# Patient Record
Sex: FEMALE | Race: WHITE | NOT HISPANIC OR LATINO | Employment: OTHER | URBAN - METROPOLITAN AREA
[De-identification: names, ages, dates, MRNs, and addresses within clinical notes are randomized per-mention and may not be internally consistent; named-entity substitution may affect disease eponyms.]

---

## 2017-01-11 ENCOUNTER — OFFICE VISIT (OUTPATIENT)
Dept: UROLOGY | Facility: CLINIC | Age: 42
End: 2017-01-11
Payer: MEDICARE

## 2017-01-11 DIAGNOSIS — N20.0 CALCULUS OF KIDNEY: Primary | ICD-10-CM

## 2017-01-11 DIAGNOSIS — Q05.5 SPINA BIFIDA OF CERVICAL REGION, UNSPECIFIED HYDROCEPHALUS PRESENCE: ICD-10-CM

## 2017-01-11 DIAGNOSIS — T19.1XXA FOREIGN BODY IN BLADDER AND URETHRA, INITIAL ENCOUNTER: ICD-10-CM

## 2017-01-11 DIAGNOSIS — T19.0XXA FOREIGN BODY IN BLADDER AND URETHRA, INITIAL ENCOUNTER: ICD-10-CM

## 2017-01-11 DIAGNOSIS — G91.9 HYDROCEPHALUS: ICD-10-CM

## 2017-01-11 DIAGNOSIS — G83.4 CAUDA EQUINA SYNDROME WITH NEUROGENIC BLADDER: ICD-10-CM

## 2017-01-11 PROCEDURE — 99212 OFFICE O/P EST SF 10 MIN: CPT | Mod: PBBFAC,PO | Performed by: UROLOGY

## 2017-01-11 PROCEDURE — 99999 PR PBB SHADOW E&M-EST. PATIENT-LVL II: CPT | Mod: PBBFAC,,, | Performed by: UROLOGY

## 2017-01-11 PROCEDURE — 99214 OFFICE O/P EST MOD 30 MIN: CPT | Mod: S$PBB,,, | Performed by: UROLOGY

## 2017-01-11 NOTE — PROGRESS NOTES
OFFICE NOTE    CHIEF COMPLAINT:  Neurogenic bladder, indwelling suprapubic catheter,   hydrocephalus, paraplegia, spina bifida, renal calculus.    HISTORY OF PRESENT ILLNESS:  This 41-year-old female returns for routine   recheck.  She has a history of neurogenic bladder as a result of her spina   bifida and hydrocephalus and bladder emptying is being managed with suprapubic tube   that is changed on a regular basis every three to four weeks by her mother and   she states overall she has been doing quite well, but the last time she did   notice some gross hematuria, which  subsequently resolved and the urine is   clear on today's visit.  The patient had been experiencing some intermittent   episodes of left-sided pain, which has experienced in the past.  A KUB was   obtained on 12/28/2016, which revealed a 15 mm calculus over the right kidney.    It was discussed with the patient and the family that we probably will need to   repeat a CT scan using kidney stone protocol, as the last one we obtained was in   2014, for better definition of the renal findings on the stone.    PHYSICAL EXAMINATION:  She has no CVA tenderness.  No abdominal tenderness.  The   suprapubic tube is in good place draining clear urine.    FINAL IMPRESSION:  Indwelling suprapubic tube in good position, neurogenic   bladder with resultant spina bifida, hydrocephalus, and right renal calculus.    RECOMMENDATION:  We will obtain CT scan using kidney stone protocol without   contrast and in the meantime, the mother will continue to change the suprapubic   tube on a regular basis as she has been doing and we will contact them with the   CT scan report.      DENY  dd: 01/11/2017 10:36:27 (CST)  td: 01/11/2017 22:47:17 (CST)  Doc ID   #[id number]  Job ID #[id number]    CC:

## 2017-01-11 NOTE — MR AVS SNAPSHOT
[facility]  [address]  [place] [address]  Phone: [phone]                  Briana Uriarte   2017 10:00 AM   Office Visit    Description:  Female : 1975   Provider:  Rosemarie Smith MD   Department:  [facility]           Reason for Visit     Follow-up           Diagnoses this Visit        Comments    Calculus of kidney    -  Primary     Cauda equina syndrome with neurogenic bladder         Foreign body in bladder and urethra, initial encounter         Spina bifida of cervical region, unspecified hydrocephalus presence         Hydrocephalus                To Do List           Future Appointments        Provider Department Dept Phone    2017 9:15 AM [facility] CT2 LIMIT 500 LBS [facility] [phone]    2017 1:40 PM Anaid Osei MD [facility] [phone]      Goals (5 Years of Data)     None      [facility]     [facility] -  Assistance  Registered nurses in the [facility] provide clinical advisement, health education, appointment booking, and other advisory services.  Call for this free service at [phone].             Medications           Message regarding Medications     Verify the changes and/or additions to your medication regime listed below are the same as discussed with your clinician today.  If any of these changes or additions are incorrect, please notify your healthcare provider.             Verify that the below list of medications is an accurate representation of the medications you are currently taking.  If none reported, the list may be blank. If incorrect, please contact your healthcare provider. Carry this list with you in case of emergency.           Current Medications     albuterol (PROAIR HFA) 90 mcg/actuation inhaler Inhale 2 puffs into the lungs every 6 (six) hours as needed for Wheezing.    ascorbic acid (VITAMIN C) 500 MG tablet  Take 500 mg by mouth once daily.    blood sugar diagnostic (FREESTYLE LITE STRIPS) Strp Use one test strip daily to test blood sugars    blood sugar diagnostic (FREESTYLE LITE STRIPS) Strp TEST BLOOD SUGAR ONCE DAILY    fexofenadine (ALLEGRA) 180 MG tablet Take 180 mg by mouth once daily.    fluocinolone acetonide oil (DERMOTIC OIL) 0.01 % Drop 5 drops in right ear bid x 7-10 days until resolution, use intermittenlty for flares.    fluticasone (FLONASE) 50 mcg/actuation nasal spray     furosemide (LASIX) 20 MG tablet Take 1 tablet (20 mg total) by mouth 2 (two) times daily. Sunday, Wednesday, Friday    hydrocodone-acetaminophen 7.5-325mg (NORCO) 7.5-325 mg per tablet Take 1 tablet by mouth every 6 (six) hours as needed.    lisinopril 10 MG tablet TAKE 1 TABLET BY MOUTH ONCE DAILY    metoprolol succinate (TOPROL-XL) 50 MG 24 hr tablet Take 1 tablet (50 mg total) by mouth once daily.    multivitamin with minerals tablet Take 1 tablet by mouth once daily.    nystatin (MYCOSTATIN) cream Apply topically 2 (two) times daily.    silver sulfADIAZINE 1% (SILVADENE) 1 % cream Apply topically once daily.    triamcinolone acetonide 0.1% (KENALOG) 0.1 % cream Apply topically 2 (two) times daily.    zinc oxide 40 % Oint Apply topically.           Clinical Reference Information           Allergies as of 1/11/2017     Cefepime    Latex      Immunizations Administered on Date of Encounter - 1/11/2017     None      Orders Placed During Today's Visit     Future Labs/Procedures Expected by Expires    CT Abdomen Pelvis  Without Contrast  1/11/2017 1/11/2018

## 2017-01-12 ENCOUNTER — HOSPITAL ENCOUNTER (OUTPATIENT)
Dept: RADIOLOGY | Facility: HOSPITAL | Age: 42
Discharge: HOME | End: 2017-01-12
Attending: UROLOGY
Payer: MEDICARE

## 2017-01-12 DIAGNOSIS — N20.0 CALCULUS OF KIDNEY: ICD-10-CM

## 2017-01-12 PROCEDURE — 74176 CT ABD & PELVIS W/O CONTRAST: CPT | Mod: 26,,, | Performed by: RADIOLOGY

## 2017-01-12 PROCEDURE — 74176 CT ABD & PELVIS W/O CONTRAST: CPT | Mod: TC

## 2017-01-16 ENCOUNTER — TELEPHONE (OUTPATIENT)
Dept: UROLOGY | Facility: CLINIC | Age: 42
End: 2017-01-16

## 2017-01-16 DIAGNOSIS — N20.0 CALCULUS OF KIDNEY: Primary | ICD-10-CM

## 2017-01-26 ENCOUNTER — HOSPITAL ENCOUNTER (OUTPATIENT)
Dept: RADIOLOGY | Facility: HOSPITAL | Age: 42
Discharge: HOME | End: 2017-01-26
Attending: UROLOGY
Payer: MEDICARE

## 2017-01-26 DIAGNOSIS — N20.0 CALCULUS OF KIDNEY: ICD-10-CM

## 2017-01-26 PROCEDURE — 78708 K FLOW/FUNCT IMAGE W/DRUG: CPT | Mod: TC

## 2017-01-26 PROCEDURE — 63600175 PHARM REV CODE 636 W HCPCS: Performed by: RADIOLOGY

## 2017-01-26 PROCEDURE — A9562 TC99M MERTIATIDE: HCPCS

## 2017-01-26 PROCEDURE — 78708 K FLOW/FUNCT IMAGE W/DRUG: CPT | Mod: 26,,, | Performed by: RADIOLOGY

## 2017-01-26 RX ORDER — FUROSEMIDE 10 MG/ML
INJECTION INTRAMUSCULAR; INTRAVENOUS
Status: DISPENSED
Start: 2017-01-26 | End: 2017-01-26

## 2017-01-26 RX ORDER — FUROSEMIDE 10 MG/ML
20 INJECTION INTRAMUSCULAR; INTRAVENOUS ONCE
Status: COMPLETED | OUTPATIENT
Start: 2017-01-26 | End: 2017-01-26

## 2017-01-26 RX ADMIN — FUROSEMIDE 20 MG: 10 INJECTION, SOLUTION INTRAMUSCULAR; INTRAVENOUS at 11:01

## 2017-01-30 ENCOUNTER — PATIENT MESSAGE (OUTPATIENT)
Dept: UROLOGY | Facility: CLINIC | Age: 42
End: 2017-01-30

## 2017-01-30 ENCOUNTER — PATIENT MESSAGE (OUTPATIENT)
Dept: FAMILY MEDICINE | Facility: CLINIC | Age: 42
End: 2017-01-30

## 2017-01-30 ENCOUNTER — TELEPHONE (OUTPATIENT)
Dept: UROLOGY | Facility: CLINIC | Age: 42
End: 2017-01-30

## 2017-01-30 DIAGNOSIS — Q76.0 SPINA BIFIDA OCCULTA: ICD-10-CM

## 2017-01-30 DIAGNOSIS — Q05.9 SPINA BIFIDA MANIFESTA: ICD-10-CM

## 2017-01-30 RX ORDER — HYDROCODONE BITARTRATE AND ACETAMINOPHEN 7.5; 325 MG/1; MG/1
1 TABLET ORAL EVERY 6 HOURS PRN
Qty: 120 TABLET | Refills: 0 | Status: SHIPPED | OUTPATIENT
Start: 2017-01-30 | End: 2017-03-10 | Stop reason: SDUPTHER

## 2017-01-30 NOTE — TELEPHONE ENCOUNTER
----- Message from Randy Pulliam sent at 1/30/2017 11:02 AM CST -----  Contact: pt's dad Blaze  Pt's  Dad returning call, call placed to pod no answer  Call Back#[phone] or   Thanks

## 2017-01-30 NOTE — TELEPHONE ENCOUNTER
----- Message from Rosemarie Smith MD sent at 1/29/2017  6:37 PM CST -----  Renal scan - nonfunctioning left kidney as noted on prior studies                        Normal functioning right kidney    Rec - recheck 3-4 months

## 2017-02-01 ENCOUNTER — PATIENT MESSAGE (OUTPATIENT)
Dept: FAMILY MEDICINE | Facility: CLINIC | Age: 42
End: 2017-02-01

## 2017-02-13 ENCOUNTER — OFFICE VISIT (OUTPATIENT)
Dept: PODIATRY | Facility: CLINIC | Age: 42
End: 2017-02-13
Payer: MEDICARE

## 2017-02-13 ENCOUNTER — HOSPITAL ENCOUNTER (OUTPATIENT)
Dept: RADIOLOGY | Facility: CLINIC | Age: 42
Discharge: HOME | End: 2017-02-13
Attending: PODIATRIST
Payer: MEDICARE

## 2017-02-13 ENCOUNTER — DOCUMENTATION ONLY (OUTPATIENT)
Dept: FAMILY MEDICINE | Facility: CLINIC | Age: 42
End: 2017-02-13

## 2017-02-13 VITALS — WEIGHT: 265 LBS | HEIGHT: 61 IN | BODY MASS INDEX: 50.03 KG/M2

## 2017-02-13 DIAGNOSIS — G82.20 PARAPLEGIA: ICD-10-CM

## 2017-02-13 DIAGNOSIS — I87.2 PERIPHERAL VENOUS INSUFFICIENCY: ICD-10-CM

## 2017-02-13 DIAGNOSIS — E11.42 CONTROLLED TYPE 2 DIABETES MELLITUS WITH DIABETIC POLYNEUROPATHY, UNSPECIFIED LONG TERM INSULIN USE STATUS: ICD-10-CM

## 2017-02-13 DIAGNOSIS — R23.8 SKIN BULLA: Primary | ICD-10-CM

## 2017-02-13 DIAGNOSIS — R23.8 SKIN BULLA: ICD-10-CM

## 2017-02-13 PROCEDURE — 73630 X-RAY EXAM OF FOOT: CPT | Mod: TC,PO,RT

## 2017-02-13 PROCEDURE — 10140 I&D HMTMA SEROMA/FLUID COLLJ: CPT | Mod: S$PBB,,, | Performed by: PODIATRIST

## 2017-02-13 PROCEDURE — 99999 PR PBB SHADOW E&M-EST. PATIENT-LVL III: CPT | Mod: PBBFAC,,, | Performed by: PODIATRIST

## 2017-02-13 PROCEDURE — 99213 OFFICE O/P EST LOW 20 MIN: CPT | Mod: 25,S$PBB,, | Performed by: PODIATRIST

## 2017-02-13 PROCEDURE — 73630 X-RAY EXAM OF FOOT: CPT | Mod: 26,RT,S$GLB, | Performed by: RADIOLOGY

## 2017-02-13 NOTE — MR AVS SNAPSHOT
[facility]  [address]  [place] [address]  Phone: [phone]                  Briana Uriarte   2017 10:00 AM   Office Visit    Description:  Female : 1975   Provider:  Panchito Blue DPM   Department:  [facility]           Reason for Visit     Foot Problem           Diagnoses this Visit        Comments    Skin bulla    -  Primary     Controlled type 2 diabetes mellitus with diabetic polyneuropathy, unspecified long term insulin use status         Paraplegia         Peripheral venous insufficiency                To Do List           Future Appointments        Provider Department Dept Phone    2017 10:20 AM Anaid Osei MD [facility] [phone]    2017 10:45 AM [facility] XR1 [facility] [phone]    2017 10:15 AM Panchito Blue DPM [facility] [phone]    2017 1:40 PM Anaid Osei MD [facility] [phone]    2017 10:00 AM Rosemarie Smith MD [facility] [phone]      Goals (5 Years of Data)     None      Follow-Up and Disposition     Return in about 1 week (around 2017) for ulcer right foot.      [facility]     [facility] On Call Nurse Care Line -  Assistance  Registered nurses in the [facility] On Call Center provide clinical advisement, health education, appointment booking, and other advisory services.  Call for this free service at [phone].             Medications           Message regarding Medications     Verify the changes and/or additions to your medication regime listed below are the same as discussed with your clinician today.  If any of these changes or additions are incorrect, please notify your healthcare provider.             Verify that the below list of medications is an accurate representation of the medications you are currently taking.  If none reported, the list may be blank. If incorrect, please contact your healthcare provider.  "Carry this list with you in case of emergency.           Current Medications     albuterol (PROAIR HFA) 90 mcg/actuation inhaler Inhale 2 puffs into the lungs every 6 (six) hours as needed for Wheezing.    ascorbic acid (VITAMIN C) 500 MG tablet Take 500 mg by mouth once daily.    blood sugar diagnostic (FREESTYLE LITE STRIPS) Strp Use one test strip daily to test blood sugars    blood sugar diagnostic (FREESTYLE LITE STRIPS) Strp TEST BLOOD SUGAR ONCE DAILY    fexofenadine (ALLEGRA) 180 MG tablet Take 180 mg by mouth once daily.    fluocinolone acetonide oil (DERMOTIC OIL) 0.01 % Drop 5 drops in right ear bid x 7-10 days until resolution, use intermittenlty for flares.    fluticasone (FLONASE) 50 mcg/actuation nasal spray     furosemide (LASIX) 20 MG tablet Take 1 tablet (20 mg total) by mouth 2 (two) times daily. Sunday, Wednesday, Friday    hydrocodone-acetaminophen 7.5-325mg (NORCO) 7.5-325 mg per tablet Take 1 tablet by mouth every 6 (six) hours as needed.    lisinopril 10 MG tablet TAKE 1 TABLET BY MOUTH ONCE DAILY    metoprolol succinate (TOPROL-XL) 50 MG 24 hr tablet Take 1 tablet (50 mg total) by mouth once daily.    multivitamin with minerals tablet Take 1 tablet by mouth once daily.    nystatin (MYCOSTATIN) cream Apply topically 2 (two) times daily.    silver sulfADIAZINE 1% (SILVADENE) 1 % cream Apply topically once daily.    triamcinolone acetonide 0.1% (KENALOG) 0.1 % cream Apply topically 2 (two) times daily.    zinc oxide 40 % Oint Apply topically.           Clinical Reference Information           Your Vitals Were     Height Weight BMI          5' 1" (1.549 m) 120.2 kg (265 lb) 50.07 kg/m2        Allergies as of 2/13/2017     Cefepime    Latex      Immunizations Administered on Date of Encounter - 2/13/2017     None      Orders Placed During Today's Visit     Future Labs/Procedures Expected by Expires    X-Ray Foot Complete Right  2/13/2017 2/13/2018      Language Assistance Services     ATTENTION: " Language assistance services are available, free of charge. Please call [phone].      ATENCIÓN: Si habla joan, tiene a park disposición servicios gratuitos de asistencia lingüística. Llame al [phone].     CHÚ Ý: N?u b?n nói Ti?ng Vi?t, có các d?ch v? h? tr? ngôn ng? mi?n phí dành cho b?n. G?i s? [phone].         [facility] complies with applicable Federal civil rights laws and does not discriminate on the basis of race, color, national origin, age, disability, or sex.

## 2017-02-13 NOTE — PROGRESS NOTES
Pre-Visit Chart Review  For Appointment Scheduled on 2-13-17    Health Maintenance Due   Topic Date Due    TETANUS VACCINE  09/24/1993    Pap Smear  09/24/1996    Mammogram  09/24/2015    Eye Exam  02/16/2017

## 2017-02-13 NOTE — PROGRESS NOTES
Subjective:      Patient ID: Briana Uriarte is a 41 y.o. female.    Chief Complaint: Foot Problem (right foot blister)    Clear blister right foot at proximal medial arch indicated navicular tuberosity with index finger.  Gradual onset, worsening over past 2 days, aggravated by increased weight bearing, shoe gear, pressure.  No previous medical treatment.  OTC pain med not helping.  Denies trauma, signs infection.      Review of Systems   Constitution: Negative for chills, diaphoresis, fever, malaise/fatigue and night sweats.   Cardiovascular: Negative for claudication, cyanosis, leg swelling and syncope.   Skin: Positive for suspicious lesions. Negative for color change, dry skin, rash and unusual hair distribution.   Musculoskeletal: Negative for falls, joint pain, joint swelling, muscle cramps, muscle weakness and stiffness.   Gastrointestinal: Negative for constipation, diarrhea, nausea and vomiting.   Neurological: Positive for focal weakness and numbness. Negative for brief paralysis, disturbances in coordination, paresthesias, sensory change and tremors.           Objective:      Physical Exam   Constitutional: She appears well-developed and well-nourished. She is cooperative. No distress.   Cardiovascular:   Pulses:       Dorsalis pedis pulses are 1+ on the right side, and 1+ on the left side.        Posterior tibial pulses are 1+ on the right side, and 1+ on the left side.   All toes warm, pink    2+ pitting edema both legs consistent with stasis.     Musculoskeletal:   parapelegia both legs.   Lymphadenopathy:   Negative lymphadenopathy bilateral popliteal fossa and tarsal tunnel.     Neurological: A sensory deficit is present.   Diminished/loss of protective sensation all toes bilateral to 10 gram monofilament.     Skin:   Wound:medial right arch  Size:    Pre:[id number] - clear tense bulla  Post: [id number]    Base:  Granular, pink with moderate serous/serosanaguinous drainage only.  No pus,  tracking, fluctuance, malodor, or cardinal signs infection.    Borders:   flat, pink, blanchable skin edges without undermining.                 Assessment:       Encounter Diagnoses   Name Primary?    Skin bulla Yes    Controlled type 2 diabetes mellitus with diabetic polyneuropathy, unspecified long term insulin use status     Paraplegia     Peripheral venous insufficiency          Plan:       Briana was seen today for foot problem.    Diagnoses and all orders for this visit:    Skin bulla  -     X-Ray Foot Complete Right; Future    Controlled type 2 diabetes mellitus with diabetic polyneuropathy, unspecified long term insulin use status    Paraplegia    Peripheral venous insufficiency      I counseled the patient on her conditions, their implications and medical management.    This procedure has been fully reviewed with the patient and verbal informed consent mother and patient -has been obtained.    Time out performed prior to anesthetizing the surgical area and all patient identifiers, procedures, and site markings are in agreement.      Dict:    [id number]    Swab wound with betadine once daily and cover with wound foam, kerlix, ACE with light compression RLE changing every 1-2 days or on strike through.      X-ray right foot.        Return in about 1 week (around 2/20/2017) for ulcer right foot.

## 2017-02-13 NOTE — PROCEDURES
DATE OF PROCEDURE:  02/13/2017    PREOPERATIVE DIAGNOSIS:  Bulla, right foot.    POSTOPERATIVE DIAGNOSIS:  Bulla right foot.    PROCEDURE:  Drainage, bulla right foot.    SURGEON:  Panchito Blue DPM    ASSISTANT:  No surgical assist.    ESTIMATED BLOOD LOSS:  Minimal, being less than 1 mL.    HEMOSTASIS:  Anatomic dissection, direct pressure manually.    ANESTHESIA:  The patient's own  peripheral neuropathy from spina bifida.    PROCEDURE IN DETAIL:  The skin of the right foot was prepped and draped in the   office, creating small sterile field.  A sterile #15 blade was then used to   puncture and circumscribe the bulla, which began as a tense clear bulla and   resulted after debridement in a 57 x 43 x 1 mm ulceration.  All the nonviable   wound elements were removed and passed from the operative site as well as the   clear fluid.  The wound was then swabbed with Betadine and covered with a   sterile dressing of wound foam and Kerlix to secure and Ace bandage with light   compression from toes to ankle were then applied over the dressing.  Her family   will change the dressing in the same way approximately every 1 to 2 days or upon   strike through.  The patient will continue regular diet.  She is discharged   today under the care of her family and will follow in this office in 1 week for   reevaluation and monitoring or sooner p.r.n.    /abiel [id number] laterality      MARYELLEN/  dd: 02/13/2017 10:16:58 (CST)  td: 02/13/2017 10:41:12 (CST)  Doc ID   #[id number]  Job ID #[id number]    CC:

## 2017-02-16 ENCOUNTER — PATIENT MESSAGE (OUTPATIENT)
Dept: PODIATRY | Facility: CLINIC | Age: 42
End: 2017-02-16

## 2017-02-20 ENCOUNTER — OFFICE VISIT (OUTPATIENT)
Dept: PODIATRY | Facility: CLINIC | Age: 42
End: 2017-02-20
Payer: MEDICARE

## 2017-02-20 VITALS — BODY MASS INDEX: 50.03 KG/M2 | HEIGHT: 61 IN | WEIGHT: 265 LBS

## 2017-02-20 DIAGNOSIS — L97.511 FOOT ULCER, RIGHT, LIMITED TO BREAKDOWN OF SKIN: Primary | ICD-10-CM

## 2017-02-20 DIAGNOSIS — G82.20 PARAPLEGIA: ICD-10-CM

## 2017-02-20 DIAGNOSIS — E11.42 CONTROLLED TYPE 2 DIABETES MELLITUS WITH DIABETIC POLYNEUROPATHY, UNSPECIFIED LONG TERM INSULIN USE STATUS: ICD-10-CM

## 2017-02-20 PROCEDURE — 99213 OFFICE O/P EST LOW 20 MIN: CPT | Mod: PBBFAC,PO | Performed by: PODIATRIST

## 2017-02-20 PROCEDURE — 99024 POSTOP FOLLOW-UP VISIT: CPT | Mod: S$PBB,,, | Performed by: PODIATRIST

## 2017-02-20 PROCEDURE — 99999 PR PBB SHADOW E&M-EST. PATIENT-LVL III: CPT | Mod: PBBFAC,,, | Performed by: PODIATRIST

## 2017-02-20 NOTE — MR AVS SNAPSHOT
[facility]  [address] [place] [address]  Phone: [phone]                  Briana Uriarte   2017 10:15 AM   Office Visit    Description:  Female : 1975   Provider:  Panchito Blue DPM   Department:  [facility]           Reason for Visit     Foot Ulcer           Diagnoses this Visit        Comments    Foot ulcer, right, limited to breakdown of skin    -  Primary     Controlled type 2 diabetes mellitus with diabetic polyneuropathy, unspecified long term insulin use status         Paraplegia                To Do List           Future Appointments        Provider Department Dept Phone    3/1/2017 1:00 PM Panchito Blue DPM [facility] [phone]    2017 1:40 PM Anaid Osei MD [facility] [phone]    2017 10:00 AM Rosemarie Smith MD [facility] [phone]      Goals (5 Years of Data)     None      Follow-Up and Disposition     Return in about 1 week (around 2017) for wound care.      [facility]     [facility] -  Assistance  Registered nurses in the [facility] provide clinical advisement, health education, appointment booking, and other advisory services.  Call for this free service at [phone].             Medications           Message regarding Medications     Verify the changes and/or additions to your medication regime listed below are the same as discussed with your clinician today.  If any of these changes or additions are incorrect, please notify your healthcare provider.             Verify that the below list of medications is an accurate representation of the medications you are currently taking.  If none reported, the list may be blank. If incorrect, please contact your healthcare provider. Carry this list with you in case of emergency.           Current Medications     albuterol (PROAIR HFA) 90 mcg/actuation inhaler Inhale 2 puffs into the lungs  "every 6 (six) hours as needed for Wheezing.    ascorbic acid (VITAMIN C) 500 MG tablet Take 500 mg by mouth once daily.    blood sugar diagnostic (FREESTYLE LITE STRIPS) Strp Use one test strip daily to test blood sugars    blood sugar diagnostic (FREESTYLE LITE STRIPS) Strp TEST BLOOD SUGAR ONCE DAILY    fexofenadine (ALLEGRA) 180 MG tablet Take 180 mg by mouth once daily.    fluocinolone acetonide oil (DERMOTIC OIL) 0.01 % Drop 5 drops in right ear bid x 7-10 days until resolution, use intermittenlty for flares.    fluticasone (FLONASE) 50 mcg/actuation nasal spray     furosemide (LASIX) 20 MG tablet Take 1 tablet (20 mg total) by mouth 2 (two) times daily. Sunday, Wednesday, Friday    hydrocodone-acetaminophen 7.5-325mg (NORCO) 7.5-325 mg per tablet Take 1 tablet by mouth every 6 (six) hours as needed.    lisinopril 10 MG tablet TAKE 1 TABLET BY MOUTH ONCE DAILY    metoprolol succinate (TOPROL-XL) 50 MG 24 hr tablet Take 1 tablet (50 mg total) by mouth once daily.    multivitamin with minerals tablet Take 1 tablet by mouth once daily.    nystatin (MYCOSTATIN) cream Apply topically 2 (two) times daily.    silver sulfADIAZINE 1% (SILVADENE) 1 % cream Apply topically once daily.    triamcinolone acetonide 0.1% (KENALOG) 0.1 % cream Apply topically 2 (two) times daily.    zinc oxide 40 % Oint Apply topically.           Clinical Reference Information           Your Vitals Were     Height Weight BMI          5' 1" (1.549 m) 120.2 kg (264 lb 15.9 oz) 50.07 kg/m2        Allergies as of 2/20/2017     Cefepime    Latex      Immunizations Administered on Date of Encounter - 2/20/2017     None      Language Assistance Services     ATTENTION: Language assistance services are available, free of charge. Please call [phone].      ATENCIÓN: Si machola joan, tiene a park disposición servicios gratuitos de asistencia lingüística. Llame al [phone].     CHÚ Ý: N?u b?n nói Ti?ng Vi?t, có các d?ch v? h? tr? ngôn ng? mi?n " phí dành cho b?n. G?i s? [phone].         [facility] complies with applicable Federal civil rights laws and does not discriminate on the basis of race, color, national origin, age, disability, or sex.

## 2017-02-20 NOTE — PROGRESS NOTES
"Subjective:      Patient ID: Briana Uriarte is a 41 y.o. female.    Chief Complaint: Foot Ulcer (right foot)    Wound right medial arch from bulla last week.  Dressing with telfa, kerlix.  "draining a lot".  xrays negative focal osteolysis.  Review of Systems   Constitution: Negative for chills, fever, malaise/fatigue and night sweats.   Cardiovascular: Positive for leg swelling. Negative for claudication and cyanosis.   Skin: Positive for poor wound healing. Negative for color change, itching, rash and suspicious lesions.   Musculoskeletal: Negative for falls, gout, joint pain and myalgias.   Neurological: Positive for numbness and sensory change.           Objective:      Physical Exam   Constitutional: She appears well-developed and well-nourished. She is cooperative. No distress.   Cardiovascular:   Pulses:       Dorsalis pedis pulses are 1+ on the right side, and 1+ on the left side.        Posterior tibial pulses are 1+ on the right side, and 1+ on the left side.   All toes warm, pink    2+ pitting edema both legs consistent with stasis.     Musculoskeletal:   parapelegia both legs.   Lymphadenopathy:   Negative lymphadenopathy bilateral popliteal fossa and tarsal tunnel.     Neurological: A sensory deficit is present.   Diminished/loss of protective sensation all toes bilateral to 10 gram monofilament.     Skin:   Wound:medial right arch  Size:    Pre: [id number]    Base:  Granular, pink with moderate serous/serosanaguinous drainage only.  No pus, tracking, fluctuance, malodor, or cardinal signs infection.    Borders:  Atrophic flat, pink, blanchable skin edges without undermining.                 Assessment:       Encounter Diagnoses   Name Primary?    Foot ulcer, right, limited to breakdown of skin Yes    Controlled type 2 diabetes mellitus with diabetic polyneuropathy, unspecified long term insulin use status     Paraplegia          Plan:       Briana was seen today for foot ulcer.    Diagnoses " and all orders for this visit:    Foot ulcer, right, limited to breakdown of skin    Controlled type 2 diabetes mellitus with diabetic polyneuropathy, unspecified long term insulin use status    Paraplegia      I counseled the patient on her conditions, their implications and medical management.        Cover wound base with betadine once daily.    Apply aquacel Ag to wound base.    Cover with wound foam, kerlix to secure.    tubigrip over toes to ankle 12 hours daily.              Return in about 1 week (around 2/27/2017) for wound care.

## 2017-03-01 ENCOUNTER — OFFICE VISIT (OUTPATIENT)
Dept: PODIATRY | Facility: CLINIC | Age: 42
End: 2017-03-01
Payer: MEDICARE

## 2017-03-01 VITALS — HEIGHT: 61 IN

## 2017-03-01 DIAGNOSIS — G82.20 PARAPLEGIA: ICD-10-CM

## 2017-03-01 DIAGNOSIS — E11.42 CONTROLLED TYPE 2 DIABETES MELLITUS WITH DIABETIC POLYNEUROPATHY, UNSPECIFIED LONG TERM INSULIN USE STATUS: ICD-10-CM

## 2017-03-01 DIAGNOSIS — L97.511 FOOT ULCER, RIGHT, LIMITED TO BREAKDOWN OF SKIN: Primary | ICD-10-CM

## 2017-03-01 PROCEDURE — 99212 OFFICE O/P EST SF 10 MIN: CPT | Mod: S$PBB,,, | Performed by: PODIATRIST

## 2017-03-01 PROCEDURE — 99212 OFFICE O/P EST SF 10 MIN: CPT | Mod: PBBFAC,PO | Performed by: PODIATRIST

## 2017-03-01 PROCEDURE — 99999 PR PBB SHADOW E&M-EST. PATIENT-LVL II: CPT | Mod: PBBFAC,,, | Performed by: PODIATRIST

## 2017-03-01 NOTE — MR AVS SNAPSHOT
[facility]  [address] [place] [address]  Phone: [phone]                  Briana Uriarte   3/1/2017 1:00 PM   Office Visit    Description:  Female : 1975   Provider:  Panchito Blue DPM   Department:  [facility]           Reason for Visit     Foot Ulcer           Diagnoses this Visit        Comments    Foot ulcer, right, limited to breakdown of skin    -  Primary     Controlled type 2 diabetes mellitus with diabetic polyneuropathy, unspecified long term insulin use status         Paraplegia                To Do List           Future Appointments        Provider Department Dept Phone    3/9/2017 10:45 AM Panchito Blue DPM [facility] [phone]    2017 1:40 PM Anaid Osei MD [facility] [phone]    2017 10:00 AM [facility] Smith [facility] [phone]      Goals (5 Years of Data)     None      Follow-Up and Disposition     Return in about 1 week (around 3/8/2017) for wound care.      [facility]     [facility] -  Assistance  Registered nurses in the [facility] provide clinical advisement, health education, appointment booking, and other advisory services.  Call for this free service at [phone].             Medications           Message regarding Medications     Verify the changes and/or additions to your medication regime listed below are the same as discussed with your clinician today.  If any of these changes or additions are incorrect, please notify your healthcare provider.             Verify that the below list of medications is an accurate representation of the medications you are currently taking.  If none reported, the list may be blank. If incorrect, please contact your healthcare provider. Carry this list with you in case of emergency.           Current Medications     albuterol (PROAIR HFA) 90 mcg/actuation inhaler Inhale 2 puffs into the lungs  "every 6 (six) hours as needed for Wheezing.    ascorbic acid (VITAMIN C) 500 MG tablet Take 500 mg by mouth once daily.    blood sugar diagnostic (FREESTYLE LITE STRIPS) Strp Use one test strip daily to test blood sugars    blood sugar diagnostic (FREESTYLE LITE STRIPS) Strp TEST BLOOD SUGAR ONCE DAILY    fexofenadine (ALLEGRA) 180 MG tablet Take 180 mg by mouth once daily.    fluocinolone acetonide oil (DERMOTIC OIL) 0.01 % Drop 5 drops in right ear bid x 7-10 days until resolution, use intermittenlty for flares.    fluticasone (FLONASE) 50 mcg/actuation nasal spray     furosemide (LASIX) 20 MG tablet Take 1 tablet (20 mg total) by mouth 2 (two) times daily. Sunday, Wednesday, Friday    hydrocodone-acetaminophen 7.5-325mg (NORCO) 7.5-325 mg per tablet Take 1 tablet by mouth every 6 (six) hours as needed.    lisinopril 10 MG tablet TAKE 1 TABLET BY MOUTH ONCE DAILY    metoprolol succinate (TOPROL-XL) 50 MG 24 hr tablet Take 1 tablet (50 mg total) by mouth once daily.    multivitamin with minerals tablet Take 1 tablet by mouth once daily.    nystatin (MYCOSTATIN) cream Apply topically 2 (two) times daily.    silver sulfADIAZINE 1% (SILVADENE) 1 % cream Apply topically once daily.    triamcinolone acetonide 0.1% (KENALOG) 0.1 % cream Apply topically 2 (two) times daily.    zinc oxide 40 % Oint Apply topically.           Clinical Reference Information           Your Vitals Were     Height                   5' 1" (1.549 m)           Allergies as of 3/1/2017     Cefepime    Latex      Immunizations Administered on Date of Encounter - 3/1/2017     None      Language Assistance Services     ATTENTION: Language assistance services are available, free of charge. Please call [phone].      ATENCIÓN: Si oskar franco, tiene a park disposición servicios gratuitos de asistencia lingüística. Llame al [phone].     CHÚ Ý: N?u b?n nói Ti?ng Vi?t, có các d?ch v? h? tr? ngôn ng? mi?n phí dành cho b?n. G?i s? " [phone].         [facility] complies with applicable Federal civil rights laws and does not discriminate on the basis of race, color, national origin, age, disability, or sex.

## 2017-03-01 NOTE — PROGRESS NOTES
Subjective:      Patient ID: Briana Uriarte is a 41 y.o. female.    Chief Complaint: Foot Ulcer (right)    Wound right medial arch from bulla.  Dressing with aquacel, kerlix changes every other day.  xrays negative focal osteolysis.  Review of Systems   Constitution: Negative for chills, fever, malaise/fatigue and night sweats.   Cardiovascular: Positive for leg swelling. Negative for claudication and cyanosis.   Skin: Positive for poor wound healing. Negative for color change, itching, rash and suspicious lesions.   Musculoskeletal: Negative for falls, gout, joint pain and myalgias.   Neurological: Positive for numbness and sensory change.           Objective:      Physical Exam   Constitutional: She appears well-developed and well-nourished. She is cooperative. No distress.   Cardiovascular:   Pulses:       Dorsalis pedis pulses are 1+ on the right side, and 1+ on the left side.        Posterior tibial pulses are 1+ on the right side, and 1+ on the left side.   All toes warm, pink    2+ pitting edema both legs consistent with stasis.     Musculoskeletal:   parapelegia both legs.   Lymphadenopathy:   Negative lymphadenopathy bilateral popliteal fossa and tarsal tunnel.     Neurological: A sensory deficit is present.   Diminished/loss of protective sensation all toes bilateral to 10 gram monofilament.     Skin:   Wound:medial right arch  Size:    Pre: [id number]    Base:  Granular, pink with moderate serous/serosanaguinous drainage only.  No pus, tracking, fluctuance, malodor, or cardinal signs infection.    Borders:  Atrophic flat, pink, blanchable skin edges without undermining.                 Assessment:       Encounter Diagnoses   Name Primary?    Foot ulcer, right, limited to breakdown of skin Yes    Controlled type 2 diabetes mellitus with diabetic polyneuropathy, unspecified long term insulin use status     Paraplegia          Plan:       Briana was seen today for foot ulcer.    Diagnoses and all  orders for this visit:    Foot ulcer, right, limited to breakdown of skin    Controlled type 2 diabetes mellitus with diabetic polyneuropathy, unspecified long term insulin use status    Paraplegia      I counseled the patient on her conditions, their implications and medical management.    Stop aquacel.    Cover with triple antibiotic ointment, wound foam, kerlix to secure.    tubigrip over toes to ankle 12 hours daily.              Return in about 1 week (around 3/8/2017) for wound care.

## 2017-03-09 ENCOUNTER — OFFICE VISIT (OUTPATIENT)
Dept: PODIATRY | Facility: CLINIC | Age: 42
End: 2017-03-09
Payer: MEDICARE

## 2017-03-09 VITALS — BODY MASS INDEX: 49.95 KG/M2 | WEIGHT: 264.56 LBS | HEIGHT: 61 IN

## 2017-03-09 DIAGNOSIS — G82.20 PARAPLEGIA: ICD-10-CM

## 2017-03-09 DIAGNOSIS — R60.9 EDEMA, UNSPECIFIED TYPE: ICD-10-CM

## 2017-03-09 DIAGNOSIS — Z87.898 HISTORY OF ULCERATION: Primary | ICD-10-CM

## 2017-03-09 DIAGNOSIS — E11.42 CONTROLLED TYPE 2 DIABETES MELLITUS WITH DIABETIC POLYNEUROPATHY, UNSPECIFIED LONG TERM INSULIN USE STATUS: ICD-10-CM

## 2017-03-09 PROCEDURE — 99999 PR PBB SHADOW E&M-EST. PATIENT-LVL III: CPT | Mod: PBBFAC,,, | Performed by: PODIATRIST

## 2017-03-09 PROCEDURE — 99212 OFFICE O/P EST SF 10 MIN: CPT | Mod: S$PBB,,, | Performed by: PODIATRIST

## 2017-03-09 PROCEDURE — 99213 OFFICE O/P EST LOW 20 MIN: CPT | Mod: PBBFAC,PO | Performed by: PODIATRIST

## 2017-03-09 NOTE — MR AVS SNAPSHOT
[facility]  [address]  [place] [address]  Phone: [phone]                  Briana Uriarte   3/9/2017 10:45 AM   Office Visit    Description:  Female : 1975   Provider:  Panchito Blue DPM   Department:  [facility]           Reason for Visit     Foot Ulcer           Diagnoses this Visit        Comments    History of ulceration    -  Primary     Controlled type 2 diabetes mellitus with diabetic polyneuropathy, unspecified long term insulin use status         Paraplegia         Edema, unspecified type                To Do List           Future Appointments        Provider Department Dept Phone    3/23/2017 10:45 AM Panchito Blue DPM [facility] [phone]    2017 1:40 PM Anaid Osei MD [facility] [phone]    2017 10:00 AM Rosemarie Smith MD [facility] [phone]      Goals (5 Years of Data)     None      Follow-Up and Disposition     Return in about 2 weeks (around 3/23/2017) for wound right heel - closed.      [facility]     [facility] -  Assistance  Registered nurses in the [facility] provide clinical advisement, health education, appointment booking, and other advisory services.  Call for this free service at [phone].             Medications           Message regarding Medications     Verify the changes and/or additions to your medication regime listed below are the same as discussed with your clinician today.  If any of these changes or additions are incorrect, please notify your healthcare provider.             Verify that the below list of medications is an accurate representation of the medications you are currently taking.  If none reported, the list may be blank. If incorrect, please contact your healthcare provider. Carry this list with you in case of emergency.           Current Medications     albuterol (PROAIR HFA) 90 mcg/actuation inhaler Inhale 2  "puffs into the lungs every 6 (six) hours as needed for Wheezing.    ascorbic acid (VITAMIN C) 500 MG tablet Take 500 mg by mouth once daily.    blood sugar diagnostic (FREESTYLE LITE STRIPS) Strp Use one test strip daily to test blood sugars    blood sugar diagnostic (FREESTYLE LITE STRIPS) Strp TEST BLOOD SUGAR ONCE DAILY    fexofenadine (ALLEGRA) 180 MG tablet Take 180 mg by mouth once daily.    fluocinolone acetonide oil (DERMOTIC OIL) 0.01 % Drop 5 drops in right ear bid x 7-10 days until resolution, use intermittenlty for flares.    fluticasone (FLONASE) 50 mcg/actuation nasal spray     furosemide (LASIX) 20 MG tablet Take 1 tablet (20 mg total) by mouth 2 (two) times daily. Sunday, Wednesday, Friday    hydrocodone-acetaminophen 7.5-325mg (NORCO) 7.5-325 mg per tablet Take 1 tablet by mouth every 6 (six) hours as needed.    lisinopril 10 MG tablet TAKE 1 TABLET BY MOUTH ONCE DAILY    metoprolol succinate (TOPROL-XL) 50 MG 24 hr tablet Take 1 tablet (50 mg total) by mouth once daily.    multivitamin with minerals tablet Take 1 tablet by mouth once daily.    nystatin (MYCOSTATIN) cream Apply topically 2 (two) times daily.    silver sulfADIAZINE 1% (SILVADENE) 1 % cream Apply topically once daily.    triamcinolone acetonide 0.1% (KENALOG) 0.1 % cream Apply topically 2 (two) times daily.    zinc oxide 40 % Oint Apply topically.           Clinical Reference Information           Your Vitals Were     Height Weight BMI          5' 1" (1.549 m) 120 kg (264 lb 8.8 oz) 49.99 kg/m2        Allergies as of 3/9/2017     Cefepime    Latex      Immunizations Administered on Date of Encounter - 3/9/2017     None      Language Assistance Services     ATTENTION: Language assistance services are available, free of charge. Please call [phone].      ATENCIÓN: Si machola joan, tiene a park disposición servicios gratuitos de asistencia lingüística. Llame al [phone].     CHÚ Ý: N?u b?n nói Ti?ng Vi?t, có các d?ch v? h? tr? " cassandra doherty? mi?n phí dành cho b?n. G?i s? [phone].         [facility] complies with applicable Federal civil rights laws and does not discriminate on the basis of race, color, national origin, age, disability, or sex.

## 2017-03-09 NOTE — PROGRESS NOTES
Subjective:      Patient ID: Briana Uriarte is a 41 y.o. female.    Chief Complaint: Foot Ulcer (right foot)    Wound right medial arch from bulla.  Dressing with aquacel, kerlix changes every other day.  xrays negative focal osteolysis.    Review of Systems   Constitution: Negative for chills, fever, malaise/fatigue and night sweats.   Cardiovascular: Positive for leg swelling. Negative for claudication and cyanosis.   Skin: Positive for poor wound healing. Negative for color change, itching, rash and suspicious lesions.   Musculoskeletal: Negative for falls, gout, joint pain and myalgias.   Neurological: Positive for numbness and sensory change.           Objective:      Physical Exam   Constitutional: She appears well-developed and well-nourished. She is cooperative. No distress.   Cardiovascular:   Pulses:       Dorsalis pedis pulses are 1+ on the right side, and 1+ on the left side.        Posterior tibial pulses are 1+ on the right side, and 1+ on the left side.   All toes warm, pink    2+ pitting edema both legs consistent with stasis.     Musculoskeletal:   parapelegia both legs.   Lymphadenopathy:   Negative lymphadenopathy bilateral popliteal fossa and tarsal tunnel.     Neurological: A sensory deficit is present.   Diminished/loss of protective sensation all toes bilateral to 10 gram monofilament.     Skin:   Wound medial right arch/heel closed today, epithelialized  without ulceration, drainage, pus, tracking, fluctuance, malodor, or cardinal signs infection.                 Assessment:       Encounter Diagnoses   Name Primary?    History of ulceration Yes    Controlled type 2 diabetes mellitus with diabetic polyneuropathy, unspecified long term insulin use status     Paraplegia     Edema, unspecified type          Plan:       Briana was seen today for foot ulcer.    Diagnoses and all orders for this visit:    History of ulceration    Controlled type 2 diabetes mellitus with diabetic  polyneuropathy, unspecified long term insulin use status    Paraplegia    Edema, unspecified type      I counseled the patient on her conditions, their implications and medical management.    Protect right arch/heel daily with dry dresing.    tubigrip over toes to ankle 12 hours daily.    Defer to PCP for long term recommendation for serial compression LE bilateral.      monitor both feet/legs multiple times daily for signs of occurrence/recurrence ulceration/infection.    Return in about 2 weeks (around 3/23/2017) for wound right heel - closed.

## 2017-03-10 ENCOUNTER — PATIENT MESSAGE (OUTPATIENT)
Dept: FAMILY MEDICINE | Facility: CLINIC | Age: 42
End: 2017-03-10

## 2017-03-10 DIAGNOSIS — Q76.0 SPINA BIFIDA OCCULTA: ICD-10-CM

## 2017-03-10 DIAGNOSIS — Q05.9 SPINA BIFIDA MANIFESTA: ICD-10-CM

## 2017-03-10 RX ORDER — HYDROCODONE BITARTRATE AND ACETAMINOPHEN 7.5; 325 MG/1; MG/1
1 TABLET ORAL EVERY 6 HOURS PRN
Qty: 120 TABLET | Refills: 0 | Status: SHIPPED | OUTPATIENT
Start: 2017-03-10 | End: 2017-04-12 | Stop reason: SDUPTHER

## 2017-03-14 ENCOUNTER — PATIENT MESSAGE (OUTPATIENT)
Dept: FAMILY MEDICINE | Facility: CLINIC | Age: 42
End: 2017-03-14

## 2017-03-23 ENCOUNTER — OFFICE VISIT (OUTPATIENT)
Dept: PODIATRY | Facility: CLINIC | Age: 42
End: 2017-03-23
Payer: MEDICARE

## 2017-03-23 VITALS — HEIGHT: 61 IN | WEIGHT: 264.56 LBS | BODY MASS INDEX: 49.95 KG/M2

## 2017-03-23 DIAGNOSIS — Z87.898 HISTORY OF ULCERATION: Primary | ICD-10-CM

## 2017-03-23 DIAGNOSIS — E11.42 CONTROLLED TYPE 2 DIABETES MELLITUS WITH DIABETIC POLYNEUROPATHY, UNSPECIFIED LONG TERM INSULIN USE STATUS: ICD-10-CM

## 2017-03-23 DIAGNOSIS — G82.20 PARAPLEGIA: ICD-10-CM

## 2017-03-23 PROCEDURE — 99212 OFFICE O/P EST SF 10 MIN: CPT | Mod: S$PBB,,, | Performed by: PODIATRIST

## 2017-03-23 PROCEDURE — 99213 OFFICE O/P EST LOW 20 MIN: CPT | Mod: PBBFAC,PO | Performed by: PODIATRIST

## 2017-03-23 PROCEDURE — 99999 PR PBB SHADOW E&M-EST. PATIENT-LVL III: CPT | Mod: PBBFAC,,, | Performed by: PODIATRIST

## 2017-03-23 NOTE — PROGRESS NOTES
Subjective:      Patient ID: Briana Uriarte is a 41 y.o. female.    Chief Complaint: Wound Check (Rt foot)    Wound right medial arch from bulla.  Dressing with aquacel, kerlix changes every other day.  xrays negative focal osteolysis.    Review of Systems   Constitution: Negative for chills, fever, malaise/fatigue and night sweats.   Cardiovascular: Positive for leg swelling. Negative for claudication and cyanosis.   Skin: Positive for poor wound healing. Negative for color change, itching, rash and suspicious lesions.   Musculoskeletal: Negative for falls, gout, joint pain and myalgias.   Neurological: Positive for numbness and sensory change.           Objective:      Physical Exam   Constitutional: She appears well-developed and well-nourished. She is cooperative. No distress.   Cardiovascular:   Pulses:       Dorsalis pedis pulses are 1+ on the right side, and 1+ on the left side.        Posterior tibial pulses are 1+ on the right side, and 1+ on the left side.   All toes warm, pink    2+ pitting edema both legs consistent with stasis.     Musculoskeletal:   parapelegia both legs.   Lymphadenopathy:   Negative lymphadenopathy bilateral popliteal fossa and tarsal tunnel.     Neurological: A sensory deficit is present.   Diminished/loss of protective sensation all toes bilateral to 10 gram monofilament.     Skin:   Wound medial right arch/heel remains closed today, epithelialized  without ulceration, drainage, pus, tracking, fluctuance, malodor, or cardinal signs infection.                 Assessment:       Encounter Diagnoses   Name Primary?    History of ulceration Yes    Controlled type 2 diabetes mellitus with diabetic polyneuropathy, unspecified long term insulin use status     Paraplegia          Plan:       Briana was seen today for wound check.    Diagnoses and all orders for this visit:    History of ulceration    Controlled type 2 diabetes mellitus with diabetic polyneuropathy, unspecified  long term insulin use status    Paraplegia      I counseled the patient on her conditions, their implications and medical management.    Protect right arch/heel daily with dry dresing.    tubigrip over toes to ankle 12 hours daily.    Defer to PCP for long term recommendation for serial compression LE bilateral.      monitor both feet/legs multiple times daily for signs of occurrence/recurrence ulceration/infection.    Return in about 1 year (around 3/23/2018) for AR exam.

## 2017-03-23 NOTE — MR AVS SNAPSHOT
[facility]  [address] [place] [address]  Phone: [phone]                  Briana Uriarte   3/23/2017 10:45 AM   Office Visit    Description:  Female : 1975   Provider:  Panchito Blue DPM   Department:  [facility]           Reason for Visit     Wound Check           Diagnoses this Visit        Comments    History of ulceration    -  Primary     Controlled type 2 diabetes mellitus with diabetic polyneuropathy, unspecified long term insulin use status         Paraplegia                To Do List           Future Appointments        Provider Department Dept Phone    3/23/2017 10:45 AM Panchito Blue DPM [facility] [phone]    2017 1:40 PM Anaid Osei MD [facility] [phone]    2017 10:00 AM Rosemarie Smith MD [facility] [phone]      Goals (5 Years of Data)     None      Follow-Up and Disposition     Return in about 1 year (around 3/23/2018) for AR exam.      [facility]     [facility] On Call Nurse [facility] Line -  Assistance  Registered nurses in the [facility] Call Center provide clinical advisement, health education, appointment booking, and other advisory services.  Call for this free service at [phone].             Medications           Message regarding Medications     Verify the changes and/or additions to your medication regime listed below are the same as discussed with your clinician today.  If any of these changes or additions are incorrect, please notify your healthcare provider.             Verify that the below list of medications is an accurate representation of the medications you are currently taking.  If none reported, the list may be blank. If incorrect, please contact your healthcare provider. Carry this list with you in case of emergency.           Current Medications     albuterol (PROAIR HFA) 90 mcg/actuation inhaler Inhale 2 puffs into the lungs every 6 (six) hours as  "needed for Wheezing.    ascorbic acid (VITAMIN C) 500 MG tablet Take 500 mg by mouth once daily.    blood sugar diagnostic (FREESTYLE LITE STRIPS) Strp Use one test strip daily to test blood sugars    blood sugar diagnostic (FREESTYLE LITE STRIPS) Strp TEST BLOOD SUGAR ONCE DAILY    fexofenadine (ALLEGRA) 180 MG tablet Take 180 mg by mouth once daily.    fluocinolone acetonide oil (DERMOTIC OIL) 0.01 % Drop 5 drops in right ear bid x 7-10 days until resolution, use intermittenlty for flares.    fluticasone (FLONASE) 50 mcg/actuation nasal spray     furosemide (LASIX) 20 MG tablet Take 1 tablet (20 mg total) by mouth 2 (two) times daily. Sunday, Wednesday, Friday    hydrocodone-acetaminophen 7.5-325mg (NORCO) 7.5-325 mg per tablet Take 1 tablet by mouth every 6 (six) hours as needed.    lisinopril 10 MG tablet TAKE 1 TABLET BY MOUTH ONCE DAILY    metoprolol succinate (TOPROL-XL) 50 MG 24 hr tablet Take 1 tablet (50 mg total) by mouth once daily.    multivitamin with minerals tablet Take 1 tablet by mouth once daily.    nystatin (MYCOSTATIN) cream Apply topically 2 (two) times daily.    silver sulfADIAZINE 1% (SILVADENE) 1 % cream Apply topically once daily.    triamcinolone acetonide 0.1% (KENALOG) 0.1 % cream Apply topically 2 (two) times daily.    zinc oxide 40 % Oint Apply topically.           Clinical Reference Information           Your Vitals Were     Height Weight BMI          5' 1" (1.549 m) 120 kg (264 lb 8.8 oz) 49.99 kg/m2        Allergies as of 3/23/2017     Cefepime    Latex      Immunizations Administered on Date of Encounter - 3/23/2017     None      Language Assistance Services     ATTENTION: Language assistance services are available, free of charge. Please call [phone].      ATENCIÓN: Si oskar franco, tiene a park disposición servicios gratuitos de asistencia lingüística. Llame al [phone].     CHÚ Ý: N?u b?n nói Ti?ng Vi?t, có các d?ch v? h? tr? ngôn ng? mi?n phí dành cho b?n. G?i s? " [phone].         [facility] complies with applicable Federal civil rights laws and does not discriminate on the basis of race, color, national origin, age, disability, or sex.

## 2017-04-10 ENCOUNTER — DOCUMENTATION ONLY (OUTPATIENT)
Dept: FAMILY MEDICINE | Facility: CLINIC | Age: 42
End: 2017-04-10

## 2017-04-10 NOTE — PROGRESS NOTES
Pre-Visit Chart Review  For Appointment Scheduled on 4/12/17    Health Maintenance Due   Topic Date Due    TETANUS VACCINE  09/24/1993    Pap Smear  09/24/1996    Mammogram  09/24/2015    Eye Exam  02/16/2017

## 2017-04-12 ENCOUNTER — OFFICE VISIT (OUTPATIENT)
Dept: FAMILY MEDICINE | Facility: CLINIC | Age: 42
End: 2017-04-12
Payer: MEDICARE

## 2017-04-12 VITALS
SYSTOLIC BLOOD PRESSURE: 137 MMHG | DIASTOLIC BLOOD PRESSURE: 94 MMHG | HEIGHT: 61 IN | TEMPERATURE: 98 F | HEART RATE: 89 BPM

## 2017-04-12 DIAGNOSIS — M25.851 RIGHT HIP IMPINGEMENT SYNDROME: ICD-10-CM

## 2017-04-12 DIAGNOSIS — I10 ESSENTIAL HYPERTENSION: ICD-10-CM

## 2017-04-12 DIAGNOSIS — E66.01 MORBID OBESITY WITH BMI OF 50.0-59.9, ADULT: ICD-10-CM

## 2017-04-12 DIAGNOSIS — D50.9 IRON DEFICIENCY ANEMIA, UNSPECIFIED IRON DEFICIENCY ANEMIA TYPE: ICD-10-CM

## 2017-04-12 DIAGNOSIS — I89.0 CHRONIC ACQUIRED LYMPHEDEMA: ICD-10-CM

## 2017-04-12 DIAGNOSIS — Q05.9 SPINA BIFIDA MANIFESTA: ICD-10-CM

## 2017-04-12 DIAGNOSIS — Q76.0 SPINA BIFIDA OCCULTA: ICD-10-CM

## 2017-04-12 DIAGNOSIS — E11.42 CONTROLLED TYPE 2 DIABETES MELLITUS WITH DIABETIC POLYNEUROPATHY, UNSPECIFIED LONG TERM INSULIN USE STATUS: Primary | ICD-10-CM

## 2017-04-12 DIAGNOSIS — Z23 NEED FOR DIPHTHERIA-TETANUS-PERTUSSIS (TDAP) VACCINE: ICD-10-CM

## 2017-04-12 DIAGNOSIS — S90.415A ABRASION OF TOE OF LEFT FOOT, INITIAL ENCOUNTER: ICD-10-CM

## 2017-04-12 PROCEDURE — 99213 OFFICE O/P EST LOW 20 MIN: CPT | Mod: PBBFAC,PO | Performed by: FAMILY MEDICINE

## 2017-04-12 PROCEDURE — 90715 TDAP VACCINE 7 YRS/> IM: CPT | Mod: PBBFAC,PO | Performed by: FAMILY MEDICINE

## 2017-04-12 PROCEDURE — 90471 IMMUNIZATION ADMIN: CPT | Mod: PBBFAC,PO | Performed by: FAMILY MEDICINE

## 2017-04-12 PROCEDURE — 99999 PR PBB SHADOW E&M-EST. PATIENT-LVL III: CPT | Mod: PBBFAC,,, | Performed by: FAMILY MEDICINE

## 2017-04-12 PROCEDURE — 99214 OFFICE O/P EST MOD 30 MIN: CPT | Mod: S$PBB,,, | Performed by: FAMILY MEDICINE

## 2017-04-12 RX ORDER — HYDROCODONE BITARTRATE AND ACETAMINOPHEN 7.5; 325 MG/1; MG/1
1 TABLET ORAL EVERY 6 HOURS PRN
Qty: 120 TABLET | Refills: 0 | Status: SHIPPED | OUTPATIENT
Start: 2017-06-08 | End: 2017-07-28 | Stop reason: SDUPTHER

## 2017-04-12 RX ORDER — HYDROCODONE BITARTRATE AND ACETAMINOPHEN 7.5; 325 MG/1; MG/1
1 TABLET ORAL EVERY 6 HOURS PRN
Qty: 120 TABLET | Refills: 0 | Status: SHIPPED | OUTPATIENT
Start: 2017-04-12 | End: 2017-05-31 | Stop reason: ALTCHOICE

## 2017-04-12 RX ORDER — HYDROCODONE BITARTRATE AND ACETAMINOPHEN 7.5; 325 MG/1; MG/1
1 TABLET ORAL EVERY 6 HOURS PRN
Qty: 120 TABLET | Refills: 0 | Status: SHIPPED | OUTPATIENT
Start: 2017-05-10 | End: 2017-05-31 | Stop reason: ALTCHOICE

## 2017-04-12 RX ORDER — HYDROCHLOROTHIAZIDE 25 MG/1
25 TABLET ORAL DAILY
Qty: 30 TABLET | Refills: 11 | Status: SHIPPED | OUTPATIENT
Start: 2017-04-12 | End: 2018-04-08 | Stop reason: SDUPTHER

## 2017-04-12 NOTE — PROGRESS NOTES
2 patient identifiers used (name & ). Administered tdap vaccine IM. Patient tolerated well, no bleeding at insertion site noted. Pain scale 0/10. Aseptic technique maintained.

## 2017-04-12 NOTE — MR AVS SNAPSHOT
[facility]  [address]  [place] [address]  Phone: [phone]  Fax: [phone]                  Briana Uriarte   2017 10:40 AM   Office Visit    Description:  Female : 1975   Provider:  Anaid Osei MD   Department:  [place] - Family [facility]           Reason for Visit     Follow-up           Diagnoses this Visit        Comments    Controlled type 2 diabetes mellitus with diabetic polyneuropathy, unspecified long term insulin use status    -  Primary     Iron deficiency anemia, unspecified iron deficiency anemia type         Right hip impingement syndrome         Chronic acquired lymphedema         Essential hypertension         Spina bifida manifesta         Spina bifida occulta         Need for diphtheria-tetanus-pertussis (Tdap) vaccine         Abrasion of toe of left foot, initial encounter                To Do List           Future Appointments        Provider Department Dept Phone    2017 10:00 AM Paulina Lindsay DO [place] [phone]    2017 10:00 AM [facility] [phone]    2017 11:00 AM Anaid Osei MD [facility] [phone]      Goals (5 Years of Data)     None      Follow-Up and Disposition     Return in about 3 months (around 2017).       These Medications        Disp Refills Start End    hydrochlorothiazide (HYDRODIURIL) 25 MG tablet 30 tablet 11 2017    Take 1 tablet (25 mg total) by mouth once daily. - Oral    [facility] [address] [address] AT [facility] Ph #: [phone]       hydrocodone-acetaminophen 7.5-325mg (NORCO) 7.5-325 mg per tablet 120 tablet 0 2017     Take 1 tablet by mouth every 6 (six) hours as needed. - Oral    Pharmacy: [facility] [address] [facility] [address] [facility] AT [facility] Ph #: [phone]        hydrocodone-acetaminophen 7.5-325mg (NORCO) 7.5-325 mg per tablet 120 tablet 0 5/10/2017     Take 1 tablet by mouth every 6 (six) hours as needed for Pain. - Oral    Pharmacy: [facility] [address] [facility], [place] - [address] AT [facility] Ph #: [phone]       hydrocodone-acetaminophen 7.5-325mg (NORCO) 7.5-325 mg per tablet 120 tablet 0 6/8/2017     Take 1 tablet by mouth every 6 (six) hours as needed for Pain. - Oral    Pharmacy: [facility] [id number] - [place], [place] - [address] AT [facility] Ph #: [phone]         [facility]     [facility] On [facility] Nurse Care Line - 24/7 Assistance  Unless otherwise directed by your provider, please contact [facility], our nurse care line that is available for 24/7 assistance.     Registered nurses in the [facility] provide: appointment scheduling, clinical advisement, health education, and other advisory services.  Call: [phone] (toll free)               Medications           Message regarding Medications     Verify the changes and/or additions to your medication regime listed below are the same as discussed with your clinician today.  If any of these changes or additions are incorrect, please notify your healthcare provider.        START taking these NEW medications        Refills    hydrochlorothiazide (HYDRODIURIL) 25 MG tablet 11    Sig: Take 1 tablet (25 mg total) by mouth once daily.    Class: Normal    Route: Oral    hydrocodone-acetaminophen 7.5-325mg (NORCO) 7.5-325 mg per tablet 0    Starting on: 5/10/2017    Sig: Take 1 tablet by mouth every 6 (six) hours as needed for Pain.    Class: Normal    Route: Oral    hydrocodone-acetaminophen 7.5-325mg (NORCO) 7.5-325 mg per tablet 0    Starting on: 6/8/2017    Sig: Take 1 tablet by mouth every 6 (six) hours as needed for Pain.    Class: Normal    Route: Oral      STOP taking these medications     furosemide (LASIX) 20 MG tablet Take 1  tablet (20 mg total) by mouth 2 (two) times daily. Sunday, Wednesday, Friday           Verify that the below list of medications is an accurate representation of the medications you are currently taking.  If none reported, the list may be blank. If incorrect, please contact your healthcare provider. Carry this list with you in case of emergency.           Current Medications     albuterol (PROAIR HFA) 90 mcg/actuation inhaler Inhale 2 puffs into the lungs every 6 (six) hours as needed for Wheezing.    ascorbic acid (VITAMIN C) 500 MG tablet Take 500 mg by mouth once daily.    blood sugar diagnostic (FREESTYLE LITE STRIPS) Strp Use one test strip daily to test blood sugars    blood sugar diagnostic (FREESTYLE LITE STRIPS) Strp TEST BLOOD SUGAR ONCE DAILY    fexofenadine (ALLEGRA) 180 MG tablet Take 180 mg by mouth once daily.    fluocinolone acetonide oil (DERMOTIC OIL) 0.01 % Drop 5 drops in right ear bid x 7-10 days until resolution, use intermittenlty for flares.    fluticasone (FLONASE) 50 mcg/actuation nasal spray     hydrocodone-acetaminophen 7.5-325mg (NORCO) 7.5-325 mg per tablet Take 1 tablet by mouth every 6 (six) hours as needed.    lisinopril 10 MG tablet TAKE 1 TABLET BY MOUTH ONCE DAILY    metoprolol succinate (TOPROL-XL) 50 MG 24 hr tablet Take 1 tablet (50 mg total) by mouth once daily.    multivitamin with minerals tablet Take 1 tablet by mouth once daily.    nystatin (MYCOSTATIN) cream Apply topically 2 (two) times daily.    silver sulfADIAZINE 1% (SILVADENE) 1 % cream Apply topically once daily.    triamcinolone acetonide 0.1% (KENALOG) 0.1 % cream Apply topically 2 (two) times daily.    zinc oxide 40 % Oint Apply topically.    hydrochlorothiazide (HYDRODIURIL) 25 MG tablet Take 1 tablet (25 mg total) by mouth once daily.    hydrocodone-acetaminophen 7.5-325mg (NORCO) 7.5-325 mg per tablet Starting on May 10, 2017. Take 1 tablet by mouth every 6 (six) hours as needed for Pain.     "hydrocodone-acetaminophen 7.5-325mg (NORCO) 7.5-325 mg per tablet Starting on Jun 08, 2017. Take 1 tablet by mouth every 6 (six) hours as needed for Pain.           Clinical Reference Information           Your Vitals Were     BP Pulse Temp Height Last Period       137/94 (BP Location: Left arm, Patient Position: Sitting, BP Method: Automatic) 89 97.7 °F (36.5 °C) (Oral) 5' 1" (1.549 m) 04/03/2017       Blood Pressure          Most Recent Value    BP  (!)  137/94      Allergies as of 4/12/2017     Cefepime    Latex      Immunizations Administered on Date of Encounter - 4/12/2017     Name Date Dose VIS Date Route    TDAP  Incomplete 0.5 mL 2/24/2015 Intramuscular      Orders Placed During Today's Visit      Normal Orders This Visit    Ambulatory referral to Physical Medicine Rehab     COMPRESSION STOCKINGS     Tdap Vaccine     Future Labs/Procedures Expected by Expires    CBC auto differential  4/12/2017 6/11/2018    Comprehensive metabolic panel  4/12/2017 6/11/2018    Ferritin  4/12/2017 6/11/2018    Hemoglobin A1c  4/12/2017 6/11/2018    Iron and TIBC  4/12/2017 6/11/2018      Language Assistance Services     ATTENTION: Language assistance services are available, free of charge. Please call [phone].      ATENCIÓN: Si machola joan, tiene a park disposición servicios gratuitos de asistencia lingüística. Llame al [phone].     CHÚ Ý: N?u b?n nói Ti?ng Vi?t, có các d?ch v? h? tr? ngôn ng? mi?n phí dành cho b?n. G?i s? [phone].         [place] [facility] complies with applicable Federal civil rights laws and does not discriminate on the basis of race, color, national origin, age, disability, or sex.        "

## 2017-04-22 NOTE — PROGRESS NOTES
Subjective:       Patient ID: Briana Uriarte is a 41 y.o. female.    Chief Complaint: Follow-up (3 month )    Patient Active Problem List   Diagnosis    Non-healing ulcer of buttock    Spina bifida manifesta    Peripheral venous insufficiency    Morbid obesity with BMI of 50.0-59.9, adult    Paraplegia    Pickwickian syndrome    Diabetes mellitus type II, controlled    Asthma    Obstructive hydrocephalus    Encephalopathy    Intracranial hypertension    Chiari malformation type II    Microcytic anemia    Spina bifida of lumbar region    Suprapubic catheter    Opioid dependence with perceptual disturbance    Physical debility    Umbilical hernia    Skin bulla    Skin ulcer of right foot, limited to breakdown of skin   BS average 135 in am.  Last eye exam [facility] 2/16    Chronic back pain- stable on qid norco    Needs urostomy supplies and compression stockings for chronic le edema.  Lasix not making much difference.  BP not controlled    HPI  Review of Systems   Constitutional: Negative for fatigue and unexpected weight change.   Respiratory: Negative for chest tightness and shortness of breath.    Cardiovascular: Positive for leg swelling. Negative for chest pain and palpitations.   Gastrointestinal: Negative for abdominal pain.   Musculoskeletal: Positive for back pain. Negative for arthralgias.   Neurological: Negative for dizziness, syncope, light-headedness and headaches.       Objective:      Physical Exam   Constitutional: She is oriented to person, place, and time. She appears well-developed and well-nourished.   Cardiovascular: Normal rate, regular rhythm and normal heart sounds.    Pulmonary/Chest: Effort normal and breath sounds normal.   Abdominal: Soft. Bowel sounds are normal. A hernia (soft reducible ventral hernia in lower mid abdomen from previous ostomy) is present.   Musculoskeletal: She exhibits no edema.   Neurological: She is alert and oriented to person,  place, and time.   Skin: Skin is warm and dry. There is erythema (abrasion to tip of left big toe).   Psychiatric: She has a normal mood and affect.   Nursing note and vitals reviewed.      Assessment:       1. Controlled type 2 diabetes mellitus with diabetic polyneuropathy, unspecified long term insulin use status    2. Iron deficiency anemia, unspecified iron deficiency anemia type    3. Right hip impingement syndrome    4. Chronic acquired lymphedema    5. Essential hypertension    6. Spina bifida manifesta    7. Spina bifida occulta    8. Need for diphtheria-tetanus-pertussis (Tdap) vaccine    9. Abrasion of toe of left foot, initial encounter    10. Morbid obesity with BMI of 50.0-59.9, adult        Plan:       1. Controlled type 2 diabetes mellitus with diabetic polyneuropathy, unspecified long term insulin use status  Controlled on current medications.  Continue current medications.    - Comprehensive metabolic panel; Future  - Hemoglobin A1c; Future    2. Iron deficiency anemia, unspecified iron deficiency anemia type  Stable condition.  Continue current medications.  Will adjust based on lab findings or if condition changes.    - CBC auto differential; Future  - Ferritin; Future  - Iron and TIBC; Future    3. Right hip impingement syndrome  Refer for eval and treat  - Ambulatory referral to Physical Medicine Rehab    4. Chronic acquired lymphedema  I counseled the patient on avoiding salt, elevating legs and wearing compression stockings as much as possible to diminish swelling.      - COMPRESSION STOCKINGS    5. Essential hypertension  D/c lasix and add:  - hydrochlorothiazide (HYDRODIURIL) 25 MG tablet; Take 1 tablet (25 mg total) by mouth once daily.  Dispense: 30 tablet; Refill: 11    6. Spina bifida manifesta  Cont pain mgmt and care.  Refill  - hydrocodone-acetaminophen 7.5-325mg (NORCO) 7.5-325 mg per tablet; Take 1 tablet by mouth every 6 (six) hours as needed.  Dispense: 120 tablet; Refill: 0  -  "hydrocodone-acetaminophen 7.5-325mg (NORCO) 7.5-325 mg per tablet; Take 1 tablet by mouth every 6 (six) hours as needed for Pain.  Dispense: 120 tablet; Refill: 0  - hydrocodone-acetaminophen 7.5-325mg (NORCO) 7.5-325 mg per tablet; Take 1 tablet by mouth every 6 (six) hours as needed for Pain.  Dispense: 120 tablet; Refill: 0    7. Spina bifida occulta  Refill  - hydrocodone-acetaminophen 7.5-325mg (NORCO) 7.5-325 mg per tablet; Take 1 tablet by mouth every 6 (six) hours as needed.  Dispense: 120 tablet; Refill: 0  - hydrocodone-acetaminophen 7.5-325mg (NORCO) 7.5-325 mg per tablet; Take 1 tablet by mouth every 6 (six) hours as needed for Pain.  Dispense: 120 tablet; Refill: 0  - hydrocodone-acetaminophen 7.5-325mg (NORCO) 7.5-325 mg per tablet; Take 1 tablet by mouth every 6 (six) hours as needed for Pain.  Dispense: 120 tablet; Refill: 0    8. Need for diphtheria-tetanus-pertussis (Tdap) vaccine  Immunize today.  Counseled patient on risks, benefits and side effects.  Patient elected to proceed with vaccination.      9. Abrasion of toe of left foot, initial encounter  Cleanse wound twice daily.  Apply antibiotic ointment and sterile bandage twice daily and as needed.  Monitor for increased pain, pus, redness and swelling.  If occurs then return to clinic or go to the emergency room if clinic is closed for a recheck.        10. Morbid obesity with BMI of 50.0-59.9, adult  Counseled patient on his ideal body weight, health consequences of being obese and current recommendations including weekly exercise and a heart healthy diet.  Current BMI is:Estimated body mass index is 49.99 kg/(m^2) as calculated from the following:    Height as of 3/23/17: 5' 1" (1.549 m).    Weight as of 3/23/17: 120 kg (264 lb 8.8 oz)..  Patient is aware that ideal BMI < 25 or Weight in (lb) to have BMI = 25: 132.    [facility] goal documentation:  Patient readiness: acceptance and barriers:readiness  During the course of the visit the patient " was educated and counseled about the following: Diabetes:  Discussed general issues about diabetes pathophysiology and management.  Obesity:   General weight loss/lifestyle modification strategies discussed (elicit support from others; identify saboteurs; non-food rewards, etc).  Goals: Diabetes: Maintain Hemoglobin A1C below 7 and Obesity: Reduce calorie intake and BMI  Goal/Outcomes met:type 2 DM and obesity  The following self management tools provided:none  Patient Instructions (the written plan) was given to the patient/family: Yes  Time spent with patient: 20 minutes    Patient with be reevaluated in 3 months or sooner prn    Greater than 50% of this visit was spent counseling as described in above documentation:Yes

## 2017-05-08 ENCOUNTER — INITIAL CONSULT (OUTPATIENT)
Dept: PHYSICAL MEDICINE AND REHAB | Facility: CLINIC | Age: 42
End: 2017-05-08
Payer: MEDICARE

## 2017-05-08 VITALS — BODY MASS INDEX: 50.03 KG/M2 | HEIGHT: 61 IN | WEIGHT: 265 LBS

## 2017-05-08 DIAGNOSIS — M25.511 CHRONIC RIGHT SHOULDER PAIN: Primary | ICD-10-CM

## 2017-05-08 DIAGNOSIS — G89.29 CHRONIC RIGHT SHOULDER PAIN: Primary | ICD-10-CM

## 2017-05-08 PROCEDURE — 20611 DRAIN/INJ JOINT/BURSA W/US: CPT | Mod: PBBFAC,PN | Performed by: PHYSICAL MEDICINE & REHABILITATION

## 2017-05-08 PROCEDURE — 20611 DRAIN/INJ JOINT/BURSA W/US: CPT | Mod: S$PBB,,, | Performed by: PHYSICAL MEDICINE & REHABILITATION

## 2017-05-08 PROCEDURE — 96372 THER/PROPH/DIAG INJ SC/IM: CPT | Mod: PBBFAC,PN,59

## 2017-05-08 PROCEDURE — 99213 OFFICE O/P EST LOW 20 MIN: CPT | Mod: 25,PBBFAC,PN | Performed by: PHYSICAL MEDICINE & REHABILITATION

## 2017-05-08 PROCEDURE — 99214 OFFICE O/P EST MOD 30 MIN: CPT | Mod: 25,S$PBB,, | Performed by: PHYSICAL MEDICINE & REHABILITATION

## 2017-05-08 PROCEDURE — 99999 PR PBB SHADOW E&M-EST. PATIENT-LVL III: CPT | Mod: 25,PBBFAC,, | Performed by: PHYSICAL MEDICINE & REHABILITATION

## 2017-05-08 RX ORDER — METHYLPREDNISOLONE ACETATE 40 MG/ML
40 INJECTION, SUSPENSION INTRA-ARTICULAR; INTRALESIONAL; INTRAMUSCULAR; SOFT TISSUE
Status: COMPLETED | OUTPATIENT
Start: 2017-05-08 | End: 2017-05-08

## 2017-05-08 RX ORDER — LIDOCAINE HYDROCHLORIDE 10 MG/ML
1 INJECTION INFILTRATION; PERINEURAL
Status: COMPLETED | OUTPATIENT
Start: 2017-05-08 | End: 2017-05-08

## 2017-05-08 RX ADMIN — LIDOCAINE HYDROCHLORIDE 1 ML: 10 INJECTION INFILTRATION; PERINEURAL at 01:05

## 2017-05-08 RX ADMIN — METHYLPREDNISOLONE ACETATE 40 MG: 40 INJECTION, SUSPENSION INTRA-ARTICULAR; INTRALESIONAL; INTRAMUSCULAR; SOFT TISSUE at 01:05

## 2017-05-08 NOTE — PROGRESS NOTES
HPI:  Patient is a 41 y.o. year old female w. Spina bifida, flaccid lower extremity paralysis and cognitive delay. Pt. Is having right shoulder pain.    Imaging  Shoulder xray  Neg    Labs  Egfr, cr, lfts gluc nl  hgbaic nl      Past Medical History:   Diagnosis Date    Asthma     Back pain     Blood transfusion     Chronic kidney disease     kidney stones    Diabetes mellitus     Diabetes mellitus, type 2     Esotropia     GERD (gastroesophageal reflux disease)     Hydrocephalus     Hypertension     Non-healing ulcer of buttock     Nystagmus, congenital     Paralysis     Spinal bifida, closed     Wheezing      Past Surgical History:   Procedure Laterality Date    BRAIN SURGERY       shunt revision    CHOLECYSTECTOMY      CYSTOSCOPY W/ LASER LITHOTRIPSY      SPINE SURGERY       Family History   Problem Relation Age of Onset    Cancer Mother     Glaucoma Father     Heart disease Maternal Grandmother     Cancer Maternal Grandmother     Glaucoma Paternal Grandfather     Psoriasis Maternal Uncle     Melanoma Neg Hx     Lupus Neg Hx     Eczema Neg Hx      Social History     Social History    Marital status: Single     Spouse name: N/A    Number of children: N/A    Years of education: N/A     Social History Main Topics    Smoking status: Never Smoker    Smokeless tobacco: Never Used    Alcohol use No    Drug use: No    Sexual activity: Not Currently     Other Topics Concern    Not on file     Social History Narrative    No narrative on file       Review of patient's allergies indicates:   Allergen Reactions    Cefepime      HIVES    Latex Swelling       Current Outpatient Prescriptions:     albuterol (PROAIR HFA) 90 mcg/actuation inhaler, Inhale 2 puffs into the lungs every 6 (six) hours as needed for Wheezing., Disp: 8.5 g, Rfl: 11    ascorbic acid (VITAMIN C) 500 MG tablet, Take 500 mg by mouth once daily., Disp: , Rfl:     blood sugar diagnostic (FREESTYLE LITE STRIPS) Strp,  Use one test strip daily to test blood sugars, Disp: 100 strip, Rfl: 2    blood sugar diagnostic (FREESTYLE LITE STRIPS) Strp, TEST BLOOD SUGAR ONCE DAILY, Disp: 100 strip, Rfl: 3    fexofenadine (ALLEGRA) 180 MG tablet, Take 180 mg by mouth once daily., Disp: , Rfl:     fluocinolone acetonide oil (DERMOTIC OIL) 0.01 % Drop, 5 drops in right ear bid x 7-10 days until resolution, use intermittenlty for flares., Disp: 20 mL, Rfl: 2    fluticasone (FLONASE) 50 mcg/actuation nasal spray, , Disp: , Rfl:     hydrochlorothiazide (HYDRODIURIL) 25 MG tablet, Take 1 tablet (25 mg total) by mouth once daily., Disp: 30 tablet, Rfl: 11    hydrocodone-acetaminophen 7.5-325mg (NORCO) 7.5-325 mg per tablet, Take 1 tablet by mouth every 6 (six) hours as needed., Disp: 120 tablet, Rfl: 0    [START ON 5/10/2017] hydrocodone-acetaminophen 7.5-325mg (NORCO) 7.5-325 mg per tablet, Take 1 tablet by mouth every 6 (six) hours as needed for Pain., Disp: 120 tablet, Rfl: 0    [START ON 6/8/2017] hydrocodone-acetaminophen 7.5-325mg (NORCO) 7.5-325 mg per tablet, Take 1 tablet by mouth every 6 (six) hours as needed for Pain., Disp: 120 tablet, Rfl: 0    lisinopril 10 MG tablet, TAKE 1 TABLET BY MOUTH ONCE DAILY, Disp: 90 tablet, Rfl: 3    metoprolol succinate (TOPROL-XL) 50 MG 24 hr tablet, Take 1 tablet (50 mg total) by mouth once daily., Disp: 90 tablet, Rfl: 3    multivitamin with minerals tablet, Take 1 tablet by mouth once daily., Disp: , Rfl:     nystatin (MYCOSTATIN) cream, Apply topically 2 (two) times daily., Disp: 60 g, Rfl: prn    silver sulfADIAZINE 1% (SILVADENE) 1 % cream, Apply topically once daily., Disp: , Rfl:     triamcinolone acetonide 0.1% (KENALOG) 0.1 % cream, Apply topically 2 (two) times daily., Disp: 80 g, Rfl: prn    zinc oxide 40 % Oint, Apply topically., Disp: , Rfl:   No current facility-administered medications for this visit.     Facility-Administered Medications Ordered in Other Visits:      "sodium chloride 0.9% flush 10 mL, 10 mL, Intravenous, PRN, Bozena Acosta MD    sodium chloride 0.9% flush 10 mL, 10 mL, Intravenous, PRN, Bozena Acosta MD    sodium chloride 0.9% flush 10 mL, 10 mL, Intravenous, PRN, Bozena Acosta MD    sodium chloride 0.9% flush 10 mL, 10 mL, Intravenous, PRNBozena MD          Review of Systems  No nausea, vomiting, fevers, Chills , contipation, diarrhea or sweats    Physical Exam:      Vitals:   reviwed  +rosen, +neers RIGHT SHOULDER  +impingement 90 dec  Dec rom in all directions   follows commands answers most questions appropriately,affect wnl  Manual muscle test LE 0/5, rue 4/ 5 except shoulder abductors fwd flexors sensation to light touch grossly intact  gait not tested  No C/C/E      Assessment:  Right shoulder impingement  DM2 controlled  Spina bifida w. Cognitive deficit  Plan:  Cortisone to RIGHT shoulder bursa  May consider mri of shoulder if this does not help    Procedure note: Risk and benefit of US guided RIGHT subacromial bursa  injection given to pt. 25 g 1.5" utilized. Injection performed after sterile prep w. Betadine , verbal consent explained, no complications. Depomedrol 40mg 1ml and lidocaine 1ml were used, US guidance was used since it has been shown to have greater efficiency w. Accurate needle placement  Ultrasound interpretation was performed prior to the procedure to identify the target and any adjacent neurovascular structures.  Subsequently, interpretation was performed during real- time needle guidance confirming placement. Post- intervention interpretation was also performed confirming appropriate injectate flow and hemostasis.  Images indicating needle placement have been saved in [employer].          "

## 2017-05-08 NOTE — LETTER
May 8, 2017      Anaid Osei MD  [address]  [facility] [address]           [facility]  [address] [place] [address]  Phone: [phone]  Fax: [phone]          Patient: Briana Uriarte   MR Number: [id number]   YOB: 1975   Date of Visit: 5/8/2017       Dear Dr. Anaid Osei:    Thank you for referring Briana Uriarte to me for evaluation. Attached you will find relevant portions of my assessment and plan of care.    If you have questions, please do not hesitate to call me. I look forward to following Briana Uriarte along with you.    Sincerely,    Paulina Lindsay, DO [place]  CC:  No Recipients    If you would like to receive this communication electronically, please contact [email or web address] or [phone] to request more information on [employer] access.    For providers and/or their staff who would like to refer a patient to [facility], please contact us through our one-stop-shop provider referral line, [facility], at [phone].    If you feel you have received this communication in error or would no longer like to receive these types of communications, please e-mail [email or web address]

## 2017-05-31 ENCOUNTER — OFFICE VISIT (OUTPATIENT)
Dept: UROLOGY | Facility: CLINIC | Age: 42
End: 2017-05-31
Payer: MEDICARE

## 2017-05-31 ENCOUNTER — HOSPITAL ENCOUNTER (OUTPATIENT)
Dept: RADIOLOGY | Facility: CLINIC | Age: 42
Discharge: HOME | End: 2017-05-31
Attending: UROLOGY
Payer: MEDICARE

## 2017-05-31 VITALS — HEART RATE: 86 BPM | TEMPERATURE: 98 F | DIASTOLIC BLOOD PRESSURE: 81 MMHG | SYSTOLIC BLOOD PRESSURE: 137 MMHG

## 2017-05-31 DIAGNOSIS — N20.0 RENAL CALCULUS, RIGHT: Primary | ICD-10-CM

## 2017-05-31 DIAGNOSIS — N20.0 RENAL CALCULUS, RIGHT: ICD-10-CM

## 2017-05-31 DIAGNOSIS — G83.4 CAUDA EQUINA SYNDROME WITH NEUROGENIC BLADDER: ICD-10-CM

## 2017-05-31 DIAGNOSIS — G82.20 PARAPLEGIA: ICD-10-CM

## 2017-05-31 DIAGNOSIS — G91.9 HYDROCEPHALUS: ICD-10-CM

## 2017-05-31 DIAGNOSIS — N28.9 NONFUNCTIONING KIDNEY: ICD-10-CM

## 2017-05-31 DIAGNOSIS — Q05.5 SPINA BIFIDA OF CERVICAL REGION, UNSPECIFIED HYDROCEPHALUS PRESENCE: ICD-10-CM

## 2017-05-31 DIAGNOSIS — Q05.9 SPINA BIFIDA, UNSPECIFIED HYDROCEPHALUS PRESENCE, UNSPECIFIED SPINAL REGION: ICD-10-CM

## 2017-05-31 PROCEDURE — 74000 XR ABDOMEN AP 1 VIEW: CPT | Mod: TC,PO

## 2017-05-31 PROCEDURE — 99214 OFFICE O/P EST MOD 30 MIN: CPT | Mod: S$PBB,,, | Performed by: UROLOGY

## 2017-05-31 PROCEDURE — 99999 PR PBB SHADOW E&M-EST. PATIENT-LVL III: CPT | Mod: PBBFAC,,, | Performed by: UROLOGY

## 2017-05-31 PROCEDURE — 74000 XR ABDOMEN AP 1 VIEW: CPT | Mod: 26,,, | Performed by: RADIOLOGY

## 2017-05-31 NOTE — PROGRESS NOTES
OFFICE NOTE    CHIEF COMPLAINT:  Neurogenic bladder, indwelling suprapubic catheter,   hydrocephalus, paraplegia, spina bifida, right renal calculus, nonfunctioning   left kidney.    HISTORY OF PRESENT ILLNESS:  This 41-year-old female returns for a routine   recheck.  She has a history of neurogenic bladder as a result of her spina   bifida and hydrocephalus.  Her bladder  emptying is currently being managed with a suprapubic tube that is changed every three to four weeks by her   mother with which she has been doing quite well.  No problems since her last   visit of 01/11/2017.  The patient also has a history of urinary calculi and she   is known to have a staghorn-type calculus in the lower pole of the right kidney   that has remained stable and a renal nuclear scan revealed a nonfunctioning   atrophic left kidney, but there is normal function to the right kidney.  CT scan   from 01/12/2017 did reveal as mentioned above, the staghorn-type calculus in   the lower pole of the right kidney, but no evidence of any obstruction.  The   calculus measured approximately 2.8 cm in greatest diameter.    Medical history, social history, surgical history, family history and  review of   symptoms history are essentially unchanged from the last visit of 01/11/2017.    PHYSICAL EXAMINATION:  ABDOMEN:  Protuberant with an indwelling suprapubic catheter and there is also   significant bulging at the umbilicus consistent with umbilical hernia that she   has had for long-term.  There was some mild right flank tenderness.    FINAL IMPRESSION:  Neurogenic bladder, spina bifida, hydrocephalus, indwelling   suprapubic catheter, right renal calculus, nonfunctioning left kidney.    RECOMMENDATIONS:  We will obtain a KUB and a CMP.  The possibility of   intervention for the calculus was again discussed with the family.  They did   mention they want to manage it as conservatively as possible, since apparently   they had problems in the past  when she underwent surgical intervention for the   stone and she has been doing quite well recently.  They said they understood and   agreed and we will contact them with the findings of the KUB and the CMP.      MD/ALFONSO  dd: 05/31/2017 10:19:10 (CDT)  td: 06/01/2017 08:46:37 (CDT)  Doc ID   #[id number]  Job ID #[id number]    CC:

## 2017-06-02 ENCOUNTER — TELEPHONE (OUTPATIENT)
Dept: UROLOGY | Facility: CLINIC | Age: 42
End: 2017-06-02

## 2017-06-02 DIAGNOSIS — N20.0 RENAL CALCULUS: Primary | ICD-10-CM

## 2017-06-03 ENCOUNTER — PATIENT MESSAGE (OUTPATIENT)
Dept: UROLOGY | Facility: CLINIC | Age: 42
End: 2017-06-03

## 2017-06-08 ENCOUNTER — HOSPITAL ENCOUNTER (OUTPATIENT)
Dept: RADIOLOGY | Facility: HOSPITAL | Age: 42
Discharge: HOME | End: 2017-06-08
Attending: UROLOGY
Payer: MEDICARE

## 2017-06-08 DIAGNOSIS — N20.0 RENAL CALCULUS: ICD-10-CM

## 2017-06-08 PROCEDURE — 74176 CT ABD & PELVIS W/O CONTRAST: CPT | Mod: TC

## 2017-06-08 PROCEDURE — 74176 CT ABD & PELVIS W/O CONTRAST: CPT | Mod: 26,,, | Performed by: RADIOLOGY

## 2017-06-20 ENCOUNTER — PATIENT MESSAGE (OUTPATIENT)
Dept: FAMILY MEDICINE | Facility: CLINIC | Age: 42
End: 2017-06-20

## 2017-06-20 DIAGNOSIS — Q05.9 SPINA BIFIDA MANIFESTA: ICD-10-CM

## 2017-06-20 DIAGNOSIS — Q76.0 SPINA BIFIDA OCCULTA: ICD-10-CM

## 2017-06-21 ENCOUNTER — TELEPHONE (OUTPATIENT)
Dept: FAMILY MEDICINE | Facility: CLINIC | Age: 42
End: 2017-06-21

## 2017-06-21 ENCOUNTER — PATIENT MESSAGE (OUTPATIENT)
Dept: FAMILY MEDICINE | Facility: CLINIC | Age: 42
End: 2017-06-21

## 2017-07-27 ENCOUNTER — DOCUMENTATION ONLY (OUTPATIENT)
Dept: FAMILY MEDICINE | Facility: CLINIC | Age: 42
End: 2017-07-27

## 2017-07-27 NOTE — PROGRESS NOTES
Pre-Visit Chart Review  For Appointment Scheduled on 7-28-17    Health Maintenance Due   Topic Date Due    Pap Smear with HPV Cotest  09/24/1996    Mammogram  09/24/2015

## 2017-07-28 ENCOUNTER — OFFICE VISIT (OUTPATIENT)
Dept: FAMILY MEDICINE | Facility: CLINIC | Age: 42
End: 2017-07-28
Payer: MEDICARE

## 2017-07-28 VITALS
HEART RATE: 90 BPM | DIASTOLIC BLOOD PRESSURE: 82 MMHG | TEMPERATURE: 99 F | WEIGHT: 238.13 LBS | SYSTOLIC BLOOD PRESSURE: 140 MMHG | BODY MASS INDEX: 44.96 KG/M2 | HEIGHT: 61 IN

## 2017-07-28 DIAGNOSIS — E66.01 MORBID OBESITY WITH BMI OF 50.0-59.9, ADULT: ICD-10-CM

## 2017-07-28 DIAGNOSIS — B37.2 CANDIDAL INTERTRIGO: Primary | ICD-10-CM

## 2017-07-28 DIAGNOSIS — E11.42 CONTROLLED TYPE 2 DIABETES MELLITUS WITH DIABETIC POLYNEUROPATHY, UNSPECIFIED LONG TERM INSULIN USE STATUS: ICD-10-CM

## 2017-07-28 DIAGNOSIS — Q05.9 SPINA BIFIDA MANIFESTA: ICD-10-CM

## 2017-07-28 DIAGNOSIS — F11.288: Chronic | ICD-10-CM

## 2017-07-28 DIAGNOSIS — K59.00 CONSTIPATION, UNSPECIFIED CONSTIPATION TYPE: ICD-10-CM

## 2017-07-28 DIAGNOSIS — Q76.0 SPINA BIFIDA OCCULTA: ICD-10-CM

## 2017-07-28 PROCEDURE — 4010F ACE/ARB THERAPY RXD/TAKEN: CPT | Mod: ,,, | Performed by: FAMILY MEDICINE

## 2017-07-28 PROCEDURE — 99213 OFFICE O/P EST LOW 20 MIN: CPT | Mod: PBBFAC,PO | Performed by: FAMILY MEDICINE

## 2017-07-28 PROCEDURE — 99214 OFFICE O/P EST MOD 30 MIN: CPT | Mod: S$PBB,,, | Performed by: FAMILY MEDICINE

## 2017-07-28 PROCEDURE — 3044F HG A1C LEVEL LT 7.0%: CPT | Mod: ,,, | Performed by: FAMILY MEDICINE

## 2017-07-28 PROCEDURE — 99999 PR PBB SHADOW E&M-EST. PATIENT-LVL III: CPT | Mod: PBBFAC,,, | Performed by: FAMILY MEDICINE

## 2017-07-28 RX ORDER — NYSTATIN 100000 U/G
CREAM TOPICAL 2 TIMES DAILY
Qty: 60 G | Status: SHIPPED | OUTPATIENT
Start: 2017-07-28 | End: 2018-06-25

## 2017-07-28 RX ORDER — HYDROCODONE BITARTRATE AND ACETAMINOPHEN 7.5; 325 MG/1; MG/1
1 TABLET ORAL EVERY 6 HOURS PRN
Qty: 120 TABLET | Refills: 0 | Status: SHIPPED | OUTPATIENT
Start: 2017-07-28 | End: 2017-09-01 | Stop reason: SDUPTHER

## 2017-07-28 RX ORDER — NYSTATIN 100000 [USP'U]/G
POWDER TOPICAL 2 TIMES DAILY
Qty: 60 G | Status: SHIPPED | OUTPATIENT
Start: 2017-07-28 | End: 2018-06-25

## 2017-07-28 NOTE — PROGRESS NOTES
Subjective:       Patient ID: Briana Uriarte is a 41 y.o. female.    Chief Complaint: Follow-up    Patient Active Problem List   Diagnosis    Non-healing ulcer of buttock    Spina bifida manifesta    Peripheral venous insufficiency    Morbid obesity with BMI of 50.0-59.9, adult    Paraplegia    Pickwickian syndrome    Diabetes mellitus type II, controlled    Asthma    Obstructive hydrocephalus    Encephalopathy    Intracranial hypertension    Chiari malformation type II    Microcytic anemia    Spina bifida of lumbar region    Suprapubic catheter    Opioid dependence with perceptual disturbance    Physical debility    Umbilical hernia    Skin bulla    Skin ulcer of right foot, limited to breakdown of skin   C/o itchy red rash under breasts    C/o constipation and watery BMs since on iron, norco qid and sedentary with spina bifida    HPI  Review of Systems   Constitutional: Negative for fatigue, fever and unexpected weight change.   Respiratory: Negative for chest tightness and shortness of breath.    Cardiovascular: Negative for chest pain, palpitations and leg swelling.   Gastrointestinal: Positive for constipation and diarrhea. Negative for abdominal pain, blood in stool, nausea and vomiting.   Musculoskeletal: Negative for arthralgias.   Neurological: Negative for dizziness, syncope, light-headedness and headaches.       Objective:      Physical Exam   Constitutional: She is oriented to person, place, and time. She appears well-developed and well-nourished.   Cardiovascular: Normal rate, regular rhythm and normal heart sounds.    Pulmonary/Chest: Effort normal and breath sounds normal.   Musculoskeletal: She exhibits no edema.   Neurological: She is alert and oriented to person, place, and time.   Skin: Skin is warm and dry.        Psychiatric: She has a normal mood and affect.   Nursing note and vitals reviewed.      Assessment:       1. Candidal intertrigo    2. Spina bifida  "manifesta    3. Spina bifida occulta    4. Controlled type 2 diabetes mellitus with diabetic polyneuropathy, unspecified long term insulin use status    5. Morbid obesity with BMI of 50.0-59.9, adult    6. Opioid dependence with perceptual disturbance    7. Constipation, unspecified constipation type        Plan:       1. Spina bifida manifesta  Chronic -cont current care and  - hydrocodone-acetaminophen 7.5-325mg (NORCO) 7.5-325 mg per tablet; Take 1 tablet by mouth every 6 (six) hours as needed for Pain.  Dispense: 120 tablet; Refill: 0    2. Spina bifida occulta  Cont current care and  - hydrocodone-acetaminophen 7.5-325mg (NORCO) 7.5-325 mg per tablet; Take 1 tablet by mouth every 6 (six) hours as needed for Pain.  Dispense: 120 tablet; Refill: 0    3. Candidal intertrigo  Apply cream prn rash then use powder for prevention  - nystatin (MYCOSTATIN) cream; Apply topically 2 (two) times daily.  Dispense: 60 g; Refill: prn  - nystatin (MYCOSTATIN) powder; Apply topically 2 (two) times daily.  Dispense: 60 g; Refill: prn    4. Controlled type 2 diabetes mellitus with diabetic polyneuropathy, unspecified long term insulin use status  Controlled on current medications.  Continue current medications.      5. Morbid obesity with BMI of 50.0-59.9, adult  Counseled patient on his ideal body weight, health consequences of being obese and current recommendations including weekly exercise and a heart healthy diet.  Current BMI is:Estimated body mass index is 44.99 kg/m² as calculated from the following:    Height as of this encounter: 5' 1" (1.549 m).    Weight as of this encounter: 108 kg (238 lb 1.6 oz)..  Patient is aware that ideal BMI < 25 or Weight in (lb) to have BMI = 25: 132.        6. Opioid dependence with perceptual disturbance  Counseled patient on habit forming potential of opiates, risk of sedation, respiratory suppression or arrest especially if used in conjunction with benzodiazepines or alcohol.  Counseled " patient to avoid driving while on the medication, rules of the pain contract and need for routine 3 month follow ups.  Patient agrees to all aspects of the plan of care and wishes to proceed with therapy.  The plan is always to use alternatives less habit forming, to utilize interventions and therapies that reduce pain as an adjunctive treatment, and limit and wean the dose of narcotics as soon as able and appropriate.        7. Constipation, unspecified constipation type  Patient was counseled that these lifestyle changes can help prevent constipation:  · Diet. Eat a high-fiber diet, with fresh fruit and vegetables, and reduce dairy intake, meats, and processed foods.  An over the counter fiber supplement is recommended such as Fiberchoice chewables or Metamucil.  · Fluids. It's important to get enough fluids each day. Drink plenty of water when you eat more fiber. If you are on diet that limits the amount of fluid you can have, talk about this with your health care provider.  · Regular exercise. Check with your health care provider first.  I also advised use of over the counter stool softeners like docusate as needed for persistent constipation.  OTC probiotics may also be of benefit like Align to help regulation.  If acutely constipated the patient was advised to use otc fleets enema(s) and/or magnesium citrate to relieve symptoms.    Monitor for worsening symptoms and return to clinic or go to the ER if these occur: fever > 100.4, severe abdominal pain, intractable vomiting, bleeding from the rectum or black tarry stools, dehydration, lethargy or other worsening symptoms.    [facility] goal documentation:  Patient readiness: acceptance and barriers:readiness  During the course of the visit the patient was educated and counseled about the following: Hypertension:   Dietary sodium restriction.  Regular aerobic exercise.  Obesity:   General weight loss/lifestyle modification strategies discussed (elicit support from  others; identify saboteurs; non-food rewards, etc).  Goals: Hypertension: Reduce Blood Pressure and Obesity: Reduce calorie intake and BMI  Goal/Outcomes met:Hypertension and obesity  The following self management tools provided:none  Patient Instructions (the written plan) was given to the patient/family: Yes  Time spent with patient: 20 minutes    Patient with be reevaluated in 3 months or sooner prn    Greater than 50% of this visit was spent counseling as described in above documentation:Yes

## 2017-08-17 DIAGNOSIS — Z12.31 OTHER SCREENING MAMMOGRAM: ICD-10-CM

## 2017-08-31 ENCOUNTER — PATIENT MESSAGE (OUTPATIENT)
Dept: FAMILY MEDICINE | Facility: CLINIC | Age: 42
End: 2017-08-31

## 2017-08-31 ENCOUNTER — TELEPHONE (OUTPATIENT)
Dept: FAMILY MEDICINE | Facility: CLINIC | Age: 42
End: 2017-08-31

## 2017-09-01 ENCOUNTER — PATIENT MESSAGE (OUTPATIENT)
Dept: FAMILY MEDICINE | Facility: CLINIC | Age: 42
End: 2017-09-01

## 2017-09-01 DIAGNOSIS — Q05.9 SPINA BIFIDA MANIFESTA: ICD-10-CM

## 2017-09-01 DIAGNOSIS — Q76.0 SPINA BIFIDA OCCULTA: ICD-10-CM

## 2017-09-01 RX ORDER — HYDROCODONE BITARTRATE AND ACETAMINOPHEN 7.5; 325 MG/1; MG/1
1 TABLET ORAL EVERY 6 HOURS PRN
Qty: 120 TABLET | Refills: 0 | Status: SHIPPED | OUTPATIENT
Start: 2017-09-01 | End: 2017-10-02 | Stop reason: SDUPTHER

## 2017-09-19 ENCOUNTER — OFFICE VISIT (OUTPATIENT)
Dept: UROLOGY | Facility: CLINIC | Age: 42
End: 2017-09-19
Payer: MEDICARE

## 2017-09-19 VITALS
TEMPERATURE: 98 F | HEART RATE: 82 BPM | DIASTOLIC BLOOD PRESSURE: 78 MMHG | SYSTOLIC BLOOD PRESSURE: 130 MMHG | RESPIRATION RATE: 18 BRPM | WEIGHT: 238.13 LBS | HEIGHT: 61 IN | BODY MASS INDEX: 44.96 KG/M2

## 2017-09-19 DIAGNOSIS — Z43.5 ENCOUNTER FOR CARE OR REPLACEMENT OF SUPRAPUBIC TUBE: ICD-10-CM

## 2017-09-19 DIAGNOSIS — G83.4 CAUDA EQUINA SYNDROME WITH NEUROGENIC BLADDER: ICD-10-CM

## 2017-09-19 DIAGNOSIS — N28.9 NONFUNCTIONING KIDNEY: ICD-10-CM

## 2017-09-19 DIAGNOSIS — Q05.5 SPINA BIFIDA OF CERVICAL REGION, UNSPECIFIED HYDROCEPHALUS PRESENCE: ICD-10-CM

## 2017-09-19 DIAGNOSIS — N20.0 KIDNEY STONE: Primary | ICD-10-CM

## 2017-09-19 DIAGNOSIS — Q05.9 SPINA BIFIDA, UNSPECIFIED HYDROCEPHALUS PRESENCE, UNSPECIFIED SPINAL REGION: ICD-10-CM

## 2017-09-19 DIAGNOSIS — G91.9 HYDROCEPHALUS: ICD-10-CM

## 2017-09-19 DIAGNOSIS — G82.20 PARAPLEGIA: ICD-10-CM

## 2017-09-19 PROCEDURE — 3078F DIAST BP <80 MM HG: CPT | Mod: ,,, | Performed by: UROLOGY

## 2017-09-19 PROCEDURE — 99213 OFFICE O/P EST LOW 20 MIN: CPT | Mod: S$PBB,,, | Performed by: UROLOGY

## 2017-09-19 PROCEDURE — 3075F SYST BP GE 130 - 139MM HG: CPT | Mod: ,,, | Performed by: UROLOGY

## 2017-09-19 PROCEDURE — 99213 OFFICE O/P EST LOW 20 MIN: CPT | Mod: PBBFAC,PN | Performed by: UROLOGY

## 2017-09-19 PROCEDURE — 99999 PR PBB SHADOW E&M-EST. PATIENT-LVL III: CPT | Mod: PBBFAC,,, | Performed by: UROLOGY

## 2017-09-19 NOTE — PROGRESS NOTES
OFFICE NOTE    CHIEF COMPLAINT:  Neurogenic bladder, indwelling suprapubic catheter,   hydrocephalus, paraplegia, spina bifida, right renal calculus and nonfunctioning   left kidney.    HISTORY OF PRESENT ILLNESS:  This 41-year-old female returns for routine   recheck.  She has a history of neurogenic bladder as a result of her spina   bifida and hydrocephalus, that is currently being managed with suprapubic tube   that is changed every three to four weeks by her mother and they are returning now   for routine recheck.  Overall, they have been doing quite well since the last   visit and no new complaints.  The catheter was most recently changed on   09/14/2017.  The patient also has a history of staghorn calculus in the lower   pole of the right kidney and she also has a nonfunctioning left kidney as noted   on the recent renal nuclear scan, but has normal function to the right kidney.    She will be due followup imaging studies.    No other change in her general health since the last visit.    PHYSICAL EXAMINATION:  ABDOMEN:  Soft.  Again, there is a protuberant bulging mass at the side of the   umbilicus, that has been present on prior visits and a suprapubic tube in good   place.    FINAL IMPRESSION AND RECOMMENDATIONS:  Recheck in three months for renal   ultrasound and KUB.      DENY  dd: 09/19/2017 18:52:45 (CDT)  td: 09/20/2017 08:43:31 (CDT)  Doc ID   #[id number]  Job ID #[id number]    CC:

## 2017-09-29 ENCOUNTER — LAB VISIT (OUTPATIENT)
Dept: LAB | Facility: HOSPITAL | Age: 42
End: 2017-09-29
Attending: PHYSICIAN ASSISTANT
Payer: MEDICARE

## 2017-09-29 ENCOUNTER — OFFICE VISIT (OUTPATIENT)
Dept: FAMILY MEDICINE | Facility: CLINIC | Age: 42
End: 2017-09-29
Payer: MEDICARE

## 2017-09-29 VITALS
HEART RATE: 91 BPM | HEIGHT: 61 IN | SYSTOLIC BLOOD PRESSURE: 143 MMHG | TEMPERATURE: 98 F | RESPIRATION RATE: 14 BRPM | DIASTOLIC BLOOD PRESSURE: 84 MMHG

## 2017-09-29 DIAGNOSIS — R73.9 HYPERGLYCEMIA: ICD-10-CM

## 2017-09-29 DIAGNOSIS — R19.7 DIARRHEA, UNSPECIFIED TYPE: ICD-10-CM

## 2017-09-29 DIAGNOSIS — L30.4 INTERTRIGINOUS DERMATITIS ASSOCIATED WITH MOISTURE: Primary | ICD-10-CM

## 2017-09-29 LAB
ALBUMIN SERPL BCP-MCNC: 3.3 G/DL
ALP SERPL-CCNC: 82 U/L
ALT SERPL W/O P-5'-P-CCNC: 26 U/L
ANION GAP SERPL CALC-SCNC: 13 MMOL/L
AST SERPL-CCNC: 40 U/L
BASOPHILS # BLD AUTO: 0.02 K/UL
BASOPHILS NFR BLD: 0.3 %
BILIRUB SERPL-MCNC: 0.2 MG/DL
BUN SERPL-MCNC: 16 MG/DL
CALCIUM SERPL-MCNC: 9.4 MG/DL
CHLORIDE SERPL-SCNC: 96 MMOL/L
CO2 SERPL-SCNC: 21 MMOL/L
CREAT SERPL-MCNC: 0.8 MG/DL
DIFFERENTIAL METHOD: ABNORMAL
EOSINOPHIL # BLD AUTO: 0 K/UL
EOSINOPHIL NFR BLD: 0.5 %
ERYTHROCYTE [DISTWIDTH] IN BLOOD BY AUTOMATED COUNT: 15.1 %
EST. GFR  (AFRICAN AMERICAN): >60 ML/MIN/1.73 M^2
EST. GFR  (NON AFRICAN AMERICAN): >60 ML/MIN/1.73 M^2
ESTIMATED AVG GLUCOSE: 114 MG/DL
GLUCOSE SERPL-MCNC: 101 MG/DL
HBA1C MFR BLD HPLC: 5.6 %
HCT VFR BLD AUTO: 34.7 %
HGB BLD-MCNC: 11.5 G/DL
LYMPHOCYTES # BLD AUTO: 0.9 K/UL
LYMPHOCYTES NFR BLD: 12 %
MCH RBC QN AUTO: 26.6 PG
MCHC RBC AUTO-ENTMCNC: 33.1 G/DL
MCV RBC AUTO: 80 FL
MONOCYTES # BLD AUTO: 0.5 K/UL
MONOCYTES NFR BLD: 6.8 %
NEUTROPHILS # BLD AUTO: 6.2 K/UL
NEUTROPHILS NFR BLD: 80.1 %
PLATELET # BLD AUTO: 328 K/UL
PMV BLD AUTO: 9.7 FL
POTASSIUM SERPL-SCNC: 4.3 MMOL/L
PROT SERPL-MCNC: 8.7 G/DL
RBC # BLD AUTO: 4.32 M/UL
SODIUM SERPL-SCNC: 130 MMOL/L
WBC # BLD AUTO: 7.69 K/UL

## 2017-09-29 PROCEDURE — 85025 COMPLETE CBC W/AUTO DIFF WBC: CPT

## 2017-09-29 PROCEDURE — 3077F SYST BP >= 140 MM HG: CPT | Mod: ,,, | Performed by: PHYSICIAN ASSISTANT

## 2017-09-29 PROCEDURE — 83036 HEMOGLOBIN GLYCOSYLATED A1C: CPT

## 2017-09-29 PROCEDURE — 99214 OFFICE O/P EST MOD 30 MIN: CPT | Mod: PBBFAC,PO | Performed by: PHYSICIAN ASSISTANT

## 2017-09-29 PROCEDURE — 3079F DIAST BP 80-89 MM HG: CPT | Mod: ,,, | Performed by: PHYSICIAN ASSISTANT

## 2017-09-29 PROCEDURE — 99999 PR PBB SHADOW E&M-EST. PATIENT-LVL IV: CPT | Mod: PBBFAC,,, | Performed by: PHYSICIAN ASSISTANT

## 2017-09-29 PROCEDURE — 36415 COLL VENOUS BLD VENIPUNCTURE: CPT | Mod: PO

## 2017-09-29 PROCEDURE — 99214 OFFICE O/P EST MOD 30 MIN: CPT | Mod: S$PBB,,, | Performed by: PHYSICIAN ASSISTANT

## 2017-09-29 PROCEDURE — 80053 COMPREHEN METABOLIC PANEL: CPT

## 2017-09-29 RX ORDER — FLUCONAZOLE 150 MG/1
TABLET ORAL
Qty: 4 TABLET | Refills: 0 | Status: SHIPPED | OUTPATIENT
Start: 2017-09-29 | End: 2017-11-02

## 2017-09-29 RX ORDER — CHOLESTYRAMINE 4 G/9G
4 POWDER, FOR SUSPENSION ORAL DAILY
Qty: 90 PACKET | Refills: 0 | Status: SHIPPED | OUTPATIENT
Start: 2017-09-29 | End: 2017-11-02

## 2017-09-29 NOTE — PATIENT INSTRUCTIONS
Diabetes (General Information)  Diabetes is a long-term health problem. It means your body does not make enough insulin. Or it may mean that your body cannot use the insulin it makes. Insulin is a hormone in your body. It lets blood sugar (glucose) reach the cells in your body. All of your cells need glucose for fuel.  When you have diabetes, the glucose in your blood builds up because it cannot get into the cells. This buildup is called high blood sugar (hyperglycemia).  Your blood sugar level depends on several things. It depends on what kind of food you eat and how much of it you eat. It also depends on how much exercise you get, and how much insulin you have in your body. Eating too much of the wrong kinds of food or not taking diabetes medicine on time can cause high blood sugar. Infections can cause high blood sugar even if you are taking medicines correctly.  These things can also cause low blood sugar:  · Missing meals  · Not eating enough food  · Taking too much diabetes medicine  Diabetes can cause serious problems over time if you do not get treated. These problems include heart disease, stroke, kidney failure, and blindness. They also include nerve pain or loss of feeling in your legs and feet, and gangrene of the feet. By keeping your blood sugar under control you can prevent or delay these problems.  Normal blood sugar levels are 80 to 100 before a meal and less than 180 in the 1 to 2 hours after a meal.  Home care  Follow these guidelines when caring for yourself at home:  · Follow the diet your healthcare provider gives you. Take insulin or other diabetes medicine exactly as told to.  · Watch your blood sugar as you are told to. Keep a log of your results. This will help your provider change your medicines to keep your blood sugar under control.  · Try to reach your ideal weight. You may be able to cut back on or not have to take diabetes medicine if you eat the right foods and get exercise.  · Do  not smoke. Smoking worsens the effects of diabetes on your circulation. You are much more likely to have a heart attack if you have diabetes and you smoke.  · Take good care of your feet. If you have lost feeling in your feet, you may not see an injury or infection. Check your feet and between your toes at least once a week.  · Wear a medical alert bracelet or necklace, or carry a card in your wallet that says you have diabetes. This will help healthcare providers give you the right care if you get very ill and cannot tell them that you have diabetes.  Sick day plan  If you get a cold, the flu, or a bacterial or viral infection, take these steps:  · Look at your diabetes sick plan and call your healthcare provider as you were told to. You may need to call your provider right away if:  ¨ Your blood sugar is above 240 while taking your diabetes medicine  ¨ Your urine ketone levels are above normal or high  ¨ You have been vomiting more than 6 hours  ¨ You have trouble breathing or your breath ha s a fruity smell  ¨ You have a high fever  ¨ You have a fever for several days and you are not getting better  ¨ You get light-headed and are sleepier than usual  · Keep taking your diabetes pills (oral medicine) even if you have been vomiting and are feeling sick. Call your provider right away because you may need insulin to lower your blood sugar until you recover from your illness.  · Keep taking your insulin even if you have been vomiting and are feeling sick. Call your provider right away to ask if you need to change your insulin dose. This will depend on your blood sugar results.  · Check your blood sugar every 2 to 4 hours, or at least 4 times a day.  · Check your ketones often. If you are vomiting and having diarrhea, watch them more often.  · Do not skip meals. Try to eat small meals on a regular schedule. Do this even if you do not feel like eating.  · Drink water or other liquids that do not have caffeine or  calories. This will keep you from getting dehydrated. If you are nauseated or vomiting, takes small sips every 5 minutes. To prevent dehydration try to drink a cup (8 ounces) of fluids every hour while you are awake.  General care  Always bring a source of fast-acting sugar with you in case you have symptoms of low blood sugar (below 70). At the first sign of low blood sugar, eat or drink 15 to 20 grams of fast-acting sugar to raise your blood sugar. Examples are:  · 3 to 4 glucose tablets. You can buy these at most [employer].  · 4 ounces (1/2 cup) of regular (not diet) soft drinks  · 4 ounces (1/2 cup) of any fruit juice  · 8 ounces (1 cup) of milk  · 5 to 6 pieces of hard candy  · 1 tablespoon of honey  Check your blood sugar 15 minutes after treating yourself. If it is still below 70, take 15 to 20 more grams of fast-acting sugar. Test again in 15 minutes. If it returns to normal (70 or above), eat a snack or meal to keep your blood sugar in a safe range. If it stays low, call your doctor or go to an emergency room.  Follow-up care  Follow-up with your healthcare provider, or as advised. For more information about diabetes, visit the American Diabetes Association website at www.diabetes.org or call 807-023-8055.  When to seek medical advice  Call your healthcare provider right away if you have any of these symptoms of high blood sugar:  · Frequent urination  · Dizziness  · Drowsiness  · Thirst  · Headache  · Nausea or vomiting  · Abdominal pain  · Eyesight changes  · Fast breathing  · Confusion or loss of consciousness  Also call your provider right away if you have any of these signs of low blood sugar:  · Fatigue  · Headache  · Shakes  · Excess sweating  · Hunger  · Feeling anxious or restless  · Eyesight changes  · Drowsiness  · Weakness  · Confusion or loss of consciousness  Call 911  Call for emergency help right away if any of these occur:  · Chest pain or shortness of breath  · Dizziness or  fainting  · Weakness of an arm or leg or one side of the face  · Trouble speaking or seeing   Date Last Reviewed: 6/1/2016  © 9055-9145 [employer]. [address] [place] [address], [place] [address]. All rights reserved. This information is not intended as a substitute for professional medical care. Always follow your healthcare professional's instructions.            Walking for Fitness  Fitness walking has something for everyone, even people who are already fit. Walking is one of the safest ways to condition your body aerobically. It can boost energy, help you lose weight, and reduce stress.    Physical benefits  · Walking strengthens your heart and lungs, and tones your muscles.  · When walking, your feet land with less impact than in other sports. This reduces chances of muscle, bone, and joint injury.  · Regular walking improves your cholesterol levels and lowers your risk of heart disease. And it helps you control your blood sugar if you have diabetes.  · Walking is a weight-bearing activity, which helps maintain bone density. This can help prevent osteoporosis.  Personal rewards  · Taking walks can help you relax and manage stress. And fitness walking may make you feel better about yourself.  · Walking can help you sleep better at night and make you less likely to be depressed.  · Regular walking may help maintain your memory as you get older.  · Walking is a great way to spend extra time with friends and family members. Be sure to invite your dog along!  Q&A about fitness walking  Q: Will walking keep me fit?  A: Yes. Regular walking at the right pace gives you all the benefits of other aerobic activities, such as jogging and swimming.  Q: Will walking help me lose weight and keep it off?  A: Yes. Per mile, walking can burn as many calories as jogging. Your health care provider can help work walking into your weight-loss plan.  Q: Is walking safe for my health?  A: Yes. Walking is safe if you have high  blood pressure, diabetes, heart disease, or other conditions. Talk to your healthcare provider before you start.  Date Last Reviewed: 4/1/2017 © 2000-2017 [employer]. [address] [place] [address], [place] [address]. All rights reserved. This information is not intended as a substitute for professional medical care. Always follow your healthcare professional's instructions.            Weight Management: Getting Started  Healthy bodies come in all shapes and sizes. Not all bodies are made to be thin. For some people, a healthy weight is higher than the average weight listed on weight charts. Your healthcare provider can help you decide on a healthy weight for you.    Reasons to lose weight  Losing weight can help with some health problems, such as high blood pressure, heart disease, diabetes, sleep apnea, and arthritis. You may also feel more energy.  Set your long-term goal  Your goal doesn't even have to be a specific weight. You may decide on a fitness goal (such as being able to walk 10 miles a week), or a health goal (such as lowering your blood pressure). Choose a goal that is measurable and reasonable, so you know when you've reached it. A goal of reaching a BMI of less than 25 is not always reasonable (or possible).   Make an action plan  Habits dont change overnight. Setting your goals too high can leave you feeling discouraged if you cant reach them. Be realistic. Choose one or two small changes you can make now. Set an action plan for how you are going to make these changes. When you can stick to this plan, keep making a few more small changes. Taking small steps will help you stay on the path to success.  Track your progress  Write down your goals. Then, keep a daily record of your progress. Write down what you eat and how active you are. This record lets you look back on how much youve done. It may also help when youre feeling frustrated. Reward yourself for success. Even if you dont reach  every goal, give yourself credit for what you do get done.  Get support  Encouragement from others can help make losing weight easier. Ask your family members and friends for support. They may even want to join you. Also look to your healthcare provider, registered dietitian, and  for help. Your local hospital can give you more information about nutrition, exercise, and weight loss.  Date Last Reviewed: 1/31/2016  © 9104-0136 [employer]. [address] [place] [address], [place] [address]. All rights reserved. This information is not intended as a substitute for professional medical care. Always follow your healthcare professional's instructions.

## 2017-09-29 NOTE — PROGRESS NOTES
Subjective:       Patient ID: Briana Uriarte is a 42 y.o. female.    Chief Complaint: Rash on bottom of abdomen area and Diarrhea    Diarrhea    This is a new problem. The current episode started more than 1 month ago (x 2-3 months). The problem occurs less than 2 times per day. The problem has been gradually worsening. The stool consistency is described as mucous. The patient states that diarrhea does not awaken her from sleep. Pertinent negatives include no abdominal pain, arthralgias, coughing, headaches, myalgias, vomiting or weight loss. Nothing aggravates the symptoms. She has tried nothing for the symptoms.   Rash   This is a new problem. The current episode started 1 to 4 weeks ago. The problem has been gradually worsening since onset. The affected locations include the groin, genitalia, right buttock and left buttock. The rash is characterized by dryness and redness. She was exposed to nothing. Associated symptoms include diarrhea. Pertinent negatives include no cough, rhinorrhea, shortness of breath or vomiting. Treatments tried: calazinc, nystatin. The treatment provided no relief.     Review of Systems   Constitutional: Negative for activity change, appetite change and weight loss.   HENT: Negative for postnasal drip, rhinorrhea and sinus pressure.    Eyes: Negative for visual disturbance.   Respiratory: Negative for cough and shortness of breath.    Cardiovascular: Negative for chest pain.   Gastrointestinal: Positive for diarrhea. Negative for abdominal distention, abdominal pain and vomiting.   Genitourinary: Negative for difficulty urinating and dysuria.   Musculoskeletal: Negative for arthralgias and myalgias.   Skin: Positive for color change and rash.   Neurological: Negative for headaches.   Hematological: Negative for adenopathy.   Psychiatric/Behavioral: The patient is not nervous/anxious.        Objective:      Physical Exam   Constitutional: She is oriented to person, place, and  time.   Patient in wheelchair   HENT:   Mouth/Throat: Oropharynx is clear and moist. No oropharyngeal exudate.   Cardiovascular: Normal rate and regular rhythm.    Pulmonary/Chest: Effort normal and breath sounds normal.   Abdominal: Soft. Bowel sounds are normal. She exhibits distension. A hernia is present.       Musculoskeletal: She exhibits no edema.   Paraplegia; patient in wheelchair   Lymphadenopathy:     She has no cervical adenopathy.   Neurological: She is alert and oriented to person, place, and time. She displays abnormal reflex. She exhibits abnormal muscle tone. Coordination abnormal.   Paraplegia    Skin: Rash noted. There is erythema.   Macular erythematous rash present around suprapubic catheter.  Patient's mother shows picture of rash on bottom and in groin- this is also erythematous and macular.    Psychiatric: Her behavior is normal.       Assessment:       1. Intertriginous dermatitis associated with moisture    2. Diarrhea, unspecified type    3. Hyperglycemia        Plan:       Briana was seen today for rash on bottom of abdomen area and diarrhea.    Diagnoses and all orders for this visit:    Intertriginous dermatitis associated with moisture  -     fluconazole (DIFLUCAN) 150 MG Tab; Take one tablet by mouth once weekly.    Diarrhea, unspecified type  -     Stool culture; Future  -     Giardia / Cryptosporidum, EIA; Future  -     H. pylori antigen, stool; Future  -     Occult blood x 1, stool; Future  -     Rotavirus antigen, stool; Future  -     Stool Exam-Ova,Cysts,Parasites; Future  -     WBC, Stool; Future  -     Clostridium difficile EIA; Future  -     cholestyramine (QUESTRAN) 4 gram packet; Take 1 packet (4 g total) by mouth once daily.  -     CBC auto differential; Future  -     Comprehensive metabolic panel; Future    Hyperglycemia  -     Hemoglobin A1c; Future    Diflucan once weekly- pulse dosing  Topical diflucan/ triamcinolone (patient has this at home)  Attempt to keep area clean  and dry  Questran to help diarrhea  Stool studies to be returned ASAP.     Follow up in 4 weeks as scheduled with Dr. Osei. Follow up with me sooner if needed.

## 2017-09-30 ENCOUNTER — LAB VISIT (OUTPATIENT)
Dept: LAB | Facility: HOSPITAL | Age: 42
End: 2017-09-30
Attending: FAMILY MEDICINE
Payer: MEDICARE

## 2017-09-30 DIAGNOSIS — R19.7 DIARRHEA, UNSPECIFIED TYPE: ICD-10-CM

## 2017-09-30 LAB — WBC #/AREA STL HPF: NORMAL /[HPF]

## 2017-09-30 PROCEDURE — 87425 ROTAVIRUS AG IA: CPT

## 2017-09-30 PROCEDURE — 87209 SMEAR COMPLEX STAIN: CPT

## 2017-09-30 PROCEDURE — 87427 SHIGA-LIKE TOXIN AG IA: CPT

## 2017-09-30 PROCEDURE — 87046 STOOL CULTR AEROBIC BACT EA: CPT | Mod: 59

## 2017-09-30 PROCEDURE — 87329 GIARDIA AG IA: CPT

## 2017-09-30 PROCEDURE — 82272 OCCULT BLD FECES 1-3 TESTS: CPT

## 2017-09-30 PROCEDURE — 87449 NOS EACH ORGANISM AG IA: CPT

## 2017-09-30 PROCEDURE — 87338 HPYLORI STOOL AG IA: CPT

## 2017-09-30 PROCEDURE — 87045 FECES CULTURE AEROBIC BACT: CPT

## 2017-09-30 PROCEDURE — 89055 LEUKOCYTE ASSESSMENT FECAL: CPT

## 2017-10-01 LAB
C DIFF GDH STL QL: NEGATIVE
C DIFF TOX A+B STL QL IA: NEGATIVE
CRYPTOSP AG STL QL IA: NEGATIVE
E COLI SXT1 STL QL IA: NEGATIVE
E COLI SXT2 STL QL IA: NEGATIVE
G LAMBLIA AG STL QL IA: NEGATIVE
OB PNL STL: NEGATIVE
RV AG STL QL IA.RAPID: NEGATIVE

## 2017-10-02 DIAGNOSIS — R19.7 DIARRHEA, UNSPECIFIED TYPE: ICD-10-CM

## 2017-10-02 DIAGNOSIS — Q76.0 SPINA BIFIDA OCCULTA: ICD-10-CM

## 2017-10-02 DIAGNOSIS — Q05.9 SPINA BIFIDA MANIFESTA: ICD-10-CM

## 2017-10-02 DIAGNOSIS — E87.1 HYPONATREMIA: Primary | ICD-10-CM

## 2017-10-02 LAB
BACTERIA STL CULT: NORMAL
O+P STL TRI STN: NORMAL

## 2017-10-02 RX ORDER — HYDROCODONE BITARTRATE AND ACETAMINOPHEN 7.5; 325 MG/1; MG/1
1 TABLET ORAL EVERY 6 HOURS PRN
Qty: 120 TABLET | Refills: 0 | Status: SHIPPED | OUTPATIENT
Start: 2017-10-02 | End: 2017-11-02 | Stop reason: SDUPTHER

## 2017-10-04 LAB — H PYLORI AG STL QL: NOT DETECTED

## 2017-10-10 ENCOUNTER — PATIENT MESSAGE (OUTPATIENT)
Dept: FAMILY MEDICINE | Facility: CLINIC | Age: 42
End: 2017-10-10

## 2017-10-15 ENCOUNTER — PATIENT MESSAGE (OUTPATIENT)
Dept: FAMILY MEDICINE | Facility: CLINIC | Age: 42
End: 2017-10-15

## 2017-10-16 NOTE — TELEPHONE ENCOUNTER
I will place order for CT scan. Patient also needs an appt with Dr. Sea COREY. THanks!  Patient also needs repeat BMP that was ordered on 10/02/17.

## 2017-10-17 ENCOUNTER — HOSPITAL ENCOUNTER (OUTPATIENT)
Dept: RADIOLOGY | Facility: HOSPITAL | Age: 42
Discharge: HOME | End: 2017-10-17
Attending: PHYSICIAN ASSISTANT
Payer: MEDICARE

## 2017-10-17 DIAGNOSIS — R19.7 DIARRHEA, UNSPECIFIED TYPE: ICD-10-CM

## 2017-10-17 PROCEDURE — 25500020 PHARM REV CODE 255

## 2017-10-17 PROCEDURE — 74176 CT ABD & PELVIS W/O CONTRAST: CPT | Mod: 26,,, | Performed by: RADIOLOGY

## 2017-10-17 PROCEDURE — 74176 CT ABD & PELVIS W/O CONTRAST: CPT | Mod: TC

## 2017-10-17 RX ADMIN — IOHEXOL 30 ML: 350 INJECTION, SOLUTION INTRAVENOUS at 12:10

## 2017-10-19 ENCOUNTER — TELEPHONE (OUTPATIENT)
Dept: FAMILY MEDICINE | Facility: CLINIC | Age: 42
End: 2017-10-19

## 2017-10-19 NOTE — TELEPHONE ENCOUNTER
----- Message from LYNDSEY Fowler sent at 10/19/2017  2:57 PM CDT -----  No changes are seen on the CT scan of the abdomen.  No acute cause seen for symptoms.

## 2017-10-19 NOTE — TELEPHONE ENCOUNTER
----- Message from Annamaria Rondon PA-C sent at 10/18/2017 12:06 PM CDT -----  Blood work shows that sodium level has improved though it remains mildly low. Continue to limit water intake to 1.5 liters daily. Please keep scheduled follow up with Dr. Osei as she may want to do further evaluation of this.

## 2017-10-23 RX ORDER — LISINOPRIL 10 MG/1
TABLET ORAL
Qty: 90 TABLET | Refills: 0 | Status: SHIPPED | OUTPATIENT
Start: 2017-10-23 | End: 2017-11-02 | Stop reason: SDUPTHER

## 2017-11-01 ENCOUNTER — DOCUMENTATION ONLY (OUTPATIENT)
Dept: FAMILY MEDICINE | Facility: CLINIC | Age: 42
End: 2017-11-01

## 2017-11-01 NOTE — PROGRESS NOTES
Pre-Visit Chart Review  For Appointment Scheduled on 11-2-17    Health Maintenance Due   Topic Date Due    Pap Smear with HPV Cotest  09/24/1996    Mammogram  09/24/2015    Eye Exam  02/16/2017    Influenza Vaccine  08/01/2017    Foot Exam  09/01/2017

## 2017-11-02 ENCOUNTER — OFFICE VISIT (OUTPATIENT)
Dept: FAMILY MEDICINE | Facility: CLINIC | Age: 42
End: 2017-11-02
Payer: MEDICARE

## 2017-11-02 VITALS
OXYGEN SATURATION: 96 % | HEART RATE: 106 BPM | SYSTOLIC BLOOD PRESSURE: 130 MMHG | BODY MASS INDEX: 55.32 KG/M2 | WEIGHT: 293 LBS | HEIGHT: 61 IN | DIASTOLIC BLOOD PRESSURE: 80 MMHG | TEMPERATURE: 99 F

## 2017-11-02 DIAGNOSIS — J45.909 UNCOMPLICATED ASTHMA, UNSPECIFIED ASTHMA SEVERITY, UNSPECIFIED WHETHER PERSISTENT: Primary | ICD-10-CM

## 2017-11-02 DIAGNOSIS — Q76.0 SPINA BIFIDA OCCULTA: ICD-10-CM

## 2017-11-02 DIAGNOSIS — Q05.9 SPINA BIFIDA MANIFESTA: ICD-10-CM

## 2017-11-02 DIAGNOSIS — R06.02 SOB (SHORTNESS OF BREATH): ICD-10-CM

## 2017-11-02 DIAGNOSIS — Z23 FLU VACCINE NEED: ICD-10-CM

## 2017-11-02 DIAGNOSIS — F11.20 CONTINUOUS OPIOID DEPENDENCE: Chronic | ICD-10-CM

## 2017-11-02 DIAGNOSIS — E66.01 MORBID OBESITY WITH BMI OF 50.0-59.9, ADULT: ICD-10-CM

## 2017-11-02 DIAGNOSIS — G82.20 PARAPLEGIA: ICD-10-CM

## 2017-11-02 DIAGNOSIS — E11.42 CONTROLLED TYPE 2 DIABETES MELLITUS WITH DIABETIC POLYNEUROPATHY, UNSPECIFIED LONG TERM INSULIN USE STATUS: ICD-10-CM

## 2017-11-02 PROCEDURE — 90688 IIV4 VACCINE SPLT 0.5 ML IM: CPT | Mod: PBBFAC,PO

## 2017-11-02 PROCEDURE — 99999 PR PBB SHADOW E&M-EST. PATIENT-LVL IV: CPT | Mod: PBBFAC,,, | Performed by: FAMILY MEDICINE

## 2017-11-02 PROCEDURE — 99214 OFFICE O/P EST MOD 30 MIN: CPT | Mod: PBBFAC,PO | Performed by: FAMILY MEDICINE

## 2017-11-02 PROCEDURE — 99214 OFFICE O/P EST MOD 30 MIN: CPT | Mod: S$PBB,,, | Performed by: FAMILY MEDICINE

## 2017-11-02 RX ORDER — HYDROCODONE BITARTRATE AND ACETAMINOPHEN 7.5; 325 MG/1; MG/1
1 TABLET ORAL EVERY 6 HOURS PRN
Qty: 120 TABLET | Refills: 0 | Status: SHIPPED | OUTPATIENT
Start: 2017-11-02 | End: 2017-12-04 | Stop reason: SDUPTHER

## 2017-11-02 RX ORDER — ALBUTEROL SULFATE 0.83 MG/ML
2.5 SOLUTION RESPIRATORY (INHALATION) EVERY 6 HOURS PRN
Qty: 100 EACH | Status: SHIPPED | OUTPATIENT
Start: 2017-11-02 | End: 2018-05-02 | Stop reason: SDUPTHER

## 2017-11-02 RX ORDER — METOPROLOL SUCCINATE 50 MG/1
50 TABLET, EXTENDED RELEASE ORAL DAILY
Qty: 90 TABLET | Refills: 3 | Status: SHIPPED | OUTPATIENT
Start: 2017-11-02 | End: 2018-11-09 | Stop reason: SDUPTHER

## 2017-11-02 RX ORDER — LISINOPRIL 10 MG/1
10 TABLET ORAL DAILY
Qty: 90 TABLET | Refills: 3 | Status: SHIPPED | OUTPATIENT
Start: 2017-11-02 | End: 2018-05-15 | Stop reason: SDUPTHER

## 2017-11-02 NOTE — PROGRESS NOTES
Subjective:       Patient ID: Briana Uriarte is a 42 y.o. female.    Chief Complaint: Follow-up    Patient Active Problem List   Diagnosis    Non-healing ulcer of buttock    Spina bifida manifesta    Peripheral venous insufficiency    Morbid obesity with BMI of 50.0-59.9, adult    Paraplegia    Pickwickian syndrome    Diabetes mellitus type II, controlled    Asthma    Obstructive hydrocephalus    Encephalopathy    Intracranial hypertension    Chiari malformation type II    Microcytic anemia    Spina bifida of lumbar region    Suprapubic catheter    Continuous opioid dependence-uds and contract 12/14/16    Physical debility    Umbilical hernia    Skin bulla    Skin ulcer of right foot, limited to breakdown of skin   Continuous use of opioids follow-up:  Current medication and dosing:  norco 7.5 qid  Supply:  30 day supply  Side effects: none  Pain controlled: yes  Medication compliance: yes  DPS checked today: today -no evidence of diversion  UDS: 12/14/16  pain contract signed: 12/14/16  Opioid risk tool done:done    C/o labored breathing, wheezing unrelieved with albuterol HFA.  Leans now toward right.  Patient admits to being more uncomfortable and more sob when upright.  Has h/o pickwickian but mother and patient are unaware how that diagnosis was made. It was diagnosed during a hospitalization for urosepsis 2013.   Not on cpap.  Has old nebulizer machine but no meds.  Never has been seen by pulm or had pfts  HPI  Review of Systems   Constitutional: Negative for fever.   Respiratory: Positive for shortness of breath and wheezing. Negative for cough.    Gastrointestinal: Negative for diarrhea and nausea.   Skin: Positive for rash (recurrent intertrigo under breast) and wound (small recurrent sacral decubitus above gluteal crease).       Objective:      Physical Exam   Constitutional: She appears well-developed and well-nourished.   Morbidly obese female with atrophied lower extremities  in motorized scooter   Cardiovascular: Normal rate and regular rhythm.    Pulmonary/Chest: Accessory muscle usage present. She is in respiratory distress. She has decreased breath sounds. She has wheezes.   Skin: Skin is warm and dry.   Psychiatric: She has a normal mood and affect.       Assessment:       1. Uncomplicated asthma, unspecified asthma severity, unspecified whether persistent    2. Spina bifida manifesta    3. Spina bifida occulta    4. SOB (shortness of breath)    5. Flu vaccine need    6. Morbid obesity with BMI of 50.0-59.9, adult    7. Controlled type 2 diabetes mellitus with diabetic polyneuropathy, unspecified long term insulin use status    8. Paraplegia    9. Continuous opioid dependence-uds and contract 12/14/16        Plan:       1. Uncomplicated asthma, unspecified asthma severity, unspecified whether persistent  USe prn:  - albuterol (PROVENTIL) 2.5 mg /3 mL (0.083 %) nebulizer solution; Take 3 mLs (2.5 mg total) by nebulization every 6 (six) hours as needed for Wheezing. Rescue  Dispense: 100 each; Refill: prn  - Ambulatory referral to Pulmonology  - NEBULIZER KIT (SUPPLIES) FOR HOME USE    2. Spina bifida manifesta  Chronic sequelae.  Counseled patient on habit forming potential of opiates, risk of sedation, respiratory suppression or arrest especially if used in conjunction with benzodiazepines or alcohol.  Counseled patient to avoid driving while on the medication, rules of the pain contract and need for routine 3 month follow ups.  Patient agrees to all aspects of the plan of care and wishes to proceed with therapy.  The plan is always to use alternatives less habit forming, to utilize interventions and therapies that reduce pain as an adjunctive treatment, and limit and wean the dose of narcotics as soon as able and appropriate.    - hydrocodone-acetaminophen 7.5-325mg (NORCO) 7.5-325 mg per tablet; Take 1 tablet by mouth every 6 (six) hours as needed for Pain.  Dispense: 120 tablet;  "Refill: 0  - Ambulatory referral to Pulmonology    3. Spina bifida occulta  See above    - hydrocodone-acetaminophen 7.5-325mg (NORCO) 7.5-325 mg per tablet; Take 1 tablet by mouth every 6 (six) hours as needed for Pain.  Dispense: 120 tablet; Refill: 0  - Ambulatory referral to Pulmonology    4. SOB (shortness of breath)  Suspect mainly restrictive lung disease, atelectasis from body habitus, spinal curvature and asthma.  May need pfts.  Consider sleep study to determine if patient may benefit from CPAP/biPAP  - Ambulatory referral to Pulmonology    5. Flu vaccine need  Immunize today.  Counseled patient on risks, benefits and side effects.  Patient elected to proceed with vaccination.    - Influenza - Quadrivalent (3 years & older)    6. Morbid obesity with BMI of 50.0-59.9, adult  Counseled patient on his ideal body weight, health consequences of being obese and current recommendations including weekly exercise and a heart healthy diet.  Current BMI is:Estimated body mass index is 58.9 kg/m² as calculated from the following:    Height as of this encounter: 5' 1" (1.549 m).    Weight as of this encounter: 141.4 kg (311 lb 11.7 oz)..  Patient is aware that ideal BMI < 25 or Weight in (lb) to have BMI = 25: 132.        7. Controlled type 2 diabetes mellitus with diabetic polyneuropathy, unspecified long term insulin use status  Controlled on current medications.  Continue current medications.      8. Paraplegia  Chronic.  Cont decubitus care and precutions    9. Continuous opioid dependence-uds and contract 12/14/16  See above    [facility] goal documentation:  Patient readiness: acceptance and barriers:readiness  During the course of the visit the patient was educated and counseled about the following: Diabetes:  Discussed general issues about diabetes pathophysiology and management.  Obesity:   General weight loss/lifestyle modification strategies discussed (elicit support from others; identify saboteurs; non-food " rewards, etc).  Goals: Diabetes: Maintain Hemoglobin A1C below 7 and Obesity: Reduce calorie intake and BMI  Goal/Outcomes met:type 2 DM  The following self management tools provided:none  Patient Instructions (the written plan) was given to the patient/family: Yes  Time spent with patient: 20 minutes    Patient with be reevaluated in 3 months or sooner prn    Greater than 50% of this visit was spent counseling as described in above documentation:Yes

## 2017-11-02 NOTE — PROGRESS NOTES
2 patient identifiers used ( name &  ).  Administered Flu vaccine IM left deltoid.  Patient tolerated well, no bleeding at insertion site noted.  Pain scale 0/10.  Aseptic technique maintained.  Immunization information given to patient. Advised patient to remain in clinic for 15 minutes to monitor for reaction.  No AR noted.

## 2017-11-02 NOTE — PATIENT INSTRUCTIONS
Controlling High Blood Pressure  High blood pressure (hypertension) is often called the silent killer. This is because many people who have it dont know it. High blood pressure is defined as 140/90 mm Hg or higher. Know your blood pressure and remember to check it regularly. Doing so can save your life. Here are some things you can do to help control your blood pressure.    Choose heart-healthy foods  · Select low-salt, low-fat foods. Limit sodium intake to 2,400 mg per day or the amount suggested by your healthcare provider.  · Limit canned, dried, cured, packaged, and fast foods. These can contain a lot of salt.  · Eat 8 to 10 servings of fruits and vegetables every day.  · Choose lean meats, fish, or chicken.  · Eat whole-grain pasta, brown rice, and beans.  · Eat 2 to 3 servings of low-fat or fat-free dairy products.  · Ask your doctor about the DASH eating plan. This plan helps reduce blood pressure.  · When you go to a restaurant, ask that your meal be prepared with no added salt.  Maintain a healthy weight  · Ask your healthcare provider how many calories to eat a day. Then stick to that number.  · Ask your healthcare provider what weight range is healthiest for you. If you are overweight, a weight loss of only 3% to 5% of your body weight can help lower blood pressure. Generally, a good weight loss goal is to lose 10% of your body weight in a year.  · Limit snacks and sweets.  · Get regular exercise.  Get up and get active  · Choose activities you enjoy. Find ones you can do with friends or family. This includes bicycling, dancing, walking, and jogging.  · Park farther away from building entrances.  · Use stairs instead of the elevator.  · When you can, walk or bike instead of driving.  · [place] leaves, garden, or do household repairs.  · Be active at a moderate to vigorous level of physical activity for at least 40 minutes for a minimum of 3 to 4 days a week.   Manage stress  · Make time to relax and enjoy  life. Find time to laugh.  · Communicate your concerns with your loved ones and your healthcare provider.  · Visit with family and friends, and keep up with hobbies.  Limit alcohol and quit smoking  · Men should have no more than 2 drinks per day.  · Women should have no more than 1 drink per day.  · Talk with your healthcare provider about quitting smoking. Smoking significantly increases your risk for heart disease and stroke. Ask your healthcare provider about community smoking cessation programs and other options.  Medicines  If lifestyle changes arent enough, your healthcare provider may prescribe high blood pressure medicine. Take all medicines as prescribed. If you have any questions about your medicines, ask your healthcare provider before stopping or changing them.   Date Last Reviewed: 4/27/2016  © 7538-0422 [employer]. [address] [place] [address], [place] [address]. All rights reserved. This information is not intended as a substitute for professional medical care. Always follow your healthcare professional's instructions.

## 2017-11-03 ENCOUNTER — TELEPHONE (OUTPATIENT)
Dept: FAMILY MEDICINE | Facility: CLINIC | Age: 42
End: 2017-11-03

## 2017-11-03 NOTE — TELEPHONE ENCOUNTER
----- Message from Michelle Gray sent at 11/2/2017  3:41 PM CDT -----  Contact: father, Alex  Patient's father Alex Chapito calling regarding the nebulizer. Please call father at [phone] or [phone]. Thanks!

## 2017-11-13 ENCOUNTER — TELEPHONE (OUTPATIENT)
Dept: PHYSICAL MEDICINE AND REHAB | Facility: CLINIC | Age: 42
End: 2017-11-13

## 2017-11-13 DIAGNOSIS — G82.20 PARAPLEGIA: Primary | ICD-10-CM

## 2017-12-04 ENCOUNTER — PATIENT MESSAGE (OUTPATIENT)
Dept: FAMILY MEDICINE | Facility: CLINIC | Age: 42
End: 2017-12-04

## 2017-12-04 DIAGNOSIS — Q05.9 SPINA BIFIDA MANIFESTA: ICD-10-CM

## 2017-12-04 DIAGNOSIS — Q76.0 SPINA BIFIDA OCCULTA: ICD-10-CM

## 2017-12-05 ENCOUNTER — HOSPITAL ENCOUNTER (OUTPATIENT)
Dept: RADIOLOGY | Facility: HOSPITAL | Age: 42
Discharge: HOME | End: 2017-12-05
Attending: UROLOGY
Payer: MEDICARE

## 2017-12-05 DIAGNOSIS — N20.0 KIDNEY STONE: ICD-10-CM

## 2017-12-05 PROCEDURE — 76770 US EXAM ABDO BACK WALL COMP: CPT | Mod: TC

## 2017-12-05 PROCEDURE — 76770 US EXAM ABDO BACK WALL COMP: CPT | Mod: 26,,, | Performed by: RADIOLOGY

## 2017-12-05 PROCEDURE — 74000 XR ABDOMEN AP 1 VIEW: CPT | Mod: TC

## 2017-12-05 PROCEDURE — 74000 XR ABDOMEN AP 1 VIEW: CPT | Mod: 26,,, | Performed by: RADIOLOGY

## 2017-12-05 RX ORDER — HYDROCODONE BITARTRATE AND ACETAMINOPHEN 7.5; 325 MG/1; MG/1
1 TABLET ORAL EVERY 6 HOURS PRN
Qty: 120 TABLET | Refills: 0 | Status: SHIPPED | OUTPATIENT
Start: 2017-12-05 | End: 2017-12-20 | Stop reason: SDUPTHER

## 2017-12-20 DIAGNOSIS — Q05.9 SPINA BIFIDA MANIFESTA: ICD-10-CM

## 2017-12-20 DIAGNOSIS — Q76.0 SPINA BIFIDA OCCULTA: ICD-10-CM

## 2017-12-20 RX ORDER — HYDROCODONE BITARTRATE AND ACETAMINOPHEN 7.5; 325 MG/1; MG/1
1 TABLET ORAL EVERY 6 HOURS PRN
Qty: 120 TABLET | Refills: 0 | Status: SHIPPED | OUTPATIENT
Start: 2017-12-20 | End: 2018-01-29 | Stop reason: SDUPTHER

## 2017-12-21 ENCOUNTER — OFFICE VISIT (OUTPATIENT)
Dept: UROLOGY | Facility: CLINIC | Age: 42
End: 2017-12-21
Payer: MEDICARE

## 2017-12-21 VITALS — BODY MASS INDEX: 55.32 KG/M2 | HEIGHT: 61 IN | RESPIRATION RATE: 18 BRPM | WEIGHT: 293 LBS | TEMPERATURE: 98 F

## 2017-12-21 DIAGNOSIS — Z43.5 ENCOUNTER FOR CARE OR REPLACEMENT OF SUPRAPUBIC TUBE: ICD-10-CM

## 2017-12-21 DIAGNOSIS — G91.9 HYDROCEPHALUS: ICD-10-CM

## 2017-12-21 DIAGNOSIS — G83.4 CAUDA EQUINA SYNDROME WITH NEUROGENIC BLADDER: ICD-10-CM

## 2017-12-21 DIAGNOSIS — N20.0 KIDNEY STONE: ICD-10-CM

## 2017-12-21 DIAGNOSIS — Q05.9 SPINA BIFIDA, UNSPECIFIED HYDROCEPHALUS PRESENCE, UNSPECIFIED SPINAL REGION: ICD-10-CM

## 2017-12-21 DIAGNOSIS — Q05.5 SPINA BIFIDA OF CERVICAL REGION, UNSPECIFIED HYDROCEPHALUS PRESENCE: Primary | ICD-10-CM

## 2017-12-21 PROCEDURE — 99999 PR PBB SHADOW E&M-EST. PATIENT-LVL III: CPT | Mod: PBBFAC,,, | Performed by: UROLOGY

## 2017-12-21 PROCEDURE — 99213 OFFICE O/P EST LOW 20 MIN: CPT | Mod: PBBFAC,PN | Performed by: UROLOGY

## 2017-12-21 PROCEDURE — 99214 OFFICE O/P EST MOD 30 MIN: CPT | Mod: S$PBB,,, | Performed by: UROLOGY

## 2017-12-21 NOTE — PROGRESS NOTES
OFFICE NOTE    CHIEF COMPLAINT:   Paraplegia, hydrocephalus, spina bifida, neurogenic bladder,   indwelling suprapubic catheter, right renal calculi, and nonfunctioning left   kidney.    HISTORY OF PRESENT ILLNESS:  This 42-year-old female returns for routine   recheck.  She has a history of neurogenic bladder as a result of her spina   bifida and hydrocephalus that is being managed with a suprapubic tube that is   changed every three to four weeks by her mother who is accompanying her today.    The mother states she has had no problems and otherwise has been doing quite   well.  Mother did mention that the patient did recently develop a generalized   body rash that was treated by her primary care physician and has since resolved.    The patient also has a history of renal calculi and has staghorn-type calculus in   the lower pole of the right kidney and a recent KUB on 12/05/2017 revealed   essentially no change in the calculus. On renal ultrasound and CT scan it was also   essentially unchanged revealing nonobstructive right renal calculus and also   left renal cyst in the atrophic nonfunctioning left kidney.    PHYSICAL EXAMINATION:  GENERAL:  The patient appears to be in no acute distress.  Alert, awake,   cooperative.  ABDOMEN:  Again, reveals a protuberant bulging abdomen with a bulging mass in   the umbilicus and the suprapubic tube in good place draining clear urine.    FINAL IMPRESSION:  Neurogenic bladder, hydrocephalus, paraplegia, indwelling   suprapubic catheter, spina bifida, right renal calculus, nonfunctioning left   kidney.    RECOMMENDATION:  The mother will continue to change the suprapubic tube every   three to four weeks.  Otherwise, routine recheck with us in three to four   months.      DENY  dd: 12/21/2017 10:55:16 (CST)  td: 12/22/2017 00:29:52 (CST)  Doc ID   #[id number]  Job ID #[id number]    CC:

## 2018-01-22 ENCOUNTER — TELEPHONE (OUTPATIENT)
Dept: PULMONOLOGY | Facility: CLINIC | Age: 43
End: 2018-01-22

## 2018-01-29 DIAGNOSIS — Q05.9 SPINA BIFIDA MANIFESTA: ICD-10-CM

## 2018-01-29 DIAGNOSIS — Q76.0 SPINA BIFIDA OCCULTA: ICD-10-CM

## 2018-01-30 RX ORDER — HYDROCODONE BITARTRATE AND ACETAMINOPHEN 7.5; 325 MG/1; MG/1
1 TABLET ORAL EVERY 6 HOURS PRN
Qty: 120 TABLET | Refills: 0 | Status: SHIPPED | OUTPATIENT
Start: 2018-01-30 | End: 2018-02-28 | Stop reason: SDUPTHER

## 2018-02-01 ENCOUNTER — PATIENT MESSAGE (OUTPATIENT)
Dept: FAMILY MEDICINE | Facility: CLINIC | Age: 43
End: 2018-02-01

## 2018-02-01 DIAGNOSIS — E11.9 CONTROLLED TYPE 2 DIABETES MELLITUS WITHOUT COMPLICATION, WITHOUT LONG-TERM CURRENT USE OF INSULIN: ICD-10-CM

## 2018-02-02 ENCOUNTER — OFFICE VISIT (OUTPATIENT)
Dept: FAMILY MEDICINE | Facility: CLINIC | Age: 43
End: 2018-02-02
Payer: MEDICARE

## 2018-02-02 VITALS
HEIGHT: 61 IN | TEMPERATURE: 98 F | HEART RATE: 91 BPM | SYSTOLIC BLOOD PRESSURE: 142 MMHG | DIASTOLIC BLOOD PRESSURE: 82 MMHG

## 2018-02-02 DIAGNOSIS — E11.42 CONTROLLED TYPE 2 DIABETES MELLITUS WITH DIABETIC POLYNEUROPATHY, UNSPECIFIED LONG TERM INSULIN USE STATUS: ICD-10-CM

## 2018-02-02 DIAGNOSIS — F11.20 CONTINUOUS OPIOID DEPENDENCE: Chronic | ICD-10-CM

## 2018-02-02 DIAGNOSIS — Z93.59 SUPRAPUBIC CATHETER: Primary | ICD-10-CM

## 2018-02-02 PROCEDURE — 99213 OFFICE O/P EST LOW 20 MIN: CPT | Mod: PBBFAC,PO | Performed by: FAMILY MEDICINE

## 2018-02-02 PROCEDURE — 99999 PR PBB SHADOW E&M-EST. PATIENT-LVL III: CPT | Mod: PBBFAC,,, | Performed by: FAMILY MEDICINE

## 2018-02-02 PROCEDURE — 99214 OFFICE O/P EST MOD 30 MIN: CPT | Mod: S$PBB,,, | Performed by: FAMILY MEDICINE

## 2018-02-02 NOTE — LETTER
"2018    Briana Ila Chapito  [address] [place] [address]             [facility]  [address] [place]  [place] [address]  Phone: [phone]  Fax: [phone]   2018    Re: Briana Thorpe Lorinkaterina        : 1975        MRN: [id number]    PAIN ORIENTATION AGREEMENT    My doctor and I have decided that as part of my treatment for chronic pain, I will receive prescriptions for controlled substances.  As a patient, I will agree to the following terms in order for my provider to effectively treat my pain and also comply with the rules set forth by [facility] Board of Medical Examiners and Drug Enforcement Agency.  1. I understand that in order for me to receive the best possible care, my pain management doctor needs a copy of any previous medical records, including MRI, office notes, lab results, etc.  2. I will provide a full list of my medications, current dose, and how often I take my medication.  3. A single physician shall be responsible for prescribing my pain medication.  4. I understand that my physician may require an office visit every 3 months for certain controlled substances.  5. It is my responsibility to keep my appointments.  If I miss my schedule appointment, I will be rescheduled to the first available time slot.  I understand that pain medications may not be refilled until seen.  6. If my doctor agrees to refill my pain medication by telephone, I will call the office at least 5 business days in advance to request a refill prescription.  7. Refill prescriptions will not be written in the evenings, weekends, or on holidays.  8. Each prescription is expected to last at least one month.  Refills will not be given early if I "run out early", "lose a prescription", "spill" or "misplaced" my medication.  9. It may be necessary for prescriptions to be picked up in person and proof of identification may be required.  10. I will have my pain medication " "filled at only one pharmacy.                 [facility] [place] - [address] [facility]  [address]  [place] [address]  Phone: [phone] Fax: [phone]         11. A baseline drug screen may be completed on my first visit and or randomly at other routine clinic visits.      I have read and understand the above information.  I will, to the best of my ability, adhere to these policies and commitments.  I further understand that noncompliance with these policies may result in my being discharged as the patient.      _____________________                     _____Briana Uriarte______  Patient signature/date         Patient printed  name                                                                                  _____________________                    ____________________  Physician signature/printed name/date         Witness/date    Anaid Osei MD 2/2/2018                BEHAVIOR AGREEMENT FOR THE USE OF CONTROLLED DRUGS  The following has been explained to me:  1. It is possible that I may become physically dependent, psychologically dependent, tolerant and/or addicted to controlled substance medications.   2. Physical dependence occurs if withdrawal symptoms are experienced when the drug is suddenly discontinued.  3. Tolerance is the need for higher doses of the drug to achieve the same amount of pain control.  4. Addition is a psychological and behavioral syndrome that is recognized when the patient abuses the drug to obtain mental numbness or euphoria (get high), or shows drug craving behavior or manipulative attitude toward the physician in order to obtain the drug.  5. Withdrawal symptoms may occur if pain medication is stopped abruptly.   These symptoms include yawning, sweating, watery eyes, runny nose, anxiety, tremors, achy muscles, hot and cold flashes, "gooseflesh ", abdominal cramps or diarrhea.  6. I will not cut or chew " long-acting pain medication.  7. If severe sedation (sleepiness) or any other medical emergency relating to my pain medication occurs, I will contact my doctor's office or seek ER attention immediately.  8. I will not combine these drugs with alcohol or recreational drugs (this includes marijuana).     9. I must inform my doctor if I am taking any other sedating drugs such as Valium, Ativan, seizure medication or psychiatric drugs.    10. I will inform my physician of any current or prior history of drug abuse or prescription medication misuse.  11. These medications may be harmful to an unborn child.  I have been advised to use 2 forms of birth control (at least one barrier, such as condoms) while using these medications.    12. If I test positive for drugs that my doctor has not prescribed, and/or if I refuses a random drug test, my physician has the right to stop my controlled substance, end  his/her relationship with me, and I may be terminated from the clinic.   13. If at any time I become violent or abusive, verbally or physically, my actions will be considered cause to terminate care from the clinic and discontinuation of pain medications.          I understand and agree that if I fail to abide by the above agreements, or if I show signs suspicious of narcotic over use or abuse, my pain management physician may discontinue treatment, and narcotic prescriptions will be discontinued.            _____________________                     _____Briana Uriarte______  Patient signature/date          Patient printed  name                                                                                _____________________                    ____________________  Physician signature/printed name/date           Witness/date    Anaid Osei MD 2/2/2018          no intervention at this time

## 2018-02-13 NOTE — PROGRESS NOTES
Subjective:       Patient ID: Briana Uriarte is a 42 y.o. female.    Chief Complaint: Follow-up    Patient Active Problem List   Diagnosis    Non-healing ulcer of buttock    Spina bifida manifesta    Peripheral venous insufficiency    Morbid obesity with BMI of 50.0-59.9, adult    Paraplegia    Pickwickian syndrome    Diabetes mellitus type II, controlled    Asthma    Obstructive hydrocephalus    Encephalopathy    Intracranial hypertension    Chiari malformation type II    Microcytic anemia    Spina bifida of lumbar region    Suprapubic catheter    Continuous opioid dependence-uds and contract 12/14/16    Physical debility    Umbilical hernia    Skin bulla    Skin ulcer of right foot, limited to breakdown of skin     HPI  Review of Systems   Constitutional: Negative for fatigue and unexpected weight change.   Respiratory: Negative for chest tightness and shortness of breath.    Cardiovascular: Negative for chest pain, palpitations and leg swelling.   Gastrointestinal: Negative for abdominal pain.   Musculoskeletal: Negative for arthralgias.   Neurological: Negative for dizziness, syncope, light-headedness and headaches.       Objective:      Physical Exam   Constitutional: She is oriented to person, place, and time. She appears well-developed and well-nourished.   Cardiovascular: Normal rate, regular rhythm and normal heart sounds.    Pulmonary/Chest: Effort normal and breath sounds normal.   Musculoskeletal: She exhibits no edema.   Neurological: She is alert and oriented to person, place, and time.   Skin: Skin is warm and dry.   Psychiatric: She has a normal mood and affect.   Nursing note and vitals reviewed.      Assessment:       1. Suprapubic catheter    2. Controlled type 2 diabetes mellitus with diabetic polyneuropathy, unspecified long term insulin use status    3. Continuous opioid dependence-uds and contract 12/14/16        Plan:       1. Suprapubic catheter  Cont home care  and urology monitoring  - catheter 20 Fr Misc; Latex free, 15 balloon suprapubic catheter  Dispense: 6 each; Refill: 0    2. Controlled type 2 diabetes mellitus with diabetic polyneuropathy, unspecified long term insulin use status  Stable condition.  Continue current medications.  Will adjust based on lab findings or if condition changes.  Will scheduled eye xam  - Comprehensive metabolic panel; Future  - CBC auto differential; Future  - Hemoglobin A1c; Future  - Lipid panel; Future    3. Continuous opioid dependence-uds and contract 12/14/16  Controlled on current medications.  Continue current medications.  Counseled patient on habit forming potential of opiates, risk of sedation, respiratory suppression or arrest especially if used in conjunction with benzodiazepines or alcohol.  Counseled patient to avoid driving while on the medication, rules of the pain contract and need for routine 3 month follow ups.  Patient agrees to all aspects of the plan of care and wishes to proceed with therapy.  The plan is always to use alternatives less habit forming, to utilize interventions and therapies that reduce pain as an adjunctive treatment, and limit and wean the dose of narcotics as soon as able and appropriate.      Reeval 3 months or sooner pnr

## 2018-02-28 DIAGNOSIS — Q76.0 SPINA BIFIDA OCCULTA: ICD-10-CM

## 2018-02-28 DIAGNOSIS — Q05.9 SPINA BIFIDA MANIFESTA: ICD-10-CM

## 2018-03-01 RX ORDER — HYDROCODONE BITARTRATE AND ACETAMINOPHEN 7.5; 325 MG/1; MG/1
1 TABLET ORAL EVERY 6 HOURS PRN
Qty: 120 TABLET | Refills: 0 | Status: SHIPPED | OUTPATIENT
Start: 2018-03-01 | End: 2018-04-03 | Stop reason: SDUPTHER

## 2018-03-09 ENCOUNTER — PATIENT MESSAGE (OUTPATIENT)
Dept: FAMILY MEDICINE | Facility: CLINIC | Age: 43
End: 2018-03-09

## 2018-04-03 DIAGNOSIS — Q76.0 SPINA BIFIDA OCCULTA: ICD-10-CM

## 2018-04-03 DIAGNOSIS — Q05.9 SPINA BIFIDA MANIFESTA: ICD-10-CM

## 2018-04-03 RX ORDER — HYDROCODONE BITARTRATE AND ACETAMINOPHEN 7.5; 325 MG/1; MG/1
1 TABLET ORAL EVERY 6 HOURS PRN
Qty: 120 TABLET | Refills: 0 | Status: SHIPPED | OUTPATIENT
Start: 2018-04-03 | End: 2018-04-30 | Stop reason: SDUPTHER

## 2018-04-08 DIAGNOSIS — I10 ESSENTIAL HYPERTENSION: ICD-10-CM

## 2018-04-09 ENCOUNTER — TELEPHONE (OUTPATIENT)
Dept: PHYSICAL MEDICINE AND REHAB | Facility: CLINIC | Age: 43
End: 2018-04-09

## 2018-04-09 RX ORDER — HYDROCHLOROTHIAZIDE 25 MG/1
TABLET ORAL
Qty: 30 TABLET | Refills: 11 | Status: SHIPPED | OUTPATIENT
Start: 2018-04-09 | End: 2018-06-25

## 2018-04-19 ENCOUNTER — OFFICE VISIT (OUTPATIENT)
Dept: UROLOGY | Facility: CLINIC | Age: 43
End: 2018-04-19
Payer: MEDICARE

## 2018-04-19 VITALS
DIASTOLIC BLOOD PRESSURE: 87 MMHG | TEMPERATURE: 99 F | SYSTOLIC BLOOD PRESSURE: 162 MMHG | RESPIRATION RATE: 18 BRPM | WEIGHT: 293 LBS | HEIGHT: 61 IN | HEART RATE: 93 BPM | BODY MASS INDEX: 55.32 KG/M2

## 2018-04-19 DIAGNOSIS — G91.9 HYDROCEPHALUS: ICD-10-CM

## 2018-04-19 DIAGNOSIS — Q05.9 SPINA BIFIDA, UNSPECIFIED HYDROCEPHALUS PRESENCE, UNSPECIFIED SPINAL REGION: ICD-10-CM

## 2018-04-19 DIAGNOSIS — N20.0 CALCULUS OF KIDNEY: Primary | ICD-10-CM

## 2018-04-19 DIAGNOSIS — Q05.5 SPINA BIFIDA OF CERVICAL REGION, UNSPECIFIED HYDROCEPHALUS PRESENCE: ICD-10-CM

## 2018-04-19 DIAGNOSIS — N28.9 NONFUNCTIONING KIDNEY: ICD-10-CM

## 2018-04-19 DIAGNOSIS — Z43.5 ENCOUNTER FOR CARE OR REPLACEMENT OF SUPRAPUBIC TUBE: ICD-10-CM

## 2018-04-19 DIAGNOSIS — G83.4 CAUDA EQUINA SYNDROME WITH NEUROGENIC BLADDER: ICD-10-CM

## 2018-04-19 PROCEDURE — 99214 OFFICE O/P EST MOD 30 MIN: CPT | Mod: PBBFAC,PN | Performed by: UROLOGY

## 2018-04-19 PROCEDURE — 99999 PR PBB SHADOW E&M-EST. PATIENT-LVL IV: CPT | Mod: PBBFAC,,, | Performed by: UROLOGY

## 2018-04-19 PROCEDURE — 99214 OFFICE O/P EST MOD 30 MIN: CPT | Mod: S$PBB,,, | Performed by: UROLOGY

## 2018-04-19 RX ORDER — IPRATROPIUM BROMIDE 42 UG/1
1 SPRAY, METERED NASAL DAILY PRN
COMMUNITY
Start: 2018-04-13 | End: 2021-08-24

## 2018-04-19 NOTE — PROGRESS NOTES
OFFICE NOTE    CHIEF COMPLAINT:  Hydrocephalus, paraplegia, spina bifida, neurogenic bladder,   indwelling suprapubic catheter, right renal calculus, and nonfunctioning left   kidney.     HISTORY OF PRESENT ILLNESS:  This 42-year-old female returns for routine   recheck.  She has a history of neurogenic bladder as a result of her a spina   bifida and hydrocephalus that is being managed with a suprapubic tube that is   changed every three to four weeks by her mother and in fact it was most recently   changed by her this morning.  The mother is accompanying her.  Mother states   there have been no problems with the catheter and otherwise, she has been doing   fine and has had no further problems with any infections.  The patient is also   known to have a right renal calculus that appears to be consistent with what   appears to be a staghorn-type calculus in the lower pole of the right kidney   that has remained stable.  She does have a nonfunctioning atrophic left kidney.    MEDICAL HISTORY UPDATE:  She has been experiencing some swelling and weakness in   the lower extremities and some breathing difficulty for which she will be   seeing Dr. Corrigan, the pulmonologist.    PHYSICAL EXAMINATION:  ABDOMEN:  Protuberant; but soft, benign.  Suprapubic tube in good place.  Again,   there again is a bulging mass in the umbilicus, essentially unchanged from   prior examinations.    FINAL IMPRESSION:  Hydrocephalus, neurogenic bladder, paraplegia, indwelling   suprapubic catheter, spina bifida, right renal calculus, nonfunctioning left   kidney.    RECOMMENDATION:  KUB and renal ultrasound for upper tract evaluation.    Otherwise, the mother will continue with changing of the suprapubic tube on a   regular basis and every three to four weeks and I will contact them with the   results of the KUB and renal ultrasound.      DENY  dd: 04/19/2018 17:05:31 (CDT)  td: 04/20/2018 02:04:41 (CDT)  Doc ID   #[id number]  Job ID #[id number]    CC:

## 2018-04-24 ENCOUNTER — OFFICE VISIT (OUTPATIENT)
Dept: PHYSICAL MEDICINE AND REHAB | Facility: CLINIC | Age: 43
End: 2018-04-24
Payer: MEDICARE

## 2018-04-24 VITALS
HEIGHT: 61 IN | HEART RATE: 96 BPM | WEIGHT: 293 LBS | SYSTOLIC BLOOD PRESSURE: 130 MMHG | BODY MASS INDEX: 55.32 KG/M2 | DIASTOLIC BLOOD PRESSURE: 88 MMHG

## 2018-04-24 DIAGNOSIS — M25.511 RIGHT SHOULDER PAIN, UNSPECIFIED CHRONICITY: Primary | ICD-10-CM

## 2018-04-24 PROCEDURE — 99999 PR PBB SHADOW E&M-EST. PATIENT-LVL III: CPT | Mod: PBBFAC,,, | Performed by: PHYSICAL MEDICINE & REHABILITATION

## 2018-04-24 PROCEDURE — 99213 OFFICE O/P EST LOW 20 MIN: CPT | Mod: S$PBB,,, | Performed by: PHYSICAL MEDICINE & REHABILITATION

## 2018-04-24 PROCEDURE — 99213 OFFICE O/P EST LOW 20 MIN: CPT | Mod: PBBFAC,PN | Performed by: PHYSICAL MEDICINE & REHABILITATION

## 2018-04-24 NOTE — PROGRESS NOTES
HPI:  Patient is a 42 y.o. year old female w. Spina bifida and cognitive impairment. She is here for her yearly follow up. Last eval I had done a subacromial cortisone injection which only helped short term. She is having difficulty w. Overhead activities of her right arm.  In the interim she was having difficulty w. Static sitting balance and required some wheelchair adjustments to address this. Mom has no complaints w. Wheelchair currently except battery only lasts 3 days not 5 days.However, tech has come to her house and was told battery was working properly.    Labs    Cr o.8 lft nl  egft nl    Past Medical History:   Diagnosis Date    Asthma     Back pain     Blood transfusion     Chronic kidney disease     kidney stones    Diabetes mellitus     Diabetes mellitus, type 2     Esotropia     GERD (gastroesophageal reflux disease)     Hydrocephalus     Hypertension     Non-healing ulcer of buttock     Nystagmus, congenital     Paralysis     Spinal bifida, closed     Wheezing      Past Surgical History:   Procedure Laterality Date    BRAIN SURGERY       shunt revision    CHOLECYSTECTOMY      CYSTOSCOPY W/ LASER LITHOTRIPSY      SPINE SURGERY       Family History   Problem Relation Age of Onset    Cancer Mother     Glaucoma Father     Heart disease Maternal Grandmother     Cancer Maternal Grandmother     Glaucoma Paternal Grandfather     Psoriasis Maternal Uncle     Melanoma Neg Hx     Lupus Neg Hx     Eczema Neg Hx      Social History     Social History    Marital status: Single     Spouse name: N/A    Number of children: N/A    Years of education: N/A     Social History Main Topics    Smoking status: Never Smoker    Smokeless tobacco: Never Used    Alcohol use No    Drug use: No    Sexual activity: Not Currently     Other Topics Concern    Not on file     Social History Narrative    No narrative on file       Review of patient's allergies indicates:   Allergen Reactions     Cefepime      HIVES    Latex Swelling       Current Outpatient Prescriptions:     albuterol (PROAIR HFA) 90 mcg/actuation inhaler, Inhale 2 puffs into the lungs every 6 (six) hours as needed for Wheezing., Disp: 8.5 g, Rfl: 11    albuterol (PROVENTIL) 2.5 mg /3 mL (0.083 %) nebulizer solution, Take 3 mLs (2.5 mg total) by nebulization every 6 (six) hours as needed for Wheezing. Rescue, Disp: 100 each, Rfl: prn    ascorbic acid (VITAMIN C) 500 MG tablet, Take 500 mg by mouth once daily., Disp: , Rfl:     blood sugar diagnostic (FREESTYLE LITE STRIPS) Strp, TEST BLOOD SUGAR ONCE DAILY, Disp: 100 strip, Rfl: 3    catheter 20 Fr Misc, Latex free, 15 balloon suprapubic catheter, Disp: 6 each, Rfl: 0    fexofenadine (ALLEGRA) 180 MG tablet, Take 180 mg by mouth once daily., Disp: , Rfl:     FIBER CHOICE ORAL, Take by mouth., Disp: , Rfl:     fluocinolone acetonide oil (DERMOTIC OIL) 0.01 % Drop, 5 drops in right ear bid x 7-10 days until resolution, use intermittenlty for flares., Disp: 20 mL, Rfl: 2    fluticasone (FLONASE) 50 mcg/actuation nasal spray, , Disp: , Rfl:     hydroCHLOROthiazide (HYDRODIURIL) 25 MG tablet, TAKE 1 TABLET(25 MG) BY MOUTH EVERY DAY, Disp: 30 tablet, Rfl: 11    hydrocodone-acetaminophen 7.5-325mg (NORCO) 7.5-325 mg per tablet, Take 1 tablet by mouth every 6 (six) hours as needed for Pain., Disp: 120 tablet, Rfl: 0    ipratropium (ATROVENT) 42 mcg (0.06 %) nasal spray, , Disp: , Rfl:     lisinopril 10 MG tablet, Take 1 tablet (10 mg total) by mouth once daily., Disp: 90 tablet, Rfl: 3    metoprolol succinate (TOPROL-XL) 50 MG 24 hr tablet, Take 1 tablet (50 mg total) by mouth once daily., Disp: 90 tablet, Rfl: 3    multivitamin with minerals tablet, Take 1 tablet by mouth once daily., Disp: , Rfl:     nystatin (MYCOSTATIN) cream, Apply topically 2 (two) times daily., Disp: 60 g, Rfl: prn    nystatin (MYCOSTATIN) powder, Apply topically 2 (two) times daily., Disp: 60 g, Rfl:  prn  No current facility-administered medications for this visit.     Facility-Administered Medications Ordered in Other Visits:     sodium chloride 0.9% flush 10 mL, 10 mL, Intravenous, PRN, Bozena Acosta MD    sodium chloride 0.9% flush 10 mL, 10 mL, Intravenous, PRN, Bozena Acosta MD    sodium chloride 0.9% flush 10 mL, 10 mL, Intravenous, PRN, Bozena Acosta MD    sodium chloride 0.9% flush 10 mL, 10 mL, Intravenous, PRNBozena MD    Review of Systems  No nausea, vomiting, fevers, Chills , contipation, diarrhea or sweats      Physical Exam:      Vitals:    04/24/18 0820   BP: 130/88   Pulse: 96   alert and oriented follows commands answers some questions affect wnl  Manual muscle test unchanged sensation to light touch grossly intact  Large body habitus  Wheezing  Wheelchair fits appropriately  Gait not tested wheelchair bound  Dec rom right shoulder all directions  +impingement 90deg in abduction and fwd flexion  +hawkin's +neers    Assessment:  Spinal bifida w. Cognitive delay and paraplegia  Morbid obesity possibly contributing to restrictive lung dz  rtc tendinopathy right shoulder    Plan:  She has difficulty w. Needles d/t anxiety. Will try cmpd cream medication which her mom will apply after icing shoulder to help w. Her pain  She has an appt. Coming up w. Pulmonology regarding her breathing  All repairs to wheelchair are now up to date

## 2018-04-25 ENCOUNTER — HOSPITAL ENCOUNTER (OUTPATIENT)
Dept: RADIOLOGY | Facility: HOSPITAL | Age: 43
Discharge: HOME | End: 2018-04-25
Attending: UROLOGY
Payer: MEDICARE

## 2018-04-25 DIAGNOSIS — N20.0 CALCULUS OF KIDNEY: ICD-10-CM

## 2018-04-25 PROCEDURE — 74018 RADEX ABDOMEN 1 VIEW: CPT | Mod: TC,FY

## 2018-04-25 PROCEDURE — 76770 US EXAM ABDO BACK WALL COMP: CPT | Mod: TC

## 2018-04-25 PROCEDURE — 74018 RADEX ABDOMEN 1 VIEW: CPT | Mod: 26,,, | Performed by: RADIOLOGY

## 2018-04-25 PROCEDURE — 76770 US EXAM ABDO BACK WALL COMP: CPT | Mod: 26,,, | Performed by: RADIOLOGY

## 2018-04-30 DIAGNOSIS — Q05.9 SPINA BIFIDA MANIFESTA: ICD-10-CM

## 2018-04-30 DIAGNOSIS — Q76.0 SPINA BIFIDA OCCULTA: ICD-10-CM

## 2018-04-30 RX ORDER — HYDROCODONE BITARTRATE AND ACETAMINOPHEN 7.5; 325 MG/1; MG/1
1 TABLET ORAL EVERY 6 HOURS PRN
Qty: 120 TABLET | Refills: 0 | Status: SHIPPED | OUTPATIENT
Start: 2018-04-30 | End: 2018-05-29 | Stop reason: SDUPTHER

## 2018-05-02 ENCOUNTER — LAB VISIT (OUTPATIENT)
Dept: LAB | Facility: HOSPITAL | Age: 43
End: 2018-05-02
Attending: FAMILY MEDICINE
Payer: MEDICARE

## 2018-05-02 ENCOUNTER — OFFICE VISIT (OUTPATIENT)
Dept: PULMONOLOGY | Facility: CLINIC | Age: 43
End: 2018-05-02
Payer: MEDICARE

## 2018-05-02 VITALS
HEART RATE: 101 BPM | BODY MASS INDEX: 55.32 KG/M2 | HEIGHT: 61 IN | OXYGEN SATURATION: 98 % | DIASTOLIC BLOOD PRESSURE: 82 MMHG | SYSTOLIC BLOOD PRESSURE: 178 MMHG | WEIGHT: 293 LBS

## 2018-05-02 DIAGNOSIS — J45.40 MODERATE PERSISTENT ASTHMA WITHOUT COMPLICATION: Primary | ICD-10-CM

## 2018-05-02 DIAGNOSIS — R06.00 DYSPNEA, UNSPECIFIED TYPE: ICD-10-CM

## 2018-05-02 DIAGNOSIS — E11.42: ICD-10-CM

## 2018-05-02 DIAGNOSIS — J44.9 CHRONIC OBSTRUCTIVE PULMONARY DISEASE, UNSPECIFIED COPD TYPE: ICD-10-CM

## 2018-05-02 DIAGNOSIS — J39.8 TRACHEOBRONCHOMALACIA: ICD-10-CM

## 2018-05-02 DIAGNOSIS — E66.01 MORBID OBESITY WITH BMI OF 50.0-59.9, ADULT: ICD-10-CM

## 2018-05-02 DIAGNOSIS — R60.0 LOCALIZED EDEMA: ICD-10-CM

## 2018-05-02 LAB
ALBUMIN SERPL BCP-MCNC: 3.3 G/DL
ALP SERPL-CCNC: 66 U/L
ALT SERPL W/O P-5'-P-CCNC: 10 U/L
ANION GAP SERPL CALC-SCNC: 11 MMOL/L
AST SERPL-CCNC: 21 U/L
BASOPHILS # BLD AUTO: 0.05 K/UL
BASOPHILS NFR BLD: 0.7 %
BILIRUB SERPL-MCNC: 0.3 MG/DL
BUN SERPL-MCNC: 20 MG/DL
CALCIUM SERPL-MCNC: 9.8 MG/DL
CHLORIDE SERPL-SCNC: 105 MMOL/L
CHOLEST SERPL-MCNC: 202 MG/DL
CHOLEST/HDLC SERPL: 3.6 {RATIO}
CO2 SERPL-SCNC: 22 MMOL/L
CREAT SERPL-MCNC: 0.7 MG/DL
DIFFERENTIAL METHOD: ABNORMAL
EOSINOPHIL # BLD AUTO: 0.3 K/UL
EOSINOPHIL NFR BLD: 4.5 %
ERYTHROCYTE [DISTWIDTH] IN BLOOD BY AUTOMATED COUNT: 17 %
EST. GFR  (AFRICAN AMERICAN): >60 ML/MIN/1.73 M^2
EST. GFR  (NON AFRICAN AMERICAN): >60 ML/MIN/1.73 M^2
ESTIMATED AVG GLUCOSE: 103 MG/DL
GLUCOSE SERPL-MCNC: 83 MG/DL
HBA1C MFR BLD HPLC: 5.2 %
HCT VFR BLD AUTO: 35.6 %
HDLC SERPL-MCNC: 56 MG/DL
HDLC SERPL: 27.7 %
HGB BLD-MCNC: 10.9 G/DL
IMM GRANULOCYTES # BLD AUTO: 0.03 K/UL
IMM GRANULOCYTES NFR BLD AUTO: 0.4 %
LDLC SERPL CALC-MCNC: 123 MG/DL
LYMPHOCYTES # BLD AUTO: 1.4 K/UL
LYMPHOCYTES NFR BLD: 20.2 %
MCH RBC QN AUTO: 25.3 PG
MCHC RBC AUTO-ENTMCNC: 30.6 G/DL
MCV RBC AUTO: 83 FL
MONOCYTES # BLD AUTO: 0.5 K/UL
MONOCYTES NFR BLD: 6.8 %
NEUTROPHILS # BLD AUTO: 4.7 K/UL
NEUTROPHILS NFR BLD: 67.4 %
NONHDLC SERPL-MCNC: 146 MG/DL
NRBC BLD-RTO: 0 /100 WBC
PLATELET # BLD AUTO: 310 K/UL
PMV BLD AUTO: 10.7 FL
POTASSIUM SERPL-SCNC: 5.2 MMOL/L
PROT SERPL-MCNC: 8.4 G/DL
RBC # BLD AUTO: 4.31 M/UL
SODIUM SERPL-SCNC: 138 MMOL/L
TRIGL SERPL-MCNC: 115 MG/DL
WBC # BLD AUTO: 6.93 K/UL

## 2018-05-02 PROCEDURE — 80061 LIPID PANEL: CPT

## 2018-05-02 PROCEDURE — 99999 PR PBB SHADOW E&M-EST. PATIENT-LVL IV: CPT | Mod: PBBFAC,,, | Performed by: INTERNAL MEDICINE

## 2018-05-02 PROCEDURE — 99205 OFFICE O/P NEW HI 60 MIN: CPT | Mod: S$PBB,,, | Performed by: INTERNAL MEDICINE

## 2018-05-02 PROCEDURE — 36415 COLL VENOUS BLD VENIPUNCTURE: CPT | Mod: PO

## 2018-05-02 PROCEDURE — 85025 COMPLETE CBC W/AUTO DIFF WBC: CPT

## 2018-05-02 PROCEDURE — 99214 OFFICE O/P EST MOD 30 MIN: CPT | Mod: PBBFAC,PO | Performed by: INTERNAL MEDICINE

## 2018-05-02 PROCEDURE — 83036 HEMOGLOBIN GLYCOSYLATED A1C: CPT

## 2018-05-02 PROCEDURE — 80053 COMPREHEN METABOLIC PANEL: CPT

## 2018-05-02 RX ORDER — ALBUTEROL SULFATE 90 UG/1
AEROSOL, METERED RESPIRATORY (INHALATION)
Qty: 1 INHALER | Refills: 11 | Status: SHIPPED | OUTPATIENT
Start: 2018-05-02 | End: 2019-05-09 | Stop reason: SDUPTHER

## 2018-05-02 RX ORDER — ALBUTEROL SULFATE 90 UG/1
AEROSOL, METERED RESPIRATORY (INHALATION)
Qty: 1 INHALER | Refills: 11 | Status: SHIPPED | OUTPATIENT
Start: 2018-05-02 | End: 2018-06-25 | Stop reason: SDUPTHER

## 2018-05-02 RX ORDER — ALBUTEROL SULFATE 0.83 MG/ML
2.5 SOLUTION RESPIRATORY (INHALATION) EVERY 6 HOURS PRN
Qty: 120 EACH | Refills: 11 | Status: SHIPPED | OUTPATIENT
Start: 2018-05-02 | End: 2018-06-25

## 2018-05-02 NOTE — PROGRESS NOTES
"5/2/2018    Briana MoranIladarwin Jackmancheco  New Patient Consult    Chief Complaint   Patient presents with    Asthma    Shortness of Breath    Wheezing       HPI: no fh asthma and wheelchair bound, no h/o pe.  Has swollen legs - no dvt.  No renal dz, chr suprapubic catheter, h/o loss  renal infection and cyst evolved and stones and only right works now.      Pt has occ wheezes.      Snores somewhat- more puffs.  Use nebulizer helps a little. No pft.    No pft nor sleep study.                  The chief compliant  problem is new to me",   PFSH:  Past Medical History:   Diagnosis Date    Asthma     Back pain     Blood transfusion     Chronic kidney disease     kidney stones    Diabetes mellitus     Diabetes mellitus, type 2     Esotropia     GERD (gastroesophageal reflux disease)     Hydrocephalus     Hypertension     Non-healing ulcer of buttock     Nystagmus, congenital     Paralysis     Spinal bifida, closed     Wheezing          Past Surgical History:   Procedure Laterality Date    BRAIN SURGERY       shunt revision    CHOLECYSTECTOMY      CYSTOSCOPY W/ LASER LITHOTRIPSY      SPINE SURGERY       Social History   Substance Use Topics    Smoking status: Never Smoker    Smokeless tobacco: Never Used    Alcohol use No     Family History   Problem Relation Age of Onset    Cancer Mother     Glaucoma Father     Heart disease Maternal Grandmother     Cancer Maternal Grandmother     Glaucoma Paternal Grandfather     Psoriasis Maternal Uncle     Melanoma Neg Hx     Lupus Neg Hx     Eczema Neg Hx      Review of patient's allergies indicates:   Allergen Reactions    Cefepime      HIVES    Latex Swelling       Performance Status:The patient's activity level is bedbound.      Review of Systems:  a review of eleven systems covering constitutional, Eye, HEENT, Psych, Respiratory, Cardiac, GI, , Musculoskeletal, Endocrine, Dermatologic was negative except for pertinent findings as listed ABOVE and " "below:   pertinent positive as above, rest is good       Exam:Comprehensive exam done. BP (!) 178/82 (BP Location: Right arm, Patient Position: Sitting)   Pulse 101   Ht 5' 1" (1.549 m)   Wt 136.1 kg (300 lb)   SpO2 98%   BMI 56.68 kg/m²   Exam included Vitals as listed, and patient's appearance and affect and alertness and mood, oral exam for yeast and hygiene and pharynx lesions and Mallapatti (M) score, neck with inspection for jvd and masses and thyroid abnormalities and lymph nodes (supraclavicular and infraclavicular nodes and axillary also examined and noted if abn), chest exam included symmetry and effort and fremitus and percussion and auscultation, cardiac exam included rhythm and gallops and murmur and rubs and jvd and edema, abdominal exam for mass and hepatosplenomegaly and tenderness and hernias and bowel sounds, Musculoskeletal exam with muscle tone and posture and mobility/gait and  strength, and skin for rashes and cyanosis and pallor and turgor, extremity for clubbing.  Findings were normal except for pertinent findings listed below:  MOrbid obese, M1, right >> left leg edema,  shunt apparent in neck, chest is symmetric, no distress, normal percussion, normal fremitus and good normal breath sounds  RRR, no hs megaly nor mass nor clubbing,     Radiographs (ct chest and cxr) reviewed: view by direct vision  2015 ct chest tb malacia and retained secretions lll    Labs reviewed      PFT will be done and results to be reviewed       Plan:  Clinical impression is apparently straight forward and impression with management as below.    Briana was seen today for asthma, shortness of breath and wheezing.    Diagnoses and all orders for this visit:    Moderate persistent asthma without complication  -     Spirometry with/without bronchodilator; Future  -     X-Ray Chest PA And Lateral; Future  -     PULM - Arterial Blood Gases--in addition to PFT only; Future  -     fluticasone-umeclidin-vilanter " (TRELEGY ELLIPTA) 100-62.5-25 mcg DsDv; Inhale 1 puff into the lungs once daily.  -     albuterol (PROVENTIL) 2.5 mg /3 mL (0.083 %) nebulizer solution; Take 3 mLs (2.5 mg total) by nebulization every 6 (six) hours as needed for Wheezing or Shortness of Breath. Rescue  -     albuterol 90 mcg/actuation inhaler; 2 puffs every 4 hours as needed for cough, wheeze, or shortness of breath    Tracheobronchomalacia  -     Spirometry with/without bronchodilator; Future  -     X-Ray Chest PA And Lateral; Future  -     PULM - Arterial Blood Gases--in addition to PFT only; Future  -     fluticasone-umeclidin-vilanter (TRELEGY ELLIPTA) 100-62.5-25 mcg DsDv; Inhale 1 puff into the lungs once daily.  -     albuterol (PROVENTIL) 2.5 mg /3 mL (0.083 %) nebulizer solution; Take 3 mLs (2.5 mg total) by nebulization every 6 (six) hours as needed for Wheezing or Shortness of Breath. Rescue  -     albuterol 90 mcg/actuation inhaler; 2 puffs every 4 hours as needed for cough, wheeze, or shortness of breath    Morbid obesity with BMI of 50.0-59.9, adult  -     PULSE OXIMETRY OVERNIGHT    Localized edema  -     US Lower Extremity Veins Bilateral; Future  -     PULSE OXIMETRY OVERNIGHT    Dyspnea, unspecified type  -     US Lower Extremity Veins Bilateral; Future  -     PULSE OXIMETRY OVERNIGHT    Chronic obstructive pulmonary disease, unspecified COPD type  -     PULSE OXIMETRY OVERNIGHT        Follow-up in about 6 months (around 11/2/2018), or if symptoms worsen or fail to improve.    Discussed with patient above for education the following:      Patient Instructions   Obesity makes lungs very small- immobility adds to smallness.     Ct chest 2015 with tracheobronchomalacia - cannot effectively clear secretions - no secretions usually no problem.  Try to use medication to minimize secretion retention.    Consider copd and asthma.    Edema of legs increases chance of clot- check legs-- right worse than left chronically  Check chest xray and  breathing test and blood gas- not urgent..    Use trelegy as trial - 24/7 bronchial medication = once daily - continue if helps    Check 02 level sleeping     Use albuterol inhaler or nebulizer up to every 4 hrs as needed - use regular to help clearance.    Weight loss may help, but hard to accomplish.

## 2018-05-02 NOTE — PATIENT INSTRUCTIONS
Obesity makes lungs very small- immobility adds to smallness.     Ct chest 2015 with tracheobronchomalacia - cannot effectively clear secretions - no secretions usually no problem.  Try to use medication to minimize secretion retention.    Consider copd and asthma.    Edema of legs increases chance of clot- check legs-- right worse than left chronically  Check chest xray and breathing test and blood gas- not urgent..    Use trelegy as trial - 24/7 bronchial medication = once daily - continue if helps    Check 02 level sleeping     Use albuterol inhaler or nebulizer up to every 4 hrs as needed - use regular to help clearance.  Spacer may help.    Weight loss may help, but hard to accomplish.

## 2018-05-02 NOTE — LETTER
May 2, 2018      Anaid Osei MD  [address]  [place] [address]           [facility] [facility]  [address]  [place] [address]  Phone: [phone]  Fax: [phone]          Patient: Briana Uriarte   MR Number: [id number]   YOB: 1975   Date of Visit: 5/2/2018       Dear Dr. Anaid Osei:    Thank you for referring Briana Uriarte to me for evaluation. Attached you will find relevant portions of my assessment and plan of care.    If you have questions, please do not hesitate to call me. I look forward to following Briana Uriarte along with you.    Sincerely,    Ramirez Corrigan MD    Enclosure  CC:  No Recipients    If you would like to receive this communication electronically, please contact [email or web address] or [phone] to request more information on [facility] access.    For providers and/or their staff who would like to refer a patient to [facility], please contact us through our one-stop-shop provider referral line, [facility] [place], operated by [facility], at [phone].    If you feel you have received this communication in error or would no longer like to receive these types of communications, please e-mail [email or web address]

## 2018-05-03 DIAGNOSIS — E11.9 CONTROLLED TYPE 2 DIABETES MELLITUS WITHOUT COMPLICATION, WITHOUT LONG-TERM CURRENT USE OF INSULIN: ICD-10-CM

## 2018-05-03 NOTE — TELEPHONE ENCOUNTER
Last visit 2-12-18, last labs 5-2-18. Patient also is requesting a new meter. Need a rx for generic meter.

## 2018-05-08 ENCOUNTER — HOSPITAL ENCOUNTER (OUTPATIENT)
Dept: RADIOLOGY | Facility: HOSPITAL | Age: 43
Discharge: HOME | End: 2018-05-08
Attending: INTERNAL MEDICINE
Payer: MEDICARE

## 2018-05-08 ENCOUNTER — HOSPITAL ENCOUNTER (OUTPATIENT)
Dept: RESPIRATORY THERAPY | Facility: HOSPITAL | Age: 43
Discharge: HOME | End: 2018-05-08
Attending: INTERNAL MEDICINE
Payer: MEDICARE

## 2018-05-08 DIAGNOSIS — R06.00 DYSPNEA, UNSPECIFIED TYPE: ICD-10-CM

## 2018-05-08 DIAGNOSIS — J39.8 TRACHEOBRONCHOMALACIA: ICD-10-CM

## 2018-05-08 DIAGNOSIS — J45.40 MODERATE PERSISTENT ASTHMA WITHOUT COMPLICATION: ICD-10-CM

## 2018-05-08 DIAGNOSIS — R60.0 LOCALIZED EDEMA: ICD-10-CM

## 2018-05-08 LAB
ALLENS TEST: ABNORMAL
DELSYS: ABNORMAL
FIO2: 21
HCO3 UR-SCNC: 26.4 MMOL/L (ref 24–28)
MODE: ABNORMAL
PCO2 BLDA: 41.9 MMHG (ref 35–45)
PH SMN: 7.41 [PH] (ref 7.35–7.45)
PO2 BLDA: 80 MMHG (ref 80–100)
POC BE: 2 MMOL/L
POC SATURATED O2: 96 % (ref 95–100)
POC TCO2: 28 MMOL/L (ref 23–27)
SAMPLE: ABNORMAL
SITE: ABNORMAL

## 2018-05-08 PROCEDURE — 71046 X-RAY EXAM CHEST 2 VIEWS: CPT | Mod: TC,FY

## 2018-05-08 PROCEDURE — 94060 EVALUATION OF WHEEZING: CPT | Mod: 26,,, | Performed by: INTERNAL MEDICINE

## 2018-05-08 PROCEDURE — 93970 EXTREMITY STUDY: CPT | Mod: TC

## 2018-05-08 PROCEDURE — 94060 EVALUATION OF WHEEZING: CPT

## 2018-05-08 PROCEDURE — 93970 EXTREMITY STUDY: CPT | Mod: 26,,, | Performed by: RADIOLOGY

## 2018-05-08 PROCEDURE — 71046 X-RAY EXAM CHEST 2 VIEWS: CPT | Mod: 26,,, | Performed by: RADIOLOGY

## 2018-05-08 PROCEDURE — 82803 BLOOD GASES ANY COMBINATION: CPT

## 2018-05-08 PROCEDURE — 94727 GAS DIL/WSHOT DETER LNG VOL: CPT | Mod: 26,,, | Performed by: INTERNAL MEDICINE

## 2018-05-08 PROCEDURE — 36600 WITHDRAWAL OF ARTERIAL BLOOD: CPT

## 2018-05-08 PROCEDURE — 94727 GAS DIL/WSHOT DETER LNG VOL: CPT

## 2018-05-09 NOTE — PROCEDURES
Pulmonary Functions, including spirometry and bronchodilator response and lung volumes  , study was done May  8, 2018.  Spirometry shows loss of vital capacity and fev1 with no obstruction and no bronchodilator response.   FEV1 is 45% or 1.24 liters.  Lung volumes show  loss of TLC with restriction 51%.     Pulmonary functions show restriction. Clinical correlation recommended.     Ramirez Corrigan M.D.

## 2018-05-10 ENCOUNTER — TELEPHONE (OUTPATIENT)
Dept: PULMONOLOGY | Facility: CLINIC | Age: 43
End: 2018-05-10

## 2018-05-10 NOTE — TELEPHONE ENCOUNTER
Per Dr. Corrigan patient notified.    ----- Message from Ramirez Corrigan MD sent at 5/8/2018  5:09 PM CDT -----  Notify good blood gases good.

## 2018-05-10 NOTE — TELEPHONE ENCOUNTER
Per Dr. Corrigan patient notified.    ----- Message from Ramirez Corrigan MD sent at 5/8/2018  5:11 PM CDT -----  Notify no dvt

## 2018-05-10 NOTE — TELEPHONE ENCOUNTER
Per Dr. Corrigan patient notified.    ----- Message from Ramirez Corrigan MD sent at 5/9/2018  3:18 PM CDT -----  Notify lung capacity 51%

## 2018-05-10 NOTE — TELEPHONE ENCOUNTER
Per Dr Corrigan patient notified.    ----- Message from Ramirez Corrigan MD sent at 5/8/2018  5:11 PM CDT -----  Notify small lungs seen as expected

## 2018-05-14 ENCOUNTER — DOCUMENTATION ONLY (OUTPATIENT)
Dept: FAMILY MEDICINE | Facility: CLINIC | Age: 43
End: 2018-05-14

## 2018-05-14 NOTE — PROGRESS NOTES
Pre-Visit Chart Review  For Appointment Scheduled on 5-15-18    Health Maintenance Due   Topic Date Due    Eye Exam  02/16/2017

## 2018-05-15 ENCOUNTER — PATIENT MESSAGE (OUTPATIENT)
Dept: PHYSICAL MEDICINE AND REHAB | Facility: CLINIC | Age: 43
End: 2018-05-15

## 2018-05-15 ENCOUNTER — OFFICE VISIT (OUTPATIENT)
Dept: FAMILY MEDICINE | Facility: CLINIC | Age: 43
End: 2018-05-15
Payer: MEDICARE

## 2018-05-15 VITALS
HEIGHT: 61 IN | DIASTOLIC BLOOD PRESSURE: 80 MMHG | TEMPERATURE: 99 F | SYSTOLIC BLOOD PRESSURE: 150 MMHG | HEART RATE: 100 BPM

## 2018-05-15 DIAGNOSIS — G82.20 PARAPLEGIA: ICD-10-CM

## 2018-05-15 DIAGNOSIS — Q05.9 SPINA BIFIDA MANIFESTA: ICD-10-CM

## 2018-05-15 DIAGNOSIS — E66.01 MORBID OBESITY WITH BMI OF 50.0-59.9, ADULT: ICD-10-CM

## 2018-05-15 DIAGNOSIS — E11.42 CONTROLLED TYPE 2 DIABETES MELLITUS WITH DIABETIC POLYNEUROPATHY, UNSPECIFIED WHETHER LONG TERM INSULIN USE: ICD-10-CM

## 2018-05-15 DIAGNOSIS — F11.20 CONTINUOUS OPIOID DEPENDENCE: Chronic | ICD-10-CM

## 2018-05-15 DIAGNOSIS — G91.1 OBSTRUCTIVE HYDROCEPHALUS: ICD-10-CM

## 2018-05-15 DIAGNOSIS — J45.40 MODERATE PERSISTENT ASTHMA WITHOUT COMPLICATION: ICD-10-CM

## 2018-05-15 DIAGNOSIS — R53.81 PHYSICAL DEBILITY: ICD-10-CM

## 2018-05-15 DIAGNOSIS — I10 ESSENTIAL HYPERTENSION: Primary | ICD-10-CM

## 2018-05-15 PROCEDURE — 99214 OFFICE O/P EST MOD 30 MIN: CPT | Mod: S$PBB,,, | Performed by: FAMILY MEDICINE

## 2018-05-15 PROCEDURE — 99213 OFFICE O/P EST LOW 20 MIN: CPT | Mod: PBBFAC,PO | Performed by: FAMILY MEDICINE

## 2018-05-15 PROCEDURE — 99999 PR PBB SHADOW E&M-EST. PATIENT-LVL III: CPT | Mod: PBBFAC,,, | Performed by: FAMILY MEDICINE

## 2018-05-15 RX ORDER — LISINOPRIL 20 MG/1
20 TABLET ORAL DAILY
Qty: 90 TABLET | Refills: 3 | Status: SHIPPED | OUTPATIENT
Start: 2018-05-15 | End: 2018-07-16 | Stop reason: DRUGHIGH

## 2018-05-17 PROBLEM — L97.511 SKIN ULCER OF RIGHT FOOT, LIMITED TO BREAKDOWN OF SKIN: Status: RESOLVED | Noted: 2017-02-20 | Resolved: 2018-05-17

## 2018-05-17 NOTE — PROGRESS NOTES
Subjective:       Patient ID: Briana Uriarte is a 42 y.o. female.    Chief Complaint: Follow-up    HPI  Review of Systems   Constitutional: Negative for fatigue and unexpected weight change.   Respiratory: Negative for chest tightness and shortness of breath.    Cardiovascular: Negative for chest pain, palpitations and leg swelling.   Gastrointestinal: Negative for abdominal pain.   Musculoskeletal: Negative for arthralgias.   Neurological: Negative for dizziness, syncope, light-headedness and headaches.       Patient Active Problem List   Diagnosis    Spina bifida manifesta    Peripheral venous insufficiency    Morbid obesity with BMI of 50.0-59.9, adult    Paraplegia    Pickwickian syndrome    Diabetes mellitus type II, controlled    Asthma    Obstructive hydrocephalus    Encephalopathy    Intracranial hypertension    Chiari malformation type II    Microcytic anemia    Spina bifida of lumbar region    Suprapubic catheter    Continuous opioid dependence-uds and contract 2/2/18    Physical debility    Umbilical hernia    Skin bulla    Tracheobronchomalacia     Patient is here for a chronic conditions follow up.    Hospital Outpatient Visit on 05/08/2018   Component Date Value Ref Range Status    POC PH 05/08/2018 7.407  7.35 - 7.45 Final    POC PCO2 05/08/2018 41.9  35 - 45 mmHg Final    POC PO2 05/08/2018 80  80 - 100 mmHg Final    POC HCO3 05/08/2018 26.4  24 - 28 mmol/L Final    POC BE 05/08/2018 2  -2 to 2 mmol/L Final    POC SATURATED O2 05/08/2018 96  95 - 100 % Final    POC TCO2 05/08/2018 28* 23 - 27 mmol/L Final    Sample 05/08/2018 ARTERIAL   Final    Site 05/08/2018 LR   Final    Allens Test 05/08/2018 Pass   Final    DelSys 05/08/2018 Room Air   Final    Mode 05/08/2018 SPONT   Final    FiO2 05/08/2018 21   Final   Lab Visit on 05/02/2018   Component Date Value Ref Range Status    Sodium 05/02/2018 138  136 - 145 mmol/L Final    Potassium 05/02/2018 5.2* 3.5 -  5.1 mmol/L Final    Chloride 05/02/2018 105  95 - 110 mmol/L Final    CO2 05/02/2018 22* 23 - 29 mmol/L Final    Glucose 05/02/2018 83  70 - 110 mg/dL Final    BUN, Bld 05/02/2018 20  6 - 20 mg/dL Final    Creatinine 05/02/2018 0.7  0.5 - 1.4 mg/dL Final    Calcium 05/02/2018 9.8  8.7 - 10.5 mg/dL Final    Total Protein 05/02/2018 8.4  6.0 - 8.4 g/dL Final    Albumin 05/02/2018 3.3* 3.5 - 5.2 g/dL Final    Total Bilirubin 05/02/2018 0.3  0.1 - 1.0 mg/dL Final    Alkaline Phosphatase 05/02/2018 66  55 - 135 U/L Final    AST 05/02/2018 21  10 - 40 U/L Final    ALT 05/02/2018 10  10 - 44 U/L Final    Anion Gap 05/02/2018 11  8 - 16 mmol/L Final    eGFR if African American 05/02/2018 >60.0  >60 mL/min/1.73 m^2 Final    eGFR if non African American 05/02/2018 >60.0  >60 mL/min/1.73 m^2 Final    WBC 05/02/2018 6.93  3.90 - 12.70 K/uL Final    RBC 05/02/2018 4.31  4.00 - 5.40 M/uL Final    Hemoglobin 05/02/2018 10.9* 12.0 - 16.0 g/dL Final    Hematocrit 05/02/2018 35.6* 37.0 - 48.5 % Final    MCV 05/02/2018 83  82 - 98 fL Final    MCH 05/02/2018 25.3* 27.0 - 31.0 pg Final    MCHC 05/02/2018 30.6* 32.0 - 36.0 g/dL Final    RDW 05/02/2018 17.0* 11.5 - 14.5 % Final    Platelets 05/02/2018 310  150 - 350 K/uL Final    MPV 05/02/2018 10.7  9.2 - 12.9 fL Final    Immature Granulocytes 05/02/2018 0.4  0.0 - 0.5 % Final    Gran # (ANC) 05/02/2018 4.7  1.8 - 7.7 K/uL Final    Immature Grans (Abs) 05/02/2018 0.03  0.00 - 0.04 K/uL Final    Lymph # 05/02/2018 1.4  1.0 - 4.8 K/uL Final    Mono # 05/02/2018 0.5  0.3 - 1.0 K/uL Final    Eos # 05/02/2018 0.3  0.0 - 0.5 K/uL Final    Baso # 05/02/2018 0.05  0.00 - 0.20 K/uL Final    nRBC 05/02/2018 0  0 /100 WBC Final    Gran% 05/02/2018 67.4  38.0 - 73.0 % Final    Lymph% 05/02/2018 20.2  18.0 - 48.0 % Final    Mono% 05/02/2018 6.8  4.0 - 15.0 % Final    Eosinophil% 05/02/2018 4.5  0.0 - 8.0 % Final    Basophil% 05/02/2018 0.7  0.0 - 1.9 % Final     Differential Method 05/02/2018 Automated   Final    Hemoglobin A1C 05/02/2018 5.2  4.0 - 5.6 % Final    Estimated Avg Glucose 05/02/2018 103  68 - 131 mg/dL Final    Cholesterol 05/02/2018 202* 120 - 199 mg/dL Final    Triglycerides 05/02/2018 115  30 - 150 mg/dL Final    HDL 05/02/2018 56  40 - 75 mg/dL Final    LDL Cholesterol 05/02/2018 123.0  63.0 - 159.0 mg/dL Final    HDL/Chol Ratio 05/02/2018 27.7  20.0 - 50.0 % Final    Total Cholesterol/HDL Ratio 05/02/2018 3.6  2.0 - 5.0 Final    Non-HDL Cholesterol 05/02/2018 146  mg/dL Final   }  Objective:      Physical Exam   Constitutional: She is oriented to person, place, and time. She appears well-developed and well-nourished.   Cardiovascular: Normal rate, regular rhythm and normal heart sounds.    Pulmonary/Chest: Effort normal and breath sounds normal.   Musculoskeletal: She exhibits edema.   Neurological: She is alert and oriented to person, place, and time.   Skin: Skin is warm and dry.   Psychiatric: She has a normal mood and affect.   Nursing note and vitals reviewed.      Assessment:       1. Essential hypertension    2. Spina bifida manifesta    3. Paraplegia    4. Obstructive hydrocephalus    5. Continuous opioid dependence-uds and contract 2/2/18    6. Moderate persistent asthma without complication    7. Morbid obesity with BMI of 50.0-59.9, adult    8. Controlled type 2 diabetes mellitus with diabetic polyneuropathy, unspecified whether long term insulin use    9. Physical debility        Plan:     1. Essential hypertension  Uncontrolled.  Increase  - lisinopril (PRINIVIL,ZESTRIL) 20 MG tablet; Take 1 tablet (20 mg total) by mouth once daily.  Dispense: 90 tablet; Refill: 3    2. Spina bifida manifesta  Chronic and stable sequela    3. Paraplegia  Cont use of power chair.  Family trying to get power controller mount for the back of the chair so they can assist with steering her.  Dr. Lidnsay is working on application    4. Obstructive  "hydrocephalus  Cont monitoring    5. Continuous opioid dependence-uds and contract 2/2/18  Controlled on current medications.  Continue current medications.  Contract and UDS 2/18    6. Moderate persistent asthma without complication  Controlled on current medications.  Continue current medications.  Improved with trelegy.  Cont pulm consult    7. Morbid obesity with BMI of 50.0-59.9, adult  Counseled patient on his ideal body weight, health consequences of being obese and current recommendations including weekly exercise and a heart healthy diet.  Current BMI is:Estimated body mass index is 56.68 kg/m² as calculated from the following:    Height as of 5/2/18: 5' 1" (1.549 m).    Weight as of 5/2/18: 136.1 kg (300 lb)..  Patient is aware that ideal BMI < 25 or Weight in (lb) to have BMI = 25: 132.        8. Controlled type 2 diabetes mellitus with diabetic polyneuropathy, unspecified whether long term insulin use  Controlled on current medications.  Continue current medications.      9. Physical debility  Cont current supports      Reeval 6 months with me and 3 months PA Maria R  "

## 2018-05-29 DIAGNOSIS — Q76.0 SPINA BIFIDA OCCULTA: ICD-10-CM

## 2018-05-29 DIAGNOSIS — Q05.9 SPINA BIFIDA MANIFESTA: ICD-10-CM

## 2018-05-29 RX ORDER — HYDROCODONE BITARTRATE AND ACETAMINOPHEN 7.5; 325 MG/1; MG/1
1 TABLET ORAL EVERY 6 HOURS PRN
Qty: 120 TABLET | Refills: 0 | Status: ON HOLD | OUTPATIENT
Start: 2018-05-29 | End: 2018-07-03 | Stop reason: HOSPADM

## 2018-06-25 ENCOUNTER — HOSPITAL ENCOUNTER (INPATIENT)
Facility: HOSPITAL | Age: 43
LOS: 3 days | Discharge: HOME | DRG: 389 | End: 2018-06-28
Attending: EMERGENCY MEDICINE | Admitting: HOSPITALIST
Payer: MEDICARE

## 2018-06-25 DIAGNOSIS — K56.609 SBO (SMALL BOWEL OBSTRUCTION): Primary | ICD-10-CM

## 2018-06-25 DIAGNOSIS — R00.0 TACHYCARDIA: ICD-10-CM

## 2018-06-25 PROBLEM — E87.1 HYPONATREMIA: Status: ACTIVE | Noted: 2018-06-25

## 2018-06-25 LAB
ALBUMIN SERPL BCP-MCNC: 3.6 G/DL
ALBUMIN SERPL BCP-MCNC: 3.6 G/DL
ALP SERPL-CCNC: 61 U/L
ALP SERPL-CCNC: 61 U/L
ALT SERPL W/O P-5'-P-CCNC: 8 U/L
ALT SERPL W/O P-5'-P-CCNC: 8 U/L
ANION GAP SERPL CALC-SCNC: 12 MMOL/L
ANION GAP SERPL CALC-SCNC: 12 MMOL/L
AST SERPL-CCNC: 18 U/L
AST SERPL-CCNC: 18 U/L
BACTERIA #/AREA URNS HPF: ABNORMAL /HPF
BASOPHILS # BLD AUTO: 0 K/UL
BASOPHILS # BLD AUTO: 0 K/UL
BASOPHILS NFR BLD: 0 %
BASOPHILS NFR BLD: 0 %
BILIRUB SERPL-MCNC: 0.2 MG/DL
BILIRUB SERPL-MCNC: 0.2 MG/DL
BILIRUB UR QL STRIP: NEGATIVE
BUN SERPL-MCNC: 19 MG/DL
BUN SERPL-MCNC: 19 MG/DL
CALCIUM SERPL-MCNC: 9.7 MG/DL
CALCIUM SERPL-MCNC: 9.7 MG/DL
CHLORIDE SERPL-SCNC: 94 MMOL/L
CHLORIDE SERPL-SCNC: 94 MMOL/L
CLARITY UR: CLEAR
CO2 SERPL-SCNC: 23 MMOL/L
CO2 SERPL-SCNC: 23 MMOL/L
COLOR UR: YELLOW
CREAT SERPL-MCNC: 1.1 MG/DL
CREAT SERPL-MCNC: 1.1 MG/DL
DIFFERENTIAL METHOD: ABNORMAL
DIFFERENTIAL METHOD: ABNORMAL
EOSINOPHIL # BLD AUTO: 0 K/UL
EOSINOPHIL # BLD AUTO: 0 K/UL
EOSINOPHIL NFR BLD: 0 %
EOSINOPHIL NFR BLD: 0 %
ERYTHROCYTE [DISTWIDTH] IN BLOOD BY AUTOMATED COUNT: 17.5 %
ERYTHROCYTE [DISTWIDTH] IN BLOOD BY AUTOMATED COUNT: 17.5 %
EST. GFR  (AFRICAN AMERICAN): >60 ML/MIN/1.73 M^2
EST. GFR  (AFRICAN AMERICAN): >60 ML/MIN/1.73 M^2
EST. GFR  (NON AFRICAN AMERICAN): >60 ML/MIN/1.73 M^2
EST. GFR  (NON AFRICAN AMERICAN): >60 ML/MIN/1.73 M^2
GLUCOSE SERPL-MCNC: 127 MG/DL
GLUCOSE SERPL-MCNC: 127 MG/DL
GLUCOSE UR QL STRIP: NEGATIVE
HCT VFR BLD AUTO: 34.8 %
HCT VFR BLD AUTO: 34.8 %
HGB BLD-MCNC: 11.4 G/DL
HGB BLD-MCNC: 11.4 G/DL
HGB UR QL STRIP: ABNORMAL
KETONES UR QL STRIP: NEGATIVE
LACTATE SERPL-SCNC: 0.9 MMOL/L
LACTATE SERPL-SCNC: 1 MMOL/L
LACTATE SERPL-SCNC: 2.4 MMOL/L
LACTATE SERPL-SCNC: 2.4 MMOL/L
LEUKOCYTE ESTERASE UR QL STRIP: ABNORMAL
LIPASE SERPL-CCNC: 20 U/L
LYMPHOCYTES # BLD AUTO: 0.8 K/UL
LYMPHOCYTES # BLD AUTO: 0.8 K/UL
LYMPHOCYTES NFR BLD: 6.2 %
LYMPHOCYTES NFR BLD: 6.2 %
MCH RBC QN AUTO: 24.9 PG
MCH RBC QN AUTO: 24.9 PG
MCHC RBC AUTO-ENTMCNC: 32.6 G/DL
MCHC RBC AUTO-ENTMCNC: 32.6 G/DL
MCV RBC AUTO: 77 FL
MCV RBC AUTO: 77 FL
MICROSCOPIC COMMENT: ABNORMAL
MONOCYTES # BLD AUTO: 0.5 K/UL
MONOCYTES # BLD AUTO: 0.5 K/UL
MONOCYTES NFR BLD: 3.6 %
MONOCYTES NFR BLD: 3.6 %
NEUTROPHILS # BLD AUTO: 11.4 K/UL
NEUTROPHILS # BLD AUTO: 11.4 K/UL
NEUTROPHILS NFR BLD: 90.2 %
NEUTROPHILS NFR BLD: 90.2 %
NITRITE UR QL STRIP: NEGATIVE
PH UR STRIP: 6 [PH] (ref 5–8)
PLATELET # BLD AUTO: 340 K/UL
PLATELET # BLD AUTO: 340 K/UL
PMV BLD AUTO: 8.2 FL
PMV BLD AUTO: 8.2 FL
POTASSIUM SERPL-SCNC: 4.6 MMOL/L
POTASSIUM SERPL-SCNC: 4.6 MMOL/L
PROT SERPL-MCNC: 8.7 G/DL
PROT SERPL-MCNC: 8.7 G/DL
PROT UR QL STRIP: NEGATIVE
RBC # BLD AUTO: 4.55 M/UL
RBC # BLD AUTO: 4.55 M/UL
RBC #/AREA URNS HPF: 1 /HPF (ref 0–4)
SODIUM SERPL-SCNC: 129 MMOL/L
SODIUM SERPL-SCNC: 129 MMOL/L
SP GR UR STRIP: 1.02 (ref 1–1.03)
SQUAMOUS #/AREA URNS HPF: 0 /HPF
URN SPEC COLLECT METH UR: ABNORMAL
UROBILINOGEN UR STRIP-ACNC: 1 EU/DL
WBC # BLD AUTO: 12.6 K/UL
WBC # BLD AUTO: 12.6 K/UL
WBC #/AREA URNS HPF: 7 /HPF (ref 0–5)

## 2018-06-25 PROCEDURE — 25000003 PHARM REV CODE 250: Performed by: EMERGENCY MEDICINE

## 2018-06-25 PROCEDURE — 99285 EMERGENCY DEPT VISIT HI MDM: CPT | Mod: 25

## 2018-06-25 PROCEDURE — 36415 COLL VENOUS BLD VENIPUNCTURE: CPT

## 2018-06-25 PROCEDURE — 83690 ASSAY OF LIPASE: CPT

## 2018-06-25 PROCEDURE — 63600175 PHARM REV CODE 636 W HCPCS: Performed by: HOSPITALIST

## 2018-06-25 PROCEDURE — 96361 HYDRATE IV INFUSION ADD-ON: CPT

## 2018-06-25 PROCEDURE — 93010 ELECTROCARDIOGRAM REPORT: CPT | Mod: ,,, | Performed by: INTERNAL MEDICINE

## 2018-06-25 PROCEDURE — 87186 SC STD MICRODIL/AGAR DIL: CPT

## 2018-06-25 PROCEDURE — 81000 URINALYSIS NONAUTO W/SCOPE: CPT

## 2018-06-25 PROCEDURE — 99900035 HC TECH TIME PER 15 MIN (STAT)

## 2018-06-25 PROCEDURE — 27000221 HC OXYGEN, UP TO 24 HOURS

## 2018-06-25 PROCEDURE — 96374 THER/PROPH/DIAG INJ IV PUSH: CPT

## 2018-06-25 PROCEDURE — 87086 URINE CULTURE/COLONY COUNT: CPT

## 2018-06-25 PROCEDURE — 87088 URINE BACTERIA CULTURE: CPT

## 2018-06-25 PROCEDURE — 83605 ASSAY OF LACTIC ACID: CPT | Mod: 91

## 2018-06-25 PROCEDURE — 87077 CULTURE AEROBIC IDENTIFY: CPT

## 2018-06-25 PROCEDURE — 63600175 PHARM REV CODE 636 W HCPCS: Performed by: EMERGENCY MEDICINE

## 2018-06-25 PROCEDURE — 93005 ELECTROCARDIOGRAM TRACING: CPT

## 2018-06-25 PROCEDURE — 12000002 HC ACUTE/MED SURGE SEMI-PRIVATE ROOM

## 2018-06-25 PROCEDURE — 87040 BLOOD CULTURE FOR BACTERIA: CPT | Mod: 59

## 2018-06-25 PROCEDURE — 85025 COMPLETE CBC W/AUTO DIFF WBC: CPT

## 2018-06-25 PROCEDURE — 99222 1ST HOSP IP/OBS MODERATE 55: CPT | Mod: ,,, | Performed by: SURGERY

## 2018-06-25 PROCEDURE — 94761 N-INVAS EAR/PLS OXIMETRY MLT: CPT

## 2018-06-25 PROCEDURE — 80053 COMPREHEN METABOLIC PANEL: CPT

## 2018-06-25 PROCEDURE — 25000003 PHARM REV CODE 250: Performed by: NURSE PRACTITIONER

## 2018-06-25 RX ORDER — ALBUTEROL SULFATE 2.5 MG/.5ML
2.5 SOLUTION RESPIRATORY (INHALATION) EVERY 6 HOURS PRN
Status: DISCONTINUED | OUTPATIENT
Start: 2018-06-25 | End: 2018-06-28 | Stop reason: HOSPADM

## 2018-06-25 RX ORDER — ALBUTEROL SULFATE 90 UG/1
2 AEROSOL, METERED RESPIRATORY (INHALATION) EVERY 6 HOURS PRN
Status: DISCONTINUED | OUTPATIENT
Start: 2018-06-25 | End: 2018-06-25 | Stop reason: CLARIF

## 2018-06-25 RX ORDER — MORPHINE SULFATE 2 MG/ML
2 INJECTION, SOLUTION INTRAMUSCULAR; INTRAVENOUS EVERY 4 HOURS PRN
Status: DISCONTINUED | OUTPATIENT
Start: 2018-06-25 | End: 2018-06-28

## 2018-06-25 RX ORDER — HYDROCHLOROTHIAZIDE 25 MG/1
25 TABLET ORAL DAILY
Status: ON HOLD | COMMUNITY
End: 2018-06-28 | Stop reason: HOSPADM

## 2018-06-25 RX ORDER — MORPHINE SULFATE 4 MG/ML
8 INJECTION, SOLUTION INTRAMUSCULAR; INTRAVENOUS EVERY 4 HOURS PRN
Status: DISCONTINUED | OUTPATIENT
Start: 2018-06-25 | End: 2018-06-25

## 2018-06-25 RX ORDER — LIDOCAINE HYDROCHLORIDE 20 MG/ML
JELLY TOPICAL
Status: COMPLETED | OUTPATIENT
Start: 2018-06-25 | End: 2018-06-25

## 2018-06-25 RX ORDER — METOPROLOL SUCCINATE 50 MG/1
50 TABLET, EXTENDED RELEASE ORAL DAILY
Status: DISCONTINUED | OUTPATIENT
Start: 2018-06-25 | End: 2018-06-25

## 2018-06-25 RX ORDER — ONDANSETRON 4 MG/1
4 TABLET, ORALLY DISINTEGRATING ORAL EVERY 8 HOURS PRN
Status: DISCONTINUED | OUTPATIENT
Start: 2018-06-25 | End: 2018-06-25

## 2018-06-25 RX ORDER — ENOXAPARIN SODIUM 100 MG/ML
40 INJECTION SUBCUTANEOUS EVERY 24 HOURS
Status: DISCONTINUED | OUTPATIENT
Start: 2018-06-25 | End: 2018-06-26

## 2018-06-25 RX ORDER — SODIUM CHLORIDE, SODIUM LACTATE, POTASSIUM CHLORIDE, CALCIUM CHLORIDE 600; 310; 30; 20 MG/100ML; MG/100ML; MG/100ML; MG/100ML
INJECTION, SOLUTION INTRAVENOUS CONTINUOUS
Status: DISCONTINUED | OUTPATIENT
Start: 2018-06-25 | End: 2018-06-28 | Stop reason: HOSPADM

## 2018-06-25 RX ORDER — ONDANSETRON 2 MG/ML
4 INJECTION INTRAMUSCULAR; INTRAVENOUS EVERY 6 HOURS PRN
Status: DISCONTINUED | OUTPATIENT
Start: 2018-06-25 | End: 2018-06-28 | Stop reason: HOSPADM

## 2018-06-25 RX ORDER — CIPROFLOXACIN 500 MG/1
500 TABLET ORAL 2 TIMES DAILY
Status: DISCONTINUED | OUTPATIENT
Start: 2018-06-25 | End: 2018-06-25

## 2018-06-25 RX ORDER — MORPHINE SULFATE 4 MG/ML
6 INJECTION, SOLUTION INTRAMUSCULAR; INTRAVENOUS
Status: COMPLETED | OUTPATIENT
Start: 2018-06-25 | End: 2018-06-25

## 2018-06-25 RX ADMIN — SODIUM CHLORIDE, SODIUM LACTATE, POTASSIUM CHLORIDE, AND CALCIUM CHLORIDE: .6; .31; .03; .02 INJECTION, SOLUTION INTRAVENOUS at 04:06

## 2018-06-25 RX ADMIN — SODIUM CHLORIDE 1000 ML: 0.9 INJECTION, SOLUTION INTRAVENOUS at 11:06

## 2018-06-25 RX ADMIN — LIDOCAINE HYDROCHLORIDE 10 ML: 20 JELLY TOPICAL at 01:06

## 2018-06-25 RX ADMIN — CIPROFLOXACIN HYDROCHLORIDE 500 MG: 500 TABLET, FILM COATED ORAL at 09:06

## 2018-06-25 RX ADMIN — ENOXAPARIN SODIUM 40 MG: 100 INJECTION SUBCUTANEOUS at 04:06

## 2018-06-25 RX ADMIN — MORPHINE SULFATE 3 MG: 4 INJECTION INTRAVENOUS at 02:06

## 2018-06-25 RX ADMIN — Medication 2 MG: at 07:06

## 2018-06-25 NOTE — ASSESSMENT & PLAN NOTE
1. Hernia is chronic and reducible.  2. Expect hernia will need to be repaired electively or on this admission if partial sbo doesn't resolve. Not clear that hernia is the cause.  3. NG in place.

## 2018-06-25 NOTE — ED PROVIDER NOTES
"Encounter Date: 6/25/2018       History     Chief Complaint   Patient presents with    Abdominal Pain     seen here on Sat PM for same - CT of abd @ that time - "not getting better'     Patient is a 42 y.o. female with a PMHx of spinal bifida, hydrocephalus, HTN, CKD, umbilical hernia,  shunt, and DM who presents to the ED 06/25/2018 with a chief complaint of abdominal pain. Pt was seen in ED 2 days ago for the same complaint. Records and CT reviewed. Pt was found to have UTI and early abdominal cellulitis and was treated with bactrim and cipro which she has been taking. Caretaker states pt has had "low grade fevers" and has been sleepy. Pt has been eating but has not had BM since ED visit. She does take narcotics for chronic pain daily. The patient cannot pinpoint pain. She has not had any vomiting. She has lower extremity paraplegia. She has chronic gluteal erythema since mostly bed bound that is unchanged. Her umbilical hernia with mild erythema surrounding is unchanged from 2 days ago according to caregiver. Caregiver states she has been breathing harder.  She denies that she has had any cough.   Denies history of pancreatitis. Pt has single functioning kidney according to caregiver.                Review of patient's allergies indicates:   Allergen Reactions    Cefepime      HIVES    Latex Swelling     Past Medical History:   Diagnosis Date    Asthma     Back pain     Blood transfusion     Chronic kidney disease     kidney stones    Diabetes mellitus     Diabetes mellitus, type 2     Esotropia     GERD (gastroesophageal reflux disease)     Hydrocephalus     Hypertension     Non-healing ulcer of buttock     Nystagmus, congenital     Paralysis     Spinal bifida, closed     Wheezing      Past Surgical History:   Procedure Laterality Date    BRAIN SURGERY       shunt revision    CHOLECYSTECTOMY      CYSTOSCOPY W/ LASER LITHOTRIPSY      SPINE SURGERY       Family History   Problem Relation " "Age of Onset    Cancer Mother     Glaucoma Father     Heart disease Maternal Grandmother     Cancer Maternal Grandmother     Glaucoma Paternal Grandfather     Psoriasis Maternal Uncle     Melanoma Neg Hx     Lupus Neg Hx     Eczema Neg Hx      Social History   Substance Use Topics    Smoking status: Never Smoker    Smokeless tobacco: Never Used    Alcohol use No     Review of Systems   Constitutional: Positive for chills, fatigue and fever ("low grade). Negative for activity change and appetite change.   HENT: Negative for sore throat.    Respiratory: Positive for shortness of breath ("breathing harder"). Negative for cough.    Cardiovascular: Negative for chest pain.   Gastrointestinal: Positive for abdominal distention, abdominal pain and constipation. Negative for anal bleeding, blood in stool, diarrhea, nausea and vomiting.        Hx of cholecystectomy     Genitourinary: Negative for decreased urine volume, difficulty urinating, dysuria, flank pain and pelvic pain.        Chronic suprapubic solis     Musculoskeletal: Positive for back pain ("chronic low  back bi").   Skin: Negative for rash.   Neurological: Positive for weakness (chronic bi lower extremities).       Physical Exam     Initial Vitals [06/25/18 0930]   BP Pulse Resp Temp SpO2   (!) 152/91 104 18 98.9 °F (37.2 °C) (!) 93 %      MAP       --         Physical Exam    Nursing note and vitals reviewed.  Constitutional: Vital signs are normal. She appears well-developed and well-nourished.   HENT:   Head: Normocephalic and atraumatic.   Eyes: Pupils are equal, round, and reactive to light.   Neck: Neck supple.   Cardiovascular: Normal rate, regular rhythm, normal heart sounds and intact distal pulses. Exam reveals no gallop and no friction rub.    No murmur heard.  Pulmonary/Chest: She has no wheezes. She has no rhonchi. She has no rales.   Abdominal: Soft. Normal appearance and bowel sounds are normal. She exhibits distension and mass (large " right partially reducable periumbilical hernia). There is no hepatosplenomegaly. There is no tenderness. There is no rigidity, no rebound, no guarding, no CVA tenderness, no tenderness at McBurney's point and negative Ivan's sign. No hernia.   Suprapubic catheter with cloudy yellow urine.    Genitourinary: Rectal exam shows anal tone abnormal. Rectal exam shows no external hemorrhoid, no internal hemorrhoid, no fissure, no mass and no tenderness.   Genitourinary Comments: 2 small firm pieces of stool removed; do not think impaction. No beto blood or mass. No rectal tone. Erythematous perianal rash which is unchanged for years according to caregiver.     Neurological: She is alert and oriented to person, place, and time. She has normal strength.   Skin: Skin is warm, dry and intact. Capillary refill takes less than 2 seconds. There is erythema.   Psychiatric: Her speech is normal.         ED Course   Procedures  Labs Reviewed - No data to display       Imaging Results    None          Medical Decision Making:   Differential Diagnosis:   Incarcerated hernia  UTI  Sepsis  Pyelonephritis  pancreatitis  SBO  Constipation          APC / Resident Notes:   Patient is a 42 y.o. female who presents to the ED 06/25/2018 who underwent emergent evaluation for abdominal pain.  Patient was seen here 2 days ago for similar complaint.  Records reviewed.  Family states her symptoms are unchanged.  Patient has been on Bactrim and Cipro for abdominal wall cellulitis and UTI.  Urinalysis consistent with resolving UTI.  Urine sent for culture.  Patient does have chronic indwelling Mcdonald catheter.  Patient is very obese.  She has a history of spina bifida.  She has a chronic large verna umbilical hernia that is only partially reducible; there is a small amount of surrounding erythema which is unchanged for 2 days according to caregiver.  She has no pinpoint abdominal tenderness. She has a history of cholecystectomy which rules out  cholecystitis.  There is no McBurney's point tenderness.  Do not think appendicitis.  Lipase is normal. Do not think pancreatitis.  2 small firm pieces of stool removed; do not think impaction. No beto blood or mass. No rectal tone.  Patient has only single functioning kidney so no contrast was used. Ct of abdomen and pelvis reviewed.  Patient was found to have small-bowel obstruction.  Case discussed with General surgery.  An NG tube is placed.  Patient remains NPO. Lactic acid of 2.4 noted. Likely dehydration. Do not think ischemic bowel or sepsis. Labs noted. No leukocytosis. Pt is afebrile in ED. Pt given IVF rehydration in ED. Will repeat lactic acid in 4 hours.  CXR without acute findings. Do not think pneumonia. Pt does not appear to be septic or in septic shock.  Case discussed with hospital medicine team who is accepting of this admission for further evaluation and treatment and surgical consultation.   Pt admitted to hospital medicine team in stable condition.               Attending Attestation:           Physician Attestation for Scribe:  Physician Attestation Statement for Scribe #1: I, Kortney Zuniga, reviewed documentation, as scribed by in my presence, and it is both accurate and complete.     Comments: I, JAIME Mckenna, personally performed the services described in this documentation. All medical record entries made by the scribe were at my direction and in my presence.  I have reviewed the chart and agree that the record reflects my personal performance and is accurate and complete. JAIME Mckenna.  3:03 PM 06/25/2018             ED Course as of Jun 25 1437   Mon Jun 25, 2018   1025 EKG:  Sinus tachycardia, rate of 101, normal intervals and axis.  There are no acute ST or T wave changes suggestive of acute ischemia or infarction.    [MR]   1222 D/w surgery labs and CT findings with repeat presentation concerning for SBO associated with hernia developing.  Dr. Mello will see the patient,  admit to medicine.  [MR]      ED Course User Index  [MR] Jay Jay Belcher MD     Clinical Impression:   The primary encounter diagnosis was SBO (small bowel obstruction). A diagnosis of Tachycardia was also pertinent to this visit.      Disposition:   Disposition: Admitted  Condition: Stable                        Kortney Zuniga NP  06/25/18 7471

## 2018-06-25 NOTE — HPI
43 yo female with umbilical hernia presents with constipation. Pt denies abdominal pain. She has a history of Spina Bifida. Hernia was reportedly repaired at time of lap marva in the past. Denies N/V. No fever or chills. Hernia has been present for years. CT showed hernia and some dilated small bowel loops. She has chronic constipation.

## 2018-06-25 NOTE — SUBJECTIVE & OBJECTIVE
Current Facility-Administered Medications on File Prior to Encounter   Medication    sodium chloride 0.9% flush 10 mL    sodium chloride 0.9% flush 10 mL    sodium chloride 0.9% flush 10 mL    sodium chloride 0.9% flush 10 mL     Current Outpatient Prescriptions on File Prior to Encounter   Medication Sig    albuterol 90 mcg/actuation inhaler 2 puffs every 4 hours as needed for cough, wheeze, or shortness of breath    ascorbic acid (VITAMIN C) 500 MG tablet Take 500 mg by mouth once daily.    ciprofloxacin HCl (CIPRO) 500 MG tablet Take 1 tablet (500 mg total) by mouth 2 (two) times daily. for 10 days    fluticasone-umeclidin-vilanter (TRELEGY ELLIPTA) 100-62.5-25 mcg DsDv Inhale 1 puff into the lungs once daily.    HYDROcodone-acetaminophen (NORCO) 7.5-325 mg per tablet Take 1 tablet by mouth every 6 (six) hours as needed for Pain.    lisinopril (PRINIVIL,ZESTRIL) 20 MG tablet Take 1 tablet (20 mg total) by mouth once daily.    metoprolol succinate (TOPROL-XL) 50 MG 24 hr tablet Take 1 tablet (50 mg total) by mouth once daily.    multivitamin with minerals tablet Take 1 tablet by mouth once daily.    sulfamethoxazole-trimethoprim 800-160mg (BACTRIM DS) 800-160 mg Tab Take 1 tablet by mouth 2 (two) times daily. for 7 days    fluocinolone acetonide oil (DERMOTIC OIL) 0.01 % Drop 5 drops in right ear bid x 7-10 days until resolution, use intermittenlty for flares.    ipratropium (ATROVENT) 42 mcg (0.06 %) nasal spray 1 spray by Nasal route daily as needed for Rhinitis.     [DISCONTINUED] albuterol (PROAIR HFA) 90 mcg/actuation inhaler Inhale 2 puffs into the lungs every 6 (six) hours as needed for Wheezing.    [DISCONTINUED] albuterol (PROVENTIL) 2.5 mg /3 mL (0.083 %) nebulizer solution Take 3 mLs (2.5 mg total) by nebulization every 6 (six) hours as needed for Wheezing or Shortness of Breath. Rescue    [DISCONTINUED] albuterol 90 mcg/actuation inhaler 2 puffs every 4 hours as needed for cough,  wheeze, or shortness of breath    [DISCONTINUED] blood sugar diagnostic (FREESTYLE LITE STRIPS) Strp TEST BLOOD SUGAR ONCE DAILY    [DISCONTINUED] catheter 20 Fr Misc Latex free, 15 balloon suprapubic catheter    [DISCONTINUED] fexofenadine (ALLEGRA) 180 MG tablet Take 180 mg by mouth once daily.    [DISCONTINUED] hydroCHLOROthiazide (HYDRODIURIL) 25 MG tablet TAKE 1 TABLET(25 MG) BY MOUTH EVERY DAY (Patient taking differently: No sig reported)    [DISCONTINUED] nystatin (MYCOSTATIN) cream Apply topically 2 (two) times daily.    [DISCONTINUED] nystatin (MYCOSTATIN) powder Apply topically 2 (two) times daily.       Review of patient's allergies indicates:   Allergen Reactions    Cefepime      HIVES    Latex Swelling       Past Medical History:   Diagnosis Date    Asthma     Back pain     Blood transfusion     Chronic kidney disease     kidney stones    Diabetes mellitus     Diabetes mellitus, type 2     Esotropia     GERD (gastroesophageal reflux disease)     Hydrocephalus     Hypertension     Non-healing ulcer of buttock     Nystagmus, congenital     Paralysis     Spinal bifida, closed     Wheezing      Past Surgical History:   Procedure Laterality Date    BRAIN SURGERY       shunt revision    CHOLECYSTECTOMY      CYSTOSCOPY W/ LASER LITHOTRIPSY      SPINE SURGERY       Family History     Problem Relation (Age of Onset)    Cancer Mother, Maternal Grandmother    Glaucoma Father, Paternal Grandfather    Heart disease Maternal Grandmother    Psoriasis Maternal Uncle        Social History Main Topics    Smoking status: Never Smoker    Smokeless tobacco: Never Used    Alcohol use No    Drug use: No    Sexual activity: Not Currently     Review of Systems   Constitutional: Negative for chills, fever and unexpected weight change.   HENT: Negative.    Eyes: Negative for visual disturbance.   Respiratory: Negative for cough, shortness of breath and wheezing.    Cardiovascular: Negative for  chest pain and palpitations.   Gastrointestinal: Positive for abdominal distention. Negative for abdominal pain, blood in stool, diarrhea, nausea and vomiting.   Genitourinary: Negative for dysuria, frequency and hematuria.   Musculoskeletal: Negative for arthralgias, joint swelling and neck pain.   Skin: Negative for rash and wound.   Neurological: Negative for dizziness, syncope, weakness and headaches.   Hematological: Does not bruise/bleed easily.   Psychiatric/Behavioral: Negative for agitation, behavioral problems and dysphoric mood.     Objective:     Vital Signs (Most Recent):  Temp: 97.4 °F (36.3 °C) (06/25/18 1515)  Pulse: 98 (06/25/18 1515)  Resp: 16 (06/25/18 1515)  BP: 113/73 (06/25/18 1515)  SpO2: 100 % (06/25/18 1515) Vital Signs (24h Range):  Temp:  [97.4 °F (36.3 °C)-98.9 °F (37.2 °C)] 97.4 °F (36.3 °C)  Pulse:  [] 98  Resp:  [16-18] 16  SpO2:  [93 %-100 %] 100 %  BP: (107-160)/(64-91) 113/73     Weight: (!) 142 kg (313 lb)  Body mass index is 59.14 kg/m².    Physical Exam   Constitutional: She is oriented to person, place, and time.   HENT:   Head: Normocephalic and atraumatic.   Eyes: Conjunctivae and EOM are normal. Pupils are equal, round, and reactive to light. No scleral icterus.   Neck: Normal range of motion.   Cardiovascular: Normal rate and regular rhythm.    No murmur heard.  Pulmonary/Chest: Effort normal and breath sounds normal. She has no wheezes. She has no rales.   Abdominal: Soft. Bowel sounds are normal. She exhibits distension. She exhibits no mass. There is no tenderness. A hernia (Reducible umbilical hernia. No significant tenderness on manipulation.) is present.   Musculoskeletal: Normal range of motion. She exhibits no edema.   Neurological: She is alert and oriented to person, place, and time. No cranial nerve deficit.   Skin: Skin is warm and dry. No rash noted. No erythema.   Psychiatric: She has a normal mood and affect. Her behavior is normal.       Significant  Labs:  CBC:   Recent Labs  Lab 06/25/18  1027   WBC 12.60  12.60   RBC 4.55  4.55   HGB 11.4*  11.4*   HCT 34.8*  34.8*     340   MCV 77*  77*   MCH 24.9*  24.9*   MCHC 32.6  32.6     CMP:   Recent Labs  Lab 06/25/18  1027   *  127*   CALCIUM 9.7  9.7   ALBUMIN 3.6  3.6   PROT 8.7*  8.7*   *  129*   K 4.6  4.6   CO2 23  23   CL 94*  94*   BUN 19  19   CREATININE 1.1  1.1   ALKPHOS 61  61   ALT 8*  8*   AST 18  18   BILITOT 0.2  0.2       Significant Diagnostics:  CT report and images reviewed.

## 2018-06-25 NOTE — PLAN OF CARE
"Problem: Patient Care Overview  Goal: Plan of Care Review  Outcome: Ongoing (interventions implemented as appropriate)  02 saturation 100% on nc at 3lpm..  Patient's mom states patient 02 saturation drops in low  90"s without 02 .  o2 decreased to 2lpm.  PRN tx not required at this time.      "

## 2018-06-25 NOTE — PLAN OF CARE
Cm completed the assessment with parents.  Pt arrived from home, she has Spina Bifida.  Patents take care of 2 Spina Bifida daughters.  PCP is Dr. Osei.  Insurance verified as Medicare and Medicaid. Pt is a diabetic and has a glucometer.  Parents are very knowledgeable with pt's care.   Pt has Very Pleasant  Parents.  Disposition:  Pt will discharge to home with parents.       06/25/18 7672   Discharge Assessment   Assessment Type Discharge Planning Assessment   Confirmed/corrected address and phone number on facesheet? Yes   Assessment information obtained from? Caregiver  (parents)   Prior to hospitilization cognitive status: Alert/Oriented   Prior to hospitalization functional status: Wheelchair Bound;Completely Dependent   Current cognitive status: Alert/Oriented   Current Functional Status: Completely Dependent;Wheelchair Bound   Facility Arrived From: home   Lives With parent(s)   Able to Return to Prior Arrangements yes   Is patient able to care for self after discharge? Yes   Who are your caregiver(s) and their phone number(s)? Parents Manuela and Blaze Melorenzacheco  [phone]   Patient's perception of discharge disposition home or selfcare   Readmission Within The Last 30 Days no previous admission in last 30 days   Patient currently being followed by outpatient case management? No   Patient currently receives any other outside agency services? No   Equipment Currently Used at Home lift device;wheelchair;nebulizer  (Adjustable Bed with side rails, Holter lift, per mobile w/c and pauline w/c )   Do you have any problems affording any of your prescribed medications? No   Is the patient taking medications as prescribed? yes  ([facility])   Does the patient have transportation home? Yes   Transportation Available family or friend will provide   Dialysis Name and Scheduled days na   Does the patient receive services at the [facility]? No   Discharge Plan A Home with family   Patient/Family In Agreement  With Plan yes   Does the patient have transportation to healthcare appointments? Yes

## 2018-06-25 NOTE — ED NOTES
Upon transfer to room 322, patient is AAOx4, no cardiac or respiratory complications. No needs or questions from patient or family at this time.

## 2018-06-25 NOTE — ED NOTES
NP at bedside, pt co abd pain intermittent, pt comes in motorized chair, alicia lift used to transfer pt to bed, pt awake alert in no acute distress, family at bedside

## 2018-06-25 NOTE — CONSULTS
[facility]  General Surgery  Consult Note    Patient Name: Briana Uriarte  MRN: [id number]  Code Status: Full Code  Admission Date: 6/25/2018  Hospital Length of Stay: 0 days  Attending Physician: Raffy Santos MD  Primary Care Provider: Anaid Osei MD    Patient information was obtained from patient and ER records.     Inpatient consult to General surgery  Consult performed by: MICKY JARAMILLO  Consult ordered by: XOCHILT NEELY        Subjective:     Principal Problem: <principal problem not specified>    History of Present Illness: 41 yo female with umbilical hernia presents with constipation. Pt denies abdominal pain. She has a history of Spina Bifida. Hernia was reportedly repaired at time of lap marva in the past. Denies N/V. No fever or chills. Hernia has been present for years. CT showed hernia and some dilated small bowel loops. She has chronic constipation.    Current Facility-Administered Medications on File Prior to Encounter   Medication    sodium chloride 0.9% flush 10 mL    sodium chloride 0.9% flush 10 mL    sodium chloride 0.9% flush 10 mL    sodium chloride 0.9% flush 10 mL     Current Outpatient Prescriptions on File Prior to Encounter   Medication Sig    albuterol 90 mcg/actuation inhaler 2 puffs every 4 hours as needed for cough, wheeze, or shortness of breath    ascorbic acid (VITAMIN C) 500 MG tablet Take 500 mg by mouth once daily.    ciprofloxacin HCl (CIPRO) 500 MG tablet Take 1 tablet (500 mg total) by mouth 2 (two) times daily. for 10 days    fluticasone-umeclidin-vilanter (TRELEGY ELLIPTA) 100-62.5-25 mcg DsDv Inhale 1 puff into the lungs once daily.    HYDROcodone-acetaminophen (NORCO) 7.5-325 mg per tablet Take 1 tablet by mouth every 6 (six) hours as needed for Pain.    lisinopril (PRINIVIL,ZESTRIL) 20 MG tablet Take 1 tablet (20 mg total) by mouth once daily.    metoprolol succinate (TOPROL-XL) 50 MG 24 hr tablet Take 1 tablet  (50 mg total) by mouth once daily.    multivitamin with minerals tablet Take 1 tablet by mouth once daily.    sulfamethoxazole-trimethoprim 800-160mg (BACTRIM DS) 800-160 mg Tab Take 1 tablet by mouth 2 (two) times daily. for 7 days    fluocinolone acetonide oil (DERMOTIC OIL) 0.01 % Drop 5 drops in right ear bid x 7-10 days until resolution, use intermittenlty for flares.    ipratropium (ATROVENT) 42 mcg (0.06 %) nasal spray 1 spray by Nasal route daily as needed for Rhinitis.     [DISCONTINUED] albuterol (PROAIR HFA) 90 mcg/actuation inhaler Inhale 2 puffs into the lungs every 6 (six) hours as needed for Wheezing.    [DISCONTINUED] albuterol (PROVENTIL) 2.5 mg /3 mL (0.083 %) nebulizer solution Take 3 mLs (2.5 mg total) by nebulization every 6 (six) hours as needed for Wheezing or Shortness of Breath. Rescue    [DISCONTINUED] albuterol 90 mcg/actuation inhaler 2 puffs every 4 hours as needed for cough, wheeze, or shortness of breath    [DISCONTINUED] blood sugar diagnostic (FREESTYLE LITE STRIPS) Strp TEST BLOOD SUGAR ONCE DAILY    [DISCONTINUED] catheter 20 Fr Misc Latex free, 15 balloon suprapubic catheter    [DISCONTINUED] fexofenadine (ALLEGRA) 180 MG tablet Take 180 mg by mouth once daily.    [DISCONTINUED] hydroCHLOROthiazide (HYDRODIURIL) 25 MG tablet TAKE 1 TABLET(25 MG) BY MOUTH EVERY DAY (Patient taking differently: No sig reported)    [DISCONTINUED] nystatin (MYCOSTATIN) cream Apply topically 2 (two) times daily.    [DISCONTINUED] nystatin (MYCOSTATIN) powder Apply topically 2 (two) times daily.       Review of patient's allergies indicates:   Allergen Reactions    Cefepime      HIVES    Latex Swelling       Past Medical History:   Diagnosis Date    Asthma     Back pain     Blood transfusion     Chronic kidney disease     kidney stones    Diabetes mellitus     Diabetes mellitus, type 2     Esotropia     GERD (gastroesophageal reflux disease)     Hydrocephalus     Hypertension      Non-healing ulcer of buttock     Nystagmus, congenital     Paralysis     Spinal bifida, closed     Wheezing      Past Surgical History:   Procedure Laterality Date    BRAIN SURGERY       shunt revision    CHOLECYSTECTOMY      CYSTOSCOPY W/ LASER LITHOTRIPSY      SPINE SURGERY       Family History     Problem Relation (Age of Onset)    Cancer Mother, Maternal Grandmother    Glaucoma Father, Paternal Grandfather    Heart disease Maternal Grandmother    Psoriasis Maternal Uncle        Social History Main Topics    Smoking status: Never Smoker    Smokeless tobacco: Never Used    Alcohol use No    Drug use: No    Sexual activity: Not Currently     Review of Systems   Constitutional: Negative for chills, fever and unexpected weight change.   HENT: Negative.    Eyes: Negative for visual disturbance.   Respiratory: Negative for cough, shortness of breath and wheezing.    Cardiovascular: Negative for chest pain and palpitations.   Gastrointestinal: Positive for abdominal distention. Negative for abdominal pain, blood in stool, diarrhea, nausea and vomiting.   Genitourinary: Negative for dysuria, frequency and hematuria.   Musculoskeletal: Negative for arthralgias, joint swelling and neck pain.   Skin: Negative for rash and wound.   Neurological: Negative for dizziness, syncope, weakness and headaches.   Hematological: Does not bruise/bleed easily.   Psychiatric/Behavioral: Negative for agitation, behavioral problems and dysphoric mood.     Objective:     Vital Signs (Most Recent):  Temp: 97.4 °F (36.3 °C) (06/25/18 1515)  Pulse: 98 (06/25/18 1515)  Resp: 16 (06/25/18 1515)  BP: 113/73 (06/25/18 1515)  SpO2: 100 % (06/25/18 1515) Vital Signs (24h Range):  Temp:  [97.4 °F (36.3 °C)-98.9 °F (37.2 °C)] 97.4 °F (36.3 °C)  Pulse:  [] 98  Resp:  [16-18] 16  SpO2:  [93 %-100 %] 100 %  BP: (107-160)/(64-91) 113/73     Weight: (!) 142 kg (313 lb)  Body mass index is 59.14 kg/m².    Physical Exam    Constitutional: She is oriented to person, place, and time.   HENT:   Head: Normocephalic and atraumatic.   Eyes: Conjunctivae and EOM are normal. Pupils are equal, round, and reactive to light. No scleral icterus.   Neck: Normal range of motion.   Cardiovascular: Normal rate and regular rhythm.    No murmur heard.  Pulmonary/Chest: Effort normal and breath sounds normal. She has no wheezes. She has no rales.   Abdominal: Soft. Bowel sounds are normal. She exhibits distension. She exhibits no mass. There is no tenderness. A hernia (Reducible umbilical hernia. No significant tenderness on manipulation.) is present.   Musculoskeletal: Normal range of motion. She exhibits no edema.   Neurological: She is alert and oriented to person, place, and time. No cranial nerve deficit.   Skin: Skin is warm and dry. No rash noted. No erythema.   Psychiatric: She has a normal mood and affect. Her behavior is normal.       Significant Labs:  CBC:   Recent Labs  Lab 06/25/18  1027   WBC 12.60  12.60   RBC 4.55  4.55   HGB 11.4*  11.4*   HCT 34.8*  34.8*     340   MCV 77*  77*   MCH 24.9*  24.9*   MCHC 32.6  32.6     CMP:   Recent Labs  Lab 06/25/18  1027   *  127*   CALCIUM 9.7  9.7   ALBUMIN 3.6  3.6   PROT 8.7*  8.7*   *  129*   K 4.6  4.6   CO2 23  23   CL 94*  94*   BUN 19  19   CREATININE 1.1  1.1   ALKPHOS 61  61   ALT 8*  8*   AST 18  18   BILITOT 0.2  0.2       Significant Diagnostics:  CT report and images reviewed.    Assessment/Plan:     Umbilical hernia    1. Hernia is chronic and reducible.  2. Expect hernia will need to be repaired electively or on this admission if partial sbo doesn't resolve. Not clear that hernia is the cause.  3. NG in place.          VTE Risk Mitigation         Ordered     enoxaparin injection 40 mg  Daily      06/25/18 1513     IP VTE HIGH RISK PATIENT  Once      06/25/18 1513          Thank you for your consult. I will follow-up with patient. Please  contact us if you have any additional questions.    Kip Mello MD  General Surgery  [facility]

## 2018-06-26 LAB
ANION GAP SERPL CALC-SCNC: 9 MMOL/L
BUN SERPL-MCNC: 16 MG/DL
CALCIUM SERPL-MCNC: 9.2 MG/DL
CHLORIDE SERPL-SCNC: 104 MMOL/L
CO2 SERPL-SCNC: 22 MMOL/L
CREAT SERPL-MCNC: 0.8 MG/DL
EST. GFR  (AFRICAN AMERICAN): >60 ML/MIN/1.73 M^2
EST. GFR  (NON AFRICAN AMERICAN): >60 ML/MIN/1.73 M^2
GLUCOSE SERPL-MCNC: 83 MG/DL
POTASSIUM SERPL-SCNC: 4.2 MMOL/L
SODIUM SERPL-SCNC: 135 MMOL/L

## 2018-06-26 PROCEDURE — 36415 COLL VENOUS BLD VENIPUNCTURE: CPT

## 2018-06-26 PROCEDURE — 63600175 PHARM REV CODE 636 W HCPCS: Performed by: HOSPITALIST

## 2018-06-26 PROCEDURE — 99900035 HC TECH TIME PER 15 MIN (STAT)

## 2018-06-26 PROCEDURE — 80048 BASIC METABOLIC PNL TOTAL CA: CPT

## 2018-06-26 PROCEDURE — 25000003 PHARM REV CODE 250: Performed by: EMERGENCY MEDICINE

## 2018-06-26 PROCEDURE — 12000002 HC ACUTE/MED SURGE SEMI-PRIVATE ROOM

## 2018-06-26 PROCEDURE — 25000003 PHARM REV CODE 250: Performed by: HOSPITALIST

## 2018-06-26 RX ORDER — ENOXAPARIN SODIUM 100 MG/ML
40 INJECTION SUBCUTANEOUS EVERY 12 HOURS
Status: DISCONTINUED | OUTPATIENT
Start: 2018-06-26 | End: 2018-06-28 | Stop reason: HOSPADM

## 2018-06-26 RX ORDER — METOPROLOL SUCCINATE 50 MG/1
50 TABLET, EXTENDED RELEASE ORAL DAILY
Status: DISCONTINUED | OUTPATIENT
Start: 2018-06-26 | End: 2018-06-28 | Stop reason: HOSPADM

## 2018-06-26 RX ORDER — BISACODYL 5 MG
5 TABLET, DELAYED RELEASE (ENTERIC COATED) ORAL ONCE
Status: COMPLETED | OUTPATIENT
Start: 2018-06-26 | End: 2018-06-26

## 2018-06-26 RX ORDER — LISINOPRIL 10 MG/1
20 TABLET ORAL DAILY
Status: DISCONTINUED | OUTPATIENT
Start: 2018-06-26 | End: 2018-06-28 | Stop reason: HOSPADM

## 2018-06-26 RX ADMIN — ENOXAPARIN SODIUM 40 MG: 100 INJECTION SUBCUTANEOUS at 10:06

## 2018-06-26 RX ADMIN — Medication 2 MG: at 03:06

## 2018-06-26 RX ADMIN — BISACODYL 5 MG: 5 TABLET, COATED ORAL at 02:06

## 2018-06-26 RX ADMIN — Medication 2 MG: at 05:06

## 2018-06-26 RX ADMIN — Medication 2 MG: at 11:06

## 2018-06-26 RX ADMIN — ENOXAPARIN SODIUM 40 MG: 100 INJECTION SUBCUTANEOUS at 08:06

## 2018-06-26 RX ADMIN — Medication 2 MG: at 08:06

## 2018-06-26 RX ADMIN — Medication 2 MG: at 12:06

## 2018-06-26 RX ADMIN — SODIUM CHLORIDE, SODIUM LACTATE, POTASSIUM CHLORIDE, AND CALCIUM CHLORIDE: .6; .31; .03; .02 INJECTION, SOLUTION INTRAVENOUS at 03:06

## 2018-06-26 RX ADMIN — LISINOPRIL 20 MG: 10 TABLET ORAL at 02:06

## 2018-06-26 RX ADMIN — SODIUM CHLORIDE, SODIUM LACTATE, POTASSIUM CHLORIDE, AND CALCIUM CHLORIDE: .6; .31; .03; .02 INJECTION, SOLUTION INTRAVENOUS at 12:06

## 2018-06-26 RX ADMIN — METOPROLOL SUCCINATE 50 MG: 50 TABLET, EXTENDED RELEASE ORAL at 02:06

## 2018-06-26 NOTE — PLAN OF CARE
Problem: Patient Care Overview  Goal: Plan of Care Review  Outcome: Ongoing (interventions implemented as appropriate)  Patient has no complaints of nausea/vomiting this shift. Pain medication effective for pain control. Supra pubic catheter patent and intact, draining dark, yellow urine to urimeter. Patient refused repositioning at times. NG tube to right nare intact and secure. Call light in reach, bed low and locked and bed rails up x 2. Hourly rounds done.

## 2018-06-26 NOTE — PROGRESS NOTES
Pt seen and examined this AM.  Pt had large BM this AM.  NO abdomianl pain.  Ng in place with minimal output.     Wt Readings from Last 3 Encounters:   06/25/18 (!) 142 kg (313 lb)   06/23/18 136.1 kg (300 lb)   05/02/18 136.1 kg (300 lb)     Temp Readings from Last 3 Encounters:   06/26/18 97.9 °F (36.6 °C) (Oral)   06/23/18 99.3 °F (37.4 °C) (Oral)   05/15/18 98.9 °F (37.2 °C) (Oral)     BP Readings from Last 3 Encounters:   06/26/18 108/68   06/23/18 (!) 144/81   05/15/18 (!) 150/80     Pulse Readings from Last 3 Encounters:   06/26/18 105   06/23/18 105   05/15/18 100     AAox3  CTA B  Soft/nd/nt reducible umbilical hernia    Lab Results   Component Value Date    WBC 12.60 06/25/2018    WBC 12.60 06/25/2018    HGB 11.4 (L) 06/25/2018    HGB 11.4 (L) 06/25/2018    HCT 34.8 (L) 06/25/2018    HCT 34.8 (L) 06/25/2018    MCV 77 (L) 06/25/2018    MCV 77 (L) 06/25/2018     06/25/2018     06/25/2018     BMP  Lab Results   Component Value Date     (L) 06/26/2018    K 4.2 06/26/2018     06/26/2018    CO2 22 (L) 06/26/2018    BUN 16 06/26/2018    CREATININE 0.8 06/26/2018    CALCIUM 9.2 06/26/2018    ANIONGAP 9 06/26/2018    ESTGFRAFRICA >60 06/26/2018    EGFRNONAA >60 06/26/2018     A/P: resolving ileus vs obstruction  Hernia present but reducible doubt source of blockage. Pt with Bowel function this Am.  Plan to remove NG and advance diet

## 2018-06-26 NOTE — ASSESSMENT & PLAN NOTE
Reducible on exam.  May not be the cause for her bowel obstruction.  Will consult with general surgery.

## 2018-06-26 NOTE — PROGRESS NOTES
Pharmacist Renal Dose Adjustment Note    Briana Uriarte is a 42 y.o. female being treated with the medication Enoxaparin     Patient Data:    Vital Signs (Most Recent):  Temp: 97.9 °F (36.6 °C) (06/26/18 0820)  Pulse: 105 (06/26/18 0820)  Resp: 14 (06/26/18 0820)  BP: 108/68 (06/26/18 0820)  SpO2: 99 % (06/26/18 0820) Vital Signs (72h Range):  Temp:  [97.4 °F (36.3 °C)-99.5 °F (37.5 °C)]   Pulse:  []   Resp:  [14-22]   BP: (107-160)/(56-91)   SpO2:  [93 %-100 %]        Recent Labs     Lab 06/23/18  2223 06/25/18  1027 06/26/18  0452   CREATININE 0.8 1.1  1.1 0.8     Serum creatinine: 0.8 mg/dL 06/26/18 0452  Estimated creatinine clearance: 123.6 mL/min    Body mass index is 59.14 kg/m².     Medication: enoxaparin  dose: 40mg frequency daily will be changed to medication: dose: 40mg frequency: bid for CrCl>30 mL/min and BMI>40.    Pharmacist's Name: Kenney Miller  Pharmacist's Extension: [phone]

## 2018-06-26 NOTE — ASSESSMENT & PLAN NOTE
]  Sodium   Date Value Ref Range Status   06/25/2018 129 (L) 136 - 145 mmol/L Final   06/25/2018 129 (L) 136 - 145 mmol/L Final     Will monitor sodium level while she's on normal saline.

## 2018-06-26 NOTE — HPI
Pt presented to our ER today with abd pain.  She presented a couple of days ago with same complaint.  She was diagnosed with early abdominal wall cellulitis and UTI and sent home with antibiotics.  The pain has continued.  Has not had bowel movement.  No vomiting.  Pt has spina bifida and h/o hydrocephalus, which was treated with  shunting.  Also has an umbilical hernia, which is reducible.

## 2018-06-26 NOTE — ASSESSMENT & PLAN NOTE
Diagnosed clinically and radiographically.  Will place NGT.  Nothing by mouth.  Consult with surgeon.

## 2018-06-26 NOTE — H&P
"[facility] Hospital Medicine  History & Physical    Patient Name: Briana Uriarte  MRN: [id number]  Admission Date: 6/25/2018  Attending Physician: Raffy Santos MD   Primary Care Provider: Anaid Osei MD         Patient information was obtained from past medical records, caretaker and ER records.     Subjective:     Principal Problem:SBO (small bowel obstruction)    Chief Complaint:   Chief Complaint   Patient presents with    Abdominal Pain     seen here on Sat PM for same - CT of abd @ that time - "not getting better'        HPI: Pt presented to our ER today with abd pain.  She presented a couple of days ago with same complaint.  She was diagnosed with early abdominal wall cellulitis and UTI and sent home with antibiotics.  The pain has continued.  Has not had bowel movement.  No vomiting.  Pt has spina bifida and h/o hydrocephalus, which was treated with  shunting.  Also has an umbilical hernia, which is reducible.    Past Medical History:   Diagnosis Date    Asthma     Back pain     Blood transfusion     Chronic kidney disease     kidney stones    Diabetes mellitus     Diabetes mellitus, type 2     Esotropia     GERD (gastroesophageal reflux disease)     Hydrocephalus     Hypertension     Non-healing ulcer of buttock     Nystagmus, congenital     Paralysis     Spinal bifida, closed     Wheezing        Past Surgical History:   Procedure Laterality Date    BRAIN SURGERY       shunt revision    CHOLECYSTECTOMY      CYSTOSCOPY W/ LASER LITHOTRIPSY      SPINE SURGERY         Review of patient's allergies indicates:   Allergen Reactions    Cefepime      HIVES    Latex Swelling       Current Facility-Administered Medications on File Prior to Encounter   Medication    sodium chloride 0.9% flush 10 mL    sodium chloride 0.9% flush 10 mL    sodium chloride 0.9% flush 10 mL    sodium chloride 0.9% flush 10 mL     Current Outpatient Prescriptions on File Prior " to Encounter   Medication Sig    albuterol 90 mcg/actuation inhaler 2 puffs every 4 hours as needed for cough, wheeze, or shortness of breath    ascorbic acid (VITAMIN C) 500 MG tablet Take 500 mg by mouth once daily.    ciprofloxacin HCl (CIPRO) 500 MG tablet Take 1 tablet (500 mg total) by mouth 2 (two) times daily. for 10 days    fluticasone-umeclidin-vilanter (TRELEGY ELLIPTA) 100-62.5-25 mcg DsDv Inhale 1 puff into the lungs once daily.    HYDROcodone-acetaminophen (NORCO) 7.5-325 mg per tablet Take 1 tablet by mouth every 6 (six) hours as needed for Pain.    lisinopril (PRINIVIL,ZESTRIL) 20 MG tablet Take 1 tablet (20 mg total) by mouth once daily.    metoprolol succinate (TOPROL-XL) 50 MG 24 hr tablet Take 1 tablet (50 mg total) by mouth once daily.    multivitamin with minerals tablet Take 1 tablet by mouth once daily.    sulfamethoxazole-trimethoprim 800-160mg (BACTRIM DS) 800-160 mg Tab Take 1 tablet by mouth 2 (two) times daily. for 7 days    fluocinolone acetonide oil (DERMOTIC OIL) 0.01 % Drop 5 drops in right ear bid x 7-10 days until resolution, use intermittenlty for flares.    ipratropium (ATROVENT) 42 mcg (0.06 %) nasal spray 1 spray by Nasal route daily as needed for Rhinitis.     [DISCONTINUED] albuterol (PROAIR HFA) 90 mcg/actuation inhaler Inhale 2 puffs into the lungs every 6 (six) hours as needed for Wheezing.    [DISCONTINUED] albuterol (PROVENTIL) 2.5 mg /3 mL (0.083 %) nebulizer solution Take 3 mLs (2.5 mg total) by nebulization every 6 (six) hours as needed for Wheezing or Shortness of Breath. Rescue    [DISCONTINUED] albuterol 90 mcg/actuation inhaler 2 puffs every 4 hours as needed for cough, wheeze, or shortness of breath    [DISCONTINUED] blood sugar diagnostic (FREESTYLE LITE STRIPS) Strp TEST BLOOD SUGAR ONCE DAILY    [DISCONTINUED] catheter 20 Fr Misc Latex free, 15 balloon suprapubic catheter    [DISCONTINUED] fexofenadine (ALLEGRA) 180 MG tablet Take 180 mg by  mouth once daily.    [DISCONTINUED] hydroCHLOROthiazide (HYDRODIURIL) 25 MG tablet TAKE 1 TABLET(25 MG) BY MOUTH EVERY DAY (Patient taking differently: No sig reported)    [DISCONTINUED] nystatin (MYCOSTATIN) cream Apply topically 2 (two) times daily.    [DISCONTINUED] nystatin (MYCOSTATIN) powder Apply topically 2 (two) times daily.     Family History     Problem Relation (Age of Onset)    Cancer Mother, Maternal Grandmother    Glaucoma Father, Paternal Grandfather    Heart disease Maternal Grandmother    Psoriasis Maternal Uncle        Social History Main Topics    Smoking status: Never Smoker    Smokeless tobacco: Never Used    Alcohol use No    Drug use: No    Sexual activity: Not Currently     Review of Systems   Constitutional: Negative for chills and fever.   Respiratory: Negative for cough and shortness of breath.    Cardiovascular: Negative for chest pain and palpitations.   Gastrointestinal: Positive for abdominal distention, abdominal pain and constipation. Negative for nausea and vomiting.   All other systems reviewed and are negative.    Objective:     Vital Signs (Most Recent):  Temp: 99 °F (37.2 °C) (06/25/18 1951)  Pulse: 99 (06/25/18 1951)  Resp: (!) 22 (06/25/18 1951)  BP: (!) 114/56 (06/25/18 1951)  SpO2: 96 % (06/25/18 1951) Vital Signs (24h Range):  Temp:  [97.4 °F (36.3 °C)-99 °F (37.2 °C)] 99 °F (37.2 °C)  Pulse:  [] 99  Resp:  [16-22] 22  SpO2:  [93 %-100 %] 96 %  BP: (107-160)/(56-91) 114/56     Weight: (!) 142 kg (313 lb)  Body mass index is 59.14 kg/m².    Physical Exam   Constitutional:  Non-toxic appearance. She does not have a sickly appearance. No distress.   HENT:   Head: Atraumatic.   Right Ear: External ear normal.   Left Ear: External ear normal.   Mouth/Throat: No oropharyngeal exudate.   Eyes: Right eye exhibits no discharge. Left eye exhibits no discharge. No scleral icterus.   Neck: Normal range of motion. Neck supple. No JVD present. No thyromegaly present.    Cardiovascular: Normal rate and regular rhythm.    Pulmonary/Chest: Effort normal and breath sounds normal.   Abdominal: Soft. Normal appearance and bowel sounds are normal. She exhibits distension. She exhibits no ascites. There is no hepatosplenomegaly. There is no tenderness. A hernia (reducible in umbilical area) is present.   Musculoskeletal: She exhibits no edema or deformity.   Neurological: She is alert.   Skin: Skin is warm and dry. No rash noted. She is not diaphoretic.           Significant Labs: All pertinent labs within the past 24 hours have been reviewed.    Significant Imaging: I have reviewed all pertinent imaging results/findings within the past 24 hours.    Assessment/Plan:     * SBO (small bowel obstruction)    Diagnosed clinically and radiographically.  Will place NGT.  Nothing by mouth.  Consult with surgeon.          Hyponatremia    ]  Sodium   Date Value Ref Range Status   06/25/2018 129 (L) 136 - 145 mmol/L Final   06/25/2018 129 (L) 136 - 145 mmol/L Final     Will monitor sodium level while she's on normal saline.          Umbilical hernia    Reducible on exam.  May not be the cause for her bowel obstruction.  Will consult with general surgery.          Spina bifida of lumbar region    No acute issues.        Morbid obesity with BMI of 50.0-59.9, adult    Body mass index is 59.14 kg/m². Morbid obesity complicates all aspects of disease management from diagnostic modalities to treatment. Weight loss encouraged and health benefits explained to patient's caretakers.              VTE Risk Mitigation         Ordered     enoxaparin injection 40 mg  Daily      06/25/18 1513     IP VTE HIGH RISK PATIENT  Once      06/25/18 1513             Raffy Santos MD  Department of Hospital Medicine   [facility]

## 2018-06-26 NOTE — SUBJECTIVE & OBJECTIVE
Past Medical History:   Diagnosis Date    Asthma     Back pain     Blood transfusion     Chronic kidney disease     kidney stones    Diabetes mellitus     Diabetes mellitus, type 2     Esotropia     GERD (gastroesophageal reflux disease)     Hydrocephalus     Hypertension     Non-healing ulcer of buttock     Nystagmus, congenital     Paralysis     Spinal bifida, closed     Wheezing        Past Surgical History:   Procedure Laterality Date    BRAIN SURGERY       shunt revision    CHOLECYSTECTOMY      CYSTOSCOPY W/ LASER LITHOTRIPSY      SPINE SURGERY         Review of patient's allergies indicates:   Allergen Reactions    Cefepime      HIVES    Latex Swelling       Current Facility-Administered Medications on File Prior to Encounter   Medication    sodium chloride 0.9% flush 10 mL    sodium chloride 0.9% flush 10 mL    sodium chloride 0.9% flush 10 mL    sodium chloride 0.9% flush 10 mL     Current Outpatient Prescriptions on File Prior to Encounter   Medication Sig    albuterol 90 mcg/actuation inhaler 2 puffs every 4 hours as needed for cough, wheeze, or shortness of breath    ascorbic acid (VITAMIN C) 500 MG tablet Take 500 mg by mouth once daily.    ciprofloxacin HCl (CIPRO) 500 MG tablet Take 1 tablet (500 mg total) by mouth 2 (two) times daily. for 10 days    fluticasone-umeclidin-vilanter (TRELEGY ELLIPTA) 100-62.5-25 mcg DsDv Inhale 1 puff into the lungs once daily.    HYDROcodone-acetaminophen (NORCO) 7.5-325 mg per tablet Take 1 tablet by mouth every 6 (six) hours as needed for Pain.    lisinopril (PRINIVIL,ZESTRIL) 20 MG tablet Take 1 tablet (20 mg total) by mouth once daily.    metoprolol succinate (TOPROL-XL) 50 MG 24 hr tablet Take 1 tablet (50 mg total) by mouth once daily.    multivitamin with minerals tablet Take 1 tablet by mouth once daily.    sulfamethoxazole-trimethoprim 800-160mg (BACTRIM DS) 800-160 mg Tab Take 1 tablet by mouth 2 (two) times daily. for 7  days    fluocinolone acetonide oil (DERMOTIC OIL) 0.01 % Drop 5 drops in right ear bid x 7-10 days until resolution, use intermittenlty for flares.    ipratropium (ATROVENT) 42 mcg (0.06 %) nasal spray 1 spray by Nasal route daily as needed for Rhinitis.     [DISCONTINUED] albuterol (PROAIR HFA) 90 mcg/actuation inhaler Inhale 2 puffs into the lungs every 6 (six) hours as needed for Wheezing.    [DISCONTINUED] albuterol (PROVENTIL) 2.5 mg /3 mL (0.083 %) nebulizer solution Take 3 mLs (2.5 mg total) by nebulization every 6 (six) hours as needed for Wheezing or Shortness of Breath. Rescue    [DISCONTINUED] albuterol 90 mcg/actuation inhaler 2 puffs every 4 hours as needed for cough, wheeze, or shortness of breath    [DISCONTINUED] blood sugar diagnostic (FREESTYLE LITE STRIPS) Strp TEST BLOOD SUGAR ONCE DAILY    [DISCONTINUED] catheter 20 Fr Misc Latex free, 15 balloon suprapubic catheter    [DISCONTINUED] fexofenadine (ALLEGRA) 180 MG tablet Take 180 mg by mouth once daily.    [DISCONTINUED] hydroCHLOROthiazide (HYDRODIURIL) 25 MG tablet TAKE 1 TABLET(25 MG) BY MOUTH EVERY DAY (Patient taking differently: No sig reported)    [DISCONTINUED] nystatin (MYCOSTATIN) cream Apply topically 2 (two) times daily.    [DISCONTINUED] nystatin (MYCOSTATIN) powder Apply topically 2 (two) times daily.     Family History     Problem Relation (Age of Onset)    Cancer Mother, Maternal Grandmother    Glaucoma Father, Paternal Grandfather    Heart disease Maternal Grandmother    Psoriasis Maternal Uncle        Social History Main Topics    Smoking status: Never Smoker    Smokeless tobacco: Never Used    Alcohol use No    Drug use: No    Sexual activity: Not Currently     Review of Systems   Constitutional: Negative for chills and fever.   Respiratory: Negative for cough and shortness of breath.    Cardiovascular: Negative for chest pain and palpitations.   Gastrointestinal: Positive for abdominal distention, abdominal  pain and constipation. Negative for nausea and vomiting.   All other systems reviewed and are negative.    Objective:     Vital Signs (Most Recent):  Temp: 99 °F (37.2 °C) (06/25/18 1951)  Pulse: 99 (06/25/18 1951)  Resp: (!) 22 (06/25/18 1951)  BP: (!) 114/56 (06/25/18 1951)  SpO2: 96 % (06/25/18 1951) Vital Signs (24h Range):  Temp:  [97.4 °F (36.3 °C)-99 °F (37.2 °C)] 99 °F (37.2 °C)  Pulse:  [] 99  Resp:  [16-22] 22  SpO2:  [93 %-100 %] 96 %  BP: (107-160)/(56-91) 114/56     Weight: (!) 142 kg (313 lb)  Body mass index is 59.14 kg/m².    Physical Exam   Constitutional:  Non-toxic appearance. She does not have a sickly appearance. No distress.   HENT:   Head: Atraumatic.   Right Ear: External ear normal.   Left Ear: External ear normal.   Mouth/Throat: No oropharyngeal exudate.   Eyes: Right eye exhibits no discharge. Left eye exhibits no discharge. No scleral icterus.   Neck: Normal range of motion. Neck supple. No JVD present. No thyromegaly present.   Cardiovascular: Normal rate and regular rhythm.    Pulmonary/Chest: Effort normal and breath sounds normal.   Abdominal: Soft. Normal appearance and bowel sounds are normal. She exhibits distension. She exhibits no ascites. There is no hepatosplenomegaly. There is no tenderness. A hernia (reducible in umbilical area) is present.   Musculoskeletal: She exhibits no edema or deformity.   Neurological: She is alert.   Skin: Skin is warm and dry. No rash noted. She is not diaphoretic.           Significant Labs: All pertinent labs within the past 24 hours have been reviewed.    Significant Imaging: I have reviewed all pertinent imaging results/findings within the past 24 hours.

## 2018-06-26 NOTE — ASSESSMENT & PLAN NOTE
Body mass index is 59.14 kg/m². Morbid obesity complicates all aspects of disease management from diagnostic modalities to treatment. Weight loss encouraged and health benefits explained to patient's caretakers.

## 2018-06-27 PROBLEM — T83.511A CATHETER-ASSOCIATED URINARY TRACT INFECTION: Status: ACTIVE | Noted: 2018-06-27

## 2018-06-27 PROBLEM — N39.0 CATHETER-ASSOCIATED URINARY TRACT INFECTION: Status: ACTIVE | Noted: 2018-06-27

## 2018-06-27 LAB — BACTERIA UR CULT: NORMAL

## 2018-06-27 PROCEDURE — 63600175 PHARM REV CODE 636 W HCPCS: Performed by: HOSPITALIST

## 2018-06-27 PROCEDURE — 27000221 HC OXYGEN, UP TO 24 HOURS

## 2018-06-27 PROCEDURE — 99900035 HC TECH TIME PER 15 MIN (STAT)

## 2018-06-27 PROCEDURE — 25000003 PHARM REV CODE 250: Performed by: HOSPITALIST

## 2018-06-27 PROCEDURE — 94761 N-INVAS EAR/PLS OXIMETRY MLT: CPT

## 2018-06-27 PROCEDURE — 12000002 HC ACUTE/MED SURGE SEMI-PRIVATE ROOM

## 2018-06-27 PROCEDURE — 25000003 PHARM REV CODE 250: Performed by: EMERGENCY MEDICINE

## 2018-06-27 PROCEDURE — 99231 SBSQ HOSP IP/OBS SF/LOW 25: CPT | Mod: ,,, | Performed by: SURGERY

## 2018-06-27 RX ADMIN — METOPROLOL SUCCINATE 50 MG: 50 TABLET, EXTENDED RELEASE ORAL at 10:06

## 2018-06-27 RX ADMIN — Medication 2 MG: at 07:06

## 2018-06-27 RX ADMIN — Medication 2 MG: at 10:06

## 2018-06-27 RX ADMIN — Medication 2 MG: at 12:06

## 2018-06-27 RX ADMIN — ENOXAPARIN SODIUM 40 MG: 100 INJECTION SUBCUTANEOUS at 08:06

## 2018-06-27 RX ADMIN — LISINOPRIL 20 MG: 10 TABLET ORAL at 10:06

## 2018-06-27 RX ADMIN — SODIUM CHLORIDE, SODIUM LACTATE, POTASSIUM CHLORIDE, AND CALCIUM CHLORIDE: .6; .31; .03; .02 INJECTION, SOLUTION INTRAVENOUS at 10:06

## 2018-06-27 RX ADMIN — Medication 2 MG: at 06:06

## 2018-06-27 RX ADMIN — Medication 2 MG: at 03:06

## 2018-06-27 RX ADMIN — ENOXAPARIN SODIUM 40 MG: 100 INJECTION SUBCUTANEOUS at 10:06

## 2018-06-27 NOTE — ASSESSMENT & PLAN NOTE
Improved after saline.    Sodium   Date Value Ref Range Status   06/26/2018 135 (L) 136 - 145 mmol/L Final   06/25/2018 129 (L) 136 - 145 mmol/L Final   06/25/2018 129 (L) 136 - 145 mmol/L Final

## 2018-06-27 NOTE — PLAN OF CARE
Problem: Patient Care Overview  Goal: Plan of Care Review  Outcome: Ongoing (interventions implemented as appropriate)  Pt AOx3. Plan of care continued. Safety maintained. Call light within reach, bed rails up x3. Pt weight shifted/turned q 2 hours. Telemetry monitoring continued. Will continue to monitor.

## 2018-06-27 NOTE — PROGRESS NOTES
Pt seen and examined. Resting.  Eating diet without difficulty.  Denies n/v.  Pt reportedly having BMs and flatus.     Wt Readings from Last 3 Encounters:   06/25/18 (!) 142 kg (313 lb)   06/23/18 136.1 kg (300 lb)   05/02/18 136.1 kg (300 lb)     Temp Readings from Last 3 Encounters:   06/27/18 99.7 °F (37.6 °C)   06/23/18 99.3 °F (37.4 °C) (Oral)   05/15/18 98.9 °F (37.2 °C) (Oral)     BP Readings from Last 3 Encounters:   06/27/18 139/70   06/23/18 (!) 144/81   05/15/18 (!) 150/80     Pulse Readings from Last 3 Encounters:   06/27/18 89   06/23/18 105   05/15/18 100     AAOx3  Sinus  Soft/nt/nd reducible hernia    Lab Results   Component Value Date    WBC 12.60 06/25/2018    WBC 12.60 06/25/2018    HGB 11.4 (L) 06/25/2018    HGB 11.4 (L) 06/25/2018    HCT 34.8 (L) 06/25/2018    HCT 34.8 (L) 06/25/2018    MCV 77 (L) 06/25/2018    MCV 77 (L) 06/25/2018     06/25/2018     06/25/2018     BMP  Lab Results   Component Value Date     (L) 06/26/2018    K 4.2 06/26/2018     06/26/2018    CO2 22 (L) 06/26/2018    BUN 16 06/26/2018    CREATININE 0.8 06/26/2018    CALCIUM 9.2 06/26/2018    ANIONGAP 9 06/26/2018    ESTGFRAFRICA >60 06/26/2018    EGFRNONAA >60 06/26/2018     A/P: resolved PSBO vs ileus    Ambulate   Adv diet as tolerated  Will sign off reconsult prn

## 2018-06-27 NOTE — ASSESSMENT & PLAN NOTE
Reducible on exam.  Was not the cause for bowel obstruction.  No need to perform urgent repair.

## 2018-06-27 NOTE — ASSESSMENT & PLAN NOTE
Diagnosed clinically and radiographically.  Improved today.  NGT discontinued.  Diet advanced to full liquid.  Will monitor for vomiting or increasing abd pain.  Surgeon following.  Will give her bisacodyl for one dose to help with motility.

## 2018-06-27 NOTE — SUBJECTIVE & OBJECTIVE
Interval History:  No vomiting.  Had small bowel movement this morning.  Vitals have been stable.  Surgeon was ok with pt advancing diet and having NGT taken out.    Review of Systems   Constitutional: Negative for chills and fever.   Gastrointestinal: Negative for abdominal distention, abdominal pain, constipation, nausea and vomiting.   All other systems reviewed and are negative.    Objective:     Vital Signs (Most Recent):  Temp: 99.5 °F (37.5 °C) (06/26/18 2035)  Pulse: 106 (06/26/18 2035)  Resp: (!) 22 (06/26/18 2035)  BP: 101/65 (06/26/18 2035)  SpO2: 96 % (06/26/18 2035) Vital Signs (24h Range):  Temp:  [97.8 °F (36.6 °C)-99.5 °F (37.5 °C)] 99.5 °F (37.5 °C)  Pulse:  [100-107] 106  Resp:  [14-22] 22  SpO2:  [96 %-100 %] 96 %  BP: (101-134)/(60-73) 101/65     Weight: (!) 142 kg (313 lb)  Body mass index is 59.14 kg/m².    Intake/Output Summary (Last 24 hours) at 06/26/18 2053  Last data filed at 06/26/18 0500   Gross per 24 hour   Intake          1618.75 ml   Output             2300 ml   Net          -681.25 ml      Physical Exam   Constitutional:  Non-toxic appearance. She does not have a sickly appearance. No distress.   HENT:   Mouth/Throat: No oropharyngeal exudate.   Eyes: Right eye exhibits no discharge. Left eye exhibits no discharge. No scleral icterus.   Neck: No JVD present.   Cardiovascular: Normal rate and regular rhythm.    Pulmonary/Chest: Effort normal and breath sounds normal.   Abdominal: Soft. Normal appearance and bowel sounds are normal. She exhibits no distension and no ascites. There is no hepatosplenomegaly. There is no tenderness. A hernia (reducible in umbilical area) is present.   Musculoskeletal: She exhibits no edema or deformity.   Neurological: She is alert.   Skin: Skin is warm and dry. No rash noted. She is not diaphoretic.       Significant Labs: All pertinent labs within the past 24 hours have been reviewed.    Significant Imaging: none

## 2018-06-27 NOTE — PLAN OF CARE
06/26/18 2052   Patient Assessment/Suction   Level of Consciousness (AVPU) alert   Respiratory Effort Normal;Unlabored   Expansion/Accessory Muscles/Retractions expansion symmetric;no retractions;no use of accessory muscles   All Lung Fields Breath Sounds clear;diminished   PRE-TX-O2-ETCO2   O2 Device (Oxygen Therapy) nasal cannula   Flow (L/min) 2   Oxygen Concentration (%) 25   SpO2 99 %   Pulse Oximetry Type Intermittent   Pulse 103   Resp 20   Aerosol Therapy   $ Aerosol Therapy Charges PRN treatment not required   Respiratory Treatment Status PRN treatment not required   Ready to Wean/Extubation Screen   FIO2<=50 (chart decimal) 0.25

## 2018-06-27 NOTE — PLAN OF CARE
Problem: Patient Care Overview  Goal: Plan of Care Review  Outcome: Revised  Awake, alert, and oriented. Catheter patent, draining clear, yellow urine. IVF infusing per order. VS stable. Remains afebrile throughout shift. Comfort level established. Moderate pain control w/ PRN pain medication. Bed in low position, brakes locked, side rails up x 2, call light w/in reach. Verbalized understanding of POC. Open communication facilitated. Will continue to monitor.

## 2018-06-27 NOTE — PROGRESS NOTES
[facility] Hospital Medicine  Progress Note    Patient Name: Briana Uriarte  MRN: [id number]  Patient Class: IP- Inpatient   Admission Date: 6/25/2018  Length of Stay: 1 days  Attending Physician: Raffy Santos MD  Primary Care Provider: Anaid Osei MD        Subjective:     Principal Problem:SBO (small bowel obstruction)    HPI:  Pt presented to our ER today with abd pain.  She presented a couple of days ago with same complaint.  She was diagnosed with early abdominal wall cellulitis and UTI and sent home with antibiotics.  The pain has continued.  Has not had bowel movement.  No vomiting.  Pt has spina bifida and h/o hydrocephalus, which was treated with  shunting.  Also has an umbilical hernia, which is reducible.    Hospital Course:  No notes on file    Interval History:  No vomiting.  Had small bowel movement this morning.  Vitals have been stable.  Surgeon was ok with pt advancing diet and having NGT taken out.    Review of Systems   Constitutional: Negative for chills and fever.   Gastrointestinal: Negative for abdominal distention, abdominal pain, constipation, nausea and vomiting.   All other systems reviewed and are negative.    Objective:     Vital Signs (Most Recent):  Temp: 99.5 °F (37.5 °C) (06/26/18 2035)  Pulse: 106 (06/26/18 2035)  Resp: (!) 22 (06/26/18 2035)  BP: 101/65 (06/26/18 2035)  SpO2: 96 % (06/26/18 2035) Vital Signs (24h Range):  Temp:  [97.8 °F (36.6 °C)-99.5 °F (37.5 °C)] 99.5 °F (37.5 °C)  Pulse:  [100-107] 106  Resp:  [14-22] 22  SpO2:  [96 %-100 %] 96 %  BP: (101-134)/(60-73) 101/65     Weight: (!) 142 kg (313 lb)  Body mass index is 59.14 kg/m².    Intake/Output Summary (Last 24 hours) at 06/26/18 2053  Last data filed at 06/26/18 0500   Gross per 24 hour   Intake          1618.75 ml   Output             2300 ml   Net          -681.25 ml      Physical Exam   Constitutional:  Non-toxic appearance. She does not have a sickly appearance. No distress.    HENT:   Mouth/Throat: No oropharyngeal exudate.   Eyes: Right eye exhibits no discharge. Left eye exhibits no discharge. No scleral icterus.   Neck: No JVD present.   Cardiovascular: Normal rate and regular rhythm.    Pulmonary/Chest: Effort normal and breath sounds normal.   Abdominal: Soft. Normal appearance and bowel sounds are normal. She exhibits no distension and no ascites. There is no hepatosplenomegaly. There is no tenderness. A hernia (reducible in umbilical area) is present.   Musculoskeletal: She exhibits no edema or deformity.   Neurological: She is alert.   Skin: Skin is warm and dry. No rash noted. She is not diaphoretic.       Significant Labs: All pertinent labs within the past 24 hours have been reviewed.    Significant Imaging: none    Assessment/Plan:      * SBO (small bowel obstruction)    Diagnosed clinically and radiographically.  Improved today.  NGT discontinued.  Diet advanced to full liquid.  Will monitor for vomiting or increasing abd pain.  Surgeon following.  Will give her bisacodyl for one dose to help with motility.          Hyponatremia    Improved after saline.    Sodium   Date Value Ref Range Status   06/26/2018 135 (L) 136 - 145 mmol/L Final   06/25/2018 129 (L) 136 - 145 mmol/L Final   06/25/2018 129 (L) 136 - 145 mmol/L Final             Umbilical hernia    Reducible on exam.  Was not the cause for bowel obstruction.  No need to perform urgent repair.           Spina bifida of lumbar region    No acute issues.        Morbid obesity with BMI of 50.0-59.9, adult    Body mass index is 59.14 kg/m². Morbid obesity complicates all aspects of disease management from diagnostic modalities to treatment. Weight loss encouraged and health benefits explained to patient's caretakers.              VTE Risk Mitigation         Ordered     enoxaparin injection 40 mg  Every 12 hours      06/26/18 1030     IP VTE HIGH RISK PATIENT  Once      06/25/18 1513              Raffy Santos  MD  Department of Hospital Medicine   [facility]

## 2018-06-28 ENCOUNTER — TELEPHONE (OUTPATIENT)
Dept: UROLOGY | Facility: CLINIC | Age: 43
End: 2018-06-28

## 2018-06-28 ENCOUNTER — TELEPHONE (OUTPATIENT)
Dept: PULMONOLOGY | Facility: CLINIC | Age: 43
End: 2018-06-28

## 2018-06-28 VITALS
OXYGEN SATURATION: 95 % | RESPIRATION RATE: 18 BRPM | DIASTOLIC BLOOD PRESSURE: 62 MMHG | TEMPERATURE: 100 F | BODY MASS INDEX: 55.32 KG/M2 | HEART RATE: 97 BPM | HEIGHT: 61 IN | SYSTOLIC BLOOD PRESSURE: 125 MMHG | WEIGHT: 293 LBS

## 2018-06-28 PROCEDURE — 94761 N-INVAS EAR/PLS OXIMETRY MLT: CPT

## 2018-06-28 PROCEDURE — 63600175 PHARM REV CODE 636 W HCPCS: Performed by: HOSPITALIST

## 2018-06-28 PROCEDURE — 27000221 HC OXYGEN, UP TO 24 HOURS

## 2018-06-28 PROCEDURE — 25000003 PHARM REV CODE 250: Performed by: HOSPITALIST

## 2018-06-28 PROCEDURE — 25000003 PHARM REV CODE 250: Performed by: EMERGENCY MEDICINE

## 2018-06-28 PROCEDURE — 94640 AIRWAY INHALATION TREATMENT: CPT

## 2018-06-28 PROCEDURE — 25000242 PHARM REV CODE 250 ALT 637 W/ HCPCS: Performed by: HOSPITALIST

## 2018-06-28 RX ORDER — AMOXICILLIN AND CLAVULANATE POTASSIUM 875; 125 MG/1; MG/1
1 TABLET, FILM COATED ORAL EVERY 12 HOURS
Status: DISCONTINUED | OUTPATIENT
Start: 2018-06-28 | End: 2018-06-28 | Stop reason: HOSPADM

## 2018-06-28 RX ORDER — GUAIFENESIN/DEXTROMETHORPHAN 100-5 MG/5
1 LIQUID (ML) ORAL 2 TIMES DAILY
COMMUNITY
Start: 2018-06-28 | End: 2019-09-16

## 2018-06-28 RX ORDER — MICONAZOLE NITRATE 2 %
POWDER (GRAM) TOPICAL 2 TIMES DAILY
Status: DISCONTINUED | OUTPATIENT
Start: 2018-06-28 | End: 2018-06-28 | Stop reason: HOSPADM

## 2018-06-28 RX ORDER — AMOXICILLIN AND CLAVULANATE POTASSIUM 875; 125 MG/1; MG/1
1 TABLET, FILM COATED ORAL EVERY 12 HOURS
Qty: 10 TABLET | Refills: 0 | Status: ON HOLD | OUTPATIENT
Start: 2018-06-28 | End: 2018-07-03

## 2018-06-28 RX ORDER — ALBUTEROL SULFATE 90 UG/1
1 AEROSOL, METERED RESPIRATORY (INHALATION) EVERY 6 HOURS PRN
Status: DISCONTINUED | OUTPATIENT
Start: 2018-06-28 | End: 2018-06-28

## 2018-06-28 RX ADMIN — SODIUM CHLORIDE, SODIUM LACTATE, POTASSIUM CHLORIDE, AND CALCIUM CHLORIDE: .6; .31; .03; .02 INJECTION, SOLUTION INTRAVENOUS at 10:06

## 2018-06-28 RX ADMIN — AMOXICILLIN AND CLAVULANATE POTASSIUM 1 TABLET: 875; 125 TABLET, FILM COATED ORAL at 02:06

## 2018-06-28 RX ADMIN — ENOXAPARIN SODIUM 40 MG: 100 INJECTION SUBCUTANEOUS at 09:06

## 2018-06-28 RX ADMIN — METOPROLOL SUCCINATE 50 MG: 50 TABLET, EXTENDED RELEASE ORAL at 09:06

## 2018-06-28 RX ADMIN — ALBUTEROL SULFATE 2.5 MG: 2.5 SOLUTION RESPIRATORY (INHALATION) at 09:06

## 2018-06-28 RX ADMIN — Medication 2 MG: at 09:06

## 2018-06-28 RX ADMIN — Medication 2 MG: at 04:06

## 2018-06-28 RX ADMIN — LISINOPRIL 20 MG: 10 TABLET ORAL at 09:06

## 2018-06-28 RX ADMIN — Medication 2 MG: at 12:06

## 2018-06-28 NOTE — PLAN OF CARE
Problem: Patient Care Overview  Goal: Plan of Care Review  Outcome: Ongoing (interventions implemented as appropriate)  Pt AAO x4, PIV in place, IVF infusing throughout shift.  Mcdonald in place, I/o monitored and documented.  Tele maintained and monitored.  PRN medication given for c/o of pain, adequate relief achieved.  No c/o of n/v during shift.

## 2018-06-28 NOTE — ASSESSMENT & PLAN NOTE
Patient with likely history of partial small-bowel obstruction review of previous CT scans, with the resolution of symptoms.  Nasogastric tube was removed 6/26.  Patient tolerating regular diet.  Will continue to monitor.  No indication for urgent surgical management at the current time.

## 2018-06-28 NOTE — PHYSICIAN QUERY
PT Name: Briana Uriarte  MR #: [id number]  Physician Query Form - CKD Clarification     CDS/: Carmella Cunha               Contact information: [email or web address]  This form is a permanent document in the medical record.     Query Date: June 28, 2018    By submitting this query, we are merely seeking further clarification of documentation. Please utilize your independent clinical judgment when addressing the question(s) below.    The Medical record contains the following:     Indicators   Supporting Clinical Findings   Location in Medical Record   x CKD or Chronic Kidney (Renal) Failure / Disease Chronic kidney disease  Kidney stones H&P, Past Medical History Diagnosis   x BUN/Creatinine                          GFR Cr= 1.1 - 0.8  BUN= 19 - 16  GFR= >60 Labs, Chem Profile 06/25 - 06/26    Dehydration      Nausea / Vomiting      Dialysis / CRRT      Medication      Treatment      Other Chronic Conditions     x Other Pt has single functioning kidney according to caregiver.    Complete atrophy of the left kidney replaced by multiple cysts.  Three large stones in the right kidney without hydronephrosis.  ED Provider Note      CT Abdomen and Pelvis 06/25     Provider, please further specify the stage of CKD.      [  ] Chronic Kidney Disease (CKD) (please specify stage* below)       [employer] Definitions  Stage Description  eGFR (mL/min)   [  ]     I Slight kidney damage with normal or increased filtration 90+   [  x]     II Mildly reduced kidney function 60-89   [  ]     III Moderately reduced kidney function 30-59     [  ] Other (please specify): ________________________  [  ] Clinically Undetermined    Please document in your progress notes daily for the duration of treatment until resolved and include in your discharge summary.

## 2018-06-28 NOTE — PLAN OF CARE
"Rios was notified by Radha and Nelly RN that family would like to speak with me about home health.  Rios met with pt and parents.  The parents refused HH. The parents informed Rios of the following, "  We do not want or need home health.  We've had them before and they do not offer much benefits.  We've been doing things ourselves. Thank you, but no home health."  Rios informed Radha and Nelly and notified Dr. Mcknight.       06/28/18 8014   Discharge Reassessment   Assessment Type Discharge Planning Reassessment   Provided patient/caregiver education on the expected discharge date and the discharge plan Yes   Do you have any problems affording any of your prescribed medications? No   Discharge Plan A Home with family     "

## 2018-06-28 NOTE — PLAN OF CARE
Problem: Patient Care Overview  Goal: Plan of Care Review  Outcome: Ongoing (interventions implemented as appropriate)  Pt AAO x4, PIV in place, IVF throughout shift.  Mcdonald in place, I/o monitored and documented.  PRN pain medication given for c/o of pain during shift, adequate relief achieved.  Frequent weight shift assistance provided.  Back, buttocks, perineal care provided throughout shift.  Tele maintained and monitored.  O2 maintained.  VS stable.  Hourly rounding completed.  Pt has remained free of injuries and falls throughout shift.  Environment free of clutter, side rails up x2, and call light within reach.  Plan of care has been verbalized to this patient.

## 2018-06-28 NOTE — DISCHARGE INSTRUCTIONS
Thank you for choosing [facility] for your medical care. The primary doctor who is taking care of you at the time of your discharge is Jaswant Mcknight MD.     You were admitted to the hospital with SBO (small bowel obstruction).     Please note your discharge instructions, including diet/activity restrictions, follow-up appointments, and medication changes.  If you have any questions about your medical issues, prescriptions, or any other questions, please feel free to contact the [facility] Medicine Dept at [phone] and we will help.    If you are previously with Home health, outpatient PT/OT or under a therapy program, you are cleared to return to those programs.    Please direct all long term medication refills and follow up to your primary care provider, Anaid Osei MD. Thank you again for letting us take care of your health care needs.

## 2018-06-28 NOTE — PLAN OF CARE
06/27/18 2043   Patient Assessment/Suction   Level of Consciousness (AVPU) alert   Respiratory Effort Normal;Unlabored   Expansion/Accessory Muscles/Retractions expansion symmetric;no retractions;no use of accessory muscles   All Lung Fields Breath Sounds clear   PRE-TX-O2-ETCO2   O2 Device (Oxygen Therapy) nasal cannula   Flow (L/min) 2   Oxygen Concentration (%) 28   SpO2 98 %   Pulse Oximetry Type Intermittent   Pulse 103   Resp 18   Aerosol Therapy   $ Aerosol Therapy Charges PRN treatment not required   Respiratory Treatment Status PRN treatment not required   Ready to Wean/Extubation Screen   FIO2<=50 (chart decimal) 0.28

## 2018-06-28 NOTE — PROGRESS NOTES
[facility] Hospital Medicine  Progress Note    Patient Name: Briana Uriarte  MRN: [id number]  Patient Class: IP- Inpatient   Admission Date: 6/25/2018  Length of Stay: 2 days  Attending Physician: Jaswant Mcknight MD  Primary Care Provider: Anaid Osei MD        Subjective:     Principal Problem:SBO (small bowel obstruction)    HPI:  Pt presented to our ER today with abd pain.  She presented a couple of days ago with same complaint.  She was diagnosed with early abdominal wall cellulitis and UTI and sent home with antibiotics.  The pain has continued.  Has not had bowel movement.  No vomiting.  Pt has spina bifida and h/o hydrocephalus, which was treated with  shunting.  Also has an umbilical hernia, which is reducible.    Hospital Course:  No notes on file    Interval History: Patient seen and examined.  No acute events overnight.  Patient eating and drinking well.  Did have low-grade temperature this morning.  Long discussion with the patient's family regards to their numerous concerns regards to the patient's care.  Plan of care reviewed in detail with the patient's mother and father as well as the patient at the bedside.    Review of Systems   Unable to perform ROS: Patient nonverbal     Objective:     Vital Signs (Most Recent):  Temp: 100.3 °F (37.9 °C) (06/27/18 1943)  Pulse: 104 (06/27/18 1943)  Resp: 18 (06/27/18 1943)  BP: 136/75 (06/27/18 1943)  SpO2: 97 % (06/27/18 1943) Vital Signs (24h Range):  Temp:  [98.2 °F (36.8 °C)-100.3 °F (37.9 °C)] 100.3 °F (37.9 °C)  Pulse:  [] 104  Resp:  [14-20] 18  SpO2:  [95 %-100 %] 97 %  BP: (122-141)/(58-75) 136/75     Weight: (!) 142 kg (313 lb)  Body mass index is 59.14 kg/m².    Intake/Output Summary (Last 24 hours) at 06/27/18 2219  Last data filed at 06/27/18 1900   Gross per 24 hour   Intake             3190 ml   Output             1650 ml   Net             1540 ml      Physical Exam   Constitutional:   Obese, developmentally  delayed female in NAD   Eyes: EOM are normal. Pupils are equal, round, and reactive to light.   Cardiovascular: Normal rate, regular rhythm and intact distal pulses.    No murmur heard.  Pulmonary/Chest:   Decreased BS noted bilaterally   Abdominal: Soft. She exhibits no distension. There is no tenderness.   Protubrant   Genitourinary:   Genitourinary Comments: Suprapubic catheter in place   Musculoskeletal: She exhibits edema (bilateral 1+ pretibial).   Neurological: She is alert.   Nonverbal, delayed. 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 No pallor.   Nursing note and vitals reviewed.      Significant Labs: All pertinent labs within the past 24 hours have been reviewed.    Significant Imaging: I have reviewed all pertinent imaging results/findings within the past 24 hours.    Assessment/Plan:      * SBO (small bowel obstruction)    Patient with likely history of partial small-bowel obstruction review of previous CT scans, with the resolution of symptoms.  Nasogastric tube was removed 6/26.  Patient tolerating regular diet.  Will continue to monitor.  No indication for urgent surgical management at the current time.          Catheter-associated urinary tract infection    patient with history of multidrug resistant catheter associated infections and positive fever overnight potentially representing secondary source.  She does have a suprapubic catheter which is in place.  This is present on admission.  Cultures are positive for Gram-negative rods, non lactose fermenters.  Speciation is not available at this moment.  Will discussion with the family, it would likely be better to keep the patient in the hospital for another day in order to achieve appropriate sensitivities prior to discharge given the patient's history of multidrug resistant organism.          Hyponatremia    Etiology unclear, likely related to SIADH. Continue to monitor.        Umbilical hernia     Reducible on exam.  Was not the cause for bowel obstruction.  No need to perform urgent repair.           Spina bifida of lumbar region    Chronic problem, controlled. Will monitor for any changes, adjusting as needed.           Pickwickian syndrome    Continue to encourage patient to mobilize and be upright as much as possible.          Morbid obesity with BMI of 50.0-59.9, adult    Body mass index is 59.14 kg/m². Morbid obesity complicates all aspects of disease management from diagnostic modalities to treatment. Weight loss encouraged and health benefits explained to patient's caretakers.              VTE Risk Mitigation         Ordered     enoxaparin injection 40 mg  Every 12 hours      06/26/18 1030     IP VTE HIGH RISK PATIENT  Once      06/25/18 7357              Jaswant Mcknight MD  Department of Hospital Medicine   [facility]

## 2018-06-28 NOTE — SUBJECTIVE & OBJECTIVE
Interval History: Patient seen and examined.  No acute events overnight.  Patient eating and drinking well.  Did have low-grade temperature this morning.  Long discussion with the patient's family regards to their numerous concerns regards to the patient's care.  Plan of care reviewed in detail with the patient's mother and father as well as the patient at the bedside.    Review of Systems   Unable to perform ROS: Patient nonverbal     Objective:     Vital Signs (Most Recent):  Temp: 100.3 °F (37.9 °C) (06/27/18 1943)  Pulse: 104 (06/27/18 1943)  Resp: 18 (06/27/18 1943)  BP: 136/75 (06/27/18 1943)  SpO2: 97 % (06/27/18 1943) Vital Signs (24h Range):  Temp:  [98.2 °F (36.8 °C)-100.3 °F (37.9 °C)] 100.3 °F (37.9 °C)  Pulse:  [] 104  Resp:  [14-20] 18  SpO2:  [95 %-100 %] 97 %  BP: (122-141)/(58-75) 136/75     Weight: (!) 142 kg (313 lb)  Body mass index is 59.14 kg/m².    Intake/Output Summary (Last 24 hours) at 06/27/18 2219  Last data filed at 06/27/18 1900   Gross per 24 hour   Intake             3190 ml   Output             1650 ml   Net             1540 ml      Physical Exam   Constitutional:   Obese, developmentally delayed female in NAD   Eyes: EOM are normal. Pupils are equal, round, and reactive to light.   Cardiovascular: Normal rate, regular rhythm and intact distal pulses.    No murmur heard.  Pulmonary/Chest:   Decreased BS noted bilaterally   Abdominal: Soft. She exhibits no distension. There is no tenderness.   Protubrant   Genitourinary:   Genitourinary Comments: Suprapubic catheter in place   Musculoskeletal: She exhibits edema (bilateral 1+ pretibial).   Neurological: She is alert.   Nonverbal, delayed. 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 No pallor.   Nursing note and vitals reviewed.      Significant Labs: All pertinent labs within the past 24 hours have been reviewed.    Significant Imaging: I have reviewed all pertinent imaging  results/findings within the past 24 hours.

## 2018-06-28 NOTE — TELEPHONE ENCOUNTER
----- Message from Grace Bhagat sent at 6/28/2018  2:43 PM CDT -----  Contact: Codie Waller to schedule 2 week post hospital follow up. Please call [phone]

## 2018-06-28 NOTE — ASSESSMENT & PLAN NOTE
patient with history of multidrug resistant catheter associated infections and positive fever overnight potentially representing secondary source.  She does have a suprapubic catheter which is in place.  This is present on admission.  Cultures are positive for Gram-negative rods, non lactose fermenters.  Speciation is not available at this moment.  Will discussion with the family, it would likely be better to keep the patient in the hospital for another day in order to achieve appropriate sensitivities prior to discharge given the patient's history of multidrug resistant organism.

## 2018-06-28 NOTE — PLAN OF CARE
Problem: Patient Care Overview  Goal: Plan of Care Review  Outcome: Revised  Awake, alert, and oriented. Catheter patent, draining clear, yellow urine. IVF infusing per order. VS stable. Telemetry monitored. Remains afebrile throughout shift. Comfort level established. Moderate pain control w/ PRN pain medication. Bed in low position, brakes locked, side rails up x 2, call light w/in reach. Verbalized understanding of POC. Open communication facilitated. Will continue to monitor.

## 2018-06-28 NOTE — NURSING
Discharge instructions discussed with pt and caregivers. Educated both caregivers on importance of using miconazole powder as prescribed per MD and turning/repositioning pt q 2 hrs at home. Questions answered and verbalized understanding. IV removed, catheter intact, statis achieved. Gauze dressing applied with paper tape. Tele monitor removed. Pt transferred to w/ with alicia lift and transported via w/c to personal vehicle with no complications. Relinquish care at this time.

## 2018-06-28 NOTE — TELEPHONE ENCOUNTER
Gave appointment for July 16, 2018.      ----- Message from Grace Bhagat sent at 6/28/2018  2:41 PM CDT -----  Contact: Codie Waller to schedule 2 week hosp follow up. Please call [phone]

## 2018-06-28 NOTE — PLAN OF CARE
06/28/18 1453   Final Note   Assessment Type Final Discharge Note   Discharge Disposition Home   Hospital Follow Up  Appt(s) scheduled? Yes

## 2018-06-28 NOTE — PHYSICIAN QUERY
PT Name: Briana Uriarte  MR #: [id number]     Physician Query Form - Documentation Clarification      CDS/: Carmella Cunha               Contact information: [email or web address]    This form is a permanent document in the medical record.     Query Date: June 28, 2018    By submitting this query, we are merely seeking further clarification of documentation. Please utilize your independent clinical judgment when addressing the question(s) below.    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    Provider Use Only      [  ] Complete paraplegia    [  ] Incomplete paraplegia    [  ] Other diagnosis, please specify:  _________________                                                                                                             [x  ] Clinically undetermined- unknown, did not test

## 2018-06-28 NOTE — PHYSICIAN QUERY
PT Name: Briana Uriarte  MR #: [id number]    Physician Query Form - Cause and Effect Relationship Clarification      CDS/: Carmella Cunha               Contact information: [email or web address]    This form is a permanent document in the medical record.     Query Date: June 28, 2018    By submitting this query, we are merely seeking further clarification of documentation. Please utilize your independent clinical judgment when addressing the question(s) below.    The Medical record contains the following:  Supporting Clinical Findings   Location in record   Abd pain.  She presented a couple of days ago with same complaint.  She was diagnosed with early abdominal wall cellulitis and UTI and sent home with antibiotics.     Catheter-associated urinary tract infection                       Cultures are positive for Gram-negative rods, non lactose fermenters.                             History of multidrug resistant organism.                                                                                        Hospital Medicine PN 06/27   Proteus mirabilis  10,000 - 49,999 cfu/ml                                                                                                                                                                                     Urine Culture 06/25         Provider, please clarify if there is any correlation between ___UTI___ and __Proteus mirabilis___.           Are the conditions:     [ x ] Due to or associated with each other     [  ] Unrelated to each other     [  ] Other (Please Specify): _________________________     [  ] Clinically Undetermined

## 2018-06-28 NOTE — TELEPHONE ENCOUNTER
Returned call to make patient an hospital follow up appt, no answer, message left with call back number.

## 2018-06-28 NOTE — HOSPITAL COURSE
Patient is a 42-year-old female with a past medical history significant for spina bifida, Chiari malformation status post ventriculoperitoneal shunt, morbid obesity, and neurogenic bladder status post suprapubic catheter who presents the hospital with abdominal pain likely related to small bowel obstruction.  Patient was given bowel rest, IV narcotics and IV fluids and improved over the course of the next 24-48 hours.  Nasogastric tube was removed and patient was eating and tolerating full diet without any difficulty.  She did start having some low-grade fevers while in hospital urinalysis was done which showed positive for Serratia which was resistant organism, yet sensitive to Augmentin.  Patient was started on Augmentin prior to discharge and tolerated this without difficulty.  She was set up to go home with home health with nursing and home health aide but family (parents) refused home health.  She also had a severe rash on her buttocks area related to candidal dermatitis which was treated while she was in the hospital.  Patient was afebrile asymptomatic and at her functional baseline at time of discharge. She will need to follow up with Urology, and pulmonology for sleep study.

## 2018-06-29 ENCOUNTER — TELEPHONE (OUTPATIENT)
Dept: MEDSURG UNIT | Facility: HOSPITAL | Age: 43
End: 2018-06-29

## 2018-06-29 ENCOUNTER — TELEPHONE (OUTPATIENT)
Dept: FAMILY MEDICINE | Facility: CLINIC | Age: 43
End: 2018-06-29

## 2018-06-29 NOTE — DISCHARGE SUMMARY
[facility] Hospital Medicine  Discharge Summary      Patient Name: Briana Uriarte  MRN: [id number]  Admission Date: 6/25/2018  Hospital Length of Stay: 3 days  Discharge Date and Time: 6/28/2018  4:00 PM  Attending Physician: Eil att. providers found   Discharging Provider: Jaswant Mcknight MD  Primary Care Provider: Anaid Osei MD      HPI:   Pt presented to our ER today with abd pain.  She presented a couple of days ago with same complaint.  She was diagnosed with early abdominal wall cellulitis and UTI and sent home with antibiotics.  The pain has continued.  Has not had bowel movement.  No vomiting.  Pt has spina bifida and h/o hydrocephalus, which was treated with  shunting.  Also has an umbilical hernia, which is reducible.    * No surgery found *      Hospital Course:   Patient is a 42-year-old female with a past medical history significant for spina bifida, Chiari malformation status post ventriculoperitoneal shunt, morbid obesity, and neurogenic bladder status post suprapubic catheter who presents the hospital with abdominal pain likely related to small bowel obstruction.  Patient was given bowel rest, IV narcotics and IV fluids and improved over the course of the next 24-48 hours.  Nasogastric tube was removed and patient was eating and tolerating full diet without any difficulty.  She did start having some low-grade fevers while in hospital urinalysis was done which showed positive for Serratia which was resistant organism, yet sensitive to Augmentin.  Patient was started on Augmentin prior to discharge and tolerated this without difficulty.  She was set up to go home with home health with nursing and home health aide but family (parents) refused home health.  She also had a severe rash on her buttocks area related to candidal dermatitis which was treated while she was in the hospital.  Patient was afebrile asymptomatic and at her functional baseline at time of discharge. She  will need to follow up with Urology, and pulmonology for sleep study.     Consults:   Consults         Status Ordering Provider     Inpatient consult to General surgery  Once     Provider:  Kip Mello MD    Completed XOCHILT NEELY          No new Assessment & Plan notes have been filed under this hospital service since the last note was generated.  Service: Hospital Medicine    Final Active Diagnoses:    Diagnosis Date Noted POA    PRINCIPAL PROBLEM:  SBO (small bowel obstruction) [K56.609] 06/25/2018 Yes    Catheter-associated urinary tract infection [T83.511A, N39.0] 06/27/2018 Yes    Hyponatremia [E87.1] 06/25/2018 Yes    Umbilical hernia [K42.9] 06/14/2016 Yes    Spina bifida of lumbar region [Q05.7]  Not Applicable    Morbid obesity with BMI of 50.0-59.9, adult [E66.01, Z68.43] 11/09/2013 Not Applicable    Pickwickian syndrome [E66.2] 11/09/2013 Yes      Problems Resolved During this Admission:    Diagnosis Date Noted Date Resolved POA       Discharged Condition: stable    Disposition: Home-Health Care [facility]    Follow Up:  Follow-up Information     Anaid Osei MD On 7/18/2018.    Specialty:  Family Medicine  Why:  Cm spoke with Chen in scheduling, she scheduled pt for the 18th of July and put her on the waiting list for an earlier apt.  Contact information:  [address] [facility] [id number] [phone]             Rosemarie Smith MD In 2 weeks.    Specialty:  Urology  Why:  Rios spoke with Grace in scheduling.  She sent a message to the nurse to schedule and notify pt of appointment  Contact information:  [address] [place] [address] [address]  [address]  [place] [address] [phone]             Ramirez Corrigan MD In 2 weeks.    Specialty:  Pulmonary Disease  Why:  Sleep study.  Rios spoke with Grace in scheduling, she sent a message to the nurse to schedule and notify pt of appointment  Contact information:  [address] [facility]  [address]  [address]  [place] [address] [phone]                  Patient Instructions:     Referral to Home health   Referral Priority: Routine Referral Type: Home Health Care   Referral Reason: Specialty Services Required    Requested Specialty: Home Health Services    Number of Visits Requested: 1      Diet Adult Regular     Activity as tolerated         Significant Diagnostic Studies:     Pending Diagnostic Studies:     None         Medications:  Reconciled Home Medications:      Medication List      START taking these medications    amoxicillin-clavulanate 875-125mg 875-125 mg per tablet  Commonly known as:  AUGMENTIN  Take 1 tablet by mouth every 12 (twelve) hours. for 5 days     miconazole nitrate 2 % Aerp  Apply 1 Dose topically 2 (two) times daily. Apply to rash on buttocks        CONTINUE taking these medications    albuterol 90 mcg/actuation inhaler  2 puffs every 4 hours as needed for cough, wheeze, or shortness of breath     fluocinolone acetonide oil 0.01 % Drop  Commonly known as:  DERMOTIC OIL  5 drops in right ear bid x 7-10 days until resolution, use intermittenlty for flares.     fluticasone-umeclidin-vilanter 100-62.5-25 mcg Dsdv  Commonly known as:  TRELEGY ELLIPTA  Inhale 1 puff into the lungs once daily.     HYDROcodone-acetaminophen 7.5-325 mg per tablet  Commonly known as:  NORCO  Take 1 tablet by mouth every 6 (six) hours as needed for Pain.     ipratropium 42 mcg (0.06 %) nasal spray  Commonly known as:  ATROVENT  1 spray by Nasal route daily as needed for Rhinitis.     lisinopril 20 MG tablet  Commonly known as:  PRINIVIL,ZESTRIL  Take 1 tablet (20 mg total) by mouth once daily.     metoprolol succinate 50 MG 24 hr tablet  Commonly known as:  TOPROL-XL  Take 1 tablet (50 mg total) by mouth once daily.     multivitamin with minerals tablet  Take 1 tablet by mouth once daily.     VITAMIN C 500 MG tablet  Generic drug:  ascorbic acid (vitamin C)  Take 500 mg by mouth once daily.        STOP taking these medications    ciprofloxacin HCl 500 MG  tablet  Commonly known as:  CIPRO     hydroCHLOROthiazide 25 MG tablet  Commonly known as:  HYDRODIURIL     sulfamethoxazole-trimethoprim 800-160mg 800-160 mg Tab  Commonly known as:  BACTRIM DS            Indwelling Lines/Drains at time of discharge:   Lines/Drains/Airways     Drain                 Suprapubic Catheter 05/12/15 0812 100% silicone 20 Fr. 1143 days                Time spent on the discharge of patient: 36 minutes  Patient was seen and examined on the date of discharge and determined to be suitable for discharge.         Jaswant Mcknight MD  Department of Hospital Medicine  [facility]

## 2018-06-29 NOTE — TELEPHONE ENCOUNTER
----- Message from Ceci Rivera LPN sent at 6/28/2018  5:31 PM CDT -----  Contact: self [phone]      ----- Message -----  From: Chen Gamez  Sent: 6/28/2018   2:52 PM  To: [facility] Staff    She is being discharged from [facility] today and needs a 2 week follow up appt late morning or afternoon (due to her medical issues).  I have scheduled 7/18 @ 8:20, but that is really too early in the day and one week late for her.  Will you fit her in at a better time and day?  Thank you!

## 2018-06-30 ENCOUNTER — HOSPITAL ENCOUNTER (INPATIENT)
Facility: HOSPITAL | Age: 43
LOS: 3 days | Discharge: HOME | DRG: 202 | End: 2018-07-03
Attending: EMERGENCY MEDICINE | Admitting: HOSPITALIST
Payer: MEDICARE

## 2018-06-30 DIAGNOSIS — R60.9 EDEMA: ICD-10-CM

## 2018-06-30 DIAGNOSIS — R06.02 SOB (SHORTNESS OF BREATH): ICD-10-CM

## 2018-06-30 DIAGNOSIS — J45.31 MILD PERSISTENT ASTHMA WITH ACUTE EXACERBATION: Primary | ICD-10-CM

## 2018-06-30 DIAGNOSIS — N39.0 URINARY TRACT INFECTION WITHOUT HEMATURIA, SITE UNSPECIFIED: ICD-10-CM

## 2018-06-30 LAB
ALBUMIN SERPL BCP-MCNC: 3.3 G/DL
ALP SERPL-CCNC: 58 U/L
ALT SERPL W/O P-5'-P-CCNC: 13 U/L
ANION GAP SERPL CALC-SCNC: 10 MMOL/L
AST SERPL-CCNC: 30 U/L
BACTERIA #/AREA URNS HPF: ABNORMAL /HPF
BACTERIA BLD CULT: NORMAL
BACTERIA BLD CULT: NORMAL
BASOPHILS # BLD AUTO: 0.1 K/UL
BASOPHILS NFR BLD: 0.5 %
BILIRUB SERPL-MCNC: 0.3 MG/DL
BILIRUB UR QL STRIP: NEGATIVE
BUN SERPL-MCNC: 8 MG/DL
CALCIUM SERPL-MCNC: 9.2 MG/DL
CHLORIDE SERPL-SCNC: 90 MMOL/L
CLARITY UR: ABNORMAL
CO2 SERPL-SCNC: 23 MMOL/L
COLOR UR: YELLOW
CREAT SERPL-MCNC: 0.8 MG/DL
DIFFERENTIAL METHOD: ABNORMAL
EOSINOPHIL # BLD AUTO: 0.1 K/UL
EOSINOPHIL NFR BLD: 1.3 %
ERYTHROCYTE [DISTWIDTH] IN BLOOD BY AUTOMATED COUNT: 17.1 %
EST. GFR  (AFRICAN AMERICAN): >60 ML/MIN/1.73 M^2
EST. GFR  (NON AFRICAN AMERICAN): >60 ML/MIN/1.73 M^2
GLUCOSE SERPL-MCNC: 136 MG/DL
GLUCOSE UR QL STRIP: NEGATIVE
HCT VFR BLD AUTO: 32.2 %
HGB BLD-MCNC: 10.4 G/DL
HGB UR QL STRIP: ABNORMAL
KETONES UR QL STRIP: NEGATIVE
LEUKOCYTE ESTERASE UR QL STRIP: ABNORMAL
LYMPHOCYTES # BLD AUTO: 1 K/UL
LYMPHOCYTES NFR BLD: 8.5 %
MCH RBC QN AUTO: 25 PG
MCHC RBC AUTO-ENTMCNC: 32.3 G/DL
MCV RBC AUTO: 77 FL
MICROSCOPIC COMMENT: ABNORMAL
MONOCYTES # BLD AUTO: 0.3 K/UL
MONOCYTES NFR BLD: 2.7 %
NEUTROPHILS # BLD AUTO: 10.2 K/UL
NEUTROPHILS NFR BLD: 87 %
NITRITE UR QL STRIP: NEGATIVE
NON-SQ EPI CELLS #/AREA URNS HPF: 20 /HPF
PH UR STRIP: 7 [PH] (ref 5–8)
PLATELET # BLD AUTO: 402 K/UL
PMV BLD AUTO: 8 FL
POCT GLUCOSE: 94 MG/DL (ref 70–110)
POTASSIUM SERPL-SCNC: 4.7 MMOL/L
PROT SERPL-MCNC: 7.9 G/DL
PROT UR QL STRIP: NEGATIVE
RBC # BLD AUTO: 4.16 M/UL
RBC #/AREA URNS HPF: 25 /HPF (ref 0–4)
SODIUM SERPL-SCNC: 123 MMOL/L
SP GR UR STRIP: <=1.005 (ref 1–1.03)
URN SPEC COLLECT METH UR: ABNORMAL
UROBILINOGEN UR STRIP-ACNC: NEGATIVE EU/DL
WBC # BLD AUTO: 11.7 K/UL
WBC #/AREA URNS HPF: 75 /HPF (ref 0–5)

## 2018-06-30 PROCEDURE — 63600175 PHARM REV CODE 636 W HCPCS: Performed by: EMERGENCY MEDICINE

## 2018-06-30 PROCEDURE — 85025 COMPLETE CBC W/AUTO DIFF WBC: CPT

## 2018-06-30 PROCEDURE — 96372 THER/PROPH/DIAG INJ SC/IM: CPT

## 2018-06-30 PROCEDURE — 96374 THER/PROPH/DIAG INJ IV PUSH: CPT

## 2018-06-30 PROCEDURE — 81000 URINALYSIS NONAUTO W/SCOPE: CPT

## 2018-06-30 PROCEDURE — 96375 TX/PRO/DX INJ NEW DRUG ADDON: CPT

## 2018-06-30 PROCEDURE — 84300 ASSAY OF URINE SODIUM: CPT

## 2018-06-30 PROCEDURE — 80053 COMPREHEN METABOLIC PANEL: CPT

## 2018-06-30 PROCEDURE — 99285 EMERGENCY DEPT VISIT HI MDM: CPT | Mod: 25

## 2018-06-30 PROCEDURE — 25000003 PHARM REV CODE 250: Performed by: EMERGENCY MEDICINE

## 2018-06-30 PROCEDURE — 63600175 PHARM REV CODE 636 W HCPCS: Performed by: HOSPITALIST

## 2018-06-30 PROCEDURE — 36415 COLL VENOUS BLD VENIPUNCTURE: CPT

## 2018-06-30 PROCEDURE — 25000242 PHARM REV CODE 250 ALT 637 W/ HCPCS: Performed by: EMERGENCY MEDICINE

## 2018-06-30 PROCEDURE — 12000002 HC ACUTE/MED SURGE SEMI-PRIVATE ROOM

## 2018-06-30 PROCEDURE — 94644 CONT INHLJ TX 1ST HOUR: CPT

## 2018-06-30 PROCEDURE — 94640 AIRWAY INHALATION TREATMENT: CPT

## 2018-06-30 PROCEDURE — 27000221 HC OXYGEN, UP TO 24 HOURS

## 2018-06-30 RX ORDER — SODIUM CHLORIDE 9 MG/ML
INJECTION, SOLUTION INTRAVENOUS CONTINUOUS
Status: DISCONTINUED | OUTPATIENT
Start: 2018-06-30 | End: 2018-07-01

## 2018-06-30 RX ORDER — ACETAMINOPHEN 325 MG/1
650 TABLET ORAL EVERY 6 HOURS PRN
Status: DISCONTINUED | OUTPATIENT
Start: 2018-06-30 | End: 2018-07-03 | Stop reason: HOSPADM

## 2018-06-30 RX ORDER — METOPROLOL SUCCINATE 50 MG/1
50 TABLET, EXTENDED RELEASE ORAL DAILY
Status: DISCONTINUED | OUTPATIENT
Start: 2018-07-01 | End: 2018-07-03 | Stop reason: HOSPADM

## 2018-06-30 RX ORDER — ENOXAPARIN SODIUM 100 MG/ML
40 INJECTION SUBCUTANEOUS EVERY 12 HOURS
Status: DISCONTINUED | OUTPATIENT
Start: 2018-06-30 | End: 2018-07-03 | Stop reason: HOSPADM

## 2018-06-30 RX ORDER — KETOROLAC TROMETHAMINE 30 MG/ML
30 INJECTION, SOLUTION INTRAMUSCULAR; INTRAVENOUS
Status: COMPLETED | OUTPATIENT
Start: 2018-06-30 | End: 2018-06-30

## 2018-06-30 RX ORDER — HYDROCODONE BITARTRATE AND ACETAMINOPHEN 7.5; 325 MG/1; MG/1
1 TABLET ORAL EVERY 6 HOURS PRN
Status: DISCONTINUED | OUTPATIENT
Start: 2018-07-01 | End: 2018-06-30

## 2018-06-30 RX ORDER — IPRATROPIUM BROMIDE AND ALBUTEROL SULFATE 2.5; .5 MG/3ML; MG/3ML
9 SOLUTION RESPIRATORY (INHALATION) CONTINUOUS
Status: DISCONTINUED | OUTPATIENT
Start: 2018-06-30 | End: 2018-06-30

## 2018-06-30 RX ORDER — IBUPROFEN 200 MG
24 TABLET ORAL
Status: DISCONTINUED | OUTPATIENT
Start: 2018-06-30 | End: 2018-07-03 | Stop reason: HOSPADM

## 2018-06-30 RX ORDER — ENOXAPARIN SODIUM 100 MG/ML
40 INJECTION SUBCUTANEOUS EVERY 24 HOURS
Status: DISCONTINUED | OUTPATIENT
Start: 2018-06-30 | End: 2018-06-30 | Stop reason: DRUGHIGH

## 2018-06-30 RX ORDER — HYDROCODONE BITARTRATE AND ACETAMINOPHEN 7.5; 325 MG/1; MG/1
1 TABLET ORAL EVERY 6 HOURS PRN
Status: DISCONTINUED | OUTPATIENT
Start: 2018-07-01 | End: 2018-07-03 | Stop reason: HOSPADM

## 2018-06-30 RX ORDER — ONDANSETRON 2 MG/ML
4 INJECTION INTRAMUSCULAR; INTRAVENOUS EVERY 8 HOURS PRN
Status: DISCONTINUED | OUTPATIENT
Start: 2018-06-30 | End: 2018-07-03 | Stop reason: HOSPADM

## 2018-06-30 RX ORDER — IPRATROPIUM BROMIDE AND ALBUTEROL SULFATE 2.5; .5 MG/3ML; MG/3ML
3 SOLUTION RESPIRATORY (INHALATION) EVERY 6 HOURS
Status: DISCONTINUED | OUTPATIENT
Start: 2018-06-30 | End: 2018-07-03 | Stop reason: HOSPADM

## 2018-06-30 RX ORDER — HALOPERIDOL 5 MG/ML
5 INJECTION INTRAMUSCULAR
Status: COMPLETED | OUTPATIENT
Start: 2018-06-30 | End: 2018-06-30

## 2018-06-30 RX ORDER — LISINOPRIL 10 MG/1
20 TABLET ORAL DAILY
Status: DISCONTINUED | OUTPATIENT
Start: 2018-07-01 | End: 2018-07-03 | Stop reason: HOSPADM

## 2018-06-30 RX ORDER — INSULIN ASPART 100 [IU]/ML
0-5 INJECTION, SOLUTION INTRAVENOUS; SUBCUTANEOUS
Status: DISCONTINUED | OUTPATIENT
Start: 2018-06-30 | End: 2018-07-03 | Stop reason: HOSPADM

## 2018-06-30 RX ORDER — IBUPROFEN 200 MG
16 TABLET ORAL
Status: DISCONTINUED | OUTPATIENT
Start: 2018-06-30 | End: 2018-07-03 | Stop reason: HOSPADM

## 2018-06-30 RX ADMIN — METHYLNALTREXONE BROMIDE 8 MG: 12 INJECTION, SOLUTION SUBCUTANEOUS at 04:06

## 2018-06-30 RX ADMIN — PIPERACILLIN SODIUM AND TAZOBACTAM SODIUM 4.5 G: 4; .5 INJECTION, POWDER, FOR SOLUTION INTRAVENOUS at 06:06

## 2018-06-30 RX ADMIN — IPRATROPIUM BROMIDE AND ALBUTEROL SULFATE 9 ML: .5; 3 SOLUTION RESPIRATORY (INHALATION) at 04:06

## 2018-06-30 RX ADMIN — SODIUM CHLORIDE: 0.9 INJECTION, SOLUTION INTRAVENOUS at 06:06

## 2018-06-30 RX ADMIN — KETOROLAC TROMETHAMINE 30 MG: 30 INJECTION, SOLUTION INTRAMUSCULAR at 04:06

## 2018-06-30 RX ADMIN — IPRATROPIUM BROMIDE AND ALBUTEROL SULFATE 3 ML: .5; 3 SOLUTION RESPIRATORY (INHALATION) at 07:06

## 2018-06-30 RX ADMIN — HYDROCODONE BITARTRATE AND ACETAMINOPHEN 1 TABLET: 7.5; 325 TABLET ORAL at 11:06

## 2018-06-30 RX ADMIN — ENOXAPARIN SODIUM 40 MG: 100 INJECTION SUBCUTANEOUS at 09:06

## 2018-06-30 RX ADMIN — HALOPERIDOL LACTATE 5 MG: 5 INJECTION, SOLUTION INTRAMUSCULAR at 04:06

## 2018-06-30 NOTE — ED PROVIDER NOTES
Encounter Date: 6/30/2018    SCRIBE #1 NOTE: Thalia DELEON, nicolasa scribing for, and in the presence of, .       History     Chief Complaint   Patient presents with    Shortness of Breath     with body aches       06/30/2018 3:49 PM     Chief complaint: back pain       Briana Uriarte is a 42 y.o. female with spina bifida, GERD, HTN, T2DM,  who presents to the ED via EMS for mid-back pain. Patient complains of middle back pain,  cough, and worsening wheezing today. She has a decreased appetite.  Patient has been inpatient secondary to suspected SBO 2 days ago where it resolved after an NG tube placement; she was discharged with Macrobid and Bactrim. Her parents states that she seems to be in more pain and now does not want to talk. They deny any vomiting, but complain of dry heaving. Otherwise they deny any measured fever, chest pain, or rash.       The history is provided by the patient. No  was used.     Review of patient's allergies indicates:   Allergen Reactions    Cefepime      HIVES    Latex Swelling     Past Medical History:   Diagnosis Date    Asthma     Back pain     Blood transfusion     Chronic kidney disease     kidney stones    Diabetes mellitus     Diabetes mellitus, type 2     Esotropia     GERD (gastroesophageal reflux disease)     Hydrocephalus     Hypertension     Non-healing ulcer of buttock     Nystagmus, congenital     Paralysis     Spinal bifida, closed     Wheezing      Past Surgical History:   Procedure Laterality Date    BRAIN SURGERY       shunt revision    CHOLECYSTECTOMY      CYSTOSCOPY W/ LASER LITHOTRIPSY      SPINE SURGERY       Family History   Problem Relation Age of Onset    Cancer Mother     Glaucoma Father     Heart disease Maternal Grandmother     Cancer Maternal Grandmother     Glaucoma Paternal Grandfather     Psoriasis Maternal Uncle     Melanoma Neg Hx     Lupus Neg Hx     Eczema Neg Hx      Social  History   Substance Use Topics    Smoking status: Never Smoker    Smokeless tobacco: Never Used    Alcohol use No     Review of Systems   Constitutional: Negative for diaphoresis and fever.   Respiratory: Positive for cough, shortness of breath and wheezing.    Cardiovascular: Negative for chest pain.   Gastrointestinal: Negative for vomiting.        Dry heaving     Genitourinary: Negative for flank pain.   Musculoskeletal: Positive for back pain (upper). Negative for gait problem.   Skin: Negative for rash.   Neurological: Negative for weakness.   Psychiatric/Behavioral: Negative for confusion.       Physical Exam     Initial Vitals [06/30/18 1537]   BP Pulse Resp Temp SpO2   (!) 176/98 86 (!) 22 98.8 °F (37.1 °C) 99 %      MAP       --         Physical Exam    Nursing note and vitals reviewed.  Constitutional: She appears well-developed and well-nourished.   HENT:   Head: Normocephalic and atraumatic.   Eyes: Conjunctivae are normal.   Neck: Normal range of motion. Neck supple.   Cardiovascular: Normal rate, regular rhythm and normal heart sounds. Exam reveals no gallop and no friction rub.    No murmur heard.  Pulmonary/Chest: Effort normal and breath sounds normal. No respiratory distress. She has no wheezes. She has no rhonchi. She has no rales.   Decreased respiratory effort   Abdominal: Soft. She exhibits no distension. There is no tenderness.   Firm     Musculoskeletal: Normal range of motion.   Left lower posterior thoracic pain   Neurological: She is alert and oriented to person, place, and time.   Skin: Skin is warm and dry. No erythema.   Psychiatric: She has a normal mood and affect.         ED Course   Procedures  Labs Reviewed - No data to display       Imaging Results    None          Medical Decision Making:   History:   Old Medical Records: I decided to obtain old medical records.  Clinical Tests:   Lab Tests: Ordered and Reviewed  Radiological Study: Ordered and Reviewed  ED  Management:  42-year-old female presents with shortness of breath difficulty speaking and left posterior thoracic pain. Physical exam suggestive of a COPD exacerbation which does improve with bronchodilators.  She was recently diagnosed with the UTI secondary to Proteus which was sensitive to Augmentin.  She has been on Augmentin but has worsening pyuria and bacteriuria.  She will be admitted for IV antibiotics and nebulized bronchodilators.  Other:   I have discussed this case with another health care provider.       <> Summary of the Discussion: Discussed with Dr. Santos who will admit the patient       APC / Resident Notes:   I, Dr. Fernando Wiley III, personally performed the services described in this documentation. All medical record entries made by the scribe were at my direction and in my presence.  I have reviewed the chart and agree that the record reflects my personal performance and is accurate and complete. Fernando Wiley III, MD.         Scribe Attestation:   Scribe #1: I performed the above scribed service and the documentation accurately describes the services I performed. I attest to the accuracy of the note.               Clinical Impression:   The primary encounter diagnosis was Mild persistent asthma with acute exacerbation. Diagnoses of SOB (shortness of breath) and Urinary tract infection without hematuria, site unspecified were also pertinent to this visit.      Disposition:   Disposition: Admitted  Condition: Stable                        Fernando Wiley III, MD  07/01/18 8990

## 2018-06-30 NOTE — PROGRESS NOTES
6/30/2018 - Serum creatinine: 0.8 mg/dL 06/30/18 1631  Estimated creatinine clearance: 123.6 mL/min   Lab Results   Component Value Date    HGB 10.4 (L) 06/30/2018     (H) 06/30/2018     Wt: (!) 142 kg (313 lb) BMI: 59.14    Increase enoxaparin to 40mg q12h from 40mg q24h per system renal dosing protocol of Cockgroft Gault equation.

## 2018-06-30 NOTE — UM SECONDARY REVIEW
Physician Advisor External    Level of Care Issue    Approved Inpatient for admit 6/30/18 per dr garcia at [facility]..

## 2018-07-01 ENCOUNTER — PATIENT MESSAGE (OUTPATIENT)
Dept: FAMILY MEDICINE | Facility: CLINIC | Age: 43
End: 2018-07-01

## 2018-07-01 PROBLEM — E83.42 HYPOMAGNESEMIA: Status: ACTIVE | Noted: 2018-07-01

## 2018-07-01 LAB
ALBUMIN SERPL BCP-MCNC: 3 G/DL
ALP SERPL-CCNC: 49 U/L
ALT SERPL W/O P-5'-P-CCNC: 15 U/L
ANION GAP SERPL CALC-SCNC: 7 MMOL/L
AST SERPL-CCNC: 34 U/L
BASOPHILS # BLD AUTO: 0 K/UL
BASOPHILS NFR BLD: 0.5 %
BILIRUB SERPL-MCNC: 0.2 MG/DL
BUN SERPL-MCNC: 9 MG/DL
CALCIUM SERPL-MCNC: 8.7 MG/DL
CHLORIDE SERPL-SCNC: 95 MMOL/L
CO2 SERPL-SCNC: 25 MMOL/L
CREAT SERPL-MCNC: 0.7 MG/DL
DIFFERENTIAL METHOD: ABNORMAL
EOSINOPHIL # BLD AUTO: 0.3 K/UL
EOSINOPHIL NFR BLD: 3.4 %
ERYTHROCYTE [DISTWIDTH] IN BLOOD BY AUTOMATED COUNT: 17 %
EST. GFR  (AFRICAN AMERICAN): >60 ML/MIN/1.73 M^2
EST. GFR  (NON AFRICAN AMERICAN): >60 ML/MIN/1.73 M^2
GLUCOSE SERPL-MCNC: 89 MG/DL
HCT VFR BLD AUTO: 29.9 %
HGB BLD-MCNC: 9.8 G/DL
LYMPHOCYTES # BLD AUTO: 1.3 K/UL
LYMPHOCYTES NFR BLD: 16.4 %
MAGNESIUM SERPL-MCNC: 1.4 MG/DL
MCH RBC QN AUTO: 25.2 PG
MCHC RBC AUTO-ENTMCNC: 32.7 G/DL
MCV RBC AUTO: 77 FL
MONOCYTES # BLD AUTO: 0.5 K/UL
MONOCYTES NFR BLD: 6.2 %
NEUTROPHILS # BLD AUTO: 6 K/UL
NEUTROPHILS NFR BLD: 73.5 %
PHOSPHATE SERPL-MCNC: 3.2 MG/DL
PLATELET # BLD AUTO: 290 K/UL
PMV BLD AUTO: 7.5 FL
POCT GLUCOSE: 106 MG/DL (ref 70–110)
POCT GLUCOSE: 80 MG/DL (ref 70–110)
POCT GLUCOSE: 84 MG/DL (ref 70–110)
POCT GLUCOSE: 93 MG/DL (ref 70–110)
POTASSIUM SERPL-SCNC: 4.2 MMOL/L
PROT SERPL-MCNC: 7.1 G/DL
RBC # BLD AUTO: 3.87 M/UL
SODIUM SERPL-SCNC: 127 MMOL/L
SODIUM UR-SCNC: 21 MMOL/L
WBC # BLD AUTO: 8.1 K/UL

## 2018-07-01 PROCEDURE — 27000221 HC OXYGEN, UP TO 24 HOURS

## 2018-07-01 PROCEDURE — 25000003 PHARM REV CODE 250: Performed by: NURSE PRACTITIONER

## 2018-07-01 PROCEDURE — 36415 COLL VENOUS BLD VENIPUNCTURE: CPT

## 2018-07-01 PROCEDURE — S0171 BUMETANIDE 0.5 MG: HCPCS | Performed by: HOSPITALIST

## 2018-07-01 PROCEDURE — 85025 COMPLETE CBC W/AUTO DIFF WBC: CPT

## 2018-07-01 PROCEDURE — 94761 N-INVAS EAR/PLS OXIMETRY MLT: CPT

## 2018-07-01 PROCEDURE — 25000003 PHARM REV CODE 250: Performed by: HOSPITALIST

## 2018-07-01 PROCEDURE — 94640 AIRWAY INHALATION TREATMENT: CPT

## 2018-07-01 PROCEDURE — 63600175 PHARM REV CODE 636 W HCPCS: Performed by: EMERGENCY MEDICINE

## 2018-07-01 PROCEDURE — 25000242 PHARM REV CODE 250 ALT 637 W/ HCPCS: Performed by: EMERGENCY MEDICINE

## 2018-07-01 PROCEDURE — 83735 ASSAY OF MAGNESIUM: CPT

## 2018-07-01 PROCEDURE — 12000002 HC ACUTE/MED SURGE SEMI-PRIVATE ROOM

## 2018-07-01 PROCEDURE — 25000003 PHARM REV CODE 250: Performed by: EMERGENCY MEDICINE

## 2018-07-01 PROCEDURE — 63600175 PHARM REV CODE 636 W HCPCS: Performed by: HOSPITALIST

## 2018-07-01 PROCEDURE — 84100 ASSAY OF PHOSPHORUS: CPT

## 2018-07-01 PROCEDURE — 80053 COMPREHEN METABOLIC PANEL: CPT

## 2018-07-01 RX ORDER — BUMETANIDE 0.25 MG/ML
1 INJECTION INTRAMUSCULAR; INTRAVENOUS ONCE
Status: COMPLETED | OUTPATIENT
Start: 2018-07-01 | End: 2018-07-01

## 2018-07-01 RX ADMIN — ENOXAPARIN SODIUM 40 MG: 100 INJECTION SUBCUTANEOUS at 08:07

## 2018-07-01 RX ADMIN — PIPERACILLIN SODIUM AND TAZOBACTAM SODIUM 4.5 G: 4; .5 INJECTION, POWDER, FOR SOLUTION INTRAVENOUS at 02:07

## 2018-07-01 RX ADMIN — IPRATROPIUM BROMIDE AND ALBUTEROL SULFATE 3 ML: .5; 3 SOLUTION RESPIRATORY (INHALATION) at 01:07

## 2018-07-01 RX ADMIN — PIPERACILLIN SODIUM AND TAZOBACTAM SODIUM 4.5 G: 4; .5 INJECTION, POWDER, FOR SOLUTION INTRAVENOUS at 06:07

## 2018-07-01 RX ADMIN — PIPERACILLIN SODIUM AND TAZOBACTAM SODIUM 4.5 G: 4; .5 INJECTION, POWDER, FOR SOLUTION INTRAVENOUS at 11:07

## 2018-07-01 RX ADMIN — IPRATROPIUM BROMIDE AND ALBUTEROL SULFATE 3 ML: .5; 3 SOLUTION RESPIRATORY (INHALATION) at 07:07

## 2018-07-01 RX ADMIN — HYDROCODONE BITARTRATE AND ACETAMINOPHEN 1 TABLET: 7.5; 325 TABLET ORAL at 07:07

## 2018-07-01 RX ADMIN — BUMETANIDE 1 MG: 0.25 INJECTION INTRAMUSCULAR; INTRAVENOUS at 07:07

## 2018-07-01 RX ADMIN — LISINOPRIL 20 MG: 10 TABLET ORAL at 08:07

## 2018-07-01 RX ADMIN — HYDROCODONE BITARTRATE AND ACETAMINOPHEN 1 TABLET: 7.5; 325 TABLET ORAL at 01:07

## 2018-07-01 RX ADMIN — IPRATROPIUM BROMIDE AND ALBUTEROL SULFATE 3 ML: .5; 3 SOLUTION RESPIRATORY (INHALATION) at 12:07

## 2018-07-01 RX ADMIN — SODIUM CHLORIDE: 0.9 INJECTION, SOLUTION INTRAVENOUS at 02:07

## 2018-07-01 RX ADMIN — METOPROLOL SUCCINATE 50 MG: 50 TABLET, EXTENDED RELEASE ORAL at 08:07

## 2018-07-01 NOTE — SUBJECTIVE & OBJECTIVE
Past Medical History:   Diagnosis Date    Asthma     Back pain     Blood transfusion     Chronic kidney disease     kidney stones    Diabetes mellitus     Diabetes mellitus, type 2     Esotropia     GERD (gastroesophageal reflux disease)     Hydrocephalus     Hypertension     Non-healing ulcer of buttock     Nystagmus, congenital     Paralysis     Spinal bifida, closed     Wheezing        Past Surgical History:   Procedure Laterality Date    BRAIN SURGERY       shunt revision    CHOLECYSTECTOMY      CYSTOSCOPY W/ LASER LITHOTRIPSY      SPINE SURGERY         Review of patient's allergies indicates:   Allergen Reactions    Cefepime      HIVES    Latex Swelling       Current Facility-Administered Medications on File Prior to Encounter   Medication    sodium chloride 0.9% flush 10 mL    sodium chloride 0.9% flush 10 mL    sodium chloride 0.9% flush 10 mL    sodium chloride 0.9% flush 10 mL     Current Outpatient Prescriptions on File Prior to Encounter   Medication Sig    amoxicillin-clavulanate 875-125mg (AUGMENTIN) 875-125 mg per tablet Take 1 tablet by mouth every 12 (twelve) hours. for 5 days    ascorbic acid (VITAMIN C) 500 MG tablet Take 500 mg by mouth once daily.    HYDROcodone-acetaminophen (NORCO) 7.5-325 mg per tablet Take 1 tablet by mouth every 6 (six) hours as needed for Pain.    ipratropium (ATROVENT) 42 mcg (0.06 %) nasal spray 1 spray by Nasal route daily as needed for Rhinitis.     lisinopril (PRINIVIL,ZESTRIL) 20 MG tablet Take 1 tablet (20 mg total) by mouth once daily.    metoprolol succinate (TOPROL-XL) 50 MG 24 hr tablet Take 1 tablet (50 mg total) by mouth once daily.    multivitamin with minerals tablet Take 1 tablet by mouth once daily.    albuterol 90 mcg/actuation inhaler 2 puffs every 4 hours as needed for cough, wheeze, or shortness of breath    fluocinolone acetonide oil (DERMOTIC OIL) 0.01 % Drop 5 drops in right ear bid x 7-10 days until resolution,  use intermittenlty for flares.    fluticasone-umeclidin-vilanter (TRELEGY ELLIPTA) 100-62.5-25 mcg DsDv Inhale 1 puff into the lungs once daily.    miconazole nitrate 2 % AerP Apply 1 Dose topically 2 (two) times daily. Apply to rash on buttocks     Family History     Problem Relation (Age of Onset)    Cancer Mother, Maternal Grandmother    Glaucoma Father, Paternal Grandfather    Heart disease Maternal Grandmother    Psoriasis Maternal Uncle        Social History Main Topics    Smoking status: Never Smoker    Smokeless tobacco: Never Used    Alcohol use No    Drug use: No    Sexual activity: Not Currently     Review of Systems   Unable to perform ROS: Acuity of condition     Objective:     Vital Signs (Most Recent):  Temp: 98.6 °F (37 °C) (06/30/18 2127)  Pulse: 95 (06/30/18 2127)  Resp: 20 (06/30/18 2127)  BP: 127/75 (06/30/18 2127)  SpO2: 100 % (06/30/18 2127) Vital Signs (24h Range):  Temp:  [97.4 °F (36.3 °C)-98.8 °F (37.1 °C)] 98.6 °F (37 °C)  Pulse:  [] 95  Resp:  [19-22] 20  SpO2:  [87 %-100 %] 100 %  BP: (117-176)/(60-98) 127/75     Weight: (!) 147.1 kg (324 lb 6.4 oz)  Body mass index is 61.29 kg/m².    Physical Exam   Constitutional: She appears well-developed and well-nourished. No distress.   Obese   HENT:   Head: Normocephalic and atraumatic.   Mouth/Throat: Oropharynx is clear and moist.   Eyes: Conjunctivae are normal. Pupils are equal, round, and reactive to light. No scleral icterus.   Neck: Normal range of motion. Neck supple. No JVD present. No tracheal deviation present. No thyromegaly present.   Cardiovascular: Normal rate, regular rhythm, normal heart sounds and intact distal pulses.  Exam reveals no gallop and no friction rub.    No murmur heard.  Pulses:       Dorsalis pedis pulses are 2+ on the right side, and 2+ on the left side.   Pulmonary/Chest: Effort normal and breath sounds normal. No accessory muscle usage. No respiratory distress. She has no wheezes. She has no  rhonchi. She has no rales.   Abdominal: Bowel sounds are normal. She exhibits distension. She exhibits no mass. There is no tenderness. There is no guarding. A hernia (Umbilical - reducible) is present.   Firm   Musculoskeletal: Normal range of motion. She exhibits edema (Non pitting generalized) and deformity (Bilateral lower extremity - spina bifida - contractures noted). She exhibits no tenderness.   Neurological: She is alert. She has normal strength. Coordination (Bilateral lower extremity paralysis) abnormal.   Skin: Skin is warm and dry. Capillary refill takes less than 2 seconds. No rash noted. She is not diaphoretic. No erythema.   Dry, flaky   Psychiatric: She is noncommunicative. She is inattentive.   Vitals reviewed.        CRANIAL NERVES     CN III, IV, VI   Pupils are equal, round, and reactive to light.       Significant Labs:   CBC:   Recent Labs  Lab 06/30/18  1631   WBC 11.70   HGB 10.4*   HCT 32.2*   *     CMP:   Recent Labs  Lab 06/30/18  1631   *   K 4.7   CL 90*   CO2 23   *   BUN 8   CREATININE 0.8   CALCIUM 9.2   PROT 7.9   ALBUMIN 3.3*   BILITOT 0.3   ALKPHOS 58   AST 30   ALT 13   ANIONGAP 10   EGFRNONAA >60     Urine Studies:   Recent Labs  Lab 06/30/18  1630   COLORU Yellow   APPEARANCEUA Hazy*   PHUR 7.0   SPECGRAV <=1.005*   PROTEINUA Negative   GLUCUA Negative   KETONESU Negative   BILIRUBINUA Negative   OCCULTUA 2+*   NITRITE Negative   UROBILINOGEN Negative   LEUKOCYTESUR 3+*   RBCUA 25*   WBCUA 75*   BACTERIA Many*       Significant Imaging:   CXR: Impression       Poor inspiratory chest x-ray with no confluent infiltrate

## 2018-07-01 NOTE — NURSING
Patient arrived to floor redness to B buttock. Picture taken. Blood in picture is due to menstrual cycle not an open wound. Family request no stat lock be place to leg for suprapubic catheter.

## 2018-07-01 NOTE — HPI
Briana Uriarte is a 41 yo female with PMHx significant for hydrocephalus relieved with  shunting, Spina Bifida, HTN, Asthma, kidney stones and cysts, and GERD.  She was admitted to the service of hospital medicine with UTI and back pain. HPI/ROS is limited 2/2 to patient condition and is provided by her mother. She presented to the ED today with worsening back pain, congestion and wheezing.  Her mother stated that patient has had trouble breathing with increase in congestion and wheezing despite breathing treatments.  She reports her daughter has complained of back pain and trouble swallowing. She has also noticed an increase in sediment in patient's indwelling catheter. She reports that patient had 3 bowel movements yesterday.  She denied vomiting.  She was recently discharged from our facility after being evaluated for SBO and UTI.  SBO resolved with bowel rest and NGT and she was sent home with Augmentin for UTI.

## 2018-07-01 NOTE — PLAN OF CARE
Problem: Patient Care Overview  Goal: Plan of Care Review  Outcome: Ongoing (interventions implemented as appropriate)  Plan of care reviewed with patient and mother. Understanding verbalized. Safety maintained throughout shift. Bed in low and locked position, side rails up x2, call light within reach, bed alarm set. IV fluids/IV antibiotics infused per orders. Blood glucose monitored per orders. O2 NC in use. Repositioned q2. Suprapubic catheter intact, draining yellow urine. Family requested no stat lock be placed to the leg for catheter. Abdomen distended and firm, hernia present. Edema to BLE. Redness/pressure ulcer to bottom, barrier cream applied. Hourly rounding utilized. Will continue to monitor.

## 2018-07-01 NOTE — PLAN OF CARE
Problem: Patient Care Overview  Goal: Plan of Care Review  Outcome: Ongoing (interventions implemented as appropriate)  Patient receiving aerosol tx via duoneb now q6hr.  Hr 97 and 02 saturation 100% on nc at 2lpm.

## 2018-07-01 NOTE — PLAN OF CARE
Problem: Patient Care Overview  Goal: Plan of Care Review  Outcome: Ongoing (interventions implemented as appropriate)  Plan of care reviewed with patient she verbalized understanding admitted for SOB asthma exacerbation. On cont oxygen therapy at 2LPM and scheduled breathing tx. Medicated for pain with prn provided relief. Pt has a suprapubic catheter draining clear yellow urine. Cont on IV ABT no ADR noted. Call light within reach will cont to reposition Q 2 hours. Will cont to monitor.

## 2018-07-01 NOTE — ASSESSMENT & PLAN NOTE
Body mass index is 61.29 kg/m². Morbid obesity complicates all aspects of disease management from diagnostic modalities to treatment. Weight loss encouraged and health benefits explained to patient's caregivers.

## 2018-07-01 NOTE — ASSESSMENT & PLAN NOTE
Supplemental O2 via nasal cannula to keep O2 sats >92%  Continue beta 2 agonist bronchodilator treatments.   Continue routine home medications.

## 2018-07-01 NOTE — H&P
[facility] Hospital Medicine  History & Physical    Patient Name: Briana Uriarte  MRN: [id number]  Admission Date: 6/30/2018  Attending Physician: Rfafy Santos MD   Primary Care Provider: Anaid Osei MD         Patient information was obtained from parent, past medical records and ER records.     Subjective:     Principal Problem:Mild persistent asthma with acute exacerbation    Chief Complaint:   Chief Complaint   Patient presents with    Shortness of Breath     with body aches        HPI: Briana Uriarte is a 43 yo female with PMHx significant for hydrocephalus relieved with  shunting, Spina Bifida, HTN, Asthma, kidney stones and cysts, and GERD.  She was admitted to the service of hospital medicine with UTI and back pain. HPI/ROS is limited 2/2 to patient condition and is provided by her mother. She presented to the ED today with worsening back pain, congestion and wheezing.  Her mother stated that patient has had trouble breathing with increase in congestion and wheezing despite breathing treatments.  She reports her daughter has complained of back pain and trouble swallowing. She has also noticed an increase in sediment in patient's indwelling catheter. She reports that patient had 3 bowel movements yesterday.  She denied vomiting.  She was recently discharged from our facility after being evaluated for SBO and UTI.  SBO resolved with bowel rest and NGT and she was sent home with Augmentin for UTI.    Past Medical History:   Diagnosis Date    Asthma     Back pain     Blood transfusion     Chronic kidney disease     kidney stones    Diabetes mellitus     Diabetes mellitus, type 2     Esotropia     GERD (gastroesophageal reflux disease)     Hydrocephalus     Hypertension     Non-healing ulcer of buttock     Nystagmus, congenital     Paralysis     Spinal bifida, closed     Wheezing        Past Surgical History:   Procedure Laterality Date    BRAIN  SURGERY       shunt revision    CHOLECYSTECTOMY      CYSTOSCOPY W/ LASER LITHOTRIPSY      SPINE SURGERY         Review of patient's allergies indicates:   Allergen Reactions    Cefepime      HIVES    Latex Swelling       Current Facility-Administered Medications on File Prior to Encounter   Medication    sodium chloride 0.9% flush 10 mL    sodium chloride 0.9% flush 10 mL    sodium chloride 0.9% flush 10 mL    sodium chloride 0.9% flush 10 mL     Current Outpatient Prescriptions on File Prior to Encounter   Medication Sig    amoxicillin-clavulanate 875-125mg (AUGMENTIN) 875-125 mg per tablet Take 1 tablet by mouth every 12 (twelve) hours. for 5 days    ascorbic acid (VITAMIN C) 500 MG tablet Take 500 mg by mouth once daily.    HYDROcodone-acetaminophen (NORCO) 7.5-325 mg per tablet Take 1 tablet by mouth every 6 (six) hours as needed for Pain.    ipratropium (ATROVENT) 42 mcg (0.06 %) nasal spray 1 spray by Nasal route daily as needed for Rhinitis.     lisinopril (PRINIVIL,ZESTRIL) 20 MG tablet Take 1 tablet (20 mg total) by mouth once daily.    metoprolol succinate (TOPROL-XL) 50 MG 24 hr tablet Take 1 tablet (50 mg total) by mouth once daily.    multivitamin with minerals tablet Take 1 tablet by mouth once daily.    albuterol 90 mcg/actuation inhaler 2 puffs every 4 hours as needed for cough, wheeze, or shortness of breath    fluocinolone acetonide oil (DERMOTIC OIL) 0.01 % Drop 5 drops in right ear bid x 7-10 days until resolution, use intermittenlty for flares.    fluticasone-umeclidin-vilanter (TRELEGY ELLIPTA) 100-62.5-25 mcg DsDv Inhale 1 puff into the lungs once daily.    miconazole nitrate 2 % AerP Apply 1 Dose topically 2 (two) times daily. Apply to rash on buttocks     Family History     Problem Relation (Age of Onset)    Cancer Mother, Maternal Grandmother    Glaucoma Father, Paternal Grandfather    Heart disease Maternal Grandmother    Psoriasis Maternal Uncle        Social History  Main Topics    Smoking status: Never Smoker    Smokeless tobacco: Never Used    Alcohol use No    Drug use: No    Sexual activity: Not Currently     Review of Systems   Unable to perform ROS: Acuity of condition     Objective:     Vital Signs (Most Recent):  Temp: 98.6 °F (37 °C) (06/30/18 2127)  Pulse: 95 (06/30/18 2127)  Resp: 20 (06/30/18 2127)  BP: 127/75 (06/30/18 2127)  SpO2: 100 % (06/30/18 2127) Vital Signs (24h Range):  Temp:  [97.4 °F (36.3 °C)-98.8 °F (37.1 °C)] 98.6 °F (37 °C)  Pulse:  [] 95  Resp:  [19-22] 20  SpO2:  [87 %-100 %] 100 %  BP: (117-176)/(60-98) 127/75     Weight: (!) 147.1 kg (324 lb 6.4 oz)  Body mass index is 61.29 kg/m².    Physical Exam   Constitutional: She appears well-developed and well-nourished. No distress.   Obese   HENT:   Head: Normocephalic and atraumatic.   Mouth/Throat: Oropharynx is clear and moist.   Eyes: Conjunctivae are normal. Pupils are equal, round, and reactive to light. No scleral icterus.   Neck: Normal range of motion. Neck supple. No JVD present. No tracheal deviation present. No thyromegaly present.   Cardiovascular: Normal rate, regular rhythm, normal heart sounds and intact distal pulses.  Exam reveals no gallop and no friction rub.    No murmur heard.  Pulses:       Dorsalis pedis pulses are 2+ on the right side, and 2+ on the left side.   Pulmonary/Chest: Effort normal and breath sounds normal. No accessory muscle usage. No respiratory distress. She has no wheezes. She has no rhonchi. She has no rales.   Abdominal: Bowel sounds are normal. She exhibits distension. She exhibits no mass. There is no tenderness. There is no guarding. A hernia (Umbilical - reducible) is present.   Firm   Musculoskeletal: Normal range of motion. She exhibits edema (Non pitting generalized) and deformity (Bilateral lower extremity - spina bifida - contractures noted). She exhibits no tenderness.   Neurological: She is alert. She has normal strength. Coordination  (Bilateral lower extremity paralysis) abnormal.   Skin: Skin is warm and dry. Capillary refill takes less than 2 seconds. No rash noted. She is not diaphoretic. No erythema.   Dry, flaky   Psychiatric: She is noncommunicative. She is inattentive.   Vitals reviewed.        CRANIAL NERVES     CN III, IV, VI   Pupils are equal, round, and reactive to light.       Significant Labs:   CBC:   Recent Labs  Lab 06/30/18  1631   WBC 11.70   HGB 10.4*   HCT 32.2*   *     CMP:   Recent Labs  Lab 06/30/18  1631   *   K 4.7   CL 90*   CO2 23   *   BUN 8   CREATININE 0.8   CALCIUM 9.2   PROT 7.9   ALBUMIN 3.3*   BILITOT 0.3   ALKPHOS 58   AST 30   ALT 13   ANIONGAP 10   EGFRNONAA >60     Urine Studies:   Recent Labs  Lab 06/30/18  1630   COLORU Yellow   APPEARANCEUA Hazy*   PHUR 7.0   SPECGRAV <=1.005*   PROTEINUA Negative   GLUCUA Negative   KETONESU Negative   BILIRUBINUA Negative   OCCULTUA 2+*   NITRITE Negative   UROBILINOGEN Negative   LEUKOCYTESUR 3+*   RBCUA 25*   WBCUA 75*   BACTERIA Many*       Significant Imaging:   CXR: Impression       Poor inspiratory chest x-ray with no confluent infiltrate         Assessment/Plan:     * Mild persistent asthma with acute exacerbation    Supplemental O2 via nasal cannula to keep O2 sats >92%  Continue beta 2 agonist bronchodilator treatments.   Continue routine home medications.                  Catheter-associated urinary tract infection    Patient with long-term indwelling catheter and recent diagnosis of UTI with failed oral treatment with Augmentin. Reviewed micro results.  Initiated on Zosyn IV.  Repeat culture and follow results.  Patient follows with Urology - Dr. Smith - consulted - follow recommendations.          Hyponatremia    Current Na level 123 upon admission.  Seems mildly hypervolemic upon exam.  Check urine Na.  Monitor sodium level closely.        Paraplegia    Skin care  Turn q2h            Morbid obesity with BMI of 50.0-59.9, adult    Body  mass index is 61.29 kg/m². Morbid obesity complicates all aspects of disease management from diagnostic modalities to treatment. Weight loss encouraged and health benefits explained to patient's caregivers.                VTE Risk Mitigation         Ordered     enoxaparin injection 40 mg  Every 12 hours      06/30/18 1823     IP VTE HIGH RISK PATIENT  Once      06/30/18 1812             Merlyn Rodriguez NP  Department of Hospital Medicine   [facility]

## 2018-07-01 NOTE — ASSESSMENT & PLAN NOTE
Current Na level 123 upon admission.  Seems mildly hypervolemic upon exam.  Check urine Na.  Monitor sodium level closely.

## 2018-07-01 NOTE — PLAN OF CARE
06/30/18 1936   Patient Assessment/Suction   Level of Consciousness (AVPU) alert   Respiratory Effort Unlabored   All Lung Fields Breath Sounds diminished   CASTILLO Breath Sounds diminished   LLL Breath Sounds diminished   RUL Breath Sounds diminished   RML Breath Sounds diminished   RLL Breath Sounds diminished   PRE-TX-O2-ETCO2   O2 Device (Oxygen Therapy) nasal cannula   $ Is the patient on Low Flow Oxygen? Yes   Flow (L/min) 2   SpO2 100 %   Pulse Oximetry Type Intermittent   Pulse 91   Resp 19   Aerosol Therapy   $ Aerosol Therapy Charges Aerosol Treatment   Respiratory Treatment Status given   SVN/Inhaler Treatment Route with oxygen;mask   Position During Treatment HOB at 45 degrees   Patient Tolerance good   Post-Treatment   Post-treatment Heart Rate (beats/min) 89   Post-treatment Resp Rate (breaths/min) 19   All Fields Breath Sounds unchanged

## 2018-07-02 LAB
ANION GAP SERPL CALC-SCNC: 8 MMOL/L
BNP SERPL-MCNC: 102 PG/ML
BUN SERPL-MCNC: 7 MG/DL
CALCIUM SERPL-MCNC: 9 MG/DL
CHLORIDE SERPL-SCNC: 99 MMOL/L
CO2 SERPL-SCNC: 25 MMOL/L
CREAT SERPL-MCNC: 0.7 MG/DL
EST. GFR  (AFRICAN AMERICAN): >60 ML/MIN/1.73 M^2
EST. GFR  (NON AFRICAN AMERICAN): >60 ML/MIN/1.73 M^2
GLUCOSE SERPL-MCNC: 84 MG/DL
MAGNESIUM SERPL-MCNC: 1.6 MG/DL
PHOSPHATE SERPL-MCNC: 2.8 MG/DL
POCT GLUCOSE: 120 MG/DL (ref 70–110)
POCT GLUCOSE: 67 MG/DL (ref 70–110)
POCT GLUCOSE: 80 MG/DL (ref 70–110)
POCT GLUCOSE: 82 MG/DL (ref 70–110)
POCT GLUCOSE: 90 MG/DL (ref 70–110)
POTASSIUM SERPL-SCNC: 4.3 MMOL/L
SODIUM SERPL-SCNC: 132 MMOL/L

## 2018-07-02 PROCEDURE — 83735 ASSAY OF MAGNESIUM: CPT

## 2018-07-02 PROCEDURE — 27000221 HC OXYGEN, UP TO 24 HOURS

## 2018-07-02 PROCEDURE — 94640 AIRWAY INHALATION TREATMENT: CPT

## 2018-07-02 PROCEDURE — 84100 ASSAY OF PHOSPHORUS: CPT

## 2018-07-02 PROCEDURE — 25000003 PHARM REV CODE 250: Performed by: EMERGENCY MEDICINE

## 2018-07-02 PROCEDURE — 63600175 PHARM REV CODE 636 W HCPCS: Performed by: EMERGENCY MEDICINE

## 2018-07-02 PROCEDURE — 25000003 PHARM REV CODE 250: Performed by: HOSPITALIST

## 2018-07-02 PROCEDURE — 12000002 HC ACUTE/MED SURGE SEMI-PRIVATE ROOM

## 2018-07-02 PROCEDURE — 63600175 PHARM REV CODE 636 W HCPCS: Performed by: HOSPITALIST

## 2018-07-02 PROCEDURE — 99223 1ST HOSP IP/OBS HIGH 75: CPT | Mod: ,,, | Performed by: UROLOGY

## 2018-07-02 PROCEDURE — 25000242 PHARM REV CODE 250 ALT 637 W/ HCPCS: Performed by: EMERGENCY MEDICINE

## 2018-07-02 PROCEDURE — 83880 ASSAY OF NATRIURETIC PEPTIDE: CPT

## 2018-07-02 PROCEDURE — 36415 COLL VENOUS BLD VENIPUNCTURE: CPT

## 2018-07-02 PROCEDURE — 25000003 PHARM REV CODE 250: Performed by: NURSE PRACTITIONER

## 2018-07-02 PROCEDURE — 80048 BASIC METABOLIC PNL TOTAL CA: CPT

## 2018-07-02 PROCEDURE — 94761 N-INVAS EAR/PLS OXIMETRY MLT: CPT

## 2018-07-02 RX ORDER — MAGNESIUM SULFATE HEPTAHYDRATE 40 MG/ML
2 INJECTION, SOLUTION INTRAVENOUS ONCE
Status: COMPLETED | OUTPATIENT
Start: 2018-07-02 | End: 2018-07-02

## 2018-07-02 RX ORDER — MICONAZOLE NITRATE 2 %
POWDER (GRAM) TOPICAL 4 TIMES DAILY PRN
Status: DISCONTINUED | OUTPATIENT
Start: 2018-07-02 | End: 2018-07-03 | Stop reason: HOSPADM

## 2018-07-02 RX ORDER — POLYETHYLENE GLYCOL 3350 17 G/17G
17 POWDER, FOR SOLUTION ORAL DAILY
Status: DISCONTINUED | OUTPATIENT
Start: 2018-07-02 | End: 2018-07-03 | Stop reason: HOSPADM

## 2018-07-02 RX ORDER — DOCUSATE SODIUM 100 MG/1
100 CAPSULE, LIQUID FILLED ORAL 2 TIMES DAILY
Status: DISCONTINUED | OUTPATIENT
Start: 2018-07-02 | End: 2018-07-03 | Stop reason: HOSPADM

## 2018-07-02 RX ADMIN — HYDROCODONE BITARTRATE AND ACETAMINOPHEN 1 TABLET: 7.5; 325 TABLET ORAL at 09:07

## 2018-07-02 RX ADMIN — PIPERACILLIN SODIUM AND TAZOBACTAM SODIUM 4.5 G: 4; .5 INJECTION, POWDER, FOR SOLUTION INTRAVENOUS at 02:07

## 2018-07-02 RX ADMIN — HYDROCODONE BITARTRATE AND ACETAMINOPHEN 1 TABLET: 7.5; 325 TABLET ORAL at 02:07

## 2018-07-02 RX ADMIN — POLYETHYLENE GLYCOL (3350) 17 G: 17 POWDER, FOR SOLUTION ORAL at 02:07

## 2018-07-02 RX ADMIN — ENOXAPARIN SODIUM 40 MG: 100 INJECTION SUBCUTANEOUS at 08:07

## 2018-07-02 RX ADMIN — DOCUSATE SODIUM 100 MG: 100 CAPSULE, LIQUID FILLED ORAL at 09:07

## 2018-07-02 RX ADMIN — METOPROLOL SUCCINATE 50 MG: 50 TABLET, EXTENDED RELEASE ORAL at 08:07

## 2018-07-02 RX ADMIN — IPRATROPIUM BROMIDE AND ALBUTEROL SULFATE 3 ML: .5; 3 SOLUTION RESPIRATORY (INHALATION) at 01:07

## 2018-07-02 RX ADMIN — IPRATROPIUM BROMIDE AND ALBUTEROL SULFATE 3 ML: .5; 3 SOLUTION RESPIRATORY (INHALATION) at 07:07

## 2018-07-02 RX ADMIN — ENOXAPARIN SODIUM 40 MG: 100 INJECTION SUBCUTANEOUS at 09:07

## 2018-07-02 RX ADMIN — IPRATROPIUM BROMIDE AND ALBUTEROL SULFATE 3 ML: .5; 3 SOLUTION RESPIRATORY (INHALATION) at 12:07

## 2018-07-02 RX ADMIN — HYDROCODONE BITARTRATE AND ACETAMINOPHEN 1 TABLET: 7.5; 325 TABLET ORAL at 01:07

## 2018-07-02 RX ADMIN — LISINOPRIL 20 MG: 10 TABLET ORAL at 08:07

## 2018-07-02 RX ADMIN — Medication: at 04:07

## 2018-07-02 RX ADMIN — MAGNESIUM SULFATE IN WATER 2 G: 40 INJECTION, SOLUTION INTRAVENOUS at 09:07

## 2018-07-02 RX ADMIN — PIPERACILLIN SODIUM AND TAZOBACTAM SODIUM 4.5 G: 4; .5 INJECTION, POWDER, FOR SOLUTION INTRAVENOUS at 05:07

## 2018-07-02 RX ADMIN — HYDROCODONE BITARTRATE AND ACETAMINOPHEN 1 TABLET: 7.5; 325 TABLET ORAL at 08:07

## 2018-07-02 RX ADMIN — PIPERACILLIN SODIUM AND TAZOBACTAM SODIUM 4.5 G: 4; .5 INJECTION, POWDER, FOR SOLUTION INTRAVENOUS at 10:07

## 2018-07-02 NOTE — PLAN OF CARE
Problem: Patient Care Overview  Goal: Plan of Care Review  Outcome: Ongoing (interventions implemented as appropriate)  Patient receiving aerosol tx via duoneb now and q6hr.  Hr 106 and 02 saturation 99% on nc at 2lpm.

## 2018-07-02 NOTE — PLAN OF CARE
07/01/18 1939   Patient Assessment/Suction   Level of Consciousness (AVPU) alert   Respiratory Effort Unlabored   All Lung Fields Breath Sounds diminished   CASTILLO Breath Sounds diminished   LLL Breath Sounds diminished   RUL Breath Sounds diminished   RML Breath Sounds diminished   RLL Breath Sounds diminished   PRE-TX-O2-ETCO2   O2 Device (Oxygen Therapy) nasal cannula   $ Is the patient on Low Flow Oxygen? Yes   Flow (L/min) 2   SpO2 100 %   Pulse Oximetry Type Intermittent   $ Pulse Oximetry - Multiple Charge Pulse Oximetry - Multiple   Pulse 94   Resp 20   Aerosol Therapy   $ Aerosol Therapy Charges Aerosol Treatment   Respiratory Treatment Status given   SVN/Inhaler Treatment Route with oxygen;mask   Position During Treatment HOB at 45 degrees   Patient Tolerance good   Post-Treatment   Post-treatment Heart Rate (beats/min) 92   Post-treatment Resp Rate (breaths/min) 20   All Fields Breath Sounds unchanged

## 2018-07-02 NOTE — PROGRESS NOTES
Echocardiogram not performed due to inability to visualize the heart after multiple attempts. Dr. Santos notified.

## 2018-07-02 NOTE — ASSESSMENT & PLAN NOTE
Magnesium   Date Value Ref Range Status   07/01/2018 1.4 (L) 1.6 - 2.6 mg/dL Final     Will check another level in morning.

## 2018-07-02 NOTE — PROGRESS NOTES
[facility] Hospital Medicine  Progress Note    Patient Name: Briana Uriarte  MRN: [id number]  Patient Class: IP- Inpatient   Admission Date: 6/30/2018  Length of Stay: 1 days  Attending Physician: Raffy Santos MD  Primary Care Provider: Anaid Osei MD        Subjective:     Principal Problem:Mild persistent asthma with acute exacerbation    HPI:  Briana Uriarte is a 41 yo female with PMHx significant for hydrocephalus relieved with  shunting, Spina Bifida, HTN, Asthma, kidney stones and cysts, and GERD.  She was admitted to the service of hospital medicine with UTI and back pain. HPI/ROS is limited 2/2 to patient condition and is provided by her mother. She presented to the ED today with worsening back pain, congestion and wheezing.  Her mother stated that patient has had trouble breathing with increase in congestion and wheezing despite breathing treatments.  She reports her daughter has complained of back pain and trouble swallowing. She has also noticed an increase in sediment in patient's indwelling catheter. She reports that patient had 3 bowel movements yesterday.  She denied vomiting.  She was recently discharged from our facility after being evaluated for SBO and UTI.  SBO resolved with bowel rest and NGT and she was sent home with Augmentin for UTI.    Hospital Course:  No notes on file    Interval History:  Parents say that Briana is more alert today.  Abdomen is still quite distended.  She's been passing flatus.  She's had bowel movements for past few days.    Review of Systems   Unable to perform ROS: Other (difficult for her to communicate)     Objective:     Vital Signs (Most Recent):  Temp: 98.3 °F (36.8 °C) (07/01/18 1624)  Pulse: 87 (07/01/18 1624)  Resp: 20 (07/01/18 1624)  BP: 117/62 (07/01/18 1624)  SpO2: 98 % (07/01/18 1624) Vital Signs (24h Range):  Temp:  [98.3 °F (36.8 °C)-98.6 °F (37 °C)] 98.3 °F (36.8 °C)  Pulse:  [86-98] 87  Resp:  [18-22]  20  SpO2:  [97 %-100 %] 98 %  BP: (117-131)/(58-96) 117/62     Weight: (!) 147.1 kg (324 lb 6.4 oz)  Body mass index is 61.29 kg/m².    Intake/Output Summary (Last 24 hours) at 07/01/18 1903  Last data filed at 07/01/18 1800   Gross per 24 hour   Intake             2955 ml   Output             6625 ml   Net            -3670 ml      Physical Exam   Constitutional: No distress.   Eyes: Right eye exhibits no discharge. Left eye exhibits no discharge.   Neck: Trachea normal. No JVD present.   Obese neck   Cardiovascular: Normal rate and regular rhythm.    Pulmonary/Chest: No accessory muscle usage. No tachypnea. No respiratory distress. She has decreased breath sounds (bilaterally). She has wheezes in the right upper field and the right middle field. She has no rhonchi.   Purses her lips when exhaling   Abdominal: She exhibits distension. She exhibits no shifting dullness and no fluid wave. Bowel sounds are decreased. There is no tenderness. A hernia (umbilical.  it is reducible.) is present.   Musculoskeletal: She exhibits edema (both legs).   Neurological: She is alert.   Skin: Skin is warm and dry. No rash noted. She is not diaphoretic.       Significant Labs: All pertinent labs within the past 24 hours have been reviewed.    Significant Imaging: none    Assessment/Plan:      Pt has distended abdomen.  Very tight on exam.  At the same time, she looks like she's using slightly more effort to breathe.  Could be intestinal gas or edema or both.  Will get abd xray.  Will give one dose of IV bumetanide.  Check BNP level.  Get TTE.    * Mild persistent asthma with acute exacerbation    Give nebulized bronchodilator treatments.                  Hypomagnesemia    Magnesium   Date Value Ref Range Status   07/01/2018 1.4 (L) 1.6 - 2.6 mg/dL Final     Will check another level in morning.          Catheter-associated urinary tract infection    Continue antibiotic treatment.  Consulting with urology.  6/25 urine culture grew  Proteus.    Antibiotics     Start     Stop Route Frequency Ordered    06/30/18 1815  piperacillin-tazobactam 4.5 g in dextrose 5 % 100 mL IVPB (ready to mix system)      -- IV Every 8 hours (non-standard times) 06/30/18 1812                  Hyponatremia    Urine Na is appropriately low.  Pt's parents say that she drinks more than what's considered normal -- a lot of water and soda's -- and she has about 2 to 3 liters of urine in her Mcdonald bag every morning.  I think she's hypervolemic.  Stop the IVF and give one dose of IV bumetanide.  Monitor sodium level closely.  Will watch the Na level after she gets a dose of diuretic.    Sodium   Date Value Ref Range Status   07/01/2018 127 (L) 136 - 145 mmol/L Final   06/30/2018 123 (L) 136 - 145 mmol/L Final   06/26/2018 135 (L) 136 - 145 mmol/L Final   ]        Paraplegia    Skin care  Turn q2h            Morbid obesity with BMI of 50.0-59.9, adult    Body mass index is 61.29 kg/m². Morbid obesity complicates all aspects of disease management from diagnostic modalities to treatment. Weight loss encouraged and health benefits explained to patient's caregivers.                VTE Risk Mitigation         Ordered     enoxaparin injection 40 mg  Every 12 hours      06/30/18 1823     IP VTE HIGH RISK PATIENT  Once      06/30/18 1812              Raffy Santos MD  Department of Hospital Medicine   [facility]

## 2018-07-02 NOTE — SUBJECTIVE & OBJECTIVE
Interval History:  Parents say that Briana is more alert today.  Abdomen is still quite distended.  She's been passing flatus.  She's had bowel movements for past few days.    Review of Systems   Unable to perform ROS: Other (difficult for her to communicate)     Objective:     Vital Signs (Most Recent):  Temp: 98.3 °F (36.8 °C) (07/01/18 1624)  Pulse: 87 (07/01/18 1624)  Resp: 20 (07/01/18 1624)  BP: 117/62 (07/01/18 1624)  SpO2: 98 % (07/01/18 1624) Vital Signs (24h Range):  Temp:  [98.3 °F (36.8 °C)-98.6 °F (37 °C)] 98.3 °F (36.8 °C)  Pulse:  [86-98] 87  Resp:  [18-22] 20  SpO2:  [97 %-100 %] 98 %  BP: (117-131)/(58-96) 117/62     Weight: (!) 147.1 kg (324 lb 6.4 oz)  Body mass index is 61.29 kg/m².    Intake/Output Summary (Last 24 hours) at 07/01/18 1903  Last data filed at 07/01/18 1800   Gross per 24 hour   Intake             2955 ml   Output             6625 ml   Net            -3670 ml      Physical Exam   Constitutional: No distress.   Eyes: Right eye exhibits no discharge. Left eye exhibits no discharge.   Neck: Trachea normal. No JVD present.   Obese neck   Cardiovascular: Normal rate and regular rhythm.    Pulmonary/Chest: No accessory muscle usage. No tachypnea. No respiratory distress. She has decreased breath sounds (bilaterally). She has wheezes in the right upper field and the right middle field. She has no rhonchi.   Purses her lips when exhaling   Abdominal: She exhibits distension. She exhibits no shifting dullness and no fluid wave. Bowel sounds are decreased. There is no tenderness. A hernia (umbilical.  it is reducible.) is present.   Musculoskeletal: She exhibits edema (both legs).   Neurological: She is alert.   Skin: Skin is warm and dry. No rash noted. She is not diaphoretic.       Significant Labs: All pertinent labs within the past 24 hours have been reviewed.    Significant Imaging: none

## 2018-07-02 NOTE — H&P
DATE OF CONSULTATION:  07/02/2018.    ATTENDING PHYSICIAN:  Dr. Santos.    CONSULTANT:  Rosemarie Smith M.D.    HISTORY OF PRESENT ILLNESS:  This 42-year-old female is admitted with abdominal   pain and findings consistent with possible bowel obstruction, but she has begun   to have normal bowel movements, both last night and today and the mother states   she is overall doing much better.  A urologic consultation was requested since   she is familiar to me and has been under my urologic care.  This patient has a   history of hydrocephalus, paraplegia, spina bifida and resultant neurogenic   bladder, which is being managed with a indwelling suprapubic catheter that is   changed every three to four weeks and is in fact is changed by the mother   without any problems and we do see her for routine followup in the office every   three to four months.  The last visit with us was on 04/19/2018 and she was   doing well at that time.  The mother states that she otherwise has been quite   stable.  The patient is also noted to have a right renal calculus to be   consistent with what appears to be a staghorn-type calculus and she has a   nonfunctioning atrophic left kidney.  Lab work on her current admission reveals    BUN and creatinine to be normal at 7 and 0.7 respectively.  On her current   admission note, she was also found to have a positive urine culture that was   growing less than 50,000 of Proteus.  It must be taken into account that with a   foreign body in the bladder in terms of suprapubic tube, urine cultures will   often be positive and whether these are due to an actual infection or possible   contaminant needs to be considered.  She is currently on Zosyn for treatment of   the infection and overall appears to be doing quite well.    PHYSICAL EXAMINATION:  On examination her abdomen is soft.  She does have a   prominent umbilical hernia that she has had for a long term, which is quite   protuberant, but it is  essentially unchanged from our prior visits and it is   reducible and nontender.  Suprapubic tube is in place draining clear urine.    Otherwise, rest of the genital examination was unremarkable.    FINAL IMPRESSION:  From urologic aspect, urologic status appears to be stable   with an indwelling suprapubic tube draining well.  She has a neurogenic bladder   as a result of the hydrocephalus, spina bifida and paraplegia and also has right   renal calculus and nonfunctioning left kidney with her renal function appearing   to be quite stable at this time.    RECOMMENDATIONS:  No further additional recommendation at this time.  She does   have an appointment to see me in the office in one month.  Mother was instructed   and she is aware that she should keep the appointment with me.    Thank you for this consult.      MD/IN  dd: 07/02/2018 14:41:57 (CDT)  td: 07/02/2018 15:15:05 (CDT)  Doc ID   #[id number]  Job ID #[id number]    CC:

## 2018-07-02 NOTE — ASSESSMENT & PLAN NOTE
Continue antibiotic treatment.  Consulting with urology.  6/25 urine culture grew Proteus.    Antibiotics     Start     Stop Route Frequency Ordered    06/30/18 1815  piperacillin-tazobactam 4.5 g in dextrose 5 % 100 mL IVPB (ready to mix system)      -- IV Every 8 hours (non-standard times) 06/30/18 1812

## 2018-07-02 NOTE — PLAN OF CARE
Problem: Patient Care Overview  Goal: Plan of Care Review  Outcome: Ongoing (interventions implemented as appropriate)  Plan of care reviewed with patient. Safety maintained throughout shift. Bed in low and locked position, side rails up x2, call light within reach, bed alarm set. Redness/pressure sore to bottom. Repositioned q2. O2 2L NC in use. Suprapubic catheter intact, draining clear yellow urine. No stat lock for solis per family request. Blood glucose monitored per order. IV antibiotics infused per orders. Edema to BLE. Bumetanide 1mg given per orders. Pain moderately controlled with PRN pain medication. Hourly rounding utilized. Will continue to monitor.

## 2018-07-02 NOTE — ASSESSMENT & PLAN NOTE
Urine Na is appropriately low.  Pt's parents say that she drinks more than what's considered normal -- a lot of water and soda's -- and she has about 2 to 3 liters of urine in her Mcdonald bag every morning.  I think she's hypervolemic.  Stop the IVF and give one dose of IV bumetanide.  Monitor sodium level closely.  Will watch the Na level after she gets a dose of diuretic.    Sodium   Date Value Ref Range Status   07/01/2018 127 (L) 136 - 145 mmol/L Final   06/30/2018 123 (L) 136 - 145 mmol/L Final   06/26/2018 135 (L) 136 - 145 mmol/L Final   ]

## 2018-07-02 NOTE — PLAN OF CARE
Problem: Patient Care Overview  Goal: Plan of Care Review  Plan of care reviewed with patient and patients parents. Pt awake and alert. NAD noted. IV antibiotics given as ordered per MD. Pt tolerated well. Supra pubic catheter intact draining clear yellow urine. No stat lock applied to catheter per family request. Pt turned q 2 hrs. Redness noted to pts buttocks, fungal powder applied prn. PRN pain meds given with relief noted. SR up x 2. Call light in reach. Will continue to monitor until on coming nurse assumes care.

## 2018-07-03 VITALS
BODY MASS INDEX: 55.32 KG/M2 | RESPIRATION RATE: 18 BRPM | WEIGHT: 293 LBS | SYSTOLIC BLOOD PRESSURE: 124 MMHG | TEMPERATURE: 98 F | DIASTOLIC BLOOD PRESSURE: 75 MMHG | HEIGHT: 61 IN | OXYGEN SATURATION: 93 % | HEART RATE: 114 BPM

## 2018-07-03 LAB
PHOSPHATE SERPL-MCNC: 2.8 MG/DL
POCT GLUCOSE: 112 MG/DL (ref 70–110)
POCT GLUCOSE: 93 MG/DL (ref 70–110)

## 2018-07-03 PROCEDURE — 94640 AIRWAY INHALATION TREATMENT: CPT

## 2018-07-03 PROCEDURE — 25000003 PHARM REV CODE 250: Performed by: EMERGENCY MEDICINE

## 2018-07-03 PROCEDURE — 84100 ASSAY OF PHOSPHORUS: CPT

## 2018-07-03 PROCEDURE — 63600175 PHARM REV CODE 636 W HCPCS: Performed by: HOSPITALIST

## 2018-07-03 PROCEDURE — 25000003 PHARM REV CODE 250: Performed by: INTERNAL MEDICINE

## 2018-07-03 PROCEDURE — 25000003 PHARM REV CODE 250: Performed by: HOSPITALIST

## 2018-07-03 PROCEDURE — 63600175 PHARM REV CODE 636 W HCPCS: Performed by: EMERGENCY MEDICINE

## 2018-07-03 PROCEDURE — 25000242 PHARM REV CODE 250 ALT 637 W/ HCPCS: Performed by: EMERGENCY MEDICINE

## 2018-07-03 PROCEDURE — 36415 COLL VENOUS BLD VENIPUNCTURE: CPT

## 2018-07-03 PROCEDURE — 94761 N-INVAS EAR/PLS OXIMETRY MLT: CPT

## 2018-07-03 PROCEDURE — 27000221 HC OXYGEN, UP TO 24 HOURS

## 2018-07-03 PROCEDURE — 25000003 PHARM REV CODE 250: Performed by: NURSE PRACTITIONER

## 2018-07-03 RX ORDER — AMOXICILLIN AND CLAVULANATE POTASSIUM 875; 125 MG/1; MG/1
1 TABLET, FILM COATED ORAL EVERY 12 HOURS
Status: DISCONTINUED | OUTPATIENT
Start: 2018-07-03 | End: 2018-07-03

## 2018-07-03 RX ORDER — AMOXICILLIN AND CLAVULANATE POTASSIUM 875; 125 MG/1; MG/1
1 TABLET, FILM COATED ORAL EVERY 12 HOURS
Qty: 8 TABLET | Refills: 0 | Status: SHIPPED | OUTPATIENT
Start: 2018-07-03 | End: 2018-07-07

## 2018-07-03 RX ORDER — POLYETHYLENE GLYCOL 3350 17 G/17G
17 POWDER, FOR SOLUTION ORAL DAILY
Qty: 7 PACKET | Refills: 0 | Status: SHIPPED | OUTPATIENT
Start: 2018-07-04 | End: 2019-04-16

## 2018-07-03 RX ORDER — AMOXICILLIN AND CLAVULANATE POTASSIUM 875; 125 MG/1; MG/1
1 TABLET, FILM COATED ORAL EVERY 12 HOURS
Status: DISCONTINUED | OUTPATIENT
Start: 2018-07-03 | End: 2018-07-03 | Stop reason: HOSPADM

## 2018-07-03 RX ADMIN — PIPERACILLIN SODIUM AND TAZOBACTAM SODIUM 4.5 G: 4; .5 INJECTION, POWDER, FOR SOLUTION INTRAVENOUS at 02:07

## 2018-07-03 RX ADMIN — IPRATROPIUM BROMIDE AND ALBUTEROL SULFATE 3 ML: .5; 3 SOLUTION RESPIRATORY (INHALATION) at 12:07

## 2018-07-03 RX ADMIN — LISINOPRIL 20 MG: 10 TABLET ORAL at 09:07

## 2018-07-03 RX ADMIN — AMOXICILLIN AND CLAVULANATE POTASSIUM 1 TABLET: 875; 125 TABLET, FILM COATED ORAL at 01:07

## 2018-07-03 RX ADMIN — IPRATROPIUM BROMIDE AND ALBUTEROL SULFATE 3 ML: .5; 3 SOLUTION RESPIRATORY (INHALATION) at 07:07

## 2018-07-03 RX ADMIN — POLYETHYLENE GLYCOL (3350) 17 G: 17 POWDER, FOR SOLUTION ORAL at 09:07

## 2018-07-03 RX ADMIN — PIPERACILLIN SODIUM AND TAZOBACTAM SODIUM 4.5 G: 4; .5 INJECTION, POWDER, FOR SOLUTION INTRAVENOUS at 09:07

## 2018-07-03 RX ADMIN — IPRATROPIUM BROMIDE AND ALBUTEROL SULFATE 3 ML: .5; 3 SOLUTION RESPIRATORY (INHALATION) at 01:07

## 2018-07-03 RX ADMIN — HYDROCODONE BITARTRATE AND ACETAMINOPHEN 1 TABLET: 7.5; 325 TABLET ORAL at 11:07

## 2018-07-03 RX ADMIN — ENOXAPARIN SODIUM 40 MG: 100 INJECTION SUBCUTANEOUS at 09:07

## 2018-07-03 RX ADMIN — HYDROCODONE BITARTRATE AND ACETAMINOPHEN 1 TABLET: 7.5; 325 TABLET ORAL at 03:07

## 2018-07-03 RX ADMIN — DOCUSATE SODIUM 100 MG: 100 CAPSULE, LIQUID FILLED ORAL at 09:07

## 2018-07-03 RX ADMIN — METOPROLOL SUCCINATE 50 MG: 50 TABLET, EXTENDED RELEASE ORAL at 09:07

## 2018-07-03 NOTE — PLAN OF CARE
Cm completed the assessment with parents. Pt is a readmit form 6/25/18.  Pt is a Spina Bifida patient and her parents care for her and her sister (both with Spina Bifida).  Pt is a diabetic and has a glucometer.. Parents are very knowledgeable with pt's care.    Insurance verified as Medicare and Medicaid.  PCP is Dr. Osei.  Disposition:  Pt will discharge to home with parents.       07/03/18 1412   Discharge Assessment   Assessment Type Discharge Planning Assessment   Confirmed/corrected address and phone number on facesheet? Yes   Assessment information obtained from? Caregiver  (parents)   Prior to hospitilization cognitive status: Alert/Oriented   Prior to hospitalization functional status: Wheelchair Bound;Completely Dependent   Current cognitive status: Alert/Oriented   Current Functional Status: Completely Dependent   Facility Arrived From: home   Lives With parent(s)   Able to Return to Prior Arrangements yes   Is patient able to care for self after discharge? Yes   Who are your caregiver(s) and their phone number(s)? parents Manuela and Blaze - [phone]   Patient's perception of discharge disposition home or selfcare   Readmission Within The Last 30 Days previous discharge plan unsuccessful   If yes, most recent facility name: [facility]   Patient currently being followed by outpatient case management? No   Patient currently receives any other outside agency services? No   Equipment Currently Used at Home nebulizer;lift device;wheelchair  (mobil w/c and pauline, Holter lift and adjustable bed)   Do you have any problems affording any of your prescribed medications? No   Is the patient taking medications as prescribed? yes  ([facility])   Does the patient have transportation home? Yes   Transportation Available family or friend will provide   Dialysis Name and Scheduled days na   Does the patient receive services at the Coumadin Clinic? No   Discharge Plan A Home with family    Patient/Family In Agreement With Plan yes   Readmission Questionnaire   At the time of your discharge, did someone talk to you about what your health problems were? Yes   At the time of discharge, did someone talk to you about what to watch out for regarding worsening of your health problem? Yes   At the time of discharge, did someone talk to you about what to do if you experienced worsening of your health problem? Yes   At the time of discharge, did someone talk to you about which medication to take when you left the hospital and which ones to stop taking? Yes   At the time of discharge, did someone talk to you about when and where to follow up with a doctor after you left the hospital? Yes   What do you believe caused you to be sick enough to be re-admitted? UTI and SOB-Asthma   How often do you need to have someone help you when you read instructions, pamphlets, or other written material from your doctor or pharmacy? Rarely   Do you have problems taking your medications as prescribed? No   Do you have any problems affording any of  your prescribed medications? No   Do you have problems obtaining/receiving your medications? No   Does the patient have transportation to healthcare appointments? Yes   Living Arrangements house   Does the patient have family/friends to help with healtcare needs after discharge? yes   Does your caregiver provide all the help you need? Yes   Are you currently feeling confused? No   Are you currently having problems thinking? No   Are you currently having memory problems? No   Have you felt down, depressed, or hopeless? 1   Have you felt little interest or pleasure in doing things? 1   In the last 7 days, my sleep quality was: good

## 2018-07-03 NOTE — ASSESSMENT & PLAN NOTE
Urine Na is appropriately low.  Pt's parents say that she drinks more than what's considered normal -- a lot of water and soda's -- and she has about 2 to 3 liters of urine in her Mcdonald bag every morning.  I think she's hypervolemic.  I gave her a dose of IV bumetanide yesterday, which caused a massive diuresis.  Sodium level improved since the diuretic was given.    Sodium   Date Value Ref Range Status   07/02/2018 132 (L) 136 - 145 mmol/L Final   07/01/2018 127 (L) 136 - 145 mmol/L Final   06/30/2018 123 (L) 136 - 145 mmol/L Final   ]

## 2018-07-03 NOTE — PLAN OF CARE
Problem: Patient Care Overview  Goal: Plan of Care Review  Outcome: Ongoing (interventions implemented as appropriate)  Patient alert/oriented x 4. Able to make needs know and answers all questions appropriately. Turned every two hours. Supra pubic catheter patent and intact and draining cloudy, yellow urine to drainage bag. IV antibiotics done per orders, no adverse reactions noted. Pain controlled with po pain meds. Call light in reach, bed rails up x two. Hourly rounds done.

## 2018-07-03 NOTE — NURSING
Patient discharged home. Discharge instructions, follow up appointments and prescriptions reviewed, no questions at this time, patients IV removed in tact, patient transported off of floor via personal wheel chair with parents. Parents refused assistance taking patient from floor. Care relinquished at this time.

## 2018-07-03 NOTE — PLAN OF CARE
07/02/18 1941   Patient Assessment/Suction   Level of Consciousness (AVPU) alert   Respiratory Effort Unlabored   All Lung Fields Breath Sounds diminished   CASTILLO Breath Sounds diminished   LLL Breath Sounds diminished   RUL Breath Sounds diminished   RML Breath Sounds diminished   RLL Breath Sounds diminished   PRE-TX-O2-ETCO2   O2 Device (Oxygen Therapy) nasal cannula   $ Is the patient on Low Flow Oxygen? Yes   Flow (L/min) 2   SpO2 99 %   Pulse Oximetry Type Intermittent   $ Pulse Oximetry - Multiple Charge Pulse Oximetry - Multiple   Pulse (!) 113   Resp 20   Aerosol Therapy   $ Aerosol Therapy Charges Aerosol Treatment   Respiratory Treatment Status given   SVN/Inhaler Treatment Route with oxygen;mask   Position During Treatment HOB at 45 degrees   Patient Tolerance good   Post-Treatment   Post-treatment Heart Rate (beats/min) 113   Post-treatment Resp Rate (breaths/min) 20   All Fields Breath Sounds unchanged

## 2018-07-03 NOTE — ASSESSMENT & PLAN NOTE
Continue antibiotic treatment.  Consulting with urology.  6/25 urine culture grew Proteus.  She was discharged from our service on 6/28 with diagnosis of UTI and, according to the MAR and the prescription that was written, pt received total of 5 or 6 days of Augmentin.  For complicated UTI (regina. Due to catheterization), she really needs 10 to 14 days of treatment.  Today is day 3 of Zosyn.    Antibiotics     Start     Stop Route Frequency Ordered    06/30/18 1815  piperacillin-tazobactam 4.5 g in dextrose 5 % 100 mL IVPB (ready to mix system)      -- IV Every 8 hours (non-standard times) 06/30/18 1812

## 2018-07-03 NOTE — PLAN OF CARE
07/03/18 1656   Final Note   Assessment Type Final Discharge Note   Discharge Disposition Home   Hospital Follow Up  Appt(s) scheduled? Yes

## 2018-07-03 NOTE — PLAN OF CARE
Problem: Patient Care Overview  Goal: Plan of Care Review  Outcome: Ongoing (interventions implemented as appropriate)  Nc 2 lpm in use with aerosol txs Q6 hrs tolerates well.

## 2018-07-03 NOTE — PROGRESS NOTES
[facility] Hospital Medicine  Progress Note    Patient Name: Briana Uriarte  MRN: [id number]  Patient Class: IP- Inpatient   Admission Date: 6/30/2018  Length of Stay: 2 days  Attending Physician: Raffy Santos MD  Primary Care Provider: Anaid Osei MD        Subjective:     Principal Problem:Mild persistent asthma with acute exacerbation    HPI:  Briana Uriarte is a 41 yo female with PMHx significant for hydrocephalus relieved with  shunting, Spina Bifida, HTN, Asthma, kidney stones and cysts, and GERD.  She was admitted to the service of hospital medicine with UTI and back pain. HPI/ROS is limited 2/2 to patient condition and is provided by her mother. She presented to the ED today with worsening back pain, congestion and wheezing.  Her mother stated that patient has had trouble breathing with increase in congestion and wheezing despite breathing treatments.  She reports her daughter has complained of back pain and trouble swallowing. She has also noticed an increase in sediment in patient's indwelling catheter. She reports that patient had 3 bowel movements yesterday.  She denied vomiting.  She was recently discharged from our facility after being evaluated for SBO and UTI.  SBO resolved with bowel rest and NGT and she was sent home with Augmentin for UTI.    Hospital Course:  No notes on file    Interval History:  Parents are not at bedside.  Pt is nonverbal.  No significant events reported by nursing.  About 9.5 liters of urine since 7 AM yesterday!!  She appears to be breathing with much less effort today.    Review of Systems   Unable to perform ROS: Patient nonverbal     Objective:     Vital Signs (Most Recent):  Temp: 98.8 °F (37.1 °C) (07/02/18 2024)  Pulse: (!) 114 (07/02/18 2024)  Resp: (!) 22 (07/02/18 2024)  BP: 129/67 (07/02/18 2024)  SpO2: 100 % (07/02/18 2024) Vital Signs (24h Range):  Temp:  [98.1 °F (36.7 °C)-99.8 °F (37.7 °C)] 98.8 °F (37.1 °C)  Pulse:   [] 114  Resp:  [16-22] 22  SpO2:  [94 %-100 %] 100 %  BP: (113-150)/(65-87) 129/67     Weight: (!) 147 kg (324 lb)  Body mass index is 61.22 kg/m².    Intake/Output Summary (Last 24 hours) at 07/02/18 2025  Last data filed at 07/02/18 1800   Gross per 24 hour   Intake             1380 ml   Output             6550 ml   Net            -5170 ml      Physical Exam   Constitutional: No distress.   Eyes: Right eye exhibits no discharge. Left eye exhibits no discharge.   Neck: Trachea normal. No JVD present.   Obese neck   Cardiovascular: Normal rate and regular rhythm.    Pulmonary/Chest: No accessory muscle usage. No tachypnea. No respiratory distress. She has decreased breath sounds (bilaterally). She has no wheezes. She has no rhonchi.   Abdominal: She exhibits distension. She exhibits no shifting dullness and no fluid wave. Bowel sounds are decreased. There is no tenderness. A hernia (umbilical.  it is reducible.) is present.   Musculoskeletal: She exhibits edema (both legs).   Neurological: She is alert.   Nonverbal.  She keeps her attention on me and nods her head sometimes.   Skin: Skin is warm and dry. No rash noted. She is not diaphoretic.       Significant Labs: All pertinent labs within the past 24 hours have been reviewed.    Significant Imaging: I have reviewed all pertinent imaging results/findings within the past 24 hours.    Assessment/Plan:      * Mild persistent asthma with acute exacerbation    Give nebulized bronchodilator treatments.  It's improving.                  Hypomagnesemia    Magnesium   Date Value Ref Range Status   07/02/2018 1.6 1.6 - 2.6 mg/dL Final     Will give about 2 grams Mg sulfate IV and check another level in morning.          Catheter-associated urinary tract infection    Continue antibiotic treatment.  Consulting with urology.  6/25 urine culture grew Proteus.  She was discharged from our service on 6/28 with diagnosis of UTI and, according to the MAR and the prescription  that was written, pt received total of 5 or 6 days of Augmentin.  For complicated UTI (regina. Due to catheterization), she really needs 10 to 14 days of treatment.  Today is day 3 of Zosyn.    Antibiotics     Start     Stop Route Frequency Ordered    06/30/18 1815  piperacillin-tazobactam 4.5 g in dextrose 5 % 100 mL IVPB (ready to mix system)      -- IV Every 8 hours (non-standard times) 06/30/18 1812                  Hyponatremia    Urine Na is appropriately low.  Pt's parents say that she drinks more than what's considered normal -- a lot of water and soda's -- and she has about 2 to 3 liters of urine in her Mcdonald bag every morning.  I think she's hypervolemic.  I gave her a dose of IV bumetanide yesterday, which caused a massive diuresis.  Sodium level improved since the diuretic was given.    Sodium   Date Value Ref Range Status   07/02/2018 132 (L) 136 - 145 mmol/L Final   07/01/2018 127 (L) 136 - 145 mmol/L Final   06/30/2018 123 (L) 136 - 145 mmol/L Final   ]        Paraplegia    Skin care  Turn q2h            Morbid obesity with BMI of 50.0-59.9, adult    Body mass index is 61.29 kg/m². Morbid obesity complicates all aspects of disease management from diagnostic modalities to treatment. Weight loss encouraged and health benefits explained to patient's caregivers.                VTE Risk Mitigation         Ordered     enoxaparin injection 40 mg  Every 12 hours      06/30/18 1823     IP VTE HIGH RISK PATIENT  Once      06/30/18 1812              Raffy Santos MD  Department of Hospital Medicine   [facility]

## 2018-07-03 NOTE — PHYSICIAN QUERY
PT Name: Briana Uriarte  MR #: [id number]     Physician Query Form - Documentation Clarification      CDS/: Kaylie Espinal               Contact information:[email or web address]    This form is a permanent document in the medical record.     Query Date: July 3, 2018    By submitting this query, we are merely seeking further clarification of documentation. Please utilize your independent clinical judgment when addressing the question(s) below.    The Medical record reflects the following:    Supporting Clinical Findings Location in Medical Record     The patient is also noted to have a right renal calculus to be   consistent with what appears to be a staghorn-type calculus and she has a   nonfunctioning atrophic left kidney.  Lab work on her current admission reveals   a BUN and creatinine to be normal at 7 and 0.7 respectively    renal function appearing   to be quite stable at this time.     Urology H&P 7/2     Chronic kidney disease: kidney stones    BUN = 8, 9, 7  Cr = 0.8, 0.7  GFR = greater than 60       H&P 6/30    Labs 6/30-7/2                                                                            Doctor, Please specify diagnosis or diagnoses associated with above clinical findings.  Please check all that apply.    Provider Use Only      [   ]  CKD:  Stage:_____________________________________________    [   ]  Kidney stones    [   ]  CKD ruled out    [   ]  Other explanations with details_____________________________________                                                                                                             [x  ] Clinically undetermined

## 2018-07-03 NOTE — ASSESSMENT & PLAN NOTE
Magnesium   Date Value Ref Range Status   07/02/2018 1.6 1.6 - 2.6 mg/dL Final     Will give about 2 grams Mg sulfate IV and check another level in morning.

## 2018-07-03 NOTE — SUBJECTIVE & OBJECTIVE
Interval History:  Parents are not at bedside.  Pt is nonverbal.  No significant events reported by nursing.  About 9.5 liters of urine since 7 AM yesterday!!  She appears to be breathing with much less effort today.    Review of Systems   Unable to perform ROS: Patient nonverbal     Objective:     Vital Signs (Most Recent):  Temp: 98.8 °F (37.1 °C) (07/02/18 2024)  Pulse: (!) 114 (07/02/18 2024)  Resp: (!) 22 (07/02/18 2024)  BP: 129/67 (07/02/18 2024)  SpO2: 100 % (07/02/18 2024) Vital Signs (24h Range):  Temp:  [98.1 °F (36.7 °C)-99.8 °F (37.7 °C)] 98.8 °F (37.1 °C)  Pulse:  [] 114  Resp:  [16-22] 22  SpO2:  [94 %-100 %] 100 %  BP: (113-150)/(65-87) 129/67     Weight: (!) 147 kg (324 lb)  Body mass index is 61.22 kg/m².    Intake/Output Summary (Last 24 hours) at 07/02/18 2025  Last data filed at 07/02/18 1800   Gross per 24 hour   Intake             1380 ml   Output             6550 ml   Net            -5170 ml      Physical Exam   Constitutional: No distress.   Eyes: Right eye exhibits no discharge. Left eye exhibits no discharge.   Neck: Trachea normal. No JVD present.   Obese neck   Cardiovascular: Normal rate and regular rhythm.    Pulmonary/Chest: No accessory muscle usage. No tachypnea. No respiratory distress. She has decreased breath sounds (bilaterally). She has no wheezes. She has no rhonchi.   Abdominal: She exhibits distension. She exhibits no shifting dullness and no fluid wave. Bowel sounds are decreased. There is no tenderness. A hernia (umbilical.  it is reducible.) is present.   Musculoskeletal: She exhibits edema (both legs).   Neurological: She is alert.   Nonverbal.  She keeps her attention on me and nods her head sometimes.   Skin: Skin is warm and dry. No rash noted. She is not diaphoretic.       Significant Labs: All pertinent labs within the past 24 hours have been reviewed.    Significant Imaging: I have reviewed all pertinent imaging results/findings within the past 24 hours.

## 2018-07-04 NOTE — DISCHARGE SUMMARY
[facility] Medicine  Discharge Summary      Patient Name: Briana Uriarte  MRN: [id number]  Admission Date: 6/30/2018  Hospital Length of Stay: 3 days  Discharge Date and Time: 7/3/2018  5:31 PM  Attending Physician: No att. providers found   Discharging Provider: Alice Gao MD  Primary Care Provider: Anaid Osei MD        HPI: Briana Uriarte is a 43 yo female with PMHx significant for hydrocephalus relieved with  shunting, Spina Bifida, HTN, Asthma, kidney stones and cysts, and GERD.  She was admitted to the service of hospital medicine with UTI and back pain. HPI/ROS is limited 2/2 to patient condition and is provided by her mother. She presented to the ED today with worsening back pain, congestion and wheezing.  Her mother stated that patient has had trouble breathing with increase in congestion and wheezing despite breathing treatments.  She reports her daughter has complained of back pain and trouble swallowing. She has also noticed an increase in sediment in patient's indwelling catheter. She reports that patient had 3 bowel movements yesterday.  She denied vomiting.  She was recently discharged from our facility after being evaluated for SBO and UTI.  SBO resolved with bowel rest and NGT and she was sent home with Augmentin for UTI.    * No surgery found *      Hospital Course: Pt was admitted for Proteus Mirabilis UTI. The culture and sensitivity showed this microorganism susceptible to both Zosyn and Augmenting. She had received 3 days of Zosyn 3 days of Augmentin before admit. She was discharged home with an extra 4 days of Augmenting to make a total of 10 days of antibiotics.   She was stable at the time of dischare. Her suprapic pain resolved, suprapubic catheter without any issues and had moved her bowel./   She will be followed with Dr. Smith for neurogenic bladder.       Consults:     Final Active Diagnoses:    Diagnosis Date Noted POA    PRINCIPAL  PROBLEM:  Mild persistent asthma with acute exacerbation [J45.31] 06/30/2018 Yes    Hypomagnesemia [E83.42] 07/01/2018 Yes    Catheter-associated urinary tract infection [T83.511A, N39.0] 06/27/2018 Yes    Hyponatremia [E87.1] 06/25/2018 Yes    Morbid obesity with BMI of 50.0-59.9, adult [E66.01, Z68.43] 11/09/2013 Not Applicable    Paraplegia [G82.20] 11/09/2013 Yes      Problems Resolved During this Admission:    Diagnosis Date Noted Date Resolved POA      Discharged Condition: stable    Disposition: Home or Self Care    Follow Up:  Follow-up Information     Anaid Osei MD In 1 week.    Specialty:  Family Medicine  Why:  family made appointments  [facility] information:  [facility] [id number] [phone]                 Patient Instructions:     Notify your health care provider if you experience any of the following:  temperature >100.4     Activity as tolerated       Medications:  Reconciled Home Medications:      Medication List      START taking these medications    polyethylene glycol 17 gram Pwpk  Commonly known as:  GLYCOLAX  Take 17 g by mouth once daily.  Start taking on:  7/4/2018        CONTINUE taking these medications    albuterol 90 mcg/actuation inhaler  2 puffs every 4 hours as needed for cough, wheeze, or shortness of breath     amoxicillin-clavulanate 875-125mg 875-125 mg per tablet  Commonly known as:  AUGMENTIN  Take 1 tablet by mouth every 12 (twelve) hours. for 4 days     fluocinolone acetonide oil 0.01 % Drop  Commonly known as:  DERMOTIC OIL  5 drops in right ear bid x 7-10 days until resolution, use intermittenlty for flares.     fluticasone-umeclidin-vilanter 100-62.5-25 mcg Dsdv  Commonly known as:  TRELEGY ELLIPTA  Inhale 1 puff into the lungs once daily.     ipratropium 42 mcg (0.06 %) nasal spray  Commonly known as:  ATROVENT  1 spray by Nasal route daily as needed for Rhinitis.     lisinopril 20 MG tablet  Commonly known as:  PRINIVIL,ZESTRIL  Take 1 tablet (20 mg  total) by mouth once daily.     metoprolol succinate 50 MG 24 hr tablet  Commonly known as:  TOPROL-XL  Take 1 tablet (50 mg total) by mouth once daily.     miconazole nitrate 2 % Aerp  Apply 1 Dose topically 2 (two) times daily. Apply to rash on buttocks     multivitamin with minerals tablet  Take 1 tablet by mouth once daily.     VITAMIN C 500 MG tablet  Generic drug:  ascorbic acid (vitamin C)  Take 500 mg by mouth once daily.        STOP taking these medications    HYDROcodone-acetaminophen 7.5-325 mg per tablet  Commonly known as:  NORCO            Significant Diagnostic Studies: Labs:   BMP:   Recent Labs  Lab 07/02/18  0446   GLU 84   *   K 4.3   CL 99   CO2 25   BUN 7   CREATININE 0.7   CALCIUM 9.0   MG 1.6    and CMP   Recent Labs  Lab 07/02/18  0446   *   K 4.3   CL 99   CO2 25   GLU 84   BUN 7   CREATININE 0.7   CALCIUM 9.0   ANIONGAP 8   ESTGFRAFRICA >60   EGFRNONAA >60       Pending Diagnostic Studies:     None        Indwelling Lines/Drains at time of discharge:   Lines/Drains/Airways     Drain                 Suprapubic Catheter 05/12/15 0812 100% silicone 20 Fr. 1148 days                Time spent on the discharge of patient: 15 minutes  Patient was seen and examined on the date of discharge and determined to be suitable for discharge.         Alice Gao MD  Department of Hospital Medicine  [facility]

## 2018-07-05 ENCOUNTER — PATIENT MESSAGE (OUTPATIENT)
Dept: FAMILY MEDICINE | Facility: CLINIC | Age: 43
End: 2018-07-05

## 2018-07-05 ENCOUNTER — TELEPHONE (OUTPATIENT)
Dept: MEDSURG UNIT | Facility: HOSPITAL | Age: 43
End: 2018-07-05

## 2018-07-05 DIAGNOSIS — Q76.0 SPINA BIFIDA OCCULTA: ICD-10-CM

## 2018-07-05 DIAGNOSIS — Q05.9 SPINA BIFIDA MANIFESTA: ICD-10-CM

## 2018-07-05 RX ORDER — HYDROCODONE BITARTRATE AND ACETAMINOPHEN 7.5; 325 MG/1; MG/1
1 TABLET ORAL EVERY 6 HOURS PRN
Qty: 120 TABLET | Refills: 0 | Status: SHIPPED | OUTPATIENT
Start: 2018-07-05 | End: 2018-07-31 | Stop reason: SDUPTHER

## 2018-07-16 ENCOUNTER — DOCUMENTATION ONLY (OUTPATIENT)
Dept: FAMILY MEDICINE | Facility: CLINIC | Age: 43
End: 2018-07-16

## 2018-07-16 ENCOUNTER — OFFICE VISIT (OUTPATIENT)
Dept: PULMONOLOGY | Facility: CLINIC | Age: 43
End: 2018-07-16
Payer: MEDICARE

## 2018-07-16 VITALS
WEIGHT: 293 LBS | OXYGEN SATURATION: 93 % | HEART RATE: 112 BPM | BODY MASS INDEX: 55.32 KG/M2 | DIASTOLIC BLOOD PRESSURE: 85 MMHG | HEIGHT: 61 IN | SYSTOLIC BLOOD PRESSURE: 154 MMHG

## 2018-07-16 DIAGNOSIS — E66.01 MORBID OBESITY WITH BMI OF 50.0-59.9, ADULT: ICD-10-CM

## 2018-07-16 DIAGNOSIS — J39.8 TRACHEOBRONCHOMALACIA: ICD-10-CM

## 2018-07-16 DIAGNOSIS — J44.89 ASTHMA-COPD OVERLAP SYNDROME: ICD-10-CM

## 2018-07-16 DIAGNOSIS — J45.909 PERSISTENT ASTHMA WITHOUT COMPLICATION, UNSPECIFIED ASTHMA SEVERITY: Primary | ICD-10-CM

## 2018-07-16 PROCEDURE — 99999 PR PBB SHADOW E&M-EST. PATIENT-LVL IV: CPT | Mod: PBBFAC,,, | Performed by: INTERNAL MEDICINE

## 2018-07-16 PROCEDURE — 99214 OFFICE O/P EST MOD 30 MIN: CPT | Mod: PBBFAC,PO | Performed by: INTERNAL MEDICINE

## 2018-07-16 PROCEDURE — 99213 OFFICE O/P EST LOW 20 MIN: CPT | Mod: S$PBB,,, | Performed by: INTERNAL MEDICINE

## 2018-07-16 RX ORDER — BLOOD-GLUCOSE METER
KIT MISCELLANEOUS
Refills: 3 | COMMUNITY
Start: 2018-04-27 | End: 2020-07-28

## 2018-07-16 RX ORDER — LISINOPRIL 10 MG/1
20 TABLET ORAL DAILY
Refills: 3 | COMMUNITY
Start: 2018-07-05 | End: 2019-02-07

## 2018-07-16 NOTE — PATIENT INSTRUCTIONS
Will need to verify good 02 levels for sleep.     Lung capacity is about 50% - part from body size.    Asthma - copd concerns should do better with regular Trelegy.

## 2018-07-16 NOTE — PROGRESS NOTES
Pre-Visit Chart Review  For Appointment Scheduled on 7/18/18    There are no preventive care reminders to display for this patient.

## 2018-07-16 NOTE — PROGRESS NOTES
"7/16/2018    Briana MoranIla Melorenzadinakaterina       Chief Complaint   Patient presents with    Hospital f/u    Asthma       HPI:   July 16, , 2018- got distended with hosp eval for 1 day then returned for 3 days.  Uses trelegy with occ albuterol use.  No major wheezes - no snoring now.      May 2, 2018no fh asthma and wheelchair bound, no h/o pe.  Has swollen legs - no dvt.  No renal dz, chr suprapubic catheter, h/o loss  renal infection and cyst evolved and stones and only right works now.      Pt has occ wheezes.      Snores somewhat- more puffs.  Use nebulizer helps a little. No pft.    No pft nor sleep study.                  The chief compliant  problem is new to me",   PFSH:  Past Medical History:   Diagnosis Date    Asthma     Back pain     Blood transfusion     Chronic kidney disease     kidney stones    Diabetes mellitus     Diabetes mellitus, type 2     Esotropia     GERD (gastroesophageal reflux disease)     Hydrocephalus     Hypertension     Non-healing ulcer of buttock     Nystagmus, congenital     Paralysis     Spinal bifida, closed     Wheezing          Past Surgical History:   Procedure Laterality Date    BRAIN SURGERY       shunt revision    CHOLECYSTECTOMY      CYSTOSCOPY W/ LASER LITHOTRIPSY      SPINE SURGERY       Social History   Substance Use Topics    Smoking status: Never Smoker    Smokeless tobacco: Never Used    Alcohol use No     Family History   Problem Relation Age of Onset    Cancer Mother     Glaucoma Father     Heart disease Maternal Grandmother     Cancer Maternal Grandmother     Glaucoma Paternal Grandfather     Psoriasis Maternal Uncle     Melanoma Neg Hx     Lupus Neg Hx     Eczema Neg Hx      Review of patient's allergies indicates:   Allergen Reactions    Cefepime      HIVES    Latex Swelling       Performance Status:The patient's activity level is bedbound.      Review of Systems:  a review of eleven systems covering constitutional, Eye, HEENT, " "Psych, Respiratory, Cardiac, GI, , Musculoskeletal, Endocrine, Dermatologic was negative except for pertinent findings as listed ABOVE and below:   pertinent positive as above, rest is good       Exam:Comprehensive exam done. BP (!) 154/85 (BP Location: Left arm, Patient Position: Sitting)   Pulse (!) 112   Ht 5' 1" (1.549 m)   Wt (!) 147 kg (324 lb)   LMP 06/29/2018   SpO2 (!) 93%   BMI 61.22 kg/m²   Exam included Vitals as listed, and patient's appearance and affect and alertness and mood, oral exam for yeast and hygiene and pharynx lesions and Mallapatti (M) score, neck with inspection for jvd and masses and thyroid abnormalities and lymph nodes (supraclavicular and infraclavicular nodes and axillary also examined and noted if abn), chest exam included symmetry and effort and fremitus and percussion and auscultation, cardiac exam included rhythm and gallops and murmur and rubs and jvd and edema, abdominal exam for mass and hepatosplenomegaly and tenderness and hernias and bowel sounds, Musculoskeletal exam with muscle tone and posture and mobility/gait and  strength, and skin for rashes and cyanosis and pallor and turgor, extremity for clubbing.  Findings were normal except for pertinent findings listed below:  MOrbid obese, M1, right >> left leg edema,  shunt apparent in neck, chest is symmetric, no distress, normal percussion, normal fremitus and good normal breath sounds  RRR, no hs megaly nor mass nor clubbing,     Radiographs (ct chest and cxr) reviewed: view by direct vision  2015 ct chest tb malacia and retained secretions lll    Labs reviewed       PFT   Pulmonary Functions, including spirometry and bronchodilator response and lung volumes  , study was done May  8, 2018.  Spirometry shows loss of vital capacity and fev1 with no obstruction and no bronchodilator response.   FEV1 is 45% or 1.24 liters.  Lung volumes show  loss of TLC with restriction 51%.     Pulmonary functions show " restriction. Clinical correlation recommended.     Ramirez Corrigan M.D.     Plan:  Clinical impression is apparently straight forward and impression with management as below.    Briana was seen today for hospital f/u and asthma.    Diagnoses and all orders for this visit:    Persistent asthma without complication, unspecified asthma severity    Tracheobronchomalacia    Asthma-COPD overlap syndrome    Morbid obesity with BMI of 50.0-59.9, adult        Follow-up in about 1 year (around 7/16/2019), or if symptoms worsen or fail to improve.    Discussed with patient above for education the following:      Patient Instructions   Will need to verify good 02 levels for sleep.     Lung capacity is about 50% - part from body size.    Asthma - copd concerns should do better with regular Trelegy.

## 2018-07-31 DIAGNOSIS — Q05.9 SPINA BIFIDA MANIFESTA: ICD-10-CM

## 2018-07-31 DIAGNOSIS — Q76.0 SPINA BIFIDA OCCULTA: ICD-10-CM

## 2018-08-01 ENCOUNTER — OFFICE VISIT (OUTPATIENT)
Dept: UROLOGY | Facility: CLINIC | Age: 43
End: 2018-08-01
Payer: MEDICARE

## 2018-08-01 VITALS
RESPIRATION RATE: 18 BRPM | TEMPERATURE: 98 F | DIASTOLIC BLOOD PRESSURE: 84 MMHG | SYSTOLIC BLOOD PRESSURE: 133 MMHG | HEART RATE: 102 BPM | WEIGHT: 293 LBS | HEIGHT: 61 IN | BODY MASS INDEX: 55.32 KG/M2

## 2018-08-01 DIAGNOSIS — G83.4 CAUDA EQUINA SYNDROME WITH NEUROGENIC BLADDER: ICD-10-CM

## 2018-08-01 DIAGNOSIS — N28.9 NONFUNCTIONING KIDNEY: ICD-10-CM

## 2018-08-01 DIAGNOSIS — G91.9 HYDROCEPHALUS: ICD-10-CM

## 2018-08-01 DIAGNOSIS — Q05.9 SPINA BIFIDA, UNSPECIFIED HYDROCEPHALUS PRESENCE, UNSPECIFIED SPINAL REGION: ICD-10-CM

## 2018-08-01 DIAGNOSIS — R31.0 GROSS HEMATURIA: Primary | ICD-10-CM

## 2018-08-01 DIAGNOSIS — N20.0 RENAL CALCULUS, RIGHT: ICD-10-CM

## 2018-08-01 PROCEDURE — 99999 PR PBB SHADOW E&M-EST. PATIENT-LVL III: CPT | Mod: PBBFAC,,, | Performed by: UROLOGY

## 2018-08-01 PROCEDURE — 99213 OFFICE O/P EST LOW 20 MIN: CPT | Mod: PBBFAC,PN | Performed by: UROLOGY

## 2018-08-01 PROCEDURE — 99214 OFFICE O/P EST MOD 30 MIN: CPT | Mod: S$PBB,,, | Performed by: UROLOGY

## 2018-08-01 RX ORDER — HYDROCODONE BITARTRATE AND ACETAMINOPHEN 7.5; 325 MG/1; MG/1
1 TABLET ORAL EVERY 6 HOURS PRN
Qty: 120 TABLET | Refills: 0 | Status: SHIPPED | OUTPATIENT
Start: 2018-08-01 | End: 2018-08-30 | Stop reason: SDUPTHER

## 2018-08-01 NOTE — PROGRESS NOTES
OFFICE NOTE    CHIEF COMPLAINT:  Intermittent gross hematuria, neurogenic bladder,   hydrocephalus, paraplegia, and indwelling suprapubic catheter of the right renal   calculus, nonfunctioning left kidney.    HISTORY OF PRESENT ILLNESS:  This 42-year-old female was hospitalized a month   ago with apparent bowel obstruction and urinary tract infection for which she   was treated and eventually discharged home in stable condition.  We saw her on   consultation on 07/02/2018 and from the urologic aspect she appeared to be quite   stable.  On followup visit today, the mother who changes her suprapubic tube on   a regular basis did mention that lately she has noticed some gross hematuria.    It must be noted that just prior to her hospitalization, she did undergo a CT   scan of the abdomen and pelvis on 06/25/2018, which revealed no obvious   explanation for the hematuria.  She does have an atrophied left kidney with multiple   cysts and also large stones in the right kidney.  No obvious   explanation for the hematuria, and no evidence of any hydronephrosis.  There was   an umbilical hernia with some small bowel and also some dilatation of the   bowel as the possibility of bowel obstruction was suspected, but it subsequently   did resolve.    On followup visit today, the patient overall is feeling fine and doing better,   but as mentioned her mother did mention that there are episodes where she   notices some hematuria.    PHYSICAL EXAMINATION:  Suprapubic tube in good place with clear urine draining   from the suprapubic site.    FINAL IMPRESSION:  Gross hematuria, very possibly due to catheter irritation,   also renal calculi, but other causes may also need to be excluded, which was   discussed with the patient and the family, neurogenic bladder, hydrocephalus,   spina bifida, right renal calculus, nonfunctioning left kidney.    RECOMMENDATIONS:  The mother who changes the suprapubic tube on a regular basis   was  instructed, the next changing of the tube to obtain a urine specimen after   the new tube has been placed and to bring a specimen to us and we will send it   for culture and cytology.  We will continue to observe and the possibility of   cystoscopy may need to be considered and this was discussed with the patient and   family.  They said they understood.      DENY  dd: 08/01/2018 11:24:57 (CDT)  td: 08/02/2018 08:58:25 (CDT)  Doc ID   #[id number]  Job ID #[id number]    CC:

## 2018-08-06 ENCOUNTER — CLINICAL SUPPORT (OUTPATIENT)
Dept: UROLOGY | Facility: CLINIC | Age: 43
End: 2018-08-06
Payer: MEDICARE

## 2018-08-06 ENCOUNTER — APPOINTMENT (OUTPATIENT)
Dept: LAB | Facility: HOSPITAL | Age: 43
End: 2018-08-06
Attending: UROLOGY
Payer: MEDICARE

## 2018-08-06 DIAGNOSIS — N20.0 KIDNEY STONE: Primary | ICD-10-CM

## 2018-08-06 PROCEDURE — 87077 CULTURE AEROBIC IDENTIFY: CPT

## 2018-08-06 PROCEDURE — 88112 CYTOPATH CELL ENHANCE TECH: CPT | Performed by: PATHOLOGY

## 2018-08-06 PROCEDURE — 88112 CYTOPATH CELL ENHANCE TECH: CPT | Mod: 26,,, | Performed by: PATHOLOGY

## 2018-08-06 PROCEDURE — 87186 SC STD MICRODIL/AGAR DIL: CPT

## 2018-08-06 PROCEDURE — 87086 URINE CULTURE/COLONY COUNT: CPT

## 2018-08-06 PROCEDURE — 87088 URINE BACTERIA CULTURE: CPT

## 2018-08-06 NOTE — PROGRESS NOTES
Patient family member dropped urine off to be sent out for culture and cytology, specimen prepared for lab .

## 2018-08-09 ENCOUNTER — DOCUMENTATION ONLY (OUTPATIENT)
Dept: FAMILY MEDICINE | Facility: CLINIC | Age: 43
End: 2018-08-09

## 2018-08-09 LAB — BACTERIA UR CULT: NORMAL

## 2018-08-09 NOTE — PROGRESS NOTES
Pre-Visit Chart Review  For Appointment Scheduled on 8/10/18    Health Maintenance Due   Topic Date Due    Influenza Vaccine  08/01/2018

## 2018-08-17 ENCOUNTER — PATIENT MESSAGE (OUTPATIENT)
Dept: UROLOGY | Facility: CLINIC | Age: 43
End: 2018-08-17

## 2018-08-17 ENCOUNTER — TELEPHONE (OUTPATIENT)
Dept: UROLOGY | Facility: CLINIC | Age: 43
End: 2018-08-17

## 2018-08-23 ENCOUNTER — DOCUMENTATION ONLY (OUTPATIENT)
Dept: FAMILY MEDICINE | Facility: CLINIC | Age: 43
End: 2018-08-23

## 2018-08-23 NOTE — PROGRESS NOTES
Pre-Visit Chart Review  For Appointment Scheduled on 8/24/18    Health Maintenance Due   Topic Date Due    Influenza Vaccine  08/01/2018

## 2018-08-24 ENCOUNTER — OFFICE VISIT (OUTPATIENT)
Dept: FAMILY MEDICINE | Facility: CLINIC | Age: 43
End: 2018-08-24
Payer: MEDICARE

## 2018-08-24 ENCOUNTER — LAB VISIT (OUTPATIENT)
Dept: LAB | Facility: HOSPITAL | Age: 43
End: 2018-08-24
Attending: PHYSICIAN ASSISTANT
Payer: MEDICARE

## 2018-08-24 VITALS
TEMPERATURE: 99 F | BODY MASS INDEX: 55.32 KG/M2 | DIASTOLIC BLOOD PRESSURE: 94 MMHG | WEIGHT: 293 LBS | OXYGEN SATURATION: 93 % | SYSTOLIC BLOOD PRESSURE: 167 MMHG | HEIGHT: 61 IN | HEART RATE: 89 BPM

## 2018-08-24 DIAGNOSIS — R53.81 PHYSICAL DEBILITY: ICD-10-CM

## 2018-08-24 DIAGNOSIS — G91.1 OBSTRUCTIVE HYDROCEPHALUS: ICD-10-CM

## 2018-08-24 DIAGNOSIS — I10 ESSENTIAL HYPERTENSION: Primary | ICD-10-CM

## 2018-08-24 DIAGNOSIS — N39.0 URINARY TRACT INFECTION WITHOUT HEMATURIA, SITE UNSPECIFIED: ICD-10-CM

## 2018-08-24 DIAGNOSIS — K59.00 CONSTIPATION, UNSPECIFIED CONSTIPATION TYPE: ICD-10-CM

## 2018-08-24 DIAGNOSIS — E66.01 MORBID OBESITY WITH BMI OF 50.0-59.9, ADULT: ICD-10-CM

## 2018-08-24 DIAGNOSIS — Q05.9 SPINA BIFIDA MANIFESTA: ICD-10-CM

## 2018-08-24 DIAGNOSIS — D64.9 ANEMIA, UNSPECIFIED TYPE: ICD-10-CM

## 2018-08-24 DIAGNOSIS — I10 ESSENTIAL HYPERTENSION: ICD-10-CM

## 2018-08-24 DIAGNOSIS — F11.20 CONTINUOUS OPIOID DEPENDENCE: Chronic | ICD-10-CM

## 2018-08-24 DIAGNOSIS — G82.20 PARAPLEGIA: ICD-10-CM

## 2018-08-24 LAB
ALBUMIN SERPL BCP-MCNC: 3.5 G/DL
ALP SERPL-CCNC: 82 U/L
ALT SERPL W/O P-5'-P-CCNC: 13 U/L
ANION GAP SERPL CALC-SCNC: 10 MMOL/L
AST SERPL-CCNC: 24 U/L
BASOPHILS # BLD AUTO: 0.05 K/UL
BASOPHILS NFR BLD: 0.5 %
BILIRUB SERPL-MCNC: 0.2 MG/DL
BUN SERPL-MCNC: 16 MG/DL
CALCIUM SERPL-MCNC: 10 MG/DL
CHLORIDE SERPL-SCNC: 104 MMOL/L
CO2 SERPL-SCNC: 26 MMOL/L
CREAT SERPL-MCNC: 0.8 MG/DL
DIFFERENTIAL METHOD: ABNORMAL
EOSINOPHIL # BLD AUTO: 0.2 K/UL
EOSINOPHIL NFR BLD: 1.6 %
ERYTHROCYTE [DISTWIDTH] IN BLOOD BY AUTOMATED COUNT: 17.7 %
EST. GFR  (AFRICAN AMERICAN): >60 ML/MIN/1.73 M^2
EST. GFR  (NON AFRICAN AMERICAN): >60 ML/MIN/1.73 M^2
GLUCOSE SERPL-MCNC: 91 MG/DL
HCT VFR BLD AUTO: 37.6 %
HGB BLD-MCNC: 10.9 G/DL
IMM GRANULOCYTES # BLD AUTO: 0.05 K/UL
IMM GRANULOCYTES NFR BLD AUTO: 0.5 %
LYMPHOCYTES # BLD AUTO: 1.3 K/UL
LYMPHOCYTES NFR BLD: 12.1 %
MCH RBC QN AUTO: 24.3 PG
MCHC RBC AUTO-ENTMCNC: 29 G/DL
MCV RBC AUTO: 84 FL
MONOCYTES # BLD AUTO: 0.4 K/UL
MONOCYTES NFR BLD: 3.9 %
NEUTROPHILS # BLD AUTO: 8.6 K/UL
NEUTROPHILS NFR BLD: 81.4 %
NRBC BLD-RTO: 0 /100 WBC
PLATELET # BLD AUTO: 379 K/UL
PMV BLD AUTO: 10.1 FL
POTASSIUM SERPL-SCNC: 4.9 MMOL/L
PROT SERPL-MCNC: 9 G/DL
RBC # BLD AUTO: 4.49 M/UL
SODIUM SERPL-SCNC: 140 MMOL/L
WBC # BLD AUTO: 10.56 K/UL

## 2018-08-24 PROCEDURE — 85025 COMPLETE CBC W/AUTO DIFF WBC: CPT

## 2018-08-24 PROCEDURE — 99999 PR PBB SHADOW E&M-EST. PATIENT-LVL V: CPT | Mod: PBBFAC,,, | Performed by: PHYSICIAN ASSISTANT

## 2018-08-24 PROCEDURE — 99214 OFFICE O/P EST MOD 30 MIN: CPT | Mod: S$PBB,,, | Performed by: PHYSICIAN ASSISTANT

## 2018-08-24 PROCEDURE — 80053 COMPREHEN METABOLIC PANEL: CPT

## 2018-08-24 PROCEDURE — 36415 COLL VENOUS BLD VENIPUNCTURE: CPT | Mod: PO

## 2018-08-24 PROCEDURE — 99215 OFFICE O/P EST HI 40 MIN: CPT | Mod: PBBFAC,PO | Performed by: PHYSICIAN ASSISTANT

## 2018-08-24 NOTE — PROGRESS NOTES
Subjective:       Patient ID: Briana Uriarte is a 42 y.o. female.    Chief Complaint: Follow-up (3 month)    Ms. Uriarte is a 42 year old female who presents to clinic for 3 month f/u on chronic conditions. She is a new patient to me. The history is obtained completely from her mother. She reports that Ms. Uriarte was diagnosed with proteus UTI approximately 2 weeks ago and was referred to Dr. Lee for treatment, but she was also told by Dr. Lee's office that she sent recommendations to Dr. Smith. She has not heard back from Dr. Smith regarding treatment yet. She reports Ms. Uriarte has some weeping from her leg recently after a small skin break, but this has now resolved. She complains of constipation, but states it is typically relieved with miralax. She reports normal appetite and no nausea, vomiting, or diarrhea.       Review of Systems   Constitutional: Negative for appetite change, chills, fatigue and fever.   Eyes: Negative for visual disturbance.   Respiratory: Negative for cough and shortness of breath.    Cardiovascular: Positive for leg swelling. Negative for chest pain and palpitations.   Gastrointestinal: Positive for constipation. Negative for abdominal pain, diarrhea, nausea and vomiting.   Neurological: Negative for dizziness, weakness, light-headedness and headaches.       Objective:      Vitals:    08/24/18 1144   BP: (!) 167/94   Pulse:    Temp:      Physical Exam   Constitutional: She is oriented to person, place, and time. She appears well-developed.   Obese body habitus. Pt is seated in powerwheelchair.   HENT:   Head: Normocephalic and atraumatic.   Eyes: Conjunctivae and EOM are normal. Pupils are equal, round, and reactive to light.   Cardiovascular: Normal rate, regular rhythm, normal heart sounds and intact distal pulses.   2+ pitting edema of bilateral lower extremities.   Pulmonary/Chest: [place] normal and breath sounds normal.   Neurological: She is alert and  oriented to person, place, and time.   Skin: Skin is warm and dry.   Psychiatric: She has a normal mood and affect.       Assessment:       1. Essential hypertension    2. Urinary tract infection without hematuria, site unspecified    3. Anemia, unspecified type    4. Constipation, unspecified constipation type    5. Spina bifida manifesta    6. Paraplegia    7. Obstructive hydrocephalus    8. Physical debility    9. Continuous opioid dependence-uds and contract 2/2/18    10. Morbid obesity with BMI of 50.0-59.9, adult        Plan:       Essential hypertension  -     Uncontrolled, continue current medications. Monitor BP at home and keep a log. Notify clinic if BP is persistently >140/90.  Nursing BP check in 1 month or drop off readings to clinic in 2 weeks.   Comprehensive metabolic panel; Future; Expected date: 08/24/2018    Urinary tract infection without hematuria, site unspecified        ->100,000 cfu proteus infection 8/6/18 awaiting Urology recommendations. Will reach out to Urology for next step.     Anemia, unspecified type  -     CBC auto differential; Future; Expected date: 08/24/2018    Constipation, unspecified constipation type       - Continue daily stool softner and miralax PRN.     Spina bifida manifesta       - Chronic, stable sequela    Paraplegia        - Continue use of powerwheel chair    Obstructive hydrocephalus         - Continue to monitor    Physical debility         - Continue support    Continuous opioid dependence-uds and contract 2/2/18          - Chronic, continue to monitor    Morbid obesity with BMI of 50.0-59.9, adult          - See below    Patient readiness: acceptance and barriers:none    During the course of the visit the patient was educated and counseled about the following:     Hypertension:   Check blood pressures daily and record.  Obesity:   not addressed.    Goals: Hypertension: Reduce Blood Pressure and Obesity: Reduce calorie intake and BMI    Did patient meet  goals/outcomes: No    The following self management tools provided: declined    Patient Instructions (the written plan) was given to the patient/family.     Time spent with patient: 30 minutes      Follow up with Dr. Osei in 3 months

## 2018-08-30 DIAGNOSIS — Q76.0 SPINA BIFIDA OCCULTA: ICD-10-CM

## 2018-08-30 DIAGNOSIS — Q05.9 SPINA BIFIDA MANIFESTA: ICD-10-CM

## 2018-08-31 RX ORDER — HYDROCODONE BITARTRATE AND ACETAMINOPHEN 7.5; 325 MG/1; MG/1
1 TABLET ORAL EVERY 6 HOURS PRN
Qty: 120 TABLET | Refills: 0 | Status: SHIPPED | OUTPATIENT
Start: 2018-08-31 | End: 2018-10-01 | Stop reason: SDUPTHER

## 2018-10-01 DIAGNOSIS — Q76.0 SPINA BIFIDA OCCULTA: ICD-10-CM

## 2018-10-01 DIAGNOSIS — Q05.9 SPINA BIFIDA MANIFESTA: ICD-10-CM

## 2018-10-03 ENCOUNTER — PATIENT MESSAGE (OUTPATIENT)
Dept: FAMILY MEDICINE | Facility: CLINIC | Age: 43
End: 2018-10-03

## 2018-10-03 RX ORDER — HYDROCODONE BITARTRATE AND ACETAMINOPHEN 7.5; 325 MG/1; MG/1
1 TABLET ORAL EVERY 6 HOURS PRN
Qty: 120 TABLET | Refills: 0 | Status: SHIPPED | OUTPATIENT
Start: 2018-10-03 | End: 2018-11-06 | Stop reason: SDUPTHER

## 2018-10-04 DIAGNOSIS — Q05.9 SPINA BIFIDA MANIFESTA: ICD-10-CM

## 2018-10-04 DIAGNOSIS — Q76.0 SPINA BIFIDA OCCULTA: ICD-10-CM

## 2018-10-04 RX ORDER — HYDROCODONE BITARTRATE AND ACETAMINOPHEN 7.5; 325 MG/1; MG/1
1 TABLET ORAL EVERY 6 HOURS PRN
Qty: 120 TABLET | Refills: 0 | Status: CANCELLED | OUTPATIENT
Start: 2018-10-04

## 2018-10-16 ENCOUNTER — OFFICE VISIT (OUTPATIENT)
Dept: FAMILY MEDICINE | Facility: CLINIC | Age: 43
End: 2018-10-16
Payer: MEDICARE

## 2018-10-16 VITALS
RESPIRATION RATE: 14 BRPM | SYSTOLIC BLOOD PRESSURE: 139 MMHG | BODY MASS INDEX: 58.73 KG/M2 | OXYGEN SATURATION: 98 % | HEART RATE: 107 BPM | TEMPERATURE: 99 F | HEIGHT: 61 IN | DIASTOLIC BLOOD PRESSURE: 88 MMHG

## 2018-10-16 DIAGNOSIS — Z23 FLU VACCINE NEED: ICD-10-CM

## 2018-10-16 DIAGNOSIS — F11.20 CONTINUOUS OPIOID DEPENDENCE: Chronic | ICD-10-CM

## 2018-10-16 DIAGNOSIS — J44.89 ASTHMA-COPD OVERLAP SYNDROME: ICD-10-CM

## 2018-10-16 DIAGNOSIS — I10 ESSENTIAL HYPERTENSION: Primary | ICD-10-CM

## 2018-10-16 DIAGNOSIS — E66.01 MORBID OBESITY WITH BMI OF 50.0-59.9, ADULT: ICD-10-CM

## 2018-10-16 PROCEDURE — 99999 PR PBB SHADOW E&M-EST. PATIENT-LVL IV: CPT | Mod: PBBFAC,,, | Performed by: FAMILY MEDICINE

## 2018-10-16 PROCEDURE — 99214 OFFICE O/P EST MOD 30 MIN: CPT | Mod: PBBFAC,PO | Performed by: FAMILY MEDICINE

## 2018-10-16 PROCEDURE — 99214 OFFICE O/P EST MOD 30 MIN: CPT | Mod: S$PBB,,, | Performed by: FAMILY MEDICINE

## 2018-10-16 NOTE — PROGRESS NOTES
Subjective:       Patient ID: Briana Uriarte is a 43 y.o. female.    Chief Complaint: Follow-up (2wk f/u hypertension)    HPI  Review of Systems   Constitutional: Negative for fatigue and unexpected weight change.   Respiratory: Negative for chest tightness and shortness of breath.    Cardiovascular: Negative for chest pain, palpitations and leg swelling.   Gastrointestinal: Negative for abdominal pain.   Musculoskeletal: Negative for arthralgias.   Neurological: Negative for dizziness, syncope, light-headedness and headaches.       Patient Active Problem List   Diagnosis    Spina bifida manifesta    Peripheral venous insufficiency    Morbid obesity with BMI of 50.0-59.9, adult    Paraplegia    Pickwickian syndrome    Asthma    Obstructive hydrocephalus    Encephalopathy    Intracranial hypertension    Chiari malformation type II    Microcytic anemia    Spina bifida of lumbar region    Suprapubic catheter    Continuous opioid dependence-uds and contract 2/2/18    Physical debility    Umbilical hernia    Skin bulla    Tracheobronchomalacia    SBO (small bowel obstruction)    Hyponatremia    Catheter-associated urinary tract infection    Mild persistent asthma with acute exacerbation    Hypomagnesemia    Asthma-COPD overlap syndrome    Essential hypertension     Patient is here for a chronic conditions follow up.    No visits with results within 1 Month(s) from this visit.   Latest known visit with results is:   Lab Visit on 08/24/2018   Component Date Value Ref Range Status    Sodium 08/24/2018 140  136 - 145 mmol/L Final    Potassium 08/24/2018 4.9  3.5 - 5.1 mmol/L Final    Chloride 08/24/2018 104  95 - 110 mmol/L Final    CO2 08/24/2018 26  23 - 29 mmol/L Final    Glucose 08/24/2018 91  70 - 110 mg/dL Final    BUN, Bld 08/24/2018 16  6 - 20 mg/dL Final    Creatinine 08/24/2018 0.8  0.5 - 1.4 mg/dL Final    Calcium 08/24/2018 10.0  8.7 - 10.5 mg/dL Final    Total Protein  08/24/2018 9.0* 6.0 - 8.4 g/dL Final    Albumin 08/24/2018 3.5  3.5 - 5.2 g/dL Final    Total Bilirubin 08/24/2018 0.2  0.1 - 1.0 mg/dL Final    Alkaline Phosphatase 08/24/2018 82  55 - 135 U/L Final    AST 08/24/2018 24  10 - 40 U/L Final    ALT 08/24/2018 13  10 - 44 U/L Final    Anion Gap 08/24/2018 10  8 - 16 mmol/L Final    eGFR if African American 08/24/2018 >60.0  >60 mL/min/1.73 m^2 Final    eGFR if non African American 08/24/2018 >60.0  >60 mL/min/1.73 m^2 Final    WBC 08/24/2018 10.56  3.90 - 12.70 K/uL Final    RBC 08/24/2018 4.49  4.00 - 5.40 M/uL Final    Hemoglobin 08/24/2018 10.9* 12.0 - 16.0 g/dL Final    Hematocrit 08/24/2018 37.6  37.0 - 48.5 % Final    MCV 08/24/2018 84  82 - 98 fL Final    MCH 08/24/2018 24.3* 27.0 - 31.0 pg Final    MCHC 08/24/2018 29.0* 32.0 - 36.0 g/dL Final    RDW 08/24/2018 17.7* 11.5 - 14.5 % Final    Platelets 08/24/2018 379* 150 - 350 K/uL Final    MPV 08/24/2018 10.1  9.2 - 12.9 fL Final    Immature Granulocytes 08/24/2018 0.5  0.0 - 0.5 % Final    Gran # (ANC) 08/24/2018 8.6* 1.8 - 7.7 K/uL Final    Immature Grans (Abs) 08/24/2018 0.05* 0.00 - 0.04 K/uL Final    Lymph # 08/24/2018 1.3  1.0 - 4.8 K/uL Final    Mono # 08/24/2018 0.4  0.3 - 1.0 K/uL Final    Eos # 08/24/2018 0.2  0.0 - 0.5 K/uL Final    Baso # 08/24/2018 0.05  0.00 - 0.20 K/uL Final    nRBC 08/24/2018 0  0 /100 WBC Final    Gran% 08/24/2018 81.4* 38.0 - 73.0 % Final    Lymph% 08/24/2018 12.1* 18.0 - 48.0 % Final    Mono% 08/24/2018 3.9* 4.0 - 15.0 % Final    Eosinophil% 08/24/2018 1.6  0.0 - 8.0 % Final    Basophil% 08/24/2018 0.5  0.0 - 1.9 % Final    Differential Method 08/24/2018 Automated   Final   }  Objective:      Physical Exam   Constitutional: She is oriented to person, place, and time. She appears well-developed and well-nourished.   Cardiovascular: Normal rate, regular rhythm and normal heart sounds.   Pulmonary/Chest: [place] normal and breath sounds normal.    Musculoskeletal: She exhibits no edema.   Neurological: She is alert and oriented to person, place, and time.   Skin: Skin is warm and dry.   Psychiatric: She has a normal mood and affect.   Nursing note and vitals reviewed.      Nasal congestion and wheezing wax and wane.  Takes trelegy daily and uses albuterol prn.  No fever.  SOb with exertion at baseline    Continuous use of opioids follow-up:  Current medication and dosing:  norco 7/5 qid  Supply:  30 day supply  Side effects: none  Pain controlled: yes  Medication compliance: yes  DPS checked today: today, no evidence of diversion  UDS: 2/2/18  pain contract signed: 2/2/18  Opioid risk tool done:N/A  Assessment:       1. Essential hypertension    2. Flu vaccine need    3. Asthma-COPD overlap syndrome    4. Continuous opioid dependence-uds and contract 2/2/18    5. Morbid obesity with BMI of 50.0-59.9, adult        Plan:       1. Essential hypertension  Controlled on current medications.  Continue current medications.      2. Flu vaccine need  Needs latex free which is not available here-directed to pharmacy    3. Asthma-COPD overlap syndrome  Cont current pulm regimen    4. Continuous opioid dependence-uds and contract 2/2/18  Controlled on current medications.  Continue current medications.  Counseled patient on habit forming potential of opiates, risk of sedation, respiratory suppression or arrest especially if used in conjunction with benzodiazepines or alcohol.  Counseled patient to avoid driving while on the medication, rules of the pain contract and need for routine 3 month follow ups.  Patient agrees to all aspects of the plan of care and wishes to proceed with therapy.  The plan is always to use alternatives less habit forming, to utilize interventions and therapies that reduce pain as an adjunctive treatment, and limit and wean the dose of narcotics as soon as able and appropriate.        5. Morbid obesity with BMI of 50.0-59.9, adult  Counseled  "patient on his ideal body weight, health consequences of being obese and current recommendations including weekly exercise and a heart healthy diet.  Current BMI is:Estimated body mass index is 58.73 kg/m² as calculated from the following:    Height as of this encounter: 5' 1" (1.549 m).    Weight as of 8/24/18: 141 kg (310 lb 13.6 oz)..  Patient is aware that ideal BMI < 25 or Weight in (lb) to have BMI = 25: 132.    Reeval q 3 months and prn        Time spent with patient: 20 minutes    Patient with be reevaluated in 3 months or sooner prn    Greater than 50% of this visit was spent counseling as described in above documentation:Yes  "

## 2018-10-17 ENCOUNTER — PATIENT MESSAGE (OUTPATIENT)
Dept: FAMILY MEDICINE | Facility: CLINIC | Age: 43
End: 2018-10-17

## 2018-11-06 DIAGNOSIS — Q05.9 SPINA BIFIDA MANIFESTA: ICD-10-CM

## 2018-11-06 DIAGNOSIS — Q76.0 SPINA BIFIDA OCCULTA: ICD-10-CM

## 2018-11-06 RX ORDER — HYDROCODONE BITARTRATE AND ACETAMINOPHEN 7.5; 325 MG/1; MG/1
1 TABLET ORAL EVERY 6 HOURS PRN
Qty: 120 TABLET | Refills: 0 | Status: SHIPPED | OUTPATIENT
Start: 2018-11-06 | End: 2018-12-07 | Stop reason: SDUPTHER

## 2018-11-06 NOTE — TELEPHONE ENCOUNTER
Dr Osei out of office. Patient's father upset medication has not been refilled requesting message to be sent to Dr Weber

## 2018-11-06 NOTE — TELEPHONE ENCOUNTER
----- Message from Chen Gamez sent at 11/6/2018  8:10 AM CST -----  Contact: Father Blaze Uriarte [phone]  He is upset that Norco was not filled.  He wants to speak to Dr Osei, not a nurse regarding this.  Please call him.  Thank you!

## 2018-11-09 RX ORDER — METOPROLOL SUCCINATE 50 MG/1
TABLET, EXTENDED RELEASE ORAL
Qty: 90 TABLET | Refills: 3 | Status: SHIPPED | OUTPATIENT
Start: 2018-11-09 | End: 2019-10-09 | Stop reason: SDUPTHER

## 2018-11-28 ENCOUNTER — PATIENT MESSAGE (OUTPATIENT)
Dept: UROLOGY | Facility: CLINIC | Age: 43
End: 2018-11-28

## 2018-11-28 ENCOUNTER — PATIENT MESSAGE (OUTPATIENT)
Dept: FAMILY MEDICINE | Facility: CLINIC | Age: 43
End: 2018-11-28

## 2018-11-29 ENCOUNTER — OFFICE VISIT (OUTPATIENT)
Dept: UROLOGY | Facility: CLINIC | Age: 43
End: 2018-11-29
Payer: MEDICARE

## 2018-11-29 VITALS — TEMPERATURE: 98 F | RESPIRATION RATE: 18 BRPM

## 2018-11-29 DIAGNOSIS — N20.0 RENAL CALCULUS, RIGHT: ICD-10-CM

## 2018-11-29 DIAGNOSIS — L30.9 DERMATITIS: ICD-10-CM

## 2018-11-29 DIAGNOSIS — N28.9 NONFUNCTIONING KIDNEY: ICD-10-CM

## 2018-11-29 DIAGNOSIS — Z43.5 ENCOUNTER FOR CARE OR REPLACEMENT OF SUPRAPUBIC TUBE: ICD-10-CM

## 2018-11-29 DIAGNOSIS — R21 SKIN RASH: ICD-10-CM

## 2018-11-29 DIAGNOSIS — Q05.9 SPINA BIFIDA, UNSPECIFIED HYDROCEPHALUS PRESENCE, UNSPECIFIED SPINAL REGION: ICD-10-CM

## 2018-11-29 DIAGNOSIS — G83.4 CAUDA EQUINA SYNDROME WITH NEUROGENIC BLADDER: ICD-10-CM

## 2018-11-29 DIAGNOSIS — G91.9 HYDROCEPHALUS: Primary | ICD-10-CM

## 2018-11-29 PROCEDURE — 99213 OFFICE O/P EST LOW 20 MIN: CPT | Mod: PBBFAC,PN | Performed by: UROLOGY

## 2018-11-29 PROCEDURE — 99999 PR PBB SHADOW E&M-EST. PATIENT-LVL III: CPT | Mod: PBBFAC,,, | Performed by: UROLOGY

## 2018-11-29 PROCEDURE — 99213 OFFICE O/P EST LOW 20 MIN: CPT | Mod: S$PBB,,, | Performed by: UROLOGY

## 2018-11-29 RX ORDER — FLUCONAZOLE 150 MG/1
150 TABLET ORAL DAILY
COMMUNITY
End: 2019-01-15

## 2018-11-29 NOTE — PROGRESS NOTES
OFFICE NOTE    CHIEF COMPLAINT:   Extensive skin rash, neurogenic bladder, hydrocephalus,   paraplegia, indwelling suprapubic catheter.    HISTORY OF PRESENT ILLNESS:  This 43-year-old female's mother had contacted   us stating that she has developed extensive rash on the lower abdomen and   buttocks also even in the legs and was concerned whether this may be related to   the suprapubic tube as she has been changing the suprapubic tube regularly   without any problems and uses a 20-[place], which she did recently.  She was thus   instructed to come to the office to be checked.    On examination, there is extensive discoloration, irritation, and rash involving   across the lower abdomen, the buttocks, and also there are areas of irritation   on both lower extremities.      It was mentioned to the patient and the family that this does not appear to be   related to the suprapubic tube and she will need to follow up with her primary   care physician or dermatologist to have this further evaluated and they stated   they understood and agreed.  Otherwise, she will continue with the suprapubic   catheter change every three to four weeks.  She was also given a 22-[place]   catheter for her to try to see if this drains well, although on examination   today, the suprapubic tube was in good place and was draining clear urine.      DENY  dd: 11/29/2018 16:42:47 (CST)  td: 11/30/2018 09:59:33 (CST)  Doc ID   #[id number]  Job ID #[id number]    CC:

## 2018-11-30 ENCOUNTER — PATIENT MESSAGE (OUTPATIENT)
Dept: FAMILY MEDICINE | Facility: CLINIC | Age: 43
End: 2018-11-30

## 2018-12-03 ENCOUNTER — PATIENT MESSAGE (OUTPATIENT)
Dept: DERMATOLOGY | Facility: CLINIC | Age: 43
End: 2018-12-03

## 2018-12-04 ENCOUNTER — PATIENT MESSAGE (OUTPATIENT)
Dept: FAMILY MEDICINE | Facility: CLINIC | Age: 43
End: 2018-12-04

## 2018-12-04 ENCOUNTER — PATIENT MESSAGE (OUTPATIENT)
Dept: DERMATOLOGY | Facility: CLINIC | Age: 43
End: 2018-12-04

## 2018-12-04 DIAGNOSIS — Q05.9 SPINA BIFIDA MANIFESTA: ICD-10-CM

## 2018-12-04 DIAGNOSIS — Q76.0 SPINA BIFIDA OCCULTA: ICD-10-CM

## 2018-12-04 RX ORDER — HYDROCODONE BITARTRATE AND ACETAMINOPHEN 7.5; 325 MG/1; MG/1
1 TABLET ORAL EVERY 6 HOURS PRN
Qty: 120 TABLET | Refills: 0 | Status: CANCELLED | OUTPATIENT
Start: 2018-12-04

## 2018-12-07 ENCOUNTER — PATIENT MESSAGE (OUTPATIENT)
Dept: FAMILY MEDICINE | Facility: CLINIC | Age: 43
End: 2018-12-07

## 2018-12-07 ENCOUNTER — TELEPHONE (OUTPATIENT)
Dept: FAMILY MEDICINE | Facility: CLINIC | Age: 43
End: 2018-12-07

## 2018-12-07 DIAGNOSIS — Q05.9 SPINA BIFIDA MANIFESTA: ICD-10-CM

## 2018-12-07 DIAGNOSIS — Q76.0 SPINA BIFIDA OCCULTA: ICD-10-CM

## 2018-12-07 RX ORDER — HYDROCODONE BITARTRATE AND ACETAMINOPHEN 7.5; 325 MG/1; MG/1
1 TABLET ORAL EVERY 6 HOURS PRN
Qty: 120 TABLET | Refills: 0 | Status: SHIPPED | OUTPATIENT
Start: 2018-12-07 | End: 2019-01-02 | Stop reason: SDUPTHER

## 2018-12-07 NOTE — TELEPHONE ENCOUNTER
Spoke with dad of the patient who has power of  over patient. The patient is out of her pain medication and needs it refilled I spoke with the on-call provider Dr. Weber to see if he could refill medication of [place] for the patient. Medication was approved for refill within on-call provider patients father was notified of refill and was told to please allow 5 days when refill is needed on this medication verbal understanding is noted form father.

## 2018-12-07 NOTE — TELEPHONE ENCOUNTER
----- Message from Anika Derrell sent at 12/7/2018  2:42 PM CST -----  Contact: Sona Chpaito (father)  Type:  RX Refill Request    Who Called: Sona Uriarte (father)  Refill or New Rx:  Refill  RX Name and Strength:  HYDROcodone-acetaminophen (NORCO) 7.5-325 mg per tablet  How is the patient currently taking it? (ex. 1XDay): Take 1 tablet by mouth every 6 (six) hours as needed for Pain. - Oral   Is this a 30 day or 90 day RX:  120 tablets  Preferred Pharmacy with phone number:   [id number] - [place], [place] - [address] AT [address]  [address] [address]  Phone: [phone] Fax: [phone]  Local or Mail Order: Local  Ordering Provider:  Dr Anaid Osei  Best Call Back Number:  Sona barraza   Additional Information:  Calling to request a refill. Call to pod. No answer

## 2019-01-02 DIAGNOSIS — Q05.9 SPINA BIFIDA MANIFESTA: ICD-10-CM

## 2019-01-02 DIAGNOSIS — Q76.0 SPINA BIFIDA OCCULTA: ICD-10-CM

## 2019-01-02 RX ORDER — HYDROCODONE BITARTRATE AND ACETAMINOPHEN 7.5; 325 MG/1; MG/1
1 TABLET ORAL EVERY 6 HOURS PRN
Qty: 120 TABLET | Refills: 0 | Status: ON HOLD | OUTPATIENT
Start: 2019-01-02 | End: 2019-03-10 | Stop reason: HOSPADM

## 2019-01-14 ENCOUNTER — DOCUMENTATION ONLY (OUTPATIENT)
Dept: FAMILY MEDICINE | Facility: CLINIC | Age: 44
End: 2019-01-14

## 2019-01-14 NOTE — PROGRESS NOTES
Pre-Visit Chart Review  For Appointment Scheduled on 01/15/2019    Health Maintenance Due   Topic Date Due    Pneumococcal Vaccine (Medium Risk) (1 of 1 - PPSV23) 09[birth year]1994

## 2019-01-15 ENCOUNTER — OFFICE VISIT (OUTPATIENT)
Dept: FAMILY MEDICINE | Facility: CLINIC | Age: 44
End: 2019-01-15
Payer: MEDICARE

## 2019-01-15 ENCOUNTER — DOCUMENTATION ONLY (OUTPATIENT)
Dept: FAMILY MEDICINE | Facility: CLINIC | Age: 44
End: 2019-01-15

## 2019-01-15 VITALS
WEIGHT: 293 LBS | HEIGHT: 61 IN | DIASTOLIC BLOOD PRESSURE: 80 MMHG | OXYGEN SATURATION: 99 % | BODY MASS INDEX: 55.32 KG/M2 | SYSTOLIC BLOOD PRESSURE: 130 MMHG | HEART RATE: 97 BPM | TEMPERATURE: 99 F

## 2019-01-15 DIAGNOSIS — J44.89 ASTHMA-COPD OVERLAP SYNDROME: ICD-10-CM

## 2019-01-15 DIAGNOSIS — J31.0 CHRONIC RHINITIS: ICD-10-CM

## 2019-01-15 DIAGNOSIS — I10 ESSENTIAL HYPERTENSION: ICD-10-CM

## 2019-01-15 DIAGNOSIS — Q05.9 SPINA BIFIDA MANIFESTA: ICD-10-CM

## 2019-01-15 DIAGNOSIS — E66.01 MORBID OBESITY WITH BMI OF 50.0-59.9, ADULT: ICD-10-CM

## 2019-01-15 DIAGNOSIS — B35.6 TINEA CRURIS: Primary | ICD-10-CM

## 2019-01-15 PROCEDURE — 99215 PR OFFICE/OUTPT VISIT, EST, LEVL V, 40-54 MIN: ICD-10-PCS | Mod: S$PBB,,, | Performed by: FAMILY MEDICINE

## 2019-01-15 PROCEDURE — 99214 OFFICE O/P EST MOD 30 MIN: CPT | Mod: PBBFAC,PO | Performed by: FAMILY MEDICINE

## 2019-01-15 PROCEDURE — 99999 PR PBB SHADOW E&M-EST. PATIENT-LVL IV: CPT | Mod: PBBFAC,,, | Performed by: FAMILY MEDICINE

## 2019-01-15 PROCEDURE — 99999 PR PBB SHADOW E&M-EST. PATIENT-LVL IV: ICD-10-PCS | Mod: PBBFAC,,, | Performed by: FAMILY MEDICINE

## 2019-01-15 PROCEDURE — 99215 OFFICE O/P EST HI 40 MIN: CPT | Mod: S$PBB,,, | Performed by: FAMILY MEDICINE

## 2019-01-15 RX ORDER — HYDROCODONE BITARTRATE AND ACETAMINOPHEN 7.5; 325 MG/1; MG/1
1 TABLET ORAL EVERY 6 HOURS PRN
Qty: 120 TABLET | Refills: 0 | Status: SHIPPED | OUTPATIENT
Start: 2019-01-15 | End: 2019-01-15 | Stop reason: SDUPTHER

## 2019-01-15 RX ORDER — HYDROCODONE BITARTRATE AND ACETAMINOPHEN 7.5; 325 MG/1; MG/1
1 TABLET ORAL EVERY 6 HOURS PRN
Qty: 120 TABLET | Refills: 0 | Status: ON HOLD | OUTPATIENT
Start: 2019-01-15 | End: 2019-03-10 | Stop reason: HOSPADM

## 2019-01-15 RX ORDER — TERBINAFINE HYDROCHLORIDE 250 MG/1
250 TABLET ORAL DAILY
Qty: 10 TABLET | Refills: 3 | Status: CANCELLED | OUTPATIENT
Start: 2019-01-15 | End: 2019-01-25

## 2019-01-15 RX ORDER — TRIAMCINOLONE ACETONIDE 1 MG/G
OINTMENT TOPICAL 2 TIMES DAILY
Qty: 1 TUBE | Refills: 11 | Status: SHIPPED | OUTPATIENT
Start: 2019-01-15 | End: 2019-09-16

## 2019-01-15 RX ORDER — TERBINAFINE HYDROCHLORIDE 250 MG/1
250 TABLET ORAL DAILY
Qty: 10 TABLET | Refills: 0 | Status: SHIPPED | OUTPATIENT
Start: 2019-01-15 | End: 2019-01-25

## 2019-01-15 RX ORDER — MONTELUKAST SODIUM 10 MG/1
10 TABLET ORAL NIGHTLY
Qty: 30 TABLET | Refills: 0 | Status: CANCELLED | OUTPATIENT
Start: 2019-01-15 | End: 2019-02-14

## 2019-01-15 RX ORDER — MONTELUKAST SODIUM 10 MG/1
10 TABLET ORAL NIGHTLY
Qty: 30 TABLET | Refills: 2 | Status: SHIPPED | OUTPATIENT
Start: 2019-01-15 | End: 2019-04-30 | Stop reason: SDUPTHER

## 2019-01-15 NOTE — PROGRESS NOTES
Pre-Visit Chart Review  For Appointment Scheduled on 1/15/19    Health Maintenance Due   Topic Date Due    Pneumococcal Vaccine (Medium Risk) (1 of 1 - PPSV23) 09/24/1994

## 2019-01-15 NOTE — PROGRESS NOTES
Subjective:   Patient ID: Briana Uriarte is a 43 y.o. female     Chief Complaint:Follow-up (3 months)      Patient presents today with multiple issues.  Patient's family states patient has been having constant cough has been nonproductive and has not been improving.  Patient has been on antihistamines with limited improvement.  Patient's family denies any sort of fever or purulent discharge. Patient's family states this is her baseline but has questions about whether they are medications that can assist.  Patient also with chronic rash that occurs in the folds of her skin that has been treated in the past but recurs.  Patient with some improvement the past with steroid and antifungal management.  Patient also here for refills of her chronic pain medication which she refused received by her primary care doctor.      Review of Systems   Constitutional: Negative for chills and fever.   HENT: Positive for postnasal drip and rhinorrhea. Negative for sore throat.    Eyes: Negative for visual disturbance.   Respiratory: Positive for cough. Negative for shortness of breath.    Cardiovascular: Negative for chest pain.   Gastrointestinal: Negative for abdominal pain.   Endocrine: Negative for polyuria.   Genitourinary: Negative for dysuria.   Musculoskeletal: Negative for back pain.   Skin: Negative for color change.   Neurological: Negative for headaches.   Psychiatric/Behavioral: Negative for agitation and confusion.     Past Medical History:   Diagnosis Date    Asthma     Back pain     Blood transfusion     Chronic kidney disease     kidney stones    Diabetes mellitus     Diabetes mellitus, type 2     Esotropia     GERD (gastroesophageal reflux disease)     Hydrocephalus     Hypertension     Non-healing ulcer of buttock     Nystagmus, congenital     Paralysis     Spinal bifida, closed     Wheezing      Past Surgical History:   Procedure Laterality Date    BRAIN SURGERY       shunt revision     CHOLECYSTECTOMY      CYSTOSCOPY W/ LASER LITHOTRIPSY      DEBRIDEMENT-WOUND Left 5/11/2015    [facility]    INSERTION-CATHETER-GROSHONG N/A 10/16/2013    [facility] D[facility] Stewart [facility] at [facility]    KFGVZEFF-FGJVA-EAPWDZATRULEEWHZVKYW Left 10/20/2014    [facility] at [facility] OR 2ND FLR    SPINE SURGERY         Objective:     Vitals:    01/15/19 0927   BP: 130/80   Pulse: 97   Temp: 98.6 °F (37 °C)     Body mass index is 58.32 kg/m².  Physical Exam   Constitutional: She is oriented to person, place, and time. She appears well-developed and well-nourished.   HENT:   Head: Normocephalic and atraumatic.   Eyes: EOM are normal. Pupils are equal, round, and reactive to light.   Neck: Normal range of motion. Neck supple. No tracheal deviation present.   Cardiovascular: Normal rate, regular rhythm, normal heart sounds and intact distal pulses.   Pulmonary/Chest: [place] normal and breath sounds normal. No respiratory distress.   Abdominal: Soft. She exhibits no distension.   Musculoskeletal: Normal range of motion. She exhibits no edema.   Neurological: She is alert and oriented to person, place, and time.   Skin: Skin is warm and dry.   Psychiatric: She has a normal mood and affect. Her behavior is normal. Judgment and thought content normal.   Nursing note and vitals reviewed.    Assessment:     1. Tinea cruris    2. Chronic rhinitis    3. Spina bifida manifesta    4. Asthma-COPD overlap syndrome    5. Essential hypertension    6. Morbid obesity with BMI of 50.0-59.9, adult      Plan:   Tinea cruris  -     triamcinolone acetonide 0.1% (KENALOG) 0.1 % ointment; Apply topically 2 (two) times daily.  Dispense: 1 Tube; Refill: 11  -     terbinafine HCl (LAMISIL) 250 mg tablet; Take 1 tablet (250 mg total) by mouth once daily. for 10 days  Dispense: 10 tablet; Refill: 0    Chronic rhinitis  -     montelukast (SINGULAIR) 10 mg tablet; Take 1 tablet (10 mg total) by mouth  every evening.  Dispense: 30 tablet; Refill: 2    Spina bifida manifesta  -     HYDROcodone-acetaminophen (NORCO) 7.5-325 mg per tablet; Take 1 tablet by mouth every 6 (six) hours as needed for Pain. Do not fill until 3/1/2019  Dispense: 120 tablet; Refill: 0  -     catheter 20 Fr Misc; 1 application by Misc.(Non-Drug; Combo Route) route every 30 days.  Dispense: 6 each; Refill: 11  Provided patient with 2 scripts for her Norco as she will be running out at the end of January which was filled by her PCP.  Patient understands she must follow up with her PCP for further refills of this medication.  Dated the scripts not to be filled until February 1, 2019 and March 1, 2019 providing her with a supply that should last through to the end of March 2018.  Went over the risks of this medication with family and they understand and accept risks patient has been on this medication for a long period of time and is stable dose and have low concern for overdose at this time.    Asthma-COPD overlap syndrome  Patient currently stable on trelligy but is having issues with rhinorrhea, congestion, and cough.  Will start patient on Singulair and monitor for improvement.    Essential hypertension  Patient's blood pressure well controlled today on lisinopril.  Review of blood work from the August 2018 shows no signs end organ damage such as kidney disease indicating controlled BP has reduced patients risk of hypertensive comorbidity.    BMI of 58  Patient with spina bifida and is wheelchair-bound.  Will be very difficult for patient to lose weight due to her multiple chronic medical comorbidities.  Will continue to monitor this on follow-up visits.    Time spent with patient: 45 minutes and over half of that time was spent on counseling an coordination of care.    Jose De Jesus Curran MD  01/15/2019    Portions of this note have been dictated with DILLON Spear

## 2019-02-07 ENCOUNTER — OFFICE VISIT (OUTPATIENT)
Dept: DERMATOLOGY | Facility: CLINIC | Age: 44
End: 2019-02-07
Payer: MEDICARE

## 2019-02-07 VITALS — WEIGHT: 293 LBS | BODY MASS INDEX: 55.32 KG/M2 | HEIGHT: 61 IN

## 2019-02-07 DIAGNOSIS — L40.8 INVERSE PSORIASIS: ICD-10-CM

## 2019-02-07 DIAGNOSIS — L30.4 INTERTRIGO: ICD-10-CM

## 2019-02-07 DIAGNOSIS — L40.8 SEBOPSORIASIS: ICD-10-CM

## 2019-02-07 PROCEDURE — 99213 OFFICE O/P EST LOW 20 MIN: CPT | Mod: PBBFAC,PO | Performed by: DERMATOLOGY

## 2019-02-07 PROCEDURE — 99213 OFFICE O/P EST LOW 20 MIN: CPT | Mod: S$PBB,,, | Performed by: DERMATOLOGY

## 2019-02-07 PROCEDURE — 99213 PR OFFICE/OUTPT VISIT, EST, LEVL III, 20-29 MIN: ICD-10-PCS | Mod: S$PBB,,, | Performed by: DERMATOLOGY

## 2019-02-07 PROCEDURE — 99999 PR PBB SHADOW E&M-EST. PATIENT-LVL III: CPT | Mod: PBBFAC,,, | Performed by: DERMATOLOGY

## 2019-02-07 PROCEDURE — 99999 PR PBB SHADOW E&M-EST. PATIENT-LVL III: ICD-10-PCS | Mod: PBBFAC,,, | Performed by: DERMATOLOGY

## 2019-02-07 RX ORDER — BETAMETHASONE DIPROPIONATE 0.5 MG/G
LOTION TOPICAL
Qty: 60 ML | Refills: 5 | Status: SHIPPED | OUTPATIENT
Start: 2019-02-07 | End: 2019-09-16

## 2019-02-07 RX ORDER — FLUCONAZOLE 150 MG/1
TABLET ORAL
Qty: 8 TABLET | Refills: 0 | Status: SHIPPED | OUTPATIENT
Start: 2019-02-07 | End: 2019-04-16

## 2019-02-07 RX ORDER — LISINOPRIL 20 MG/1
TABLET ORAL
Refills: 3 | COMMUNITY
Start: 2019-02-01 | End: 2019-04-17

## 2019-02-07 RX ORDER — KETOCONAZOLE 20 MG/G
CREAM TOPICAL
Qty: 120 G | Refills: 3 | Status: SHIPPED | OUTPATIENT
Start: 2019-02-07 | End: 2020-05-25

## 2019-02-07 NOTE — PROGRESS NOTES
Subjective:       Patient ID:  Briana Uriarte is a 43 y.o. female who presents for   Chief Complaint   Patient presents with    Rash     breast and abdominal folds, x 3 months, inflamed, red, burns, tx w/ TAC aquaphor and nystatin cream, helps     Patient last seen 3/2016  H/o seb derm ears, manages with dermotic oil and OTC sal acid gel    Interim visit with Dr TIP Dailey, 01/2019 x several visits, bacterial cultures obtained and positive (mother accompanies and helps with history, unknown organism)  Doxycycline PO, terbinafine PO and TAC cream, cleared completely  Then recurred    Morbid obesity. History spina bifida with paraplegia. Numerous spinal surgeries in past.      Current Outpatient Medications:     albuterol 90 mcg/actuation inhaler, 2 puffs every 4 hours as needed for cough, wheeze, or shortness of breath, Disp: 1 Inhaler, Rfl: 11    ascorbic acid (VITAMIN C) 500 MG tablet, Take 500 mg by mouth once daily., Disp: , Rfl:     catheter 20 Fr Misc, 1 application by Misc.(Non-Drug; Combo Route) route every 30 days., Disp: 6 each, Rfl: 11    fluticasone-umeclidin-vilanter (TRELEGY ELLIPTA) 100-62.5-25 mcg DsDv, Inhale 1 puff into the lungs once daily., Disp: 1 each, Rfl: 11    FREESTYLE LITE STRIPS Strp, TEST BS ONCE D, Disp: , Rfl: 3    HYDROcodone-acetaminophen (NORCO) 7.5-325 mg per tablet, Take 1 tablet by mouth every 6 (six) hours as needed for Pain., Disp: 120 tablet, Rfl: 0    ipratropium (ATROVENT) 42 mcg (0.06 %) nasal spray, 1 spray by Nasal route daily as needed for Rhinitis. , Disp: , Rfl:     lisinopril (PRINIVIL,ZESTRIL) 20 MG tablet, , Disp: , Rfl: 3    metoprolol succinate (TOPROL-XL) 50 MG 24 hr tablet, TAKE 1 TABLET(50 MG) BY MOUTH EVERY DAY, Disp: 90 tablet, Rfl: 3    montelukast (SINGULAIR) 10 mg tablet, Take 1 tablet (10 mg total) by mouth every evening., Disp: 30 tablet, Rfl: 2    multivitamin with minerals tablet, Take 1 tablet by mouth once daily., Disp: , Rfl:      polyethylene glycol (GLYCOLAX) 17 gram PwPk, Take 17 g by mouth once daily., Disp: 7 packet, Rfl: 0    triamcinolone acetonide 0.1% (KENALOG) 0.1 % ointment, Apply topically 2 (two) times daily., Disp: 1 Tube, Rfl: 11    betamethasone dipropionate (DIPROLENE) 0.05 % lotion, AAA scalp QHS PRN itchy red plaques, Disp: 60 mL, Rfl: 5    fluocinolone acetonide oil (DERMOTIC OIL) 0.01 % Drop, 5 drops in right ear bid x 7-10 days until resolution, use intermittenlty for flares., Disp: 20 mL, Rfl: 2    HYDROcodone-acetaminophen (NORCO) 7.5-325 mg per tablet, Take 1 tablet by mouth every 6 (six) hours as needed for Pain. Do not fill until 3/1/2019, Disp: 120 tablet, Rfl: 0    ketoconazole (NIZORAL) 2 % cream, Apply to AA folds BID PRN flare, Disp: 120 g, Rfl: 3    miconazole nitrate 2 % AerP, Apply 1 Dose topically 2 (two) times daily. Apply to rash on buttocks, Disp: , Rfl:   No current facility-administered medications for this visit.             Rash  - Initial  Affected locations: groin, abdomen and chest  Duration: 3 months  Signs / symptoms: inflamed, redness and burning  Severity: moderate  Timing: intermittent  Aggravated by: sweating  Treatments tried: TAC aquaphor nystatin.  Improvement on treatment: mild        Review of Systems   Constitutional: Negative for fever, chills and fatigue.   Skin: Positive for rash. Negative for daily sunscreen use, activity-related sunscreen use and wears hat.   Hematologic/Lymphatic: Does not bruise/bleed easily.        Objective:    Physical Exam   Skin:   Areas Examined (abnormalities noted in diagram):   Scalp / Hair Palpated and Inspected  Head / Face Inspection Performed  Chest / Axilla Inspection Performed  Abdomen Inspection Performed  Genitals / Buttocks / Groin Inspection Performed                       Diagram Legend     Erythematous scaling macule/papule c/w actinic keratosis       Vascular papule c/w angioma      Pigmented verrucoid papule/plaque c/w seborrheic  keratosis      Yellow umbilicated papule c/w sebaceous hyperplasia      Irregularly shaped tan macule c/w lentigo     1-2 mm smooth white papules consistent with Milia      Movable subcutaneous cyst with punctum c/w epidermal inclusion cyst      Subcutaneous movable cyst c/w pilar cyst      Firm pink to brown papule c/w dermatofibroma      Pedunculated fleshy papule(s) c/w skin tag(s)      Evenly pigmented macule c/w junctional nevus     Mildly variegated pigmented, slightly irregular-bordered macule c/w mildly atypical nevus      Flesh colored to evenly pigmented papule c/w intradermal nevus       Pink pearly papule/plaque c/w basal cell carcinoma      Erythematous hyperkeratotic cursted plaque c/w SCC      Surgical scar with no sign of skin cancer recurrence      Open and closed comedones      Inflammatory papules and pustules      Verrucoid papule consistent consistent with wart     Erythematous eczematous patches and plaques     Dystrophic onycholytic nail with subungual debris c/w onychomycosis     Umbilicated papule    Erythematous-base heme-crusted tan verrucoid plaque consistent with inflamed seborrheic keratosis     Erythematous Silvery Scaling Plaque c/w Psoriasis     See annotation      Assessment / Plan:        Intertrigo vs Inverse psoriasis +/- element of irritant derm (suprapubic catheter with some urine leakage)  -     ketoconazole (NIZORAL) 2 % cream; Apply to AA folds BID PRN flare  Dispense: 120 g; Refill: 3  Fluconazole 300mg PO BIW x 4 doses total    Mix keto cream, TAC cream and zinc oxide cream (OTC) 1:1:1 and apply BID PRN flare  Once improved, wean out TAC cream  Recommend white vinegar: water 1:2 compresses daily followed by cool blow dry and then application of prescription medication.     Patient morbidly obese with limited mobility; skin on skin   Mother bathes daily  This is going to be a chronic process requiring a lot of work to manage (drying measures, barrier  protection)    Sebopsoriasis, scalp  -     betamethasone dipropionate (DIPROLENE) 0.05 % lotion; AAA scalp QHS PRN itchy red plaques  Dispense: 60 mL; Refill: 5             Follow-up in about 4 weeks (around 3/7/2019).

## 2019-03-08 ENCOUNTER — ANESTHESIA (OUTPATIENT)
Dept: SURGERY | Facility: HOSPITAL | Age: 44
DRG: 353 | End: 2019-03-08
Payer: MEDICARE

## 2019-03-08 ENCOUNTER — ANESTHESIA EVENT (OUTPATIENT)
Dept: SURGERY | Facility: HOSPITAL | Age: 44
DRG: 353 | End: 2019-03-08
Payer: MEDICARE

## 2019-03-08 ENCOUNTER — HOSPITAL ENCOUNTER (INPATIENT)
Facility: HOSPITAL | Age: 44
LOS: 2 days | Discharge: HOME | DRG: 353 | End: 2019-03-10
Attending: EMERGENCY MEDICINE | Admitting: INTERNAL MEDICINE
Payer: MEDICARE

## 2019-03-08 DIAGNOSIS — K46.0 INCARCERATED HERNIA: ICD-10-CM

## 2019-03-08 DIAGNOSIS — K56.609 SBO (SMALL BOWEL OBSTRUCTION): Primary | ICD-10-CM

## 2019-03-08 LAB
ALBUMIN SERPL BCP-MCNC: 3.5 G/DL
ALP SERPL-CCNC: 87 U/L
ALT SERPL W/O P-5'-P-CCNC: 29 U/L
ANION GAP SERPL CALC-SCNC: 13 MMOL/L
AST SERPL-CCNC: 30 U/L
B-HCG UR QL: NEGATIVE
BASOPHILS # BLD AUTO: 0.3 K/UL
BASOPHILS NFR BLD: 2.3 %
BILIRUB SERPL-MCNC: 0.4 MG/DL
BUN SERPL-MCNC: 12 MG/DL
CALCIUM SERPL-MCNC: 10.2 MG/DL
CHLORIDE SERPL-SCNC: 88 MMOL/L
CO2 SERPL-SCNC: 24 MMOL/L
CREAT SERPL-MCNC: 0.8 MG/DL
CTP QC/QA: YES
DIFFERENTIAL METHOD: ABNORMAL
EOSINOPHIL # BLD AUTO: 0 K/UL
EOSINOPHIL NFR BLD: 0.3 %
ERYTHROCYTE [DISTWIDTH] IN BLOOD BY AUTOMATED COUNT: 18 %
EST. GFR  (AFRICAN AMERICAN): >60 ML/MIN/1.73 M^2
EST. GFR  (NON AFRICAN AMERICAN): >60 ML/MIN/1.73 M^2
GLUCOSE SERPL-MCNC: 132 MG/DL
HCT VFR BLD AUTO: 37.7 %
HGB BLD-MCNC: 12 G/DL
LIPASE SERPL-CCNC: 25 U/L
LYMPHOCYTES # BLD AUTO: 1 K/UL
LYMPHOCYTES NFR BLD: 7.6 %
MCH RBC QN AUTO: 24.1 PG
MCHC RBC AUTO-ENTMCNC: 31.9 G/DL
MCV RBC AUTO: 76 FL
MONOCYTES # BLD AUTO: 0.3 K/UL
MONOCYTES NFR BLD: 2.5 %
NEUTROPHILS # BLD AUTO: 11.7 K/UL
NEUTROPHILS NFR BLD: 87.3 %
PLATELET # BLD AUTO: 438 K/UL
PMV BLD AUTO: 7.7 FL
POTASSIUM SERPL-SCNC: 4.9 MMOL/L
PROT SERPL-MCNC: 8.7 G/DL
RBC # BLD AUTO: 4.98 M/UL
SODIUM SERPL-SCNC: 125 MMOL/L
WBC # BLD AUTO: 13.4 K/UL

## 2019-03-08 PROCEDURE — 12000002 HC ACUTE/MED SURGE SEMI-PRIVATE ROOM

## 2019-03-08 PROCEDURE — D9220A PRA ANESTHESIA: ICD-10-PCS | Mod: CRNA,,, | Performed by: NURSE ANESTHETIST, CERTIFIED REGISTERED

## 2019-03-08 PROCEDURE — 99221 PR INITIAL HOSPITAL CARE,LEVL I: ICD-10-PCS | Mod: 57,,, | Performed by: SURGERY

## 2019-03-08 PROCEDURE — D9220A PRA ANESTHESIA: Mod: CRNA,,, | Performed by: NURSE ANESTHETIST, CERTIFIED REGISTERED

## 2019-03-08 PROCEDURE — 99900104 DSU ONLY-NO CHARGE-EA ADD'L HR (STAT): Performed by: SURGERY

## 2019-03-08 PROCEDURE — 36415 COLL VENOUS BLD VENIPUNCTURE: CPT

## 2019-03-08 PROCEDURE — 71000033 HC RECOVERY, INTIAL HOUR: Performed by: SURGERY

## 2019-03-08 PROCEDURE — 88302 TISSUE EXAM BY PATHOLOGIST: CPT | Mod: 26,,, | Performed by: PATHOLOGY

## 2019-03-08 PROCEDURE — 63600175 PHARM REV CODE 636 W HCPCS: Performed by: NURSE ANESTHETIST, CERTIFIED REGISTERED

## 2019-03-08 PROCEDURE — 27000221 HC OXYGEN, UP TO 24 HOURS

## 2019-03-08 PROCEDURE — 25500020 PHARM REV CODE 255: Performed by: INTERNAL MEDICINE

## 2019-03-08 PROCEDURE — 96374 THER/PROPH/DIAG INJ IV PUSH: CPT

## 2019-03-08 PROCEDURE — 49587 PR REPAIR UMBILICAL HERN,5+Y/O,STRANG: ICD-10-PCS | Mod: ,,, | Performed by: SURGERY

## 2019-03-08 PROCEDURE — 85025 COMPLETE CBC W/AUTO DIFF WBC: CPT

## 2019-03-08 PROCEDURE — 83690 ASSAY OF LIPASE: CPT

## 2019-03-08 PROCEDURE — 71000039 HC RECOVERY, EACH ADD'L HOUR: Performed by: SURGERY

## 2019-03-08 PROCEDURE — D9220A PRA ANESTHESIA: Mod: ANES,,, | Performed by: ANESTHESIOLOGY

## 2019-03-08 PROCEDURE — 88302 TISSUE EXAM BY PATHOLOGIST: CPT | Performed by: PATHOLOGY

## 2019-03-08 PROCEDURE — 37000009 HC ANESTHESIA EA ADD 15 MINS: Performed by: SURGERY

## 2019-03-08 PROCEDURE — 93005 ELECTROCARDIOGRAM TRACING: CPT

## 2019-03-08 PROCEDURE — 63600175 PHARM REV CODE 636 W HCPCS: Performed by: ANESTHESIOLOGY

## 2019-03-08 PROCEDURE — 25000003 PHARM REV CODE 250: Performed by: ANESTHESIOLOGY

## 2019-03-08 PROCEDURE — S0077 INJECTION, CLINDAMYCIN PHOSP: HCPCS | Performed by: SURGERY

## 2019-03-08 PROCEDURE — 81025 URINE PREGNANCY TEST: CPT | Performed by: INTERNAL MEDICINE

## 2019-03-08 PROCEDURE — 36000707: Performed by: SURGERY

## 2019-03-08 PROCEDURE — 63600175 PHARM REV CODE 636 W HCPCS: Performed by: EMERGENCY MEDICINE

## 2019-03-08 PROCEDURE — 94760 N-INVAS EAR/PLS OXIMETRY 1: CPT

## 2019-03-08 PROCEDURE — 99900103 DSU ONLY-NO CHARGE-INITIAL HR (STAT): Performed by: SURGERY

## 2019-03-08 PROCEDURE — D9220A PRA ANESTHESIA: ICD-10-PCS | Mod: ANES,,, | Performed by: ANESTHESIOLOGY

## 2019-03-08 PROCEDURE — 25000003 PHARM REV CODE 250: Performed by: SURGERY

## 2019-03-08 PROCEDURE — 88302 TISSUE SPECIMEN TO PATHOLOGY - SURGERY: ICD-10-PCS | Mod: 26,,, | Performed by: PATHOLOGY

## 2019-03-08 PROCEDURE — 96375 TX/PRO/DX INJ NEW DRUG ADDON: CPT

## 2019-03-08 PROCEDURE — 63600175 PHARM REV CODE 636 W HCPCS: Performed by: SURGERY

## 2019-03-08 PROCEDURE — 25500020 PHARM REV CODE 255: Performed by: EMERGENCY MEDICINE

## 2019-03-08 PROCEDURE — 80053 COMPREHEN METABOLIC PANEL: CPT

## 2019-03-08 PROCEDURE — 36000706: Performed by: SURGERY

## 2019-03-08 PROCEDURE — 25000003 PHARM REV CODE 250: Performed by: EMERGENCY MEDICINE

## 2019-03-08 PROCEDURE — 37000008 HC ANESTHESIA 1ST 15 MINUTES: Performed by: SURGERY

## 2019-03-08 PROCEDURE — 99285 EMERGENCY DEPT VISIT HI MDM: CPT | Mod: 25

## 2019-03-08 PROCEDURE — 49587 PR REPAIR UMBILICAL HERN,5+Y/O,STRANG: CPT | Mod: ,,, | Performed by: SURGERY

## 2019-03-08 PROCEDURE — 25000003 PHARM REV CODE 250: Performed by: NURSE ANESTHETIST, CERTIFIED REGISTERED

## 2019-03-08 PROCEDURE — 99221 1ST HOSP IP/OBS SF/LOW 40: CPT | Mod: 57,,, | Performed by: SURGERY

## 2019-03-08 RX ORDER — ONDANSETRON HYDROCHLORIDE 2 MG/ML
INJECTION, SOLUTION INTRAMUSCULAR; INTRAVENOUS
Status: DISCONTINUED | OUTPATIENT
Start: 2019-03-08 | End: 2019-03-08

## 2019-03-08 RX ORDER — ROCURONIUM BROMIDE 10 MG/ML
INJECTION, SOLUTION INTRAVENOUS
Status: DISCONTINUED | OUTPATIENT
Start: 2019-03-08 | End: 2019-03-08

## 2019-03-08 RX ORDER — ONDANSETRON 2 MG/ML
4 INJECTION INTRAMUSCULAR; INTRAVENOUS
Status: COMPLETED | OUTPATIENT
Start: 2019-03-08 | End: 2019-03-08

## 2019-03-08 RX ORDER — CLINDAMYCIN PHOSPHATE 900 MG/50ML
900 INJECTION, SOLUTION INTRAVENOUS
Status: COMPLETED | OUTPATIENT
Start: 2019-03-08 | End: 2019-03-08

## 2019-03-08 RX ORDER — LIDOCAINE HCL/PF 100 MG/5ML
SYRINGE (ML) INTRAVENOUS
Status: DISCONTINUED | OUTPATIENT
Start: 2019-03-08 | End: 2019-03-08

## 2019-03-08 RX ORDER — SODIUM CHLORIDE 0.9 % (FLUSH) 0.9 %
5 SYRINGE (ML) INJECTION
Status: DISCONTINUED | OUTPATIENT
Start: 2019-03-08 | End: 2019-03-10 | Stop reason: HOSPADM

## 2019-03-08 RX ORDER — PROPOFOL 10 MG/ML
VIAL (ML) INTRAVENOUS
Status: DISCONTINUED | OUTPATIENT
Start: 2019-03-08 | End: 2019-03-08

## 2019-03-08 RX ORDER — MORPHINE SULFATE 2 MG/ML
6 INJECTION, SOLUTION INTRAMUSCULAR; INTRAVENOUS
Status: COMPLETED | OUTPATIENT
Start: 2019-03-08 | End: 2019-03-08

## 2019-03-08 RX ORDER — FENTANYL CITRATE 50 UG/ML
INJECTION, SOLUTION INTRAMUSCULAR; INTRAVENOUS
Status: DISCONTINUED | OUTPATIENT
Start: 2019-03-08 | End: 2019-03-08

## 2019-03-08 RX ORDER — HYDROMORPHONE HYDROCHLORIDE 2 MG/ML
0.2 INJECTION, SOLUTION INTRAMUSCULAR; INTRAVENOUS; SUBCUTANEOUS EVERY 5 MIN PRN
Status: DISCONTINUED | OUTPATIENT
Start: 2019-03-08 | End: 2019-03-08 | Stop reason: HOSPADM

## 2019-03-08 RX ORDER — MIDAZOLAM HYDROCHLORIDE 1 MG/ML
INJECTION, SOLUTION INTRAMUSCULAR; INTRAVENOUS
Status: DISCONTINUED | OUTPATIENT
Start: 2019-03-08 | End: 2019-03-08

## 2019-03-08 RX ORDER — ENOXAPARIN SODIUM 100 MG/ML
40 INJECTION SUBCUTANEOUS
Status: DISCONTINUED | OUTPATIENT
Start: 2019-03-08 | End: 2019-03-08

## 2019-03-08 RX ORDER — MORPHINE SULFATE 8 MG/ML
8 INJECTION INTRAMUSCULAR; INTRAVENOUS; SUBCUTANEOUS EVERY 4 HOURS PRN
Status: DISCONTINUED | OUTPATIENT
Start: 2019-03-08 | End: 2019-03-10 | Stop reason: HOSPADM

## 2019-03-08 RX ORDER — SODIUM CHLORIDE, SODIUM LACTATE, POTASSIUM CHLORIDE, CALCIUM CHLORIDE 600; 310; 30; 20 MG/100ML; MG/100ML; MG/100ML; MG/100ML
INJECTION, SOLUTION INTRAVENOUS CONTINUOUS
Status: DISCONTINUED | OUTPATIENT
Start: 2019-03-08 | End: 2019-03-08

## 2019-03-08 RX ORDER — ONDANSETRON 2 MG/ML
4 INJECTION INTRAMUSCULAR; INTRAVENOUS ONCE AS NEEDED
Status: DISCONTINUED | OUTPATIENT
Start: 2019-03-08 | End: 2019-03-08 | Stop reason: HOSPADM

## 2019-03-08 RX ORDER — ENOXAPARIN SODIUM 100 MG/ML
40 INJECTION SUBCUTANEOUS
Status: DISCONTINUED | OUTPATIENT
Start: 2019-03-08 | End: 2019-03-10 | Stop reason: HOSPADM

## 2019-03-08 RX ORDER — PHENYLEPHRINE HYDROCHLORIDE 10 MG/ML
INJECTION INTRAVENOUS
Status: DISCONTINUED | OUTPATIENT
Start: 2019-03-08 | End: 2019-03-08

## 2019-03-08 RX ORDER — ONDANSETRON 2 MG/ML
4 INJECTION INTRAMUSCULAR; INTRAVENOUS EVERY 8 HOURS PRN
Status: DISCONTINUED | OUTPATIENT
Start: 2019-03-08 | End: 2019-03-10 | Stop reason: HOSPADM

## 2019-03-08 RX ADMIN — HYDROMORPHONE HYDROCHLORIDE 0.2 MG: 2 INJECTION, SOLUTION INTRAMUSCULAR; INTRAVENOUS; SUBCUTANEOUS at 05:03

## 2019-03-08 RX ADMIN — PHENYLEPHRINE HYDROCHLORIDE 100 MCG: 10 INJECTION INTRAVENOUS at 04:03

## 2019-03-08 RX ADMIN — MIDAZOLAM 2 MG: 1 INJECTION INTRAMUSCULAR; INTRAVENOUS at 02:03

## 2019-03-08 RX ADMIN — PHENYLEPHRINE HYDROCHLORIDE 100 MCG: 10 INJECTION INTRAVENOUS at 03:03

## 2019-03-08 RX ADMIN — IOHEXOL 20 ML: 350 INJECTION, SOLUTION INTRAVENOUS at 09:03

## 2019-03-08 RX ADMIN — ONDANSETRON 4 MG: 2 INJECTION, SOLUTION INTRAMUSCULAR; INTRAVENOUS at 03:03

## 2019-03-08 RX ADMIN — MORPHINE SULFATE 8 MG: 8 INJECTION, SOLUTION INTRAMUSCULAR; INTRAVENOUS at 10:03

## 2019-03-08 RX ADMIN — CLINDAMYCIN PHOSPHATE 900 MG: 18 INJECTION, SOLUTION INTRAVENOUS at 03:03

## 2019-03-08 RX ADMIN — SODIUM CHLORIDE, SODIUM LACTATE, POTASSIUM CHLORIDE, AND CALCIUM CHLORIDE: .6; .31; .03; .02 INJECTION, SOLUTION INTRAVENOUS at 03:03

## 2019-03-08 RX ADMIN — SUGAMMADEX 500 MG: 100 INJECTION, SOLUTION INTRAVENOUS at 04:03

## 2019-03-08 RX ADMIN — PHENYLEPHRINE HYDROCHLORIDE 200 MCG: 10 INJECTION INTRAVENOUS at 03:03

## 2019-03-08 RX ADMIN — MORPHINE SULFATE 6 MG: 2 INJECTION, SOLUTION INTRAMUSCULAR; INTRAVENOUS at 08:03

## 2019-03-08 RX ADMIN — FENTANYL CITRATE 50 MCG: 50 INJECTION, SOLUTION INTRAMUSCULAR; INTRAVENOUS at 03:03

## 2019-03-08 RX ADMIN — HYDROMORPHONE HYDROCHLORIDE 0.2 MG: 2 INJECTION, SOLUTION INTRAMUSCULAR; INTRAVENOUS; SUBCUTANEOUS at 06:03

## 2019-03-08 RX ADMIN — IOHEXOL 100 ML: 350 INJECTION, SOLUTION INTRAVENOUS at 06:03

## 2019-03-08 RX ADMIN — LIDOCAINE HYDROCHLORIDE 75 MG: 20 INJECTION, SOLUTION INTRAVENOUS at 03:03

## 2019-03-08 RX ADMIN — ONDANSETRON 4 MG: 2 INJECTION INTRAMUSCULAR; INTRAVENOUS at 08:03

## 2019-03-08 RX ADMIN — PROPOFOL 150 MG: 10 INJECTION, EMULSION INTRAVENOUS at 03:03

## 2019-03-08 RX ADMIN — SODIUM CHLORIDE, SODIUM LACTATE, POTASSIUM CHLORIDE, AND CALCIUM CHLORIDE 1000 ML: .6; .31; .03; .02 INJECTION, SOLUTION INTRAVENOUS at 05:03

## 2019-03-08 RX ADMIN — SODIUM CHLORIDE, SODIUM LACTATE, POTASSIUM CHLORIDE, AND CALCIUM CHLORIDE: .6; .31; .03; .02 INJECTION, SOLUTION INTRAVENOUS at 02:03

## 2019-03-08 RX ADMIN — ROCURONIUM BROMIDE 50 MG: 10 INJECTION, SOLUTION INTRAVENOUS at 03:03

## 2019-03-08 RX ADMIN — ENOXAPARIN SODIUM 40 MG: 100 INJECTION SUBCUTANEOUS at 10:03

## 2019-03-08 NOTE — HPI
44 y/o female with spina bifida presents to ED with abdomina pain nausea and vomiting, starting yesterday.  I am called to evaluate for sbo.

## 2019-03-08 NOTE — H&P
PCP: nAaid Osei MD    History & Physical    Chief Complaint:  Worsening abdominal pain associated with nausea and vomiting    History of Present Illness:  Patient is a 43 y.o. female admitted to Hospitalist Service from [facility] Emergency Room with complaint of worsening abdominal pain associated with nausea and vomiting for few days. Patient reportedly has past medical history significant for morbid obesity, diabetes mellitus type 2, history of nephrolithiasis, chronic kidney disease, gastroesophageal reflux disorder, history of spina bifida status post ventricular peritoneal shunt.  Patient has prior history of cholecystectomy. Patient presented to the emergency room with complaint of progressively worsening upper crampy abdominal pain associated with nonbloody, non bilious vomiting.  A CT scan of the abdomen and pelvis with contrast in the emergency room showed high-grade small-bowel obstruction appearing related to umbilical hernia containing bowel and with bowel wall thickening questioning ischemic change.  Patient was evaluated by Dr. Hancock in the emergency room who reportedly reduce the umbilical hernia.  Patient is being admitted for further monitoring and a CT scan of the abdomen and pelvis with contrast is planned for this afternoon. Patient denied chest pain, shortness of breath, headache, vision changes, focal neuro-deficits, cough or fever.    Past Medical History:   Diagnosis Date    Asthma     Back pain     Blood transfusion     Chronic kidney disease     kidney stones    Diabetes mellitus     Diabetes mellitus, type 2     Esotropia     GERD (gastroesophageal reflux disease)     Hydrocephalus     Hypertension     Non-healing ulcer of buttock     Nystagmus, congenital     Paralysis     Spinal bifida, closed     Wheezing      Past Surgical History:   Procedure Laterality Date    BRAIN SURGERY       shunt revision    CHOLECYSTECTOMY      CYSTOSCOPY W/ LASER  LITHOTRIPSY      DEBRIDEMENT-WOUND Left 5/11/2015    [facility]    INSERTION-CATHETER-GROSHONG N/A 10/16/2013    [facility] by [facility] at [facility] OR    [id number] Left 10/20/2014    Performed by [facility] at [facility] OR 2ND [facility]    SPINE SURGERY       Family History   Problem Relation Age of Onset    Cancer Mother     Glaucoma Father     Heart disease Maternal Grandmother     Cancer Maternal Grandmother     Glaucoma Paternal Grandfather     Psoriasis Maternal Uncle     Melanoma Neg Hx     Lupus Neg Hx     Eczema Neg Hx      Social History     Tobacco Use    Smoking status: Never Smoker    Smokeless tobacco: Never Used   Substance Use Topics    Alcohol use: No    Drug use: No      Review of patient's allergies indicates:   Allergen Reactions    Cefepime      HIVES    Latex Swelling       (Not in a hospital admission)  Review of Systems:  Constitutional: no fever or chills  Eyes: no visual changes  Ears, nose, mouth, throat, and face: no nasal congestion or sore throat  Respiratory: no cough or shorness of breath  Cardiovascular: no chest pain or palpitations  Gastrointestinal: see HPI.  Genitourinary: no hematuria or dysuria  Integument/breast: no rash or pruritis  Hematologic/lymphatic: no easy bruising or lymphadenopathy  Musculoskeletal: no arthralgias or myalgias  Neurological: no seizures or tremors.  Behavioral/Psych: no auditory or visual hallucinations  Endocrine: no heat or cold intolerance     OBJECTIVE:     Vital Signs (Most Recent)  Temp: 98.6 °F (37 °C) (03/08/19 0440)  Pulse: 106(Simultaneous filing. User may not have seen previous data.) (03/08/19 0440)  Resp: 20 (03/08/19 0440)  BP: (!) 153/99 (03/08/19 0440)  SpO2: 95 %(Simultaneous filing. User may not have seen previous data.) (03/08/19 0440)    Physical Exam:  General appearance: well developed, appears stated age  Head: normocephalic, atraumatic  Eyes:   conjunctivae/corneas clear. PERRL.  Nose: Nares normal. Septum midline.  Throat: lips, mucosa, and tongue normal; teeth and gums normal, no throat erythema.  Neck: supple, symmetrical, trachea midline, no JVD and thyroid not enlarged, symmetric, no tenderness/mass/nodules  Lungs:  clear to auscultation bilaterally and normal respiratory effort  Chest wall: no tenderness  Heart: regular rate and rhythm, S1, S2 normal, no murmur, click, rub or gallop  Abdomen: soft, bowel sounds normal; no masses,  no organomegaly. Large protuberant umbilical hernia with fullness and chronic changes of the skin, well-healed midline scar, suprapubic catheter is in place.  Extremities: no cyanosis, clubbing or edema.   Pulses: 2+ and symmetric  Skin: Skin color, texture, turgor normal. No rashes or lesions.  Lymph nodes: Cervical, supraclavicular, and axillary nodes normal.  Neurologic: Normal strength and tone. No focal numbness or weakness. CNII-XII intact.  Functional quadriplegia.    Laboratory:   CBC:   Recent Labs   Lab 03/08/19  0516   WBC 13.40*   RBC 4.98   HGB 12.0   HCT 37.7   *   MCV 76*   MCH 24.1*   MCHC 31.9*     CMP:   Recent Labs   Lab 03/08/19  0516   *   CALCIUM 10.2   ALBUMIN 3.5   PROT 8.7*   *   K 4.9   CO2 24   CL 88*   BUN 12   CREATININE 0.8   ALKPHOS 87   ALT 29   AST 30   BILITOT 0.4     Hemoglobin A1C   Date Value Ref Range Status   05/02/2018 5.2 4.0 - 5.6 % Final     Comment:     According to ADA guidelines, hemoglobin A1c <7.0% represents  optimal control in non-pregnant diabetic patients. Different  metrics may apply to specific patient populations.   Standards of Medical Care in Diabetes-2016.  For the purpose of screening for the presence of diabetes:  <5.7%     Consistent with the absence of diabetes  5.7-6.4%  Consistent with increasing risk for diabetes   (prediabetes)  >or=6.5%  Consistent with diabetes  Currently, no consensus exists for use of hemoglobin A1c  for diagnosis of  diabetes for children.  This Hemoglobin A1c assay has significant interference with fetal   hemoglobin   (HbF). The results are invalid for patients with abnormal amounts of   HbF,   including those with known Hereditary Persistence   of Fetal Hemoglobin. Heterozygous hemoglobin variants (HbAS, HbAC,   HbAD, HbAE, HbA2) do not significantly interfere with this assay;   however, presence of multiple variants in a sample may impact the %   interference.     09/29/2017 5.6 4.0 - 5.6 % Final     Comment:     According to ADA guidelines, hemoglobin A1c <7.0% represents  optimal control in non-pregnant diabetic patients. Different  metrics may apply to specific patient populations.   Standards of Medical Care in Diabetes-2016.  For the purpose of screening for the presence of diabetes:  <5.7%     Consistent with the absence of diabetes  5.7-6.4%  Consistent with increasing risk for diabetes   (prediabetes)  >or=6.5%  Consistent with diabetes  Currently, no consensus exists for use of hemoglobin A1c  for diagnosis of diabetes for children.  This Hemoglobin A1c assay has significant interference with fetal   hemoglobin   (HbF). The results are invalid for patients with abnormal amounts of   HbF,   including those with known Hereditary Persistence   of Fetal Hemoglobin. Heterozygous hemoglobin variants (HbAS, HbAC,   HbAD, HbAE, HbA2) do not significantly interfere with this assay;   however, presence of multiple variants in a sample may impact the %   interference.     05/31/2017 5.8 4.5 - 6.2 % Final     Comment:     According to ADA guidelines, hemoglobin A1C <7.0% represents  optimal control in non-pregnant diabetic patients.  Different  metrics may apply to specific populations.   Standards of Medical Care in Diabetes - 2016.  For the purpose of screening for the presence of diabetes:  <5.7%     Consistent with the absence of diabetes  5.7-6.4%  Consistent with increasing risk for diabetes   (prediabetes)  >or=6.5%   Consistent with diabetes  Currently no consensus exists for use of hemoglobin A1C  for diagnosis of diabetes for children.       Microbiology Results (last 7 days)     ** No results found for the last 168 hours. **        Diagnostic Results:  CT abdomen and pelvis with contrast:  Abnormal study consistent with high-grade small bowel obstruction appearing related to umbilical hernia containing bowel and with bowel wall thickening questioning ischemic change    Assessment/Plan:     Active Hospital Problems    Diagnosis  POA    SBO (small bowel obstruction) [K56.609]  Yes    Umbilical hernia [K42.9]  Umbilical hernia is likely reduced.  Follow Dr. Hancock is recommendations..  Keep NPO  Maintain NG tube with low intermittent suction.  Follow CT abdomen and pelvis with oral contrast.  Continue proton pump inhibitor.  Observe for any worsening signs symptoms related to small-bowel obstruction.  Continue intravenous fluid hydration, use intravenous antiemetics as needed.    Yes    Physical debility [R53.81]  Yes    Spina bifida of lumbar region [Q05.7]  Not Applicable    Paraplegia [G82.20]  Supportive care.  Fall precautions.  Skin care.  Yes          Morbid obesity       Body mass index is 58.2 kg/m². Morbid obesity complicates all aspects of disease management from diagnostic modalities to treatment. Weight loss encouraged and health benefits explained to patient.    DVT prophylaxis:  Lovenox 40 mg subcu daily.    Kvng Winters MD  Department of Hospital Medicine   [facility]

## 2019-03-08 NOTE — NURSING
Pt arrived to room 301A. Family @ bedside. NG tube @ L nare clamped. Denies pain. SP cath noted draining clear yellow urine. Rash noted @ buttocks and groin. NPO. Oriented to room. Parents @ bedside. IV patent. Bed locked and low. Call light in reach. Family notified of sx scheduled today.

## 2019-03-08 NOTE — CONSULTS
[facility] Surgery  Consult Note    Patient Name: Briana Uriarte  MRN: [id number]  Code Status: Prior  Admission Date: 3/8/2019  Hospital Length of Stay: 0 days  Attending Physician: Kvng Winters MD  Primary Care Provider: Anaid Osei MD    Patient information was obtained from patient and ER records.     Consults  Subjective:     Principal Problem: small bowel obstruction    History of Present Illness: 44 y/o female with spina bifida presents to ED with abdomina pain nausea and vomiting, starting yesterday.  I am called to evaluate for sbo.  Hernia sticks out and will not go back in.    Current Facility-Administered Medications on File Prior to Encounter   Medication    sodium chloride 0.9% flush 10 mL    sodium chloride 0.9% flush 10 mL    sodium chloride 0.9% flush 10 mL    sodium chloride 0.9% flush 10 mL     Current Outpatient Medications on File Prior to Encounter   Medication Sig    albuterol 90 mcg/actuation inhaler 2 puffs every 4 hours as needed for cough, wheeze, or shortness of breath    ascorbic acid (VITAMIN C) 500 MG tablet Take 500 mg by mouth once daily.    betamethasone dipropionate (DIPROLENE) 0.05 % lotion AAA scalp QHS PRN itchy red plaques    catheter 20 Fr Misc 1 application by Misc.(Non-Drug; Combo Route) route every 30 days.    fluconazole (DIFLUCAN) 150 MG Tab 2 tabs PO  Twice weekly for 4 doses    fluocinolone acetonide oil (DERMOTIC OIL) 0.01 % Drop 5 drops in right ear bid x 7-10 days until resolution, use intermittenlty for flares.    fluticasone-umeclidin-vilanter (TRELEGY ELLIPTA) 100-62.5-25 mcg DsDv Inhale 1 puff into the lungs once daily.    FREESTYLE LITE STRIPS Strp TEST BS ONCE D    HYDROcodone-acetaminophen (NORCO) 7.5-325 mg per tablet Take 1 tablet by mouth every 6 (six) hours as needed for Pain.    HYDROcodone-acetaminophen (NORCO) 7.5-325 mg per tablet Take 1 tablet by mouth every 6 (six) hours as needed for Pain. Do not  fill until 3/1/2019    ipratropium (ATROVENT) 42 mcg (0.06 %) nasal spray 1 spray by Nasal route daily as needed for Rhinitis.     ketoconazole (NIZORAL) 2 % cream Apply to AA folds BID PRN flare    lisinopril (PRINIVIL,ZESTRIL) 20 MG tablet     metoprolol succinate (TOPROL-XL) 50 MG 24 hr tablet TAKE 1 TABLET(50 MG) BY MOUTH EVERY DAY    miconazole nitrate 2 % AerP Apply 1 Dose topically 2 (two) times daily. Apply to rash on buttocks    multivitamin with minerals tablet Take 1 tablet by mouth once daily.    polyethylene glycol (GLYCOLAX) 17 gram PwPk Take 17 g by mouth once daily.    triamcinolone acetonide 0.1% (KENALOG) 0.1 % ointment Apply topically 2 (two) times daily.       Review of patient's allergies indicates:   Allergen Reactions    Cefepime      HIVES    Latex Swelling       Past Medical History:   Diagnosis Date    Asthma     Back pain     Blood transfusion     Chronic kidney disease     kidney stones    Diabetes mellitus     Diabetes mellitus, type 2     Esotropia     GERD (gastroesophageal reflux disease)     Hydrocephalus     Hypertension     Non-healing ulcer of buttock     Nystagmus, congenital     Paralysis     Spinal bifida, closed     Wheezing      Past Surgical History:   Procedure Laterality Date    BRAIN SURGERY       shunt revision    CHOLECYSTECTOMY      CYSTOSCOPY W/ LASER LITHOTRIPSY      DEBRIDEMENT-WOUND Left 5/11/2015    [facility]    INSERTION-CATHETER-GROSHONG N/A 10/16/2013    [facility] by Darius Stewart [facility] at [facility] OR    [id number] Left 10/20/2014    Performed by [facility] at [facility] OR [address] [place] [address]    SPINE SURGERY       Family History     Problem Relation (Age of Onset)    Cancer Mother, Maternal Grandmother    Glaucoma Father, Paternal Grandfather    Heart disease Maternal Grandmother    Psoriasis Maternal Uncle        Tobacco Use    Smoking status: Never Smoker    Smokeless tobacco:  Never Used   Substance and Sexual Activity    Alcohol use: No    Drug use: No    Sexual activity: Not Currently     Review of Systems   Constitutional: Negative for chills and fever.   Respiratory: Negative for cough and chest tightness.    Cardiovascular: Negative for chest pain and palpitations.   Gastrointestinal: Positive for abdominal pain, constipation, nausea and vomiting.   Genitourinary:        Has catheter   Musculoskeletal: Negative for arthralgias and back pain.   Skin: Positive for rash. Negative for wound.     Objective:     Vital Signs (Most Recent):  Temp: 98.6 °F (37 °C) (03/08/19 0440)  Pulse: 106(Simultaneous filing. User may not have seen previous data.) (03/08/19 0440)  Resp: 20 (03/08/19 0440)  BP: (!) 153/99 (03/08/19 0440)  SpO2: 95 %(Simultaneous filing. User may not have seen previous data.) (03/08/19 0440) Vital Signs (24h Range):  Temp:  [98.6 °F (37 °C)] 98.6 °F (37 °C)  Pulse:  [106] 106  Resp:  [20] 20  SpO2:  [95 %] 95 %  BP: (153)/(99) 153/99     Weight: (!) 139.7 kg (308 lb)  Body mass index is 58.2 kg/m².    Physical Exam   Constitutional: No distress.   HENT:   Head: Atraumatic.   Eyes: EOM are normal. Right eye exhibits no discharge. Left eye exhibits no discharge.   Neck: No JVD present. No tracheal deviation present.   Cardiovascular: Normal rate and regular rhythm.   Pulmonary/Chest: Effort normal. No stridor. She has no wheezes.   Abdominal: Soft. She exhibits distension. She exhibits no mass. There is no tenderness. There is no rebound and no guarding. A hernia is present.   Large umbilical hernia with fullness and chronic skin changes.    Well healed midline scar  Suprapubic catheter in place   Musculoskeletal: Normal range of motion. She exhibits no edema.   Neurological: She is alert.   Skin: Skin is warm and dry. She is not diaphoretic.   Vitals reviewed.      Significant Labs:  CBC:   Recent Labs   Lab 03/08/19  0516   WBC 13.40*   RBC 4.98   HGB 12.0   HCT 37.7    *   MCV 76*   MCH 24.1*   MCHC 31.9*     BMP:   Recent Labs   Lab 03/08/19  0516   *   *   K 4.9   CL 88*   CO2 24   BUN 12   CREATININE 0.8   CALCIUM 10.2       Significant Diagnostics:  CT: I have reviewed all pertinent results/findings within the past 24 hours and my personal findings are:  sbo from umbilical hernia    Assessment/Plan:     SBO (small bowel obstruction)    From umbilical hernia-  With patient flat and in some trendelenburg the hernia was reduced with constant pressure.  A small pop was felt as the hernia reduced.  The patient is insensate in her abdomen and did not express and change in feeling.  She should still be admitted to medical team, given ngt, and re scanned this afternoon with oral contrast to make sure the obstruction has resolved with the hernia reduction.  I will place the CT orders.        VTE Risk Mitigation (From admission, onward)    None          Thank you for your consult. I will follow-up with patient. Please contact us if you have any additional questions.    Ziyad Hancock MD  General Surgery  [facility]

## 2019-03-08 NOTE — OP NOTE
Incarcerated strangulated umbilical hernia Procedure Note    Date of procedure:   03/08/2019    Indications: umbilical hernia causing obstruction    Pre-operative Diagnosis: umbilical hernia with incarceration, strangulation, and obstruction    Post-operative Diagnosis: Same    Surgeon: Ziyad Hancock MD    Assistants: none    Anesthesia: General endotracheal anesthesia    ASA Class: 4    Procedure Details   The patient was seen in the Holding Room. The risks, benefits, complications, treatment options, and expected outcomes were discussed with the patient. The possibilities of reaction to medication, pulmonary aspiration, perforation of viscus, bleeding, recurrent infection, the need for additional procedures, failure to diagnose a condition, and creating a complication requiring transfusion or operation were discussed with the patient. The patient concurred with the proposed plan, giving informed consent. The site of surgery properly noted/marked. The patient was taken to Operating Room 7 identified as Briana Uriarte and the procedure verified as exploratory laparotomy. A Time Out was held and the above information confirmed.    Full general anesthesia was induced with orotracheal intubation. The patient was prepped and draped in a supine position. Appropriate antibiotics were given intravenously. Arms were out.    An incision was made over the umbilical hernia.  Dissection was carried to the hernia sack.  This was entered.  Another incision was used more lateral to this incision to excise the redundant skin of the hernia.  Incision was also taken down to the hernia sack.  The sack was entered.  The contents of the sack were small bowel, omentum, ascites, and a large portion of a shunt.  The hernia sack was opened to the fascial edges and excised.  The small bowel appeared dusky and would not reduce without extending the fascial incision.   Once done the small bowel appeared pink, viable, and was easily  placed back into the abdomen.  A serosal injury was approximated with vicryl suture although this was difficult in the edematous bowel.  The fascial edges were then cleared and the fascia approximated with loop pds.  The excess skin was trimmed, subcutaneous tissues approximated with vicyrl and skin closed with staples.    Instrument, sponge, and needle counts were correct prior to wound closure and at the conclusion of the case.     Findings:  Incarcerated strangulated small bowel in umbilical hernia.    Estimated Blood Loss: 50.0 cc    Drains: none    Total IV Fluids: 1000 ml    Specimens: hernia sack with skin    Implants: none    Complications:  None; patient tolerated the procedure well.    Disposition: PACU - hemodynamically stable.    Condition: stable    Attending Attestation: I was present and scrubbed for the entire procedure.

## 2019-03-08 NOTE — ED PROVIDER NOTES
Chief complaint:  Constipation (family reports constipation for several months, last several days worse with vomitting and abd pain)      HPI:  Briana Uriarte is a 43 y.o. female with spinal bifida, hydrocephalus, HTN, CKD, umbilical hernia,  shunt, and DM complaining of 2 day history decreased bowel movements with nonbilious, nonbloody emesis associated with crampy upper abdominal pain. She does have a prior history of small-bowel obstruction.  She has had prior cholecystectomy.  No known fever.  Decreased urination coinciding with decreased oral intake.  She has had some emesis starting yesterday.  She normally has loose bowel movements typical until yesterday when they ceased.  Patient denies radiation or migration of pain.  Much of the history is by the guardians also present at the bedside.      ROS: As per HPI and below:  No headache, confusion, chest pain, dyspnea, hematemesis, fever, dysuria, rashes. The patient/family denies visual changes, dysphagia, joint swelling, easy bruising.    Review of patient's allergies indicates:   Allergen Reactions    Cefepime      HIVES    Latex Swelling       Patient's Medications   New Prescriptions    No medications on file   Previous Medications    ALBUTEROL 90 MCG/ACTUATION INHALER    2 puffs every 4 hours as needed for cough, wheeze, or shortness of breath    ASCORBIC ACID (VITAMIN C) 500 MG TABLET    Take 500 mg by mouth once daily.    BETAMETHASONE DIPROPIONATE (DIPROLENE) 0.05 % LOTION    AAA scalp QHS PRN itchy red plaques    CATHETER 20 FR MISC    1 application by Misc.(Non-Drug; Combo Route) route every 30 days.    FLUCONAZOLE (DIFLUCAN) 150 MG TAB    2 tabs PO  Twice weekly for 4 doses    FLUOCINOLONE ACETONIDE OIL (DERMOTIC OIL) 0.01 % DROP    5 drops in right ear bid x 7-10 days until resolution, use intermittenlty for flares.    FLUTICASONE-UMECLIDIN-VILANTER (TRELEGY ELLIPTA) 100-62.5-25 MCG DSDV    Inhale 1 puff into the lungs once daily.     FREESTYLE LITE STRIPS STRP    TEST BS ONCE D    HYDROCODONE-ACETAMINOPHEN (NORCO) 7.5-325 MG PER TABLET    Take 1 tablet by mouth every 6 (six) hours as needed for Pain.    HYDROCODONE-ACETAMINOPHEN (NORCO) 7.5-325 MG PER TABLET    Take 1 tablet by mouth every 6 (six) hours as needed for Pain. Do not fill until 3/1/2019    IPRATROPIUM (ATROVENT) 42 MCG (0.06 %) NASAL SPRAY    1 spray by Nasal route daily as needed for Rhinitis.     KETOCONAZOLE (NIZORAL) 2 % CREAM    Apply to AA folds BID PRN flare    LISINOPRIL (PRINIVIL,ZESTRIL) 20 MG TABLET        METOPROLOL SUCCINATE (TOPROL-XL) 50 MG 24 HR TABLET    TAKE 1 TABLET(50 MG) BY MOUTH EVERY DAY    MICONAZOLE NITRATE 2 % AERP    Apply 1 Dose topically 2 (two) times daily. Apply to rash on buttocks    MULTIVITAMIN WITH MINERALS TABLET    Take 1 tablet by mouth once daily.    POLYETHYLENE GLYCOL (GLYCOLAX) 17 GRAM PWPK    Take 17 g by mouth once daily.    TRIAMCINOLONE ACETONIDE 0.1% (KENALOG) 0.1 % OINTMENT    Apply topically 2 (two) times daily.   Modified Medications    No medications on file   Discontinued Medications    No medications on file       PMH:  As per HPI and below:  Past Medical History:   Diagnosis Date    Asthma     Back pain     Blood transfusion     Chronic kidney disease     kidney stones    Diabetes mellitus     Diabetes mellitus, type 2     Esotropia     GERD (gastroesophageal reflux disease)     Hydrocephalus     Hypertension     Non-healing ulcer of buttock     Nystagmus, congenital     Paralysis     Spinal bifida, closed     Wheezing      Past Surgical History:   Procedure Laterality Date    BRAIN SURGERY       shunt revision    CHOLECYSTECTOMY      CYSTOSCOPY W/ LASER LITHOTRIPSY      DEBRIDEMENT-WOUND Left 5/11/2015    [facility]    INSERTION-CATHETER-GROSHONG N/A 10/16/2013    [facility]    [id number] Left 10/20/2014    Performed by  Preston [facility]jas [facility] at [facility] OR [address] [place] [address]    SPINE SURGERY         Social History     Socioeconomic History    Marital status: Single     Spouse name: Not on file    Number of children: Not on file    Years of education: Not on file    Highest education level: Not on file   Social Needs    Financial resource strain: Not on file    Food insecurity - worry: Not on file    Food insecurity - inability: Not on file    Transportation needs - medical: Not on file    Transportation needs - non-medical: Not on file   Occupational History    Not on file   Tobacco Use    Smoking status: Never Smoker    Smokeless tobacco: Never Used   Substance and Sexual Activity    Alcohol use: No    Drug use: No    Sexual activity: Not Currently   Other Topics Concern    Are you pregnant or think you may be? Not Asked    Breast-feeding Not Asked   Social History Narrative    Not on file       Family History   Problem Relation Age of Onset    Cancer Mother     Glaucoma Father     Heart disease Maternal Grandmother     Cancer Maternal Grandmother     Glaucoma Paternal Grandfather     Psoriasis Maternal Uncle     Melanoma Neg Hx     Lupus Neg Hx     Eczema Neg Hx        Physical Exam:    Vitals:    03/08/19 0440   BP: (!) 153/99   Pulse: 106   Resp: 20   Temp: 98.6 °F (37 °C)     GENERAL:  No apparent distress.  Alert.  Large body habitus.  HEENT:  Moist mucous membranes.  Normocephalic and atraumatic.  No scleral icterus.  NECK:  No swelling.  Midline trachea.   CARDIOVASCULAR:  Regular rate and rhythm.  2+ radial pulses.    PULMONARY:  Lungs clear to auscultation bilaterally.  No wheezes, rales, or rhonci.    ABDOMEN:  Non-tender and non-distended.  Distended, tense abdomen with minimal upper abdominal tenderness. No guarding.  Reducible, nontender lower umbilical hernia.  EXTREMITIES:  Warm and well perfused.  Brisk capillary refill.    NEUROLOGICAL:  Normal mental status.  Appropriate and conversant.  Lower extremity  paraplegia present.  SKIN:  No rashes or ecchymoses.    BACK:  Atraumatic.  No CVA tenderness to palpation.      Labs Reviewed   CBC W/ AUTO DIFFERENTIAL   COMPREHENSIVE METABOLIC PANEL   LIPASE       Current Discharge Medication List      CONTINUE these medications which have NOT CHANGED    Details   albuterol 90 mcg/actuation inhaler 2 puffs every 4 hours as needed for cough, wheeze, or shortness of breath  Qty: 1 Inhaler, Refills: 11    Associated Diagnoses: Moderate persistent asthma without complication; Tracheobronchomalacia; Localized edema; Dyspnea, unspecified type; Chronic obstructive pulmonary disease, unspecified COPD type      ascorbic acid (VITAMIN C) 500 MG tablet Take 500 mg by mouth once daily.      betamethasone dipropionate (DIPROLENE) 0.05 % lotion AAA scalp QHS PRN itchy red plaques  Qty: 60 mL, Refills: 5    Associated Diagnoses: Sebopsoriasis      catheter 20 Fr Misc 1 application by Misc.(Non-Drug; Combo Route) route every 30 days.  Qty: 6 each, Refills: 11    Associated Diagnoses: Spina bifida manifesta      fluconazole (DIFLUCAN) 150 MG Tab 2 tabs PO  Twice weekly for 4 doses  Qty: 8 tablet, Refills: 0    Associated Diagnoses: Intertrigo      fluocinolone acetonide oil (DERMOTIC OIL) 0.01 % Drop 5 drops in right ear bid x 7-10 days until resolution, use intermittenlty for flares.  Qty: 20 mL, Refills: 2    Associated Diagnoses: Sebopsoriasis      fluticasone-umeclidin-vilanter (TRELEGY ELLIPTA) 100-62.5-25 mcg DsDv Inhale 1 puff into the lungs once daily.  Qty: 1 each, Refills: 11    Associated Diagnoses: Moderate persistent asthma without complication; Tracheobronchomalacia      FREESTYLE LITE STRIPS Strp TEST BS ONCE D  Refills: 3      !! HYDROcodone-acetaminophen (NORCO) 7.5-325 mg per tablet Take 1 tablet by mouth every 6 (six) hours as needed for Pain.  Qty: 120 tablet, Refills: 0    Associated Diagnoses: Spina bifida manifesta; Spina bifida occulta      !! HYDROcodone-acetaminophen  (NORCO) 7.5-325 mg per tablet Take 1 tablet by mouth every 6 (six) hours as needed for Pain. Do not fill until 3/1/2019  Qty: 120 tablet, Refills: 0    Associated Diagnoses: Spina bifida manifesta      ipratropium (ATROVENT) 42 mcg (0.06 %) nasal spray 1 spray by Nasal route daily as needed for Rhinitis.       ketoconazole (NIZORAL) 2 % cream Apply to AA folds BID PRN flare  Qty: 120 g, Refills: 3    Associated Diagnoses: Intertrigo      lisinopril (PRINIVIL,ZESTRIL) 20 MG tablet Refills: 3      metoprolol succinate (TOPROL-XL) 50 MG 24 hr tablet TAKE 1 TABLET(50 MG) BY MOUTH EVERY DAY  Qty: 90 tablet, Refills: 3      miconazole nitrate 2 % AerP Apply 1 Dose topically 2 (two) times daily. Apply to rash on buttocks      multivitamin with minerals tablet Take 1 tablet by mouth once daily.      polyethylene glycol (GLYCOLAX) 17 gram PwPk Take 17 g by mouth once daily.  Qty: 7 packet, Refills: 0      triamcinolone acetonide 0.1% (KENALOG) 0.1 % ointment Apply topically 2 (two) times daily.  Qty: 1 Tube, Refills: 11    Associated Diagnoses: Tinea cruris       !! - Potential duplicate medications found. Please discuss with provider.          Orders Placed This Encounter   Procedures    CT Abdomen Pelvis With Contrast    CBC auto differential    Comprehensive metabolic panel    Lipase    EKG 12-lead    Insert peripheral IV       Imaging Results    None         ED Course as of Mar 08 0816   Fri Mar 08, 2019   0500 EKG:  Normal sinus rhythm at a rate of 97.  Normal intervals.  Normal axis.  No significant ST or T wave changes suggesting acute ischemia or infarction.    [MR]   0710 CT-AP:  High-grade small bowel obstruction related to narrow necked umbilical bowel containing hernia as above. Wall thickening of the bowel loop within the hernia is noted possibly representing ischemic strangulation.  (rad read)  [MR]      ED Course User Index  [MR] Jay Jay Belcher MD       MDM:    43 y.o. female with upper abdominal  pain in the setting of emesis and decreased bowel movements.  Presentation is concerning for possible repeat obstruction.  Hernia seems reducible with low suspicion for course or a shin at this point.  Additional labs ordered along with CT imaging for further exploration.  IV fluids initiated for hydration given recent decreased intake.    Patient has high-grade small-bowel obstruction associated with hernia.  I did discuss with Dr. Hancock from general surgery who saw the patient at the bedside with apparent reduction of hernia.  I will admit the patient to Medicine per Dr. Hancock request for observation and close repeat imaging.  I feel this is reasonable to ensure improvement in obstructive pattern and no need for urgent surgery.  I do not think emergent surgery at this moment is necessary given apparent reduction.  I have discussed with Dr. Winters who will assume care.  Patient also noted to have hyponatremia that may be addressed by Medicine possibly secondary to poor intake with recent obstruction.    Diagnoses:    1. SBO  2. Hyponatremia     Jay Jay Belcher MD  03/08/19 0867

## 2019-03-08 NOTE — PLAN OF CARE
03/08/19 1153   PRE-TX-O2   O2 Device (Oxygen Therapy) room air   SpO2 95 %   Pulse 102   Resp 16

## 2019-03-08 NOTE — ASSESSMENT & PLAN NOTE
From umbilical hernia-  With patient flat and in some trendelenburg the hernia was reduced with constant pressure.  A small pop was felt as the hernia reduced.  The patient is insensate in her abdomen and did not express and change in feeling.  She should still be admitted to medical team, given ngt, and re scanned this afternoon with oral contrast to make sure the obstruction has resolved with the hernia reduction.  I will place the CT orders.

## 2019-03-08 NOTE — TRANSFER OF CARE
"Anesthesia Transfer of Care Note    Patient: Briana Uriarte    Procedure(s) Performed: Procedure(s) (LRB):  LAPAROTOMY, EXPLORATORY (N/A)  REPAIR, HERNIA, UMBILICAL, INCARCERATED, AGE 5 YEARS OR OLDER (N/A)    Patient location: PACU    Anesthesia Type: general    Transport from OR: Transported from OR on 2-3 L/min O2 by NC with adequate spontaneous ventilation    Post pain: adequate analgesia    Post assessment: no apparent anesthetic complications and tolerated procedure well    Post vital signs: stable    Level of consciousness: awake, alert and oriented    Nausea/Vomiting: no nausea/vomiting    Complications: none    Transfer of care protocol was followed      Last vitals:   Visit Vitals  /86 (BP Location: Right arm, Patient Position: Lying)   Pulse 108   Temp 36.5 °C (97.7 °F) (Skin)   Resp 16   Ht 5' 1" (1.549 m)   Wt (!) 139.7 kg (308 lb)   LMP 02/25/2019   SpO2 95%   Breastfeeding? No   BMI 58.20 kg/m²     "

## 2019-03-08 NOTE — ANESTHESIA PREPROCEDURE EVALUATION
03/08/2019  Briana Uriarte is a 43 y.o., female.    Anesthesia Evaluation    I have reviewed the Patient Summary Reports.    I have reviewed the Nursing Notes.   I have reviewed the Medications.     Review of Systems  Anesthesia Hx:  No problems with previous Anesthesia    Social:  Non-Smoker    Cardiovascular:   Hypertension   Normal sinus rhythm  Normal ECG  When compared with ECG of 25-JUN-2018 10:19,  No significant change was found    Referred By: [email or web address]   Pulmonary:   COPD, mild Asthma Pickwickian syndrome   Renal/:   Chronic Renal Disease, CRI    Hepatic/GI:   GERD Small bowel obstruction    Neurological:  Nervous System Malformations, Hydrocephalus, Spina Bifida Chiari malformation type II  Paralysis     Endocrine:   Diabetes, poorly controlled, type 2        Physical Exam  General:  Morbid Obesity    Airway/Jaw/Neck:  Airway Findings: Mouth Opening: Normal Tongue: Normal  General Airway Assessment: Adult  Mallampati: II  Improves to I with phonation.  Jaw/Neck Findings:  Neck Findings:  Girth Increased     Eyes/Ears/Nose:  EYES/EARS/NOSE FINDINGS: Normal   Dental:  DENTAL FINDINGS: Normal   Chest/Lungs:  Chest/Lungs Findings: Clear to auscultation, Normal Respiratory Rate     Heart/Vascular:  Heart Findings: Rate: Normal  Rhythm: Regular Rhythm        Mental Status:  Mental Status Findings:  Cooperative, Alert and Oriented         Anesthesia Plan  Type of Anesthesia, risks & benefits discussed:  Anesthesia Type:  general  Patient's Preference:   Intra-op Monitoring Plan: standard ASA monitors  Intra-op Monitoring Plan Comments:   Post Op Pain Control Plan: IV/PO Opioids PRN  Post Op Pain Control Plan Comments:   Induction:   IV  Beta Blocker:  Patient is on a Beta-Blocker and has received one dose within the past 24 hours (No further documentation required).       Informed Consent:  Patient representative understands risks and agrees with Anesthesia plan.  Questions answered. Anesthesia consent signed with patient representative.  ASA Score: 4     Day of Surgery Review of History & Physical: I have interviewed and examined the patient. I have reviewed the patient's H&P dated:  There are no significant changes.  H&P update referred to the provider.         Ready For Surgery From Anesthesia Perspective.

## 2019-03-08 NOTE — PROGRESS NOTES
Pt's linen changed and disposable blue pads and brief applied to pt's bottom due to pt having a BM. Pt has red and purple coloring breakdown to buttocks. Spoke with Dr. Winters concerning this and wound care consult ordered. Pt's parents are aware of breakdown to buttocks and that this has been an ongoing problem.     Pt's NG tube placed to low intermittent suction per Dr. Winters's order. Dark green output noted in suction canister and Dr. Winters aware.

## 2019-03-08 NOTE — SUBJECTIVE & OBJECTIVE
Current Facility-Administered Medications on File Prior to Encounter   Medication    sodium chloride 0.9% flush 10 mL    sodium chloride 0.9% flush 10 mL    sodium chloride 0.9% flush 10 mL    sodium chloride 0.9% flush 10 mL     Current Outpatient Medications on File Prior to Encounter   Medication Sig    albuterol 90 mcg/actuation inhaler 2 puffs every 4 hours as needed for cough, wheeze, or shortness of breath    ascorbic acid (VITAMIN C) 500 MG tablet Take 500 mg by mouth once daily.    betamethasone dipropionate (DIPROLENE) 0.05 % lotion AAA scalp QHS PRN itchy red plaques    catheter 20 Fr Misc 1 application by Misc.(Non-Drug; Combo Route) route every 30 days.    fluconazole (DIFLUCAN) 150 MG Tab 2 tabs PO  Twice weekly for 4 doses    fluocinolone acetonide oil (DERMOTIC OIL) 0.01 % Drop 5 drops in right ear bid x 7-10 days until resolution, use intermittenlty for flares.    fluticasone-umeclidin-vilanter (TRELEGY ELLIPTA) 100-62.5-25 mcg DsDv Inhale 1 puff into the lungs once daily.    FREESTYLE LITE STRIPS Strp TEST BS ONCE D    HYDROcodone-acetaminophen (NORCO) 7.5-325 mg per tablet Take 1 tablet by mouth every 6 (six) hours as needed for Pain.    HYDROcodone-acetaminophen (NORCO) 7.5-325 mg per tablet Take 1 tablet by mouth every 6 (six) hours as needed for Pain. Do not fill until 3/1/2019    ipratropium (ATROVENT) 42 mcg (0.06 %) nasal spray 1 spray by Nasal route daily as needed for Rhinitis.     ketoconazole (NIZORAL) 2 % cream Apply to AA folds BID PRN flare    lisinopril (PRINIVIL,ZESTRIL) 20 MG tablet     metoprolol succinate (TOPROL-XL) 50 MG 24 hr tablet TAKE 1 TABLET(50 MG) BY MOUTH EVERY DAY    miconazole nitrate 2 % AerP Apply 1 Dose topically 2 (two) times daily. Apply to rash on buttocks    multivitamin with minerals tablet Take 1 tablet by mouth once daily.    polyethylene glycol (GLYCOLAX) 17 gram PwPk Take 17 g by mouth once daily.    triamcinolone acetonide 0.1%  (KENALOG) 0.1 % ointment Apply topically 2 (two) times daily.       Review of patient's allergies indicates:   Allergen Reactions    Cefepime      HIVES    Latex Swelling       Past Medical History:   Diagnosis Date    Asthma     Back pain     Blood transfusion     Chronic kidney disease     kidney stones    Diabetes mellitus     Diabetes mellitus, type 2     Esotropia     GERD (gastroesophageal reflux disease)     Hydrocephalus     Hypertension     Non-healing ulcer of buttock     Nystagmus, congenital     Paralysis     Spinal bifida, closed     Wheezing      Past Surgical History:   Procedure Laterality Date    BRAIN SURGERY       shunt revision    CHOLECYSTECTOMY      CYSTOSCOPY W/ LASER LITHOTRIPSY      DEBRIDEMENT-WOUND Left 5/11/2015    [facility]    INSERTION-CATHETER-GROSHONG N/A 10/16/2013    Performed by Darius Stewart MD at [facility] OR    [id number] Left 10/20/2014    Performed by [facility]ajs [facility] at [facility] OR [facility]    SPINE SURGERY       Family History     Problem Relation (Age of Onset)    Cancer Mother, Maternal Grandmother    Glaucoma Father, Paternal Grandfather    Heart disease Maternal Grandmother    Psoriasis Maternal Uncle        Tobacco Use    Smoking status: Never Smoker    Smokeless tobacco: Never Used   Substance and Sexual Activity    Alcohol use: No    Drug use: No    Sexual activity: Not Currently     Review of Systems   Constitutional: Negative for chills and fever.   Respiratory: Negative for cough and chest tightness.    Cardiovascular: Negative for chest pain and palpitations.   Gastrointestinal: Positive for abdominal pain, constipation, nausea and vomiting.   Genitourinary:        Has catheter   Musculoskeletal: Negative for arthralgias and back pain.   Skin: Positive for rash. Negative for wound.     Objective:     Vital Signs (Most Recent):  Temp: 98.6 °F (37 °C) (03/08/19 0440)  Pulse:  106(Simultaneous filing. User may not have seen previous data.) (03/08/19 0440)  Resp: 20 (03/08/19 0440)  BP: (!) 153/99 (03/08/19 0440)  SpO2: 95 %(Simultaneous filing. User may not have seen previous data.) (03/08/19 0440) Vital Signs (24h Range):  Temp:  [98.6 °F (37 °C)] 98.6 °F (37 °C)  Pulse:  [106] 106  Resp:  [20] 20  SpO2:  [95 %] 95 %  BP: (153)/(99) 153/99     Weight: (!) 139.7 kg (308 lb)  Body mass index is 58.2 kg/m².    Physical Exam   Constitutional: No distress.   HENT:   Head: Atraumatic.   Eyes: EOM are normal. Right eye exhibits no discharge. Left eye exhibits no discharge.   Neck: No JVD present. No tracheal deviation present.   Cardiovascular: Normal rate and regular rhythm.   Pulmonary/Chest: Effort normal. No stridor. She has no wheezes.   Abdominal: Soft. She exhibits distension. She exhibits no mass. There is no tenderness. There is no rebound and no guarding. A hernia is present.   Large umbilical hernia with fullness and chronic skin changes.    Well healed midline scar  Suprapubic catheter in place   Musculoskeletal: Normal range of motion. She exhibits no edema.   Neurological: She is alert.   Skin: Skin is warm and dry. She is not diaphoretic.   Vitals reviewed.      Significant Labs:  CBC:   Recent Labs   Lab 03/08/19  0516   WBC 13.40*   RBC 4.98   HGB 12.0   HCT 37.7   *   MCV 76*   MCH 24.1*   MCHC 31.9*     BMP:   Recent Labs   Lab 03/08/19  0516   *   *   K 4.9   CL 88*   CO2 24   BUN 12   CREATININE 0.8   CALCIUM 10.2       Significant Diagnostics:  CT: I have reviewed all pertinent results/findings within the past 24 hours and my personal findings are:  sbo from umbilical hernia

## 2019-03-08 NOTE — ED NOTES
Pt sitting up after transferring to stretcher from wheelchair complaining with abdominal pain and discomfort.

## 2019-03-08 NOTE — PROGRESS NOTES
Pt arrived to PACU from the OR and pt's abdominal dressing was saturated with clear red fluid. Notified Dr. Hancock and he came to assess pt. MD stated the drainage is from pt's  shunt. Pressure dressing applied and then abd pads and taped applied over the pressure dressing per Dr. Hancock. New gown applied to pt and clean blankets placed on pt. Pt very calm and cooperative. Pt's back was hurting so pain med given. Dr. Hancock wants pt to have a private room so house supervisor is working on getting a private room on the 3rd floor. VSS and will continue to monitor.

## 2019-03-09 LAB
ANION GAP SERPL CALC-SCNC: 10 MMOL/L
BUN SERPL-MCNC: 11 MG/DL
CALCIUM SERPL-MCNC: 8.9 MG/DL
CHLORIDE SERPL-SCNC: 95 MMOL/L
CO2 SERPL-SCNC: 28 MMOL/L
CREAT SERPL-MCNC: 0.7 MG/DL
EST. GFR  (AFRICAN AMERICAN): >60 ML/MIN/1.73 M^2
EST. GFR  (NON AFRICAN AMERICAN): >60 ML/MIN/1.73 M^2
GLUCOSE SERPL-MCNC: 91 MG/DL
POTASSIUM SERPL-SCNC: 4.2 MMOL/L
SODIUM SERPL-SCNC: 133 MMOL/L

## 2019-03-09 PROCEDURE — 63600175 PHARM REV CODE 636 W HCPCS: Performed by: SURGERY

## 2019-03-09 PROCEDURE — 25000003 PHARM REV CODE 250: Performed by: NURSE PRACTITIONER

## 2019-03-09 PROCEDURE — 25000003 PHARM REV CODE 250: Performed by: SURGERY

## 2019-03-09 PROCEDURE — 80048 BASIC METABOLIC PNL TOTAL CA: CPT

## 2019-03-09 PROCEDURE — 36415 COLL VENOUS BLD VENIPUNCTURE: CPT

## 2019-03-09 PROCEDURE — 94761 N-INVAS EAR/PLS OXIMETRY MLT: CPT

## 2019-03-09 PROCEDURE — 27000221 HC OXYGEN, UP TO 24 HOURS

## 2019-03-09 PROCEDURE — 63600175 PHARM REV CODE 636 W HCPCS: Performed by: EMERGENCY MEDICINE

## 2019-03-09 PROCEDURE — 12000002 HC ACUTE/MED SURGE SEMI-PRIVATE ROOM

## 2019-03-09 RX ORDER — HYDROCODONE BITARTRATE AND ACETAMINOPHEN 10; 325 MG/1; MG/1
1 TABLET ORAL EVERY 4 HOURS PRN
Status: DISCONTINUED | OUTPATIENT
Start: 2019-03-09 | End: 2019-03-10 | Stop reason: HOSPADM

## 2019-03-09 RX ORDER — ACETAMINOPHEN 325 MG/1
650 TABLET ORAL EVERY 6 HOURS PRN
Status: DISCONTINUED | OUTPATIENT
Start: 2019-03-09 | End: 2019-03-10 | Stop reason: HOSPADM

## 2019-03-09 RX ADMIN — HYDROCODONE BITARTRATE AND ACETAMINOPHEN 1 TABLET: 10; 325 TABLET ORAL at 06:03

## 2019-03-09 RX ADMIN — ENOXAPARIN SODIUM 40 MG: 100 INJECTION SUBCUTANEOUS at 10:03

## 2019-03-09 RX ADMIN — MORPHINE SULFATE 8 MG: 8 INJECTION, SOLUTION INTRAMUSCULAR; INTRAVENOUS at 03:03

## 2019-03-09 RX ADMIN — HYDROCODONE BITARTRATE AND ACETAMINOPHEN 1 TABLET: 10; 325 TABLET ORAL at 01:03

## 2019-03-09 RX ADMIN — SODIUM CHLORIDE 250 ML: 0.9 INJECTION, SOLUTION INTRAVENOUS at 06:03

## 2019-03-09 RX ADMIN — MORPHINE SULFATE 8 MG: 8 INJECTION, SOLUTION INTRAMUSCULAR; INTRAVENOUS at 07:03

## 2019-03-09 RX ADMIN — ACETAMINOPHEN 650 MG: 325 TABLET ORAL at 06:03

## 2019-03-09 RX ADMIN — HYDROCODONE BITARTRATE AND ACETAMINOPHEN 1 TABLET: 10; 325 TABLET ORAL at 10:03

## 2019-03-09 NOTE — SUBJECTIVE & OBJECTIVE
Interval History: No new complaints this morning. Awake and alert. She reports no nausea.  Pain is controlled.  Minimal NGT output this morning.        Medications:  Continuous Infusions:  Scheduled Meds:   enoxaparin  40 mg Subcutaneous Q24H     PRN Meds:acetaminophen, HYDROcodone-acetaminophen, morphine, ondansetron, sodium chloride 0.9%     Review of patient's allergies indicates:   Allergen Reactions    Cefepime      HIVES    Latex Swelling     Objective:     Vital Signs (Most Recent):  Temp: 98.1 °F (36.7 °C) (03/09/19 1143)  Pulse: (!) 126 (03/09/19 1143)  Resp: 20 (03/09/19 1143)  BP: (!) 177/123 (03/09/19 1143)  SpO2: 99 % (03/09/19 1143) Vital Signs (24h Range):  Temp:  [97.7 °F (36.5 °C)-99.6 °F (37.6 °C)] 98.1 °F (36.7 °C)  Pulse:  [] 126  Resp:  [9-20] 20  SpO2:  [95 %-100 %] 99 %  BP: (110-177)/() 177/123     Weight: (!) 140.3 kg (309 lb 4.9 oz)  Body mass index is 58.44 kg/m².    Intake/Output - Last 3 Shifts       03/07 0700 - 03/08 0659 03/08 0700 - 03/09 0659 03/09 0700 - 03/10 0659    P.O.  0     I.V. (mL/kg)  1471.3 (10.5)     NG/GT  0     IV Piggyback  250     Total Intake(mL/kg)  1721.3 (12.3)     Urine (mL/kg/hr)  650 (0.2)     Drains  1000     Stool  0     Blood  50     Total Output  1700     Net  +21.3            Stool Occurrence  1 x           Physical Exam   Constitutional: She is oriented to person, place, and time. She is cooperative. No distress.   Pulmonary/Chest: Effort normal. No respiratory distress.   Abdominal: Soft. There is tenderness. There is no rigidity, no rebound and no guarding.   Appropriate tenderness.   Incision is clean and intact.    She has had ascites drain from the incision but looks to have slowed down.   No cellulitis    Neurological: She is alert and oriented to person, place, and time.       Significant Labs:  CBC:   Recent Labs   Lab 03/08/19  0516   WBC 13.40*   RBC 4.98   HGB 12.0   HCT 37.7   *   MCV 76*   MCH 24.1*   MCHC 31.9*      BMP:   Recent Labs   Lab 03/09/19  0504   GLU 91   *   K 4.2   CL 95   CO2 28   BUN 11   CREATININE 0.7   CALCIUM 8.9     CMP:   Recent Labs   Lab 03/08/19  0516 03/09/19  0504   * 91   CALCIUM 10.2 8.9   ALBUMIN 3.5  --    PROT 8.7*  --    * 133*   K 4.9 4.2   CO2 24 28   CL 88* 95   BUN 12 11   CREATININE 0.8 0.7   ALKPHOS 87  --    ALT 29  --    AST 30  --    BILITOT 0.4  --        Significant Diagnostics:

## 2019-03-09 NOTE — SUBJECTIVE & OBJECTIVE
Interval History: Pt went for umbilical hernia repair yesterday. This AM doing well. Seen by Surgery: DC NGT, advance diet. Pt tolerating liquids, however parents hesitant to go home today.    Review of Systems   Constitutional: Negative for chills and fever.   Respiratory: Negative for cough and shortness of breath.    Cardiovascular: Negative for chest pain.   Gastrointestinal: Positive for abdominal pain (Improved).   Neurological: Negative for dizziness and numbness.   Psychiatric/Behavioral: Negative for agitation and confusion.     Objective:     Vital Signs (Most Recent):  Temp: 98.1 °F (36.7 °C) (03/09/19 1143)  Pulse: (!) 126 (03/09/19 1143)  Resp: 20 (03/09/19 1143)  BP: (!) 177/123 (03/09/19 1143)  SpO2: 99 % (03/09/19 1143) Vital Signs (24h Range):  Temp:  [97.7 °F (36.5 °C)-99.6 °F (37.6 °C)] 98.1 °F (36.7 °C)  Pulse:  [] 126  Resp:  [9-20] 20  SpO2:  [95 %-100 %] 99 %  BP: (110-177)/() 177/123     Weight: (!) 140.3 kg (309 lb 4.9 oz)  Body mass index is 58.44 kg/m².    Intake/Output Summary (Last 24 hours) at 3/9/2019 1343  Last data filed at 3/9/2019 0607  Gross per 24 hour   Intake 1721.25 ml   Output 1700 ml   Net 21.25 ml      Physical Exam   Constitutional: She appears well-developed and well-nourished.   HENT:   Head: Normocephalic and atraumatic.   Eyes: EOM are normal. Pupils are equal, round, and reactive to light.   Neck: Neck supple.   Cardiovascular: Normal rate and regular rhythm.   Pulmonary/Chest: Effort normal and breath sounds normal.   Abdominal: Soft. She exhibits no distension. There is tenderness.   Occ BS   Neurological: She is alert.   Skin: Skin is warm and dry.   Surgical dressing intact. Dry   Psychiatric: She has a normal mood and affect. Her behavior is normal.       Significant Labs: All pertinent labs within the past 24 hours have been reviewed.    Significant Imaging: I have reviewed all pertinent imaging results/findings within the past 24 hours.

## 2019-03-09 NOTE — ASSESSMENT & PLAN NOTE
S/p incarcerated umbilical hernia, now fixed  NGT removed, diet advanced. Seems to be tolerating  Family/Caregivers hesitant to take home. Will continue to advance diet as tolerated and tentatively plan for discharge in AM

## 2019-03-09 NOTE — ANESTHESIA POSTPROCEDURE EVALUATION
"Anesthesia Post Evaluation    Patient: Briana Uriarte    Procedure(s) Performed: Procedure(s) (LRB):  LAPAROTOMY, EXPLORATORY (N/A)  REPAIR, HERNIA, UMBILICAL, INCARCERATED, AGE 5 YEARS OR OLDER (N/A)    Final Anesthesia Type: general  Patient location during evaluation: PACU  Patient participation: Yes- Able to Participate  Level of consciousness: awake and alert  Post-procedure vital signs: reviewed and stable  Pain management: adequate  Airway patency: patent  PONV status at discharge: No PONV  Anesthetic complications: no      Cardiovascular status: hemodynamically stable  Respiratory status: unassisted and nasal cannula  Hydration status: euvolemic  Follow-up not needed.        Visit Vitals  BP (!) 147/73   Pulse 98   Temp 37.1 °C (98.8 °F) (Skin)   Resp 18   Ht 5' 1" (1.549 m)   Wt (!) 139.7 kg (308 lb)   LMP 02/25/2019   SpO2 99%   Breastfeeding? No   BMI 58.20 kg/m²       Pain/Barbara Score: Pain Rating Prior to Med Admin: 6 (3/8/2019  5:18 PM)  Pain Rating Post Med Admin: 0 (3/8/2019  9:19 AM)        "

## 2019-03-09 NOTE — NURSING
Pt arrived onto unit via bed.  Transferred to unit and VSS.  C/o pain, medication not available at this time.  Told pt and pt verbalized understanding.  Pt oriented to room.  Suprapubic Catheter and PIV intact.      Notified Dr. Hancock and Kortney Chavez NP about pts post-op orders.  Dr. Hancock notified post-op nurse Steffi no post-op abx needed at this time.  Will continue to monitor.

## 2019-03-09 NOTE — NURSING
Situation Principle Problem:  Umbilical hernia      Reason for Calling: Pt c/o HA    Provider Calling: Merlyn Mena NP   Background Vitals:    03/09/19 0037 03/09/19 0333 03/09/19 0428 03/09/19 0430   BP: 138/60 123/65  (!) 110/56   BP Location:  Right arm     Patient Position: Lying Lying  Lying   Pulse: (!) 113 (!) 127  (!) 128   Resp: 18 20  16   Temp: 98.3 °F (36.8 °C) 98 °F (36.7 °C) 97.9 °F (36.6 °C) 97.9 °F (36.6 °C)   TempSrc: Oral Oral Oral Oral   SpO2: 99% 98%  97%   Weight:       Height:           No results found for: POCTGLUCOSE    Intake/Output:    Intake/Output Summary (Last 24 hours) at 3/9/2019 0433  Last data filed at 3/8/2019 2023  Gross per 24 hour   Intake 1471.25 ml   Output 50 ml   Net 1421.25 ml        Assessment What is happening: Pt is c/o of HA.  Has no orders for tylenol.  Can I get an order in please.  Thank you!   Response Provider Response: ***

## 2019-03-09 NOTE — PLAN OF CARE
Problem: Adult Inpatient Plan of Care  Goal: Plan of Care Review  Outcome: Revised  Pt is AAOx4.  Pt c/o pain, prn medication given, pt tolerated well.  IV saline locked, flushed as needed.  Abd dressing draining serosanguious fluid, reinforced with abd and medpore.  Wound to sacral area noted, open to air.  NG tube to L naris on low intermittent suction, draining brown liquid.  Suprapubic catheter intact, yellow urine noted, emptied as needed.  VSS, in NAD, pt remains afebrile.  Pt remains free from injury.  Bed in low position, wheels locked, call light within reach.  Pt verbalized understanding of POC.  Will continue to monitor.

## 2019-03-09 NOTE — OR NURSING
1615:  Assumed care of patient.  Awaiting room switch and clean x 2 for requested private room by Dr. Hancock.  Called 3rd floor and spoke with charge RN, Tomeka.  Stated the patient needing to be moved to another room has not been moved yet because housekeeping is cleaning it now.  Stated I would call back at 1900.

## 2019-03-09 NOTE — OR NURSING
1900:  Called 3rd floor again.  Spoke with charge RN.  Stated patient was moved into the other room and now housekeeping was cleaning my patient's room.  Plan to call back in 30 minutes.

## 2019-03-09 NOTE — PROGRESS NOTES
[facility]  Progress Note    Patient Name: Briana Uriarte  MRN: [id number]  Patient Class: IP- Inpatient   Admission Date: 3/8/2019  Length of Stay: 1 days  Attending Physician: Kvng Winters MD  Primary Care Provider: Anaid Osei MD        Subjective:     Principal Problem:Umbilical hernia    HPI:  43 y.o. female admitted to Hospitalist Service from [facility] Room with complaint of worsening abdominal pain associated with nausea and vomiting for few days. Patient reportedly has past medical history significant for morbid obesity, diabetes mellitus type 2, history of nephrolithiasis, chronic kidney disease, gastroesophageal reflux disorder, history of spina bifida status post ventricular peritoneal shunt.  Patient has prior history of cholecystectomy. Patient presented to the emergency room with complaint of progressively worsening upper crampy abdominal pain associated with nonbloody, non bilious vomiting.  A CT scan of the abdomen and pelvis with contrast in the emergency room showed high-grade small-bowel obstruction appearing related to umbilical hernia containing bowel and with bowel wall thickening questioning ischemic change.  Patient was evaluated by Dr. Hancock in the emergency room who reportedly reduce the umbilical hernia.  Patient is being admitted for further monitoring and a CT scan of the abdomen and pelvis with contrast is planned for this afternoon. Patient denied chest pain, shortness of breath, headache, vision changes, focal neuro-deficits, cough or fever.    Hospital Course:  No notes on file    Interval History: Pt went for umbilical hernia repair yesterday. This AM doing well. Seen by Surgery: DC NGT, advance diet. Pt tolerating liquids, however parents hesitant to go home today.    Review of Systems   Constitutional: Negative for chills and fever.   Respiratory: Negative for cough and shortness of breath.    Cardiovascular:  Negative for chest pain.   Gastrointestinal: Positive for abdominal pain (Improved).   Neurological: Negative for dizziness and numbness.   Psychiatric/Behavioral: Negative for agitation and confusion.     Objective:     Vital Signs (Most Recent):  Temp: 98.1 °F (36.7 °C) (03/09/19 1143)  Pulse: (!) 126 (03/09/19 1143)  Resp: 20 (03/09/19 1143)  BP: (!) 177/123 (03/09/19 1143)  SpO2: 99 % (03/09/19 1143) Vital Signs (24h Range):  Temp:  [97.7 °F (36.5 °C)-99.6 °F (37.6 °C)] 98.1 °F (36.7 °C)  Pulse:  [] 126  Resp:  [9-20] 20  SpO2:  [95 %-100 %] 99 %  BP: (110-177)/() 177/123     Weight: (!) 140.3 kg (309 lb 4.9 oz)  Body mass index is 58.44 kg/m².    Intake/Output Summary (Last 24 hours) at 3/9/2019 1343  Last data filed at 3/9/2019 0607  Gross per 24 hour   Intake 1721.25 ml   Output 1700 ml   Net 21.25 ml      Physical Exam   Constitutional: She appears well-developed and well-nourished.   HENT:   Head: Normocephalic and atraumatic.   Eyes: EOM are normal. Pupils are equal, round, and reactive to light.   Neck: Neck supple.   Cardiovascular: Normal rate and regular rhythm.   Pulmonary/Chest: Effort normal and breath sounds normal.   Abdominal: Soft. She exhibits no distension. There is tenderness.   Occ BS   Neurological: She is alert.   Skin: Skin is warm and dry.   Surgical dressing intact. Dry   Psychiatric: She has a normal mood and affect. Her behavior is normal.       Significant Labs: All pertinent labs within the past 24 hours have been reviewed.    Significant Imaging: I have reviewed all pertinent imaging results/findings within the past 24 hours.    Assessment/Plan:      SBO (small bowel obstruction)    S/p incarcerated umbilical hernia, now fixed  NGT removed, diet advanced. Seems to be tolerating  Family/Caregivers hesitant to take home. Will continue to advance diet as tolerated and tentatively plan for discharge in AM       Spina bifida of lumbar region    Supportive care.  Fall  precautions.  Skin care.         VTE Risk Mitigation (From admission, onward)        Ordered     enoxaparin injection 40 mg  Every 24 hours (non-standard times)      03/08/19 2014     IP VTE LOW RISK PATIENT  Once      03/08/19 1110              Denny Magallanes MD  Department of Hospital Medicine   [facility]

## 2019-03-09 NOTE — PLAN OF CARE
2023:  Patient transferred to room 311.  Report to trudy aHnnon.  Released from Anesthesia.  Upon arrival to room, RN wanted to move patient from bed she was on to another bed.  After doing so, patient's dressing saturated with sanguinous fluid near bottom of dressing.  Parents at bedside.  Hooked back to LIWS.  NAD noted.  Patient turned so TRUDY Hannon could take pictures to document wound on buttocks.

## 2019-03-09 NOTE — PROGRESS NOTES
[facility] Surgery  Progress Note    Subjective:     History of Present Illness:  42 y/o female with spina bifida presents to ED with abdomina pain nausea and vomiting, starting yesterday.  I am called to evaluate for sbo.    Post-Op Info:  Procedure(s) (LRB):  LAPAROTOMY, EXPLORATORY (N/A)  REPAIR, HERNIA, UMBILICAL, INCARCERATED, AGE 5 YEARS OR OLDER (N/A)   1 Day Post-Op     Interval History: No new complaints this morning. Awake and alert. She reports no nausea.  Pain is controlled.  Minimal NGT output this morning.        Medications:  Continuous Infusions:  Scheduled Meds:   enoxaparin  40 mg Subcutaneous Q24H     PRN Meds:acetaminophen, HYDROcodone-acetaminophen, morphine, ondansetron, sodium chloride 0.9%     Review of patient's allergies indicates:   Allergen Reactions    Cefepime      HIVES    Latex Swelling     Objective:     Vital Signs (Most Recent):  Temp: 98.1 °F (36.7 °C) (03/09/19 1143)  Pulse: (!) 126 (03/09/19 1143)  Resp: 20 (03/09/19 1143)  BP: (!) 177/123 (03/09/19 1143)  SpO2: 99 % (03/09/19 1143) Vital Signs (24h Range):  Temp:  [97.7 °F (36.5 °C)-99.6 °F (37.6 °C)] 98.1 °F (36.7 °C)  Pulse:  [] 126  Resp:  [9-20] 20  SpO2:  [95 %-100 %] 99 %  BP: (110-177)/() 177/123     Weight: (!) 140.3 kg (309 lb 4.9 oz)  Body mass index is 58.44 kg/m².    Intake/Output - Last 3 Shifts       03/07 0700 - 03/08 0659 03/08 0700 - 03/09 0659 03/09 0700 - 03/10 0659    P.O.  0     I.V. (mL/kg)  1471.3 (10.5)     NG/GT  0     IV Piggyback  250     Total Intake(mL/kg)  1721.3 (12.3)     Urine (mL/kg/hr)  650 (0.2)     Drains  1000     Stool  0     Blood  50     Total Output  1700     Net  +21.3            Stool Occurrence  1 x           Physical Exam   Constitutional: She is oriented to person, place, and time. She is cooperative. No distress.   Pulmonary/Chest: Effort normal. No respiratory distress.   Abdominal: Soft. There is tenderness. There is no rigidity, no  rebound and no guarding.   Appropriate tenderness.   Incision is clean and intact.    She has had ascites drain from the incision but looks to have slowed down.   No cellulitis    Neurological: She is alert and oriented to person, place, and time.       Significant Labs:  CBC:   Recent Labs   Lab 03/08/19  0516   WBC 13.40*   RBC 4.98   HGB 12.0   HCT 37.7   *   MCV 76*   MCH 24.1*   MCHC 31.9*     BMP:   Recent Labs   Lab 03/09/19  0504   GLU 91   *   K 4.2   CL 95   CO2 28   BUN 11   CREATININE 0.7   CALCIUM 8.9     CMP:   Recent Labs   Lab 03/08/19  0516 03/09/19  0504   * 91   CALCIUM 10.2 8.9   ALBUMIN 3.5  --    PROT 8.7*  --    * 133*   K 4.9 4.2   CO2 24 28   CL 88* 95   BUN 12 11   CREATININE 0.8 0.7   ALKPHOS 87  --    ALT 29  --    AST 30  --    BILITOT 0.4  --        Significant Diagnostics:      Assessment/Plan:     Small bowel obstruction due to incarcerated umbilical hernia s/p open reduction and repair   -remove NGT  -start liquid diet  -OK for PO medication.     -Ok to discharge from surgery standpoint if tolerating diet       Kel Reyez III, MD  General Surgery  [facility]

## 2019-03-09 NOTE — HPI
43 y.o. female admitted to Hospitalist Service from [facility] Emergency Room with complaint of worsening abdominal pain associated with nausea and vomiting for few days. Patient reportedly has past medical history significant for morbid obesity, diabetes mellitus type 2, history of nephrolithiasis, chronic kidney disease, gastroesophageal reflux disorder, history of spina bifida status post ventricular peritoneal shunt.  Patient has prior history of cholecystectomy. Patient presented to the emergency room with complaint of progressively worsening upper crampy abdominal pain associated with nonbloody, non bilious vomiting.  A CT scan of the abdomen and pelvis with contrast in the emergency room showed high-grade small-bowel obstruction appearing related to umbilical hernia containing bowel and with bowel wall thickening questioning ischemic change.  Patient was evaluated by Dr. Hancock in the emergency room who reportedly reduce the umbilical hernia.  Patient is being admitted for further monitoring and a CT scan of the abdomen and pelvis with contrast is planned for this afternoon. Patient denied chest pain, shortness of breath, headache, vision changes, focal neuro-deficits, cough or fever.

## 2019-03-10 VITALS
HEIGHT: 61 IN | RESPIRATION RATE: 18 BRPM | OXYGEN SATURATION: 99 % | WEIGHT: 293 LBS | BODY MASS INDEX: 55.32 KG/M2 | SYSTOLIC BLOOD PRESSURE: 124 MMHG | HEART RATE: 118 BPM | DIASTOLIC BLOOD PRESSURE: 71 MMHG | TEMPERATURE: 99 F

## 2019-03-10 LAB
ANION GAP SERPL CALC-SCNC: 13 MMOL/L
BUN SERPL-MCNC: 17 MG/DL
CALCIUM SERPL-MCNC: 8.8 MG/DL
CHLORIDE SERPL-SCNC: 92 MMOL/L
CO2 SERPL-SCNC: 27 MMOL/L
CREAT SERPL-MCNC: 1.2 MG/DL
EST. GFR  (AFRICAN AMERICAN): >60 ML/MIN/1.73 M^2
EST. GFR  (NON AFRICAN AMERICAN): 55 ML/MIN/1.73 M^2
GLUCOSE SERPL-MCNC: 85 MG/DL
POTASSIUM SERPL-SCNC: 3.9 MMOL/L
SODIUM SERPL-SCNC: 132 MMOL/L

## 2019-03-10 PROCEDURE — 27000221 HC OXYGEN, UP TO 24 HOURS

## 2019-03-10 PROCEDURE — 80048 BASIC METABOLIC PNL TOTAL CA: CPT

## 2019-03-10 PROCEDURE — 36415 COLL VENOUS BLD VENIPUNCTURE: CPT

## 2019-03-10 PROCEDURE — 25000003 PHARM REV CODE 250: Performed by: SURGERY

## 2019-03-10 PROCEDURE — 94761 N-INVAS EAR/PLS OXIMETRY MLT: CPT

## 2019-03-10 RX ORDER — MICONAZOLE NITRATE 2 %
POWDER (GRAM) TOPICAL 2 TIMES DAILY
Status: DISCONTINUED | OUTPATIENT
Start: 2019-03-10 | End: 2019-03-10 | Stop reason: HOSPADM

## 2019-03-10 RX ORDER — HYDROCODONE BITARTRATE AND ACETAMINOPHEN 10; 325 MG/1; MG/1
1 TABLET ORAL EVERY 4 HOURS PRN
Qty: 24 TABLET | Refills: 0 | Status: SHIPPED | OUTPATIENT
Start: 2019-03-10 | End: 2019-03-10 | Stop reason: HOSPADM

## 2019-03-10 RX ORDER — HYDROCODONE BITARTRATE AND ACETAMINOPHEN 7.5; 325 MG/1; MG/1
1 TABLET ORAL EVERY 6 HOURS PRN
Qty: 30 TABLET | Refills: 0 | Status: SHIPPED | OUTPATIENT
Start: 2019-03-10 | End: 2019-04-30 | Stop reason: SDUPTHER

## 2019-03-10 RX ADMIN — HYDROCODONE BITARTRATE AND ACETAMINOPHEN 1 TABLET: 10; 325 TABLET ORAL at 11:03

## 2019-03-10 RX ADMIN — HYDROCODONE BITARTRATE AND ACETAMINOPHEN 1 TABLET: 10; 325 TABLET ORAL at 03:03

## 2019-03-10 RX ADMIN — HYDROCODONE BITARTRATE AND ACETAMINOPHEN 1 TABLET: 10; 325 TABLET ORAL at 07:03

## 2019-03-10 NOTE — PLAN OF CARE
03/10/19 0749   Patient Assessment/Suction   Level of Consciousness (AVPU) alert   PRE-TX-O2   O2 Device (Oxygen Therapy) nasal cannula   $ Is the patient on Low Flow Oxygen? Yes   Flow (L/min) 2   Oxygen Concentration (%) 28   SpO2 99 %   Pulse Oximetry Type Intermittent   $ Pulse Oximetry - Multiple Charge Pulse Oximetry - Multiple   Pulse (!) 118   Resp 18   Ready to Wean/Extubation Screen   FIO2<=50 (chart decimal) 0.28

## 2019-03-10 NOTE — PLAN OF CARE
Problem: Adult Inpatient Plan of Care  Goal: Plan of Care Review  Outcome: Ongoing (interventions implemented as appropriate)  A, A & O, able to make needs known. Pt educated to call for assistance. Plan of care discussed with patient, all question answered. C/o pain addressed with prn medication. Comfort level established. Pt remains free from fall/injury throughout shift. Suprapubic catheter patent and draining dk yellow/concentrated urine with sedements. Cath care provided. PO fluids encouraged. Pt drank 240ml of water. Abd drsg clean, dry and intact. Pt Bed in lowest position, wheels locked, side rails up x2, and call light within reach. No distress noted at this time. Will Continue to observe.

## 2019-03-10 NOTE — PLAN OF CARE
Problem: Adult Inpatient Plan of Care  Goal: Plan of Care Review  Outcome: Ongoing (interventions implemented as appropriate)  Patient AAO, VSS. Delayed responses. Pt c/o sore throat.  Spray and lozenges given.  Pain well managed with current plan. Suprapubic cath draining clear yellow urine.  Incontinence care provided as needed. Pt verbalized understanding of POC. Purposeful hourly/q2hr rounding done during shift to promote patient safety. Patient free from falls and injury during shift.  Bed in lowest position, brakes locked, and call light within reach.  Will continue to monitor.

## 2019-03-10 NOTE — HOSPITAL COURSE
Pt went for open repair of what was found to be an incarcerated umbilical hernia. Post operative course was uneventful. Pt had return of bowel function of POD1 so NGT was removed and pt was started on liquid diet. Given tolerance diet was advanced on POD2. Given trajectory the decision was made to discharge pt with instruction to f/u with Dr. Hancock in 2 weeks.

## 2019-03-10 NOTE — PLAN OF CARE
03/09/19 2014   Patient Assessment/Suction   Level of Consciousness (AVPU) alert   Respiratory Effort Unlabored   PRE-TX-O2   O2 Device (Oxygen Therapy) nasal cannula   $ Is the patient on Low Flow Oxygen? Yes   Flow (L/min) 2   SpO2 97 %   Pulse Oximetry Type Intermittent   $ Pulse Oximetry - Multiple Charge Pulse Oximetry - Multiple

## 2019-03-10 NOTE — DISCHARGE SUMMARY
[facility] Hospital Medicine  Discharge Summary      Patient Name: Briana Uriarte  MRN: [id number]  Admission Date: 3/8/2019  Hospital Length of Stay: 2 days  Discharge Date and Time:  03/10/2019 3:33 PM  Attending Physician: No att. providers found   Discharging Provider: Denny Magallanes MD  Primary Care Provider: Anaid Osei MD      HPI:   43 y.o. female admitted to Hospitalist Service from [facility] Emergency Room with complaint of worsening abdominal pain associated with nausea and vomiting for few days. Patient reportedly has past medical history significant for morbid obesity, diabetes mellitus type 2, history of nephrolithiasis, chronic kidney disease, gastroesophageal reflux disorder, history of spina bifida status post ventricular peritoneal shunt.  Patient has prior history of cholecystectomy. Patient presented to the emergency room with complaint of progressively worsening upper crampy abdominal pain associated with nonbloody, non bilious vomiting.  A CT scan of the abdomen and pelvis with contrast in the emergency room showed high-grade small-bowel obstruction appearing related to umbilical hernia containing bowel and with bowel wall thickening questioning ischemic change.  Patient was evaluated by Dr. Hancock in the emergency room who reportedly reduce the umbilical hernia.  Patient is being admitted for further monitoring and a CT scan of the abdomen and pelvis with contrast is planned for this afternoon. Patient denied chest pain, shortness of breath, headache, vision changes, focal neuro-deficits, cough or fever.    Procedure(s) (LRB):  LAPAROTOMY, EXPLORATORY (N/A)  REPAIR, HERNIA, UMBILICAL, INCARCERATED, AGE 5 YEARS OR OLDER (N/A)      Hospital Course:   Pt went for open repair of what was found to be an incarcerated umbilical hernia. Post operative course was uneventful. Pt had return of bowel function of POD1 so NGT was removed and pt was started  on liquid diet. Given tolerance diet was advanced on POD2. Given trajectory the decision was made to discharge pt with instruction to f/u with Dr. Hancock in 2 weeks.        Consults:   Consults (From admission, onward)        Status Ordering Provider     Inpatient consult to General surgery  Once     Provider:  Ziyad Hancock MD    Completed XOCHILT NEELY          No new Assessment & Plan notes have been filed under this hospital service since the last note was generated.  Service: Hospital Medicine    Final Active Diagnoses:    Diagnosis Date Noted POA    PRINCIPAL PROBLEM:  Umbilical hernia [K42.9] 06/14/2016 Yes    SBO (small bowel obstruction) [K56.609] 06/25/2018 Yes    Physical debility [R53.81] 06/14/2016 Yes    Spina bifida of lumbar region [Q05.7]  Not Applicable    Paraplegia [G82.20] 11/09/2013 Yes      Problems Resolved During this Admission:       Discharged Condition: good    Disposition: Home or Self Care    Follow Up:  Follow-up Information     Ziyad Hancock MD In 2 weeks.    Specialties:  General Surgery, Bariatrics, Surgery  Contact information:  [address] [place] [address]  [facility] [address]  [phone]                 Patient Instructions:      Ambulatory Referral to Outpatient Case Management   Referral Priority: Routine Referral Type: Consultation   Referral Reason: Specialty Services Required   Number of Visits Requested: 1       Significant Diagnostic Studies: Labs:   CMP   Recent Labs   Lab 03/09/19  0504 03/10/19  0531   * 132*   K 4.2 3.9   CL 95 92*   CO2 28 27   GLU 91 85   BUN 11 17   CREATININE 0.7 1.2   CALCIUM 8.9 8.8   ANIONGAP 10 13   ESTGFRAFRICA >60 >60   EGFRNONAA >60 55*    and CBC No results for input(s): WBC, HGB, HCT, PLT in the last 48 hours.  Radiology: X-Ray: CXR: X-Ray Chest 1 View (CXR): No results found for this visit on 03/08/19. and X-Ray Chest PA and Lateral (CXR): No results found for this visit on 03/08/19. and KUB: X-Ray Abdomen AP 1  View (KUB): No results found for this visit on 03/08/19.    Pending Diagnostic Studies:     None         Medications:  Reconciled Home Medications:      Medication List      CHANGE how you take these medications    HYDROcodone-acetaminophen 7.5-325 mg per tablet  Commonly known as:  NORCO  Take 1 tablet by mouth every 6 (six) hours as needed for Pain.  What changed:  Another medication with the same name was removed. Continue taking this medication, and follow the directions you see here.        CONTINUE taking these medications    albuterol 90 mcg/actuation inhaler  Commonly known as:  PROVENTIL/VENTOLIN HFA  2 puffs every 4 hours as needed for cough, wheeze, or shortness of breath     betamethasone dipropionate 0.05 % lotion  Commonly known as:  DIPROLENE  AAA scalp QHS PRN itchy red plaques     catheter 20 Fr Misc  1 application by Misc.(Non-Drug; Combo Route) route every 30 days.     fluconazole 150 MG Tab  Commonly known as:  DIFLUCAN  2 tabs PO  Twice weekly for 4 doses     fluocinolone acetonide oil 0.01 % Drop  Commonly known as:  DERMOTIC OIL  5 drops in right ear bid x 7-10 days until resolution, use intermittenlty for flares.     fluticasone-umeclidin-vilanter 100-62.5-25 mcg Dsdv  Commonly known as:  TRELEGY ELLIPTA  Inhale 1 puff into the lungs once daily.     FREESTYLE LITE STRIPS Strp  Generic drug:  blood sugar diagnostic  TEST BS ONCE D     ipratropium 42 mcg (0.06 %) nasal spray  Commonly known as:  ATROVENT  1 spray by Nasal route daily as needed for Rhinitis.     ketoconazole 2 % cream  Commonly known as:  NIZORAL  Apply to AA folds BID PRN flare     lisinopril 20 MG tablet  Commonly known as:  PRINIVIL,ZESTRIL     metoprolol succinate 50 MG 24 hr tablet  Commonly known as:  TOPROL-XL  TAKE 1 TABLET(50 MG) BY MOUTH EVERY DAY     miconazole nitrate 2 % Aerp  Apply 1 Dose topically 2 (two) times daily. Apply to rash on buttocks     multivitamin with minerals tablet  Take 1 tablet by mouth once  daily.     polyethylene glycol 17 gram Pwpk  Commonly known as:  GLYCOLAX  Take 17 g by mouth once daily.     triamcinolone acetonide 0.1% 0.1 % ointment  Commonly known as:  KENALOG  Apply topically 2 (two) times daily.     VITAMIN C 500 MG tablet  Generic drug:  ascorbic acid (vitamin C)  Take 500 mg by mouth once daily.            Indwelling Lines/Drains at time of discharge:   Lines/Drains/Airways     Drain                 Suprapubic Catheter 05/12/15 0812 100% silicone 20 Fr. 1398 days         Suprapubic Catheter 03/08/19 2328 1 day                Time spent on the discharge of patient: 30 minutes  Patient was seen and examined on the date of discharge and determined to be suitable for discharge.         Denny Magallanes MD  Department of Hospital Medicine  [facility]

## 2019-03-10 NOTE — PROGRESS NOTES
[facility] Surgery  Progress Note    Subjective:     Interval History: No acute events overnight.  Tolerating diet.  Pain controlled.     Post-Op Info:  Procedure(s) (LRB):  LAPAROTOMY, EXPLORATORY (N/A)  REPAIR, HERNIA, UMBILICAL, INCARCERATED, AGE 5 YEARS OR OLDER (N/A)   2 Days Post-Op      Medications:  Continuous Infusions:  Scheduled Meds:   enoxaparin  40 mg Subcutaneous Q24H    miconazole NITRATE 2 %   Topical (Top) BID     PRN Meds:acetaminophen, HYDROcodone-acetaminophen, morphine, ondansetron, sodium chloride 0.9%     Objective:     Vital Signs (Most Recent):  Temp: 99.3 °F (37.4 °C) (03/10/19 0736)  Pulse: (!) 120 (03/10/19 0736)  Resp: 19 (03/10/19 0736)  BP: 124/71 (03/10/19 0736)  SpO2: 100 % (03/10/19 0736) Vital Signs (24h Range):  Temp:  [98.1 °F (36.7 °C)-100.2 °F (37.9 °C)] 99.3 °F (37.4 °C)  Pulse:  [118-133] 120  Resp:  [14-20] 19  SpO2:  [97 %-100 %] 100 %  BP: ()/() 124/71       Intake/Output Summary (Last 24 hours) at 3/10/2019 1005  Last data filed at 3/10/2019 0600  Gross per 24 hour   Intake 240 ml   Output 200 ml   Net 40 ml       Physical Exam   Constitutional: She is oriented to person, place, and time. She is cooperative. No distress.   Pulmonary/Chest: Effort normal. No respiratory distress.   Abdominal: Soft. She exhibits no distension. There is tenderness (appropriate ). There is no rigidity, no rebound and no guarding. No hernia.   Neurological: She is alert and oriented to person, place, and time.   Skin:   Incisions are clean, dry and intact  There is no evidence of infection, hematoma or seroma        Significant Labs:      Significant Diagnostics:      Assessment/Plan:   Incarcerated umbilical hernia s/p open reduction and repair.    -OK to Dc from surgery standpoint.  Follow up two weeks.         Active Diagnoses:    Diagnosis Date Noted POA    PRINCIPAL PROBLEM:  Umbilical hernia [K42.9] 06/14/2016 Yes    SBO (small bowel  obstruction) [K56.609] 06/25/2018 Yes    Physical debility [R53.81] 06/14/2016 Yes    Spina bifida of lumbar region [Q05.7]  Not Applicable    Paraplegia [G82.20] 11/09/2013 Yes      Problems Resolved During this Admission:         Kel Reyez III, MD  General Surgery  [facility]

## 2019-03-10 NOTE — PLAN OF CARE
Pt able to provide , demographic info.  Pt's parents at bedside, assisted w/ assessment.  Pt is non-ambulatory, parents provide for all support --bathing, dressing, getting pt in/out of bed, etc. Pt denies HH, uses DME below.  Pt's parents have medical POA followed by her brother, Marcos Uriarte.       03/10/19 0934   Discharge Assessment   Assessment Type Discharge Planning Assessment   Confirmed/corrected address and phone number on facesheet? Yes   Assessment information obtained from? Caregiver;Patient   Prior to hospitilization cognitive status: Alert/Oriented   Prior to hospitalization functional status: Wheelchair Bound;Partially Dependent   Current cognitive status: Alert/Oriented   Current Functional Status: Wheelchair Bound;Partially Dependent   Facility Arrived From: home   Lives With parent(s)   Able to Return to Prior Arrangements yes   Is patient able to care for self after discharge? No   Who are your caregiver(s) and their phone number(s)? mother:  Manuela Uriarte  [phone]   Patient's perception of discharge disposition home or selfcare   Readmission Within the Last 30 Days no previous admission in last 30 days   Patient currently being followed by outpatient case management? No   Patient currently receives any other outside agency services? No   Equipment Currently Used at Home wheelchair;power chair;lift device   Do you have any problems affording any of your prescribed medications? No   Is the patient taking medications as prescribed? yes   Does the patient have transportation home? Yes   Transportation Anticipated family or friend will provide   Does the patient receive services at the [facility]? No   Discharge Plan A Home with family   Discharge Plan B Home with family   DME Needed Upon Discharge  none   Patient/Family in Agreement with Plan yes

## 2019-03-11 ENCOUNTER — PATIENT MESSAGE (OUTPATIENT)
Dept: DERMATOLOGY | Facility: CLINIC | Age: 44
End: 2019-03-11

## 2019-03-11 ENCOUNTER — TELEPHONE (OUTPATIENT)
Dept: BARIATRICS | Facility: CLINIC | Age: 44
End: 2019-03-11

## 2019-03-11 ENCOUNTER — PATIENT OUTREACH (OUTPATIENT)
Dept: ADMINISTRATIVE | Facility: CLINIC | Age: 44
End: 2019-03-11

## 2019-03-11 NOTE — TELEPHONE ENCOUNTER
Spoke with pt mother, scheduled po sx appt, confirmed location, date, and time. Manuela acknowledged, stated also that she has had a small fever, 99. advised that is normal post surgery. If reaches 101/higher to contact office, currently goes down w/tylenol.

## 2019-03-11 NOTE — PLAN OF CARE
03/11/19 0808   Final Note   Assessment Type Final Discharge Note   Anticipated Discharge Disposition Home

## 2019-03-11 NOTE — TELEPHONE ENCOUNTER
----- Message from Pradeep Alfonso RN sent at 3/11/2019 12:08 PM CDT -----  Contact: Pradeep Alfonso RN  Spoke with patient's mother and she is requesting to speak with someone from physician office regarding patient having symptoms of fever. She would also like to schedule a follow up appointment to see Dr. Hancock. Please contact patient and advise. Thanks in advance for your assistance.

## 2019-03-11 NOTE — PATIENT INSTRUCTIONS
Recognizing and Treating Wound Infection  Wounds can become infected with harmful bacteria. This prevents healing and increases the risk of scars. In some cases, the infection may spread to other parts of the body. And infection with the bacteria that cause tetanus can be fatal. Know what to watch for and get prompt treatment for infection.     Risk Factors  A wound is more likely to become infected if it:  Results from a puncture, such as from a nail or piece of glass.  Results from a human or animal bite.  Isn't cleaned or treated within 8 hours.  Occurs in your hand, foot, leg, armpit, or groin.  Contains dirt or saliva.  Heals very slowly.  Occurs in a person with diabetes, alcoholism, or a compromised immune system.    Symptoms of Infection  Call your healthcare provider at the first sign of infection, such as:  Yellow, yellow-green, or foul-smelling drainage from a wound  Increased pain, swelling, or redness in or near a wound  A change in the color or size of a wound  Red streaks in the skin around the wound  Fever    Treatment  Treatment is likely to depend on the type and extent of your infection. Your healthcare provider may prescribe oral antibiotics to help fight bacteria. He or she may also flush the wound with an antibiotic solution or apply an antibiotic ointment. Sometimes an abscess (a pocket of pus) may form. In that case, the abscess will be opened and the fluid drained. You may need hospital care if the infection is very severe.    Preventing Wound Infection  Follow these steps to help keep wounds from developing infection:  Wash the wound right away with soap and water.  Apply a small amount of antibiotic ointment. You can buy this at the drugstore without a prescription.  Cover wounds with a bandage or gauze dressing.  Keep the wound clean and dry for the first 24hrs  Change the dressing daily using sterile gloves  © 7938-4930 [employer] Law[employer], [address] [place] [address], [place] [address]. All  rights reserved. This information is not intended as a substitute for professional medical care. Always follow your healthcare professional's instructions.

## 2019-03-12 ENCOUNTER — HOSPITAL ENCOUNTER (EMERGENCY)
Facility: HOSPITAL | Age: 44
Discharge: HOME | End: 2019-03-12
Attending: EMERGENCY MEDICINE | Admitting: HOSPITALIST
Payer: MEDICARE

## 2019-03-12 ENCOUNTER — PATIENT MESSAGE (OUTPATIENT)
Dept: BARIATRICS | Facility: CLINIC | Age: 44
End: 2019-03-12

## 2019-03-12 ENCOUNTER — NURSE TRIAGE (OUTPATIENT)
Dept: ADMINISTRATIVE | Facility: CLINIC | Age: 44
End: 2019-03-12

## 2019-03-12 VITALS
OXYGEN SATURATION: 93 % | DIASTOLIC BLOOD PRESSURE: 77 MMHG | SYSTOLIC BLOOD PRESSURE: 129 MMHG | HEART RATE: 109 BPM | TEMPERATURE: 100 F | BODY MASS INDEX: 55.32 KG/M2 | HEIGHT: 61 IN | RESPIRATION RATE: 20 BRPM | WEIGHT: 293 LBS

## 2019-03-12 DIAGNOSIS — J10.1 INFLUENZA A: Primary | ICD-10-CM

## 2019-03-12 DIAGNOSIS — R00.0 TACHYCARDIA: ICD-10-CM

## 2019-03-12 DIAGNOSIS — R05.9 COUGH: ICD-10-CM

## 2019-03-12 LAB
ALBUMIN SERPL BCP-MCNC: 3.1 G/DL
ALP SERPL-CCNC: 84 U/L
ALT SERPL W/O P-5'-P-CCNC: 17 U/L
AMORPH CRY URNS QL MICRO: ABNORMAL
ANION GAP SERPL CALC-SCNC: 14 MMOL/L
AST SERPL-CCNC: 21 U/L
BACTERIA #/AREA URNS HPF: ABNORMAL /HPF
BASOPHILS # BLD AUTO: 0 K/UL
BASOPHILS NFR BLD: 0.1 %
BILIRUB SERPL-MCNC: 0.2 MG/DL
BILIRUB UR QL STRIP: NEGATIVE
BUN SERPL-MCNC: 8 MG/DL
CALCIUM SERPL-MCNC: 9.2 MG/DL
CHLORIDE SERPL-SCNC: 91 MMOL/L
CLARITY UR: CLEAR
CO2 SERPL-SCNC: 25 MMOL/L
COLOR UR: YELLOW
CREAT SERPL-MCNC: 0.7 MG/DL
DIFFERENTIAL METHOD: ABNORMAL
EOSINOPHIL # BLD AUTO: 0.1 K/UL
EOSINOPHIL NFR BLD: 2 %
ERYTHROCYTE [DISTWIDTH] IN BLOOD BY AUTOMATED COUNT: 18 %
EST. GFR  (AFRICAN AMERICAN): >60 ML/MIN/1.73 M^2
EST. GFR  (NON AFRICAN AMERICAN): >60 ML/MIN/1.73 M^2
GLUCOSE SERPL-MCNC: 89 MG/DL
GLUCOSE UR QL STRIP: NEGATIVE
HCT VFR BLD AUTO: 34.6 %
HGB BLD-MCNC: 10.9 G/DL
HGB UR QL STRIP: ABNORMAL
INFLUENZA A, MOLECULAR: POSITIVE
INFLUENZA B, MOLECULAR: NEGATIVE
KETONES UR QL STRIP: NEGATIVE
LACTATE SERPL-SCNC: 1.2 MMOL/L
LACTATE SERPL-SCNC: 2.4 MMOL/L
LEUKOCYTE ESTERASE UR QL STRIP: ABNORMAL
LYMPHOCYTES # BLD AUTO: 0.5 K/UL
LYMPHOCYTES NFR BLD: 7.1 %
MCH RBC QN AUTO: 24.3 PG
MCHC RBC AUTO-ENTMCNC: 31.4 G/DL
MCV RBC AUTO: 77 FL
MICROSCOPIC COMMENT: ABNORMAL
MONOCYTES # BLD AUTO: 0.4 K/UL
MONOCYTES NFR BLD: 6.3 %
NEUTROPHILS # BLD AUTO: 5.4 K/UL
NEUTROPHILS NFR BLD: 84.5 %
NITRITE UR QL STRIP: POSITIVE
PH UR STRIP: 8 [PH] (ref 5–8)
PLATELET # BLD AUTO: 360 K/UL
PMV BLD AUTO: 7.8 FL
POTASSIUM SERPL-SCNC: 4.4 MMOL/L
PROT SERPL-MCNC: 8.1 G/DL
PROT UR QL STRIP: NEGATIVE
RBC # BLD AUTO: 4.48 M/UL
RBC #/AREA URNS HPF: 1 /HPF (ref 0–4)
SODIUM SERPL-SCNC: 130 MMOL/L
SP GR UR STRIP: 1.01 (ref 1–1.03)
SPECIMEN SOURCE: ABNORMAL
SQUAMOUS #/AREA URNS HPF: 1 /HPF
URN SPEC COLLECT METH UR: ABNORMAL
UROBILINOGEN UR STRIP-ACNC: NEGATIVE EU/DL
WBC # BLD AUTO: 6.4 K/UL
WBC #/AREA URNS HPF: 12 /HPF (ref 0–5)

## 2019-03-12 PROCEDURE — 25000003 PHARM REV CODE 250: Performed by: NURSE PRACTITIONER

## 2019-03-12 PROCEDURE — 93005 ELECTROCARDIOGRAM TRACING: CPT

## 2019-03-12 PROCEDURE — 36415 COLL VENOUS BLD VENIPUNCTURE: CPT

## 2019-03-12 PROCEDURE — 99285 EMERGENCY DEPT VISIT HI MDM: CPT | Mod: 25

## 2019-03-12 PROCEDURE — 25500020 PHARM REV CODE 255: Performed by: EMERGENCY MEDICINE

## 2019-03-12 PROCEDURE — 96366 THER/PROPH/DIAG IV INF ADDON: CPT

## 2019-03-12 PROCEDURE — 83605 ASSAY OF LACTIC ACID: CPT | Mod: 91

## 2019-03-12 PROCEDURE — 85025 COMPLETE CBC W/AUTO DIFF WBC: CPT

## 2019-03-12 PROCEDURE — 94640 AIRWAY INHALATION TREATMENT: CPT

## 2019-03-12 PROCEDURE — 81000 URINALYSIS NONAUTO W/SCOPE: CPT

## 2019-03-12 PROCEDURE — 87040 BLOOD CULTURE FOR BACTERIA: CPT

## 2019-03-12 PROCEDURE — 87086 URINE CULTURE/COLONY COUNT: CPT

## 2019-03-12 PROCEDURE — 63600175 PHARM REV CODE 636 W HCPCS: Performed by: NURSE PRACTITIONER

## 2019-03-12 PROCEDURE — 87502 INFLUENZA DNA AMP PROBE: CPT

## 2019-03-12 PROCEDURE — 25000242 PHARM REV CODE 250 ALT 637 W/ HCPCS: Performed by: NURSE PRACTITIONER

## 2019-03-12 PROCEDURE — 96365 THER/PROPH/DIAG IV INF INIT: CPT

## 2019-03-12 PROCEDURE — 96361 HYDRATE IV INFUSION ADD-ON: CPT

## 2019-03-12 PROCEDURE — 80053 COMPREHEN METABOLIC PANEL: CPT

## 2019-03-12 RX ORDER — OSELTAMIVIR PHOSPHATE 75 MG/1
75 CAPSULE ORAL
Status: COMPLETED | OUTPATIENT
Start: 2019-03-12 | End: 2019-03-12

## 2019-03-12 RX ORDER — OSELTAMIVIR PHOSPHATE 75 MG/1
75 CAPSULE ORAL 2 TIMES DAILY
Qty: 9 CAPSULE | Refills: 0 | Status: SHIPPED | OUTPATIENT
Start: 2019-03-12 | End: 2019-03-17

## 2019-03-12 RX ORDER — IPRATROPIUM BROMIDE AND ALBUTEROL SULFATE 2.5; .5 MG/3ML; MG/3ML
3 SOLUTION RESPIRATORY (INHALATION)
Status: COMPLETED | OUTPATIENT
Start: 2019-03-12 | End: 2019-03-12

## 2019-03-12 RX ADMIN — OSELTAMIVIR PHOSPHATE 75 MG: 75 CAPSULE ORAL at 12:03

## 2019-03-12 RX ADMIN — SODIUM CHLORIDE 1000 ML: 0.9 INJECTION, SOLUTION INTRAVENOUS at 11:03

## 2019-03-12 RX ADMIN — VANCOMYCIN HYDROCHLORIDE 2000 MG: 1 INJECTION, POWDER, FOR SOLUTION INTRAVENOUS at 11:03

## 2019-03-12 RX ADMIN — IPRATROPIUM BROMIDE AND ALBUTEROL SULFATE 3 ML: .5; 3 SOLUTION RESPIRATORY (INHALATION) at 09:03

## 2019-03-12 RX ADMIN — IOHEXOL 100 ML: 350 INJECTION, SOLUTION INTRAVENOUS at 10:03

## 2019-03-12 NOTE — ED PROVIDER NOTES
Encounter Date: 3/12/2019    SCRIBE #1 NOTE: Tamela DELEON, nicolasa scribing for, and in the presence of, JAIME Tavares.       History     Chief Complaint   Patient presents with    Cough     low grade temp. Went home from abdominal surgeryon 3/10/19.       Time seen by provider: 8:34 AM on 03/12/2019    Briana Uriarte is a 43 y.o. female with PMHx of spinal bifida, hydrocephalus, HTN, DM, and asthma who presents to the ED with complaints of cough that started x2 days ago and fever that started x3 days ago. Per mother, patient recently had surgery x4 days ago (3/8) for bowel obstruction and was discharged x2 days ago (3/10). Highest fever measured was 102 TMAXF. Cough is non productive. Mother endorses patient used her rescue inhaler last night with no improvements to cough. Father reports patient uses Trelegy Ellipta daily. Patient has no other medical concerns or complaints at this moment. SHx includes cholecystectomy, brain surgery, and spine surgery. Cefapime and Latex. HPI provided by patient's caregivers.       The history is provided by a relative.     Review of patient's allergies indicates:   Allergen Reactions    Cefepime      HIVES    Latex Swelling     Past Medical History:   Diagnosis Date    Asthma     Back pain     Blood transfusion     Chronic kidney disease     kidney stones    Diabetes mellitus     Diabetes mellitus, type 2     Esotropia     GERD (gastroesophageal reflux disease)     Hydrocephalus     Hypertension     Non-healing ulcer of buttock     Nystagmus, congenital     Paralysis     Spinal bifida, closed     Wheezing      Past Surgical History:   Procedure Laterality Date    BRAIN SURGERY       shunt revision    CHOLECYSTECTOMY      CYSTOSCOPY W/ LASER LITHOTRIPSY      DEBRIDEMENT-WOUND Left 5/11/2015    [facility]    INSERTION-CATHETER-GROSHONG N/A 10/16/2013    [facility] at [facility] OR    LAPAROTOMY,  EXPLORATORY N/A 3/8/2019    [facility] OR    REPAIR, HERNIA, UMBILICAL, INCARCERATED, AGE 5 YEARS OR OLDER N/A 3/8/2019    [facility] at [facility] OR    MJUOVTTF-DTIXJ-JBQOUZTFEEXWLUUXRHYE Left 10/20/2014    [facility] OR 2ND [facility]    SPINE SURGERY       Family History   Problem Relation Age of Onset    Cancer Mother     Glaucoma Father     Heart disease Maternal Grandmother     Cancer Maternal Grandmother     Glaucoma Paternal Grandfather     Psoriasis Maternal Uncle     Melanoma Neg Hx     Lupus Neg Hx     Eczema Neg Hx      Social History     Tobacco Use    Smoking status: Never Smoker    Smokeless tobacco: Never Used   Substance Use Topics    Alcohol use: No    Drug use: No     Review of Systems   Constitutional: Positive for fever. Negative for chills.   HENT: Negative for congestion, rhinorrhea and sore throat.    Respiratory: Positive for cough. Negative for shortness of breath.    Cardiovascular: Negative for chest pain and palpitations.   Gastrointestinal: Negative for abdominal pain, diarrhea, nausea and vomiting.   Genitourinary: Negative for frequency.   Musculoskeletal: Negative for gait problem, myalgias and neck pain.   Skin: Negative for rash.   Neurological: Negative for seizures and syncope.   Hematological: Does not bruise/bleed easily.   Psychiatric/Behavioral: Negative for confusion.       Physical Exam     Initial Vitals [03/12/19 0819]   BP Pulse Resp Temp SpO2   132/77 102 16 100.3 °F (37.9 °C) 95 %      MAP       --         Physical Exam    Nursing note and vitals reviewed.  Constitutional: She appears well-developed and well-nourished. She is not diaphoretic. She is active. She does not have a sickly appearance. No distress.   Confined to wheel chair.    HENT:   Head: Normocephalic and atraumatic.   Eyes: Conjunctivae are normal.   Neck: Normal range of motion and full passive range of motion  without pain.   Cardiovascular: Regular rhythm and normal heart sounds. Tachycardia present.  Exam reveals no gallop and no friction rub.    No murmur heard.  Pulmonary/Chest: She has wheezes. She has rhonchi. She has no rales.   Expiratory wheezing bilaterally. Coarse rhonchi bilaterally.    Abdominal: Soft. Bowel sounds are normal. There is tenderness.   Midline abdominal scar with staples, minimal surrounding erythema with diffuse abdominal tenderness. No drainage   Musculoskeletal: Normal range of motion.   Neurological: She is alert. Gait normal.   Paraplegic.    Skin: Skin is warm and dry. Capillary refill takes less than 2 seconds.         ED Course   Procedures  Labs Reviewed   INFLUENZA A & B BY MOLECULAR - Abnormal; Notable for the following components:       Result Value    Influenza A, Molecular Positive (*)     All other components within normal limits   CBC W/ AUTO DIFFERENTIAL - Abnormal; Notable for the following components:    Hemoglobin 10.9 (*)     Hematocrit 34.6 (*)     MCV 77 (*)     MCH 24.3 (*)     MCHC 31.4 (*)     RDW 18.0 (*)     Platelets 360 (*)     MPV 7.8 (*)     Lymph # 0.5 (*)     Gran% 84.5 (*)     Lymph% 7.1 (*)     All other components within normal limits   COMPREHENSIVE METABOLIC PANEL - Abnormal; Notable for the following components:    Sodium 130 (*)     Chloride 91 (*)     Albumin 3.1 (*)     All other components within normal limits   CULTURE, BLOOD   CULTURE, BLOOD   LACTIC ACID, PLASMA   URINALYSIS, REFLEX TO URINE CULTURE   LACTIC ACID, PLASMA     EKG Readings: (Independently Interpreted)   Rhythm: Sinus Tachycardia. Heart Rate: 104. ST Segments: Normal ST Segments. T Waves: Normal. Axis: Normal. Other Impression: No evidence of acute ischemia per interpretated by Dr. Jones       Imaging Results          X-Ray Chest AP Portable (In process)                  Medical Decision Making:   History:   Old Medical Records: I decided to obtain old medical records.  Clinical  Tests:   Lab Tests: Reviewed and Ordered  Radiological Study: Reviewed and Ordered  Medical Tests: Reviewed and Ordered       APC / Resident Notes:   Patient presents with cough and fever. No infiltrate on CXR to indicate pneumonia. She is not hypoxic and has no respiratory distress. She appears well hydrated and nontoxic. Lactic acid elevated at 2.4 but can be explained by influenza. CT shows no acute findings such as abscess or other infectious process. She has a normal WBC. SHe has nitrite positive urine but this is likely a contaminant based on previous cultures and discussion with patient's parents. Will not treat for UTI at this time and await culture. The patient was evaluated by Dr. Mcknight who recommends discharge. Repeat lactic acid was 1.2. She was prescribed Tamiflu and advised to follow up with PCP. Specific return precautions discussed with patient and family. Based on my clinical evaluation, I do not appreciate any immediate, emergent, or life threatening condition or etiology that warrants additional workup today and feel that the patient can be discharged with close follow up care.           Scribe Attestation:   Scribe #1: I performed the above scribed service and the documentation accurately describes the services I performed. I attest to the accuracy of the note.    Attending Attestation:     Physician Attestation Statement for NP/PA:   I discussed this assessment and plan of this patient with the NP/PA, but I did not personally examine the patient. The face to face encounter was performed by the NP/PA.    Other NP/PA Attestation Additions:    History of Present Illness: 43-year-old female presented with a chief complaint of cough and fever.    Medical Decision Making: Initial differential diagnosis included but not limited to influenza, bronchitis, and pneumonia.  I am in agreement with the nurse practitioner's assessment, treatment, and plan of care.           I, Diamante Mooney NP-C, personally  performed the services described in this documentation. All medical record entries made by the scribe were at my direction and in my presence.  I have reviewed the chart and agree that the record reflects my personal performance and is accurate and complete. JAIME Tavares.  11:51 AM 03/12/2019               Clinical Impression:       ICD-10-CM ICD-9-CM   1. Influenza A J10.1 487.1   2. Tachycardia R00.0 785.0   3. Cough R05 786.2         Disposition:   Disposition: Discharged  Condition: Stable                        Tiffanie Mooney NP  03/12/19 1458       Tiffanie Mooney NP  03/12/19 1456       Jason Jones MD  03/12/19 2114

## 2019-03-14 LAB
BACTERIA UR CULT: NORMAL
BACTERIA UR CULT: NORMAL

## 2019-03-17 LAB
BACTERIA BLD CULT: NORMAL
BACTERIA BLD CULT: NORMAL

## 2019-03-18 ENCOUNTER — OUTPATIENT CASE MANAGEMENT (OUTPATIENT)
Dept: ADMINISTRATIVE | Facility: OTHER | Age: 44
End: 2019-03-18

## 2019-03-19 ENCOUNTER — PATIENT MESSAGE (OUTPATIENT)
Dept: SURGERY | Facility: CLINIC | Age: 44
End: 2019-03-19

## 2019-03-20 ENCOUNTER — OFFICE VISIT (OUTPATIENT)
Dept: BARIATRICS | Facility: CLINIC | Age: 44
End: 2019-03-20
Payer: MEDICARE

## 2019-03-20 ENCOUNTER — TELEPHONE (OUTPATIENT)
Dept: BARIATRICS | Facility: CLINIC | Age: 44
End: 2019-03-20

## 2019-03-20 ENCOUNTER — PATIENT MESSAGE (OUTPATIENT)
Dept: SURGERY | Facility: CLINIC | Age: 44
End: 2019-03-20

## 2019-03-20 VITALS
HEIGHT: 61 IN | DIASTOLIC BLOOD PRESSURE: 60 MMHG | RESPIRATION RATE: 16 BRPM | SYSTOLIC BLOOD PRESSURE: 120 MMHG | TEMPERATURE: 99 F | HEART RATE: 101 BPM | BODY MASS INDEX: 58.44 KG/M2

## 2019-03-20 DIAGNOSIS — K42.0 UMBILICAL HERNIA WITH OBSTRUCTION, WITHOUT GANGRENE: Primary | ICD-10-CM

## 2019-03-20 PROCEDURE — 99214 OFFICE O/P EST MOD 30 MIN: CPT | Mod: PBBFAC,PN | Performed by: SURGERY

## 2019-03-20 PROCEDURE — 99999 PR PBB SHADOW E&M-EST. PATIENT-LVL IV: ICD-10-PCS | Mod: PBBFAC,,, | Performed by: SURGERY

## 2019-03-20 PROCEDURE — 99999 PR PBB SHADOW E&M-EST. PATIENT-LVL IV: CPT | Mod: PBBFAC,,, | Performed by: SURGERY

## 2019-03-20 PROCEDURE — 99024 POSTOP FOLLOW-UP VISIT: CPT | Mod: POP,,, | Performed by: SURGERY

## 2019-03-20 PROCEDURE — 99024 PR POST-OP FOLLOW-UP VISIT: ICD-10-PCS | Mod: POP,,, | Performed by: SURGERY

## 2019-03-20 NOTE — TELEPHONE ENCOUNTER
----- Message from Maricruz Fabian sent at 3/20/2019  9:59 AM CDT -----  Contact: self  Patient [phone] is having drainage at the sight of her incision and was advised by Dr Hancock to come in to see him/I do not show any appts soon/please advise

## 2019-03-20 NOTE — TELEPHONE ENCOUNTER
Spoke with pt's mother, scheduled for today to assess incision. lorene acknowledged and confirmed  time, date, and location.

## 2019-03-22 NOTE — PROGRESS NOTES
Doing well tolerating diet  Seems more alert according to family    Vitals:    03/20/19 1426   BP: 120/60   Pulse: 101   Resp: 16   Temp: 99.2 °F (37.3 °C)     Incision healing well, scant drainage from mid wound- ss- no obvious infection    A/p    Sp hernia repair primarily for strangulated ventral hernia    - incision healing well  Remove staples  Continue wound care

## 2019-03-28 ENCOUNTER — OFFICE VISIT (OUTPATIENT)
Dept: SURGERY | Facility: CLINIC | Age: 44
End: 2019-03-28
Payer: MEDICARE

## 2019-03-28 VITALS
WEIGHT: 293 LBS | DIASTOLIC BLOOD PRESSURE: 80 MMHG | BODY MASS INDEX: 55.32 KG/M2 | HEIGHT: 61 IN | HEART RATE: 99 BPM | RESPIRATION RATE: 16 BRPM | TEMPERATURE: 98 F | SYSTOLIC BLOOD PRESSURE: 139 MMHG

## 2019-03-28 DIAGNOSIS — K56.609 SBO (SMALL BOWEL OBSTRUCTION): ICD-10-CM

## 2019-03-28 DIAGNOSIS — K42.0 UMBILICAL HERNIA WITH OBSTRUCTION, WITHOUT GANGRENE: Primary | ICD-10-CM

## 2019-03-28 PROCEDURE — 99999 PR PBB SHADOW E&M-EST. PATIENT-LVL V: CPT | Mod: PBBFAC,,, | Performed by: SURGERY

## 2019-03-28 PROCEDURE — 99999 PR PBB SHADOW E&M-EST. PATIENT-LVL V: ICD-10-PCS | Mod: PBBFAC,,, | Performed by: SURGERY

## 2019-03-28 PROCEDURE — 99024 PR POST-OP FOLLOW-UP VISIT: ICD-10-PCS | Mod: POP,,, | Performed by: SURGERY

## 2019-03-28 PROCEDURE — 99215 OFFICE O/P EST HI 40 MIN: CPT | Mod: PBBFAC,PO | Performed by: SURGERY

## 2019-03-28 PROCEDURE — 99024 POSTOP FOLLOW-UP VISIT: CPT | Mod: POP,,, | Performed by: SURGERY

## 2019-03-28 RX ORDER — SILVER SULFADIAZINE 10 G/1000G
CREAM TOPICAL
COMMUNITY
Start: 2019-03-27 | End: 2020-01-29

## 2019-04-02 RX ORDER — HYDROCODONE BITARTRATE AND ACETAMINOPHEN 7.5; 325 MG/1; MG/1
1 TABLET ORAL EVERY 6 HOURS PRN
Qty: 120 TABLET | Refills: 0 | Status: SHIPPED | OUTPATIENT
Start: 2019-06-01 | End: 2019-05-02 | Stop reason: SDUPTHER

## 2019-04-02 RX ORDER — HYDROCODONE BITARTRATE AND ACETAMINOPHEN 7.5; 325 MG/1; MG/1
1 TABLET ORAL 2 TIMES DAILY PRN
Qty: 120 TABLET | Refills: 0 | Status: SHIPPED | OUTPATIENT
Start: 2019-04-02 | End: 2019-04-30 | Stop reason: SDUPTHER

## 2019-04-02 RX ORDER — HYDROCODONE BITARTRATE AND ACETAMINOPHEN 7.5; 325 MG/1; MG/1
1 TABLET ORAL 2 TIMES DAILY PRN
Qty: 120 TABLET | Refills: 0 | Status: SHIPPED | OUTPATIENT
Start: 2019-05-02 | End: 2019-05-02

## 2019-04-04 NOTE — PROGRESS NOTES
Wound still has some drainage- clear mainly  No other complaints    Vitals:    03/28/19 1445   BP: 139/80   Pulse: 99   Resp: 16   Temp: 98.2 °F (36.8 °C)       Wound with mild clear drainage no erythema, some dehiscence of wound edges    A/P    Sp repair of hernia  Doing well  Will monitor wound- expect it will close and stop leaking

## 2019-04-16 ENCOUNTER — TELEPHONE (OUTPATIENT)
Dept: DERMATOLOGY | Facility: CLINIC | Age: 44
End: 2019-04-16

## 2019-04-16 ENCOUNTER — OFFICE VISIT (OUTPATIENT)
Dept: DERMATOLOGY | Facility: CLINIC | Age: 44
End: 2019-04-16
Payer: MEDICARE

## 2019-04-16 ENCOUNTER — PATIENT MESSAGE (OUTPATIENT)
Dept: DERMATOLOGY | Facility: CLINIC | Age: 44
End: 2019-04-16

## 2019-04-16 VITALS — BODY MASS INDEX: 55.32 KG/M2 | HEIGHT: 61 IN | WEIGHT: 293 LBS

## 2019-04-16 DIAGNOSIS — L30.4 INTERTRIGO: Primary | ICD-10-CM

## 2019-04-16 DIAGNOSIS — L40.8 INVERSE PSORIASIS: ICD-10-CM

## 2019-04-16 PROCEDURE — 99213 OFFICE O/P EST LOW 20 MIN: CPT | Mod: PBBFAC,PO | Performed by: DERMATOLOGY

## 2019-04-16 PROCEDURE — 99213 OFFICE O/P EST LOW 20 MIN: CPT | Mod: S$PBB,,, | Performed by: DERMATOLOGY

## 2019-04-16 PROCEDURE — 99999 PR PBB SHADOW E&M-EST. PATIENT-LVL III: CPT | Mod: PBBFAC,,, | Performed by: DERMATOLOGY

## 2019-04-16 PROCEDURE — 99999 PR PBB SHADOW E&M-EST. PATIENT-LVL III: ICD-10-PCS | Mod: PBBFAC,,, | Performed by: DERMATOLOGY

## 2019-04-16 PROCEDURE — 99213 PR OFFICE/OUTPT VISIT, EST, LEVL III, 20-29 MIN: ICD-10-PCS | Mod: S$PBB,,, | Performed by: DERMATOLOGY

## 2019-04-16 RX ORDER — CALCIPOTRIENE 50 UG/G
CREAM TOPICAL
Qty: 120 G | Refills: 0 | Status: SHIPPED | OUTPATIENT
Start: 2019-04-16 | End: 2019-07-10 | Stop reason: SDUPTHER

## 2019-04-16 RX ORDER — FLUCONAZOLE 200 MG/1
TABLET ORAL
Qty: 8 TABLET | Refills: 0 | Status: SHIPPED | OUTPATIENT
Start: 2019-04-16 | End: 2019-05-09

## 2019-04-16 RX ORDER — COLLAGENASE SANTYL 250 [ARB'U]/G
OINTMENT TOPICAL
Refills: 3 | COMMUNITY
Start: 2019-03-28 | End: 2020-01-29

## 2019-04-16 NOTE — PROGRESS NOTES
Subjective:       Patient ID:  Briana Uriarte is a 43 y.o. female who presents for   Chief Complaint   Patient presents with    Intertrigo     F/U-Buttock, B legs, abdomen     Patient last seen 2/2019  Interim surgery 3/8/19 (bowel), developed pressure sore on sacrum, managed by wound care  Pre-suregery, numerous liquid bowel movements    Intertrigo vs Inverse psoriasis +/- element of irritant derm (suprapubic catheter with some urine leakage)  S/p Fluconazole 300mg PO BIW x 4 doses total  Mix keto cream, TAC cream and zinc oxide cream (OTC) 1:1:1 and apply BID PRN flare  Once improved, wean out TAC cream  Recommend white vinegar: water 1:2 compresses daily followed by cool blow dry and then application of prescription medication.     Patient morbidly obese with limited mobility; skin on skin   Mother bathes daily  This is going to be a chronic process requiring a lot of work to manage (drying measures, barrier protection)    H/o seb derm ears, manages with dermotic oil and OTC sal acid gel    Visit with Dr TIP Dailey, 01/2019 x several FUs, bacterial cultures obtained and positive (mother accompanies and helps with history, unknown organism)  Doxycycline PO, terbinafine PO and TAC cream, cleared completely  Then recurred    Morbid obesity. History spina bifida with paraplegia. Numerous spinal surgeries in past.    Intertrigo  - Follow-up  Diagnosis: Intertrigo.  Symptom course: stable  AFFECTED LOCATIONS F/U: Behind B legs, abdomen, and Buttock.  Signs / symptoms: dryness  Severity: mild        Review of Systems   Constitutional: Negative for fever, chills and fatigue.   Skin: Positive for dry skin. Negative for daily sunscreen use and activity-related sunscreen use.   Hematologic/Lymphatic: Does not bruise/bleed easily.        Objective:    Physical Exam   Constitutional: She appears well-developed. She is obese.  She is in a wheelchair.    Skin:   Areas Examined (abnormalities noted in diagram):   Chest  / Axilla Inspection Performed  Abdomen Inspection Performed  Genitals / Buttocks / Groin Inspection Performed                       Diagram Legend     Erythematous scaling macule/papule c/w actinic keratosis       Vascular papule c/w angioma      Pigmented verrucoid papule/plaque c/w seborrheic keratosis      Yellow umbilicated papule c/w sebaceous hyperplasia      Irregularly shaped tan macule c/w lentigo     1-2 mm smooth white papules consistent with Milia      Movable subcutaneous cyst with punctum c/w epidermal inclusion cyst      Subcutaneous movable cyst c/w pilar cyst      Firm pink to brown papule c/w dermatofibroma      Pedunculated fleshy papule(s) c/w skin tag(s)      Evenly pigmented macule c/w junctional nevus     Mildly variegated pigmented, slightly irregular-bordered macule c/w mildly atypical nevus      Flesh colored to evenly pigmented papule c/w intradermal nevus       Pink pearly papule/plaque c/w basal cell carcinoma      Erythematous hyperkeratotic cursted plaque c/w SCC      Surgical scar with no sign of skin cancer recurrence      Open and closed comedones      Inflammatory papules and pustules      Verrucoid papule consistent consistent with wart     Erythematous eczematous patches and plaques     Dystrophic onycholytic nail with subungual debris c/w onychomycosis     Umbilicated papule    Erythematous-base heme-crusted tan verrucoid plaque consistent with inflamed seborrheic keratosis     Erythematous Silvery Scaling Plaque c/w Psoriasis     See annotation      Assessment / Plan:        Intertrigo vs Inverse psoriasis  Chronic issue, obese patient wheelchair bound  Inframammary and anterior folds markedly improved  Buttocks and upper thighs still probelmatic  Nights are spent on side, however no side break during the day  Continue drying measures, vinegar soaks, keto cream and zinc oxide mix  Will keep weekly fluconazole 400mg x 1 month  I do not suspect bacterial infection  Add  calcipotriene cream daily                 Follow up in about 1 month (around 5/14/2019).

## 2019-04-16 NOTE — TELEPHONE ENCOUNTER
insurance required prior authorization    It was Sent to Plan today  Drug  Calcipotriene 0.005% EX CREA  [employer] Electronic PA Form  will make a determination within 72 hours.

## 2019-04-17 ENCOUNTER — OFFICE VISIT (OUTPATIENT)
Dept: FAMILY MEDICINE | Facility: CLINIC | Age: 44
End: 2019-04-17
Payer: MEDICARE

## 2019-04-17 VITALS
TEMPERATURE: 98 F | OXYGEN SATURATION: 96 % | HEART RATE: 96 BPM | SYSTOLIC BLOOD PRESSURE: 135 MMHG | HEIGHT: 61 IN | BODY MASS INDEX: 58.4 KG/M2 | DIASTOLIC BLOOD PRESSURE: 90 MMHG

## 2019-04-17 DIAGNOSIS — T84.498S: ICD-10-CM

## 2019-04-17 DIAGNOSIS — I10 ESSENTIAL HYPERTENSION: Primary | ICD-10-CM

## 2019-04-17 DIAGNOSIS — B37.2 CANDIDAL INTERTRIGO: ICD-10-CM

## 2019-04-17 DIAGNOSIS — Z87.19 HISTORY OF SMALL BOWEL OBSTRUCTION: ICD-10-CM

## 2019-04-17 DIAGNOSIS — Z93.59 SUPRAPUBIC CATHETER: ICD-10-CM

## 2019-04-17 DIAGNOSIS — G82.20 PARAPLEGIA: ICD-10-CM

## 2019-04-17 DIAGNOSIS — L89.159 PRESSURE INJURY OF SKIN OF SACRAL REGION, UNSPECIFIED INJURY STAGE: ICD-10-CM

## 2019-04-17 DIAGNOSIS — M54.5 CHRONIC BILATERAL LOW BACK PAIN, WITH SCIATICA PRESENCE UNSPECIFIED: ICD-10-CM

## 2019-04-17 DIAGNOSIS — E66.01 MORBID OBESITY WITH BMI OF 50.0-59.9, ADULT: ICD-10-CM

## 2019-04-17 DIAGNOSIS — F11.20 CONTINUOUS OPIOID DEPENDENCE: Chronic | ICD-10-CM

## 2019-04-17 DIAGNOSIS — G89.29 CHRONIC BILATERAL LOW BACK PAIN, WITH SCIATICA PRESENCE UNSPECIFIED: ICD-10-CM

## 2019-04-17 PROBLEM — M54.9 CHRONIC BACK PAIN: Status: ACTIVE | Noted: 2019-04-17

## 2019-04-17 PROBLEM — T84.498A INTERNAL FIXATION DEVICE (PIN, ROD, OR SCREW) MECHANICAL COMPLICATION: Status: ACTIVE | Noted: 2019-04-17

## 2019-04-17 PROCEDURE — 99999 PR PBB SHADOW E&M-EST. PATIENT-LVL III: ICD-10-PCS | Mod: PBBFAC,,, | Performed by: FAMILY MEDICINE

## 2019-04-17 PROCEDURE — 99999 PR PBB SHADOW E&M-EST. PATIENT-LVL III: CPT | Mod: PBBFAC,,, | Performed by: FAMILY MEDICINE

## 2019-04-17 PROCEDURE — 99214 PR OFFICE/OUTPT VISIT, EST, LEVL IV, 30-39 MIN: ICD-10-PCS | Mod: S$PBB,,, | Performed by: FAMILY MEDICINE

## 2019-04-17 PROCEDURE — 99214 OFFICE O/P EST MOD 30 MIN: CPT | Mod: S$PBB,,, | Performed by: FAMILY MEDICINE

## 2019-04-17 PROCEDURE — 99213 OFFICE O/P EST LOW 20 MIN: CPT | Mod: PBBFAC,PO | Performed by: FAMILY MEDICINE

## 2019-04-17 RX ORDER — LISINOPRIL 20 MG/1
30 TABLET ORAL DAILY
Qty: 90 TABLET | Refills: 3 | Status: SHIPPED | OUTPATIENT
Start: 2019-04-17 | End: 2019-10-09 | Stop reason: SDUPTHER

## 2019-04-17 NOTE — PROGRESS NOTES
Subjective:       Patient ID: Briana Uriarte is a 43 y.o. female.    Chief Complaint: Follow-up (3mth f/u hypertension)    HPI  Review of Systems    Patient Active Problem List   Diagnosis    Spina bifida manifesta    Peripheral venous insufficiency    Morbid obesity with BMI of 50.0-59.9, adult    Paraplegia    Pickwickian syndrome    Asthma    Obstructive hydrocephalus    Encephalopathy    Intracranial hypertension    Chiari malformation type II    Microcytic anemia    Spina bifida of lumbar region    Suprapubic catheter    Continuous opioid dependence- contract 3/21/19    Physical debility    Umbilical hernia    Skin bulla    Tracheobronchomalacia    History of small bowel obstruction s/p exp lab for umbilical hernia incarceration 3/19    Hyponatremia    Catheter-associated urinary tract infection    Mild persistent asthma with acute exacerbation    Hypomagnesemia    Asthma-COPD overlap syndrome    Essential hypertension    Chronic back pain    Internal fixation device (pin, mindi, or screw) mechanical complication-break in spinal fusion rods    Candidal intertrigo    Pressure injury of skin of sacral region     Patient is here for a chronic conditions follow up.  Here with mother    Admitted 3/19 for severe constipation/SBO -had regina lap for incarcerated umbilical hernia.  . Has been doing well at home for 6 weeks. Has sacral decubitus and seeing [place] wound care for it since d/c which is healing with santyl and off loading pressure. Bowels moving well and soft.  Taking stool softener but stopper miralax now since bowels moving well.      Continuous use of opioids follow-up:  Current medication and dosing:  norco 7.5mg qid  Supply:  30 day supply  Side effects: none  Pain controlled: yes  Medication compliance: yes  DPS checked today: yes no evidence of diversion  UDS: 12/2016 (has urostomy)  pain contract signed: today  Opioid risk tool done:n/a  Cause of Pain: chronic back  "pain due to spina bifida and hardware complication break in spinal fusion rods    Objective:      Physical Exam    Assessment:       1. Essential hypertension    2. Chronic bilateral low back pain, with sciatica presence unspecified    3. Mechanical complication of internal fixation device such as nail, plate or mindi, sequela    4. Morbid obesity with BMI of 50.0-59.9, adult    5. History of small bowel obstruction    6. Paraplegia    7. Continuous opioid dependence- contract 3/21/19    8. Candidal intertrigo    9. Suprapubic catheter    10. Pressure injury of skin of sacral region, unspecified injury stage        Plan:       1. Chronic bilateral low back pain, with sciatica presence unspecified  Controlled on current medications.  Continue current medications.      2. Mechanical complication of internal fixation device such as nail, plate or mindi, sequela  Cont current care    3. Essential hypertension  Uncontrolled. Increase to lisinopril 30mg a day    4. Morbid obesity with BMI of 50.0-59.9, adult  Counseled patient on his ideal body weight, health consequences of being obese and current recommendations including weekly exercise and a heart healthy diet.  Current BMI is:Estimated body mass index is 58.4 kg/m² as calculated from the following:    Height as of this encounter: 5' 1" (1.549 m).    Weight as of 4/16/19: 140.2 kg (309 lb 1.4 oz)..  Patient is aware that ideal BMI < 25 or Weight in (lb) to have BMI = 25: 132.        5. History of small bowel obstruction  Resolved.       6. Paraplegia  Chronic. Cont use of power chair and decubitus precautions    7. Continuous opioid dependence- contract 3/21/19  Counseled patient on habit forming potential of opiates, risk of sedation, respiratory suppression or arrest especially if used in conjunction with benzodiazepines or alcohol.  Counseled patient to avoid driving while on the medication, rules of the pain contract and need for routine 3 month follow ups.  Patient " agrees to all aspects of the plan of care and wishes to proceed with therapy.  The plan is always to use alternatives less habit forming, to utilize interventions and therapies that reduce pain as an adjunctive treatment, and limit and wean the dose of narcotics as soon as able and appropriate.    Contract signed today    8. Candidal intertrigo  Cont derm care with diflucan and topical steroids/antifungal as prescribed per derm    9. Suprapubic catheter  Cont cath care    10. Pressure injury of skin of sacral region, unspecified injury stage  Cont wound care per wound center        Time spent with patient: 20 minutes    Patient with be reevaluated in 3 months or sooner prn    Greater than 50% of this visit was spent counseling as described in above documentation:Yes

## 2019-04-17 NOTE — PATIENT INSTRUCTIONS
Established High Blood Pressure    High blood pressure (hypertension) is a chronic disease. Often, healthcare providers dont know what causes it. But it can be caused by certain health conditions and medicines.  If you have high blood pressure, you may not have any symptoms. If you do have symptoms, they may include headache, dizziness, changes in your vision, chest pain, and shortness of breath. But even without symptoms, high blood pressure thats not treated raises your risk for heart attack and stroke. High blood pressure is a serious health risk and shouldnt be ignored.  A blood pressure reading is made up of two numbers: a higher number over a lower number. The top number is the systolic pressure. The bottom number is the diastolic pressure. A normal blood pressure is a systolic pressure of  less than 120 over a diastolic pressure of less than 80. You will see your blood pressure readings written together. For example, a person with a systolic pressure of 188 and a diastolic pressure of 78 will have 118/78 written in the medical record.  High blood pressure is when either the top number is 140 or higher, or the bottom number is 90 or higher. This must be the result when taking your blood pressure a number of times. The blood pressures between normal and high are called prehypertension.  Home care  If you have high blood pressure, you should do what is listed below to lower your blood pressure. If you are taking medicines for high blood pressure, these methods may reduce or end your need for medicines in the future.  · Begin a weight-loss program if you are overweight.  · Cut back on how much salt you get in your diet. Heres how to do this:  ¨ Dont eat foods that have a lot of salt. These include olives, pickles, smoked meats, and salted potato chips.  ¨ Dont add salt to your food at the table.  ¨ Use only small amounts of salt when cooking.  · Start an exercise program. Talk with your healthcare  provider about the type of exercise program that would be best for you. It doesn't have to be hard. Even brisk walking for 20 minutes 3 times a week is a good form of exercise.  · Dont take medicines that stimulate the heart. This includes many over-the-counter cold and sinus decongestant pills and sprays, as well as diet pills. Check the warnings about hypertension on the label. Before buying any over-the-counter medicines or supplements, always ask the pharmacist about the product's potential interaction with your high blood pressure and your high blood pressure medicines.  · Stimulants such as amphetamine or cocaine could be deadly for someone with high blood pressure. Never take these.  · Limit how much caffeine you get in your diet. Switch to caffeine-free products.  · Stop smoking. If you are a long-time smoker, this can be hard. Talk to your healthcare provider about medicines and nicotine replacement options to help you. Also, enroll in a stop-smoking program to make it more likely that you will quit for good.  · Learn how to handle stress. This is an important part of any program to lower blood pressure. Learn about relaxation methods like meditation, yoga, or biofeedback.  · If your provider prescribed medicines, take them exactly as directed. Missing doses may cause your blood pressure get out of control.  · If you miss a dose or doses, check with your healthcare provider or pharmacist about what to do.  · Consider buying an automatic blood pressure machine. Ask your provider for a recommendation. You can get one of these at most pharmacies.     The American Heart Association recommends the following guidelines for home blood pressure monitoring:  · Don't smoke or drink coffee for 30 minutes before taking your blood pressure.  · Go to the bathroom before the test.  · Relax for 5 minutes before taking the measurement.  · Sit with your back supported (don't sit on a couch or soft chair); keep your feet on  the floor uncrossed. Place your arm on a solid flat surface (like a table) with the upper part of the arm at heart level. Place the middle of the cuff directly above the eye of the elbow. Check the monitor's instruction manual for an illustration.  · Take multiple readings. When you measure, take 2 to 3 readings one minute apart and record all of the results.  · Take your blood pressure at the same time every day, or as your healthcare provider recommends.  · Record the date, time, and blood pressure reading.  · Take the record with you to your next medical appointment. If your blood pressure monitor has a built-in memory, simply take the monitor with you to your next appointment.  · Call your provider if you have several high readings. Don't be frightened by a single high blood pressure reading, but if you get several high readings, check in with your healthcare provider.  · Note: When blood pressure reaches a systolic (top number) of 180 or higher OR diastolic (bottom number) of 110 or higher, seek emergency medical treatment.  Follow-up care  You will need to see your healthcare provider regularly. This is to check your blood pressure and to make changes to your medicines. Make a follow-up appointment as directed. Bring the record of your home blood pressure readings to the appointment.  When to seek medical advice  Call your healthcare provider right away if any of these occur:  · Blood pressure reaches a systolic (upper number) of 180 or higher OR a diastolic (bottom number) of 110 or higher  · Chest pain or shortness of breath  · Severe headache  · Throbbing or rushing sound in the ears  · Nosebleed  · Sudden severe pain in your belly (abdomen)  · Extreme drowsiness, confusion, or fainting  · Dizziness or spinning sensation (vertigo)  · Weakness of an arm or leg or one side of the face  · You have problems speaking or seeing   Date Last Reviewed: 12/1/2016  © 7174-6234 "[employer]. [address] [place] [address]  [place] [address]. All rights reserved. This information is not intended as a substitute for professional medical care. Always follow your healthcare professional's instructions.

## 2019-04-17 NOTE — LETTER
"2019    Briana Ila Chapito  [address] [place] [address]             [facility]  [address]  [place] [address]  Phone: [phone]  Fax: [phone]   2019    Re: Briana Thorpe Lorinkaterina        : 1975        MRN: [id number]    PAIN ORIENTATION AGREEMENT    My doctor and I have decided that as part of my treatment for chronic pain, I will receive prescriptions for controlled substances.  As a patient, I will agree to the following terms in order for my provider to effectively treat my pain and also comply with the rules set forth by [facility] Board of Medical Examiners and Drug Enforcement Agency.  1. I understand that in order for me to receive the best possible care, my pain management doctor needs a copy of any previous medical records, including MRI, office notes, lab results, etc.  2. I will provide a full list of my medications, current dose, and how often I take my medication.  3. A single physician shall be responsible for prescribing my pain medication.  4. I understand that my physician may require an office visit every 3 months for certain controlled substances.  5. It is my responsibility to keep my appointments.  If I miss my schedule appointment, I will be rescheduled to the first available time slot.  I understand that pain medications may not be refilled until seen.  6. If my doctor agrees to refill my pain medication by telephone, I will call the office at least 5 business days in advance to request a refill prescription.  7. Refill prescriptions will not be written in the evenings, weekends, or on holidays.  8. Each prescription is expected to last at least one month.  Refills will not be given early if I "run out early", "lose a prescription", "spill" or "misplaced" my medication.  9. It may be necessary for prescriptions to be picked up in person and proof of identification may be required.  10. I will have my pain medication filled " "at only one pharmacy.                 [facility] [place] [facility] - [address] [facility]  [address]  [place] [address]  Phone: [phone] Fax: [phone]         11. A baseline drug screen may be completed on my first visit and or randomly at other routine clinic visits.      I have read and understand the above information.  I will, to the best of my ability, adhere to these policies and commitments.  I further understand that noncompliance with these policies may result in my being discharged as the patient.      _____________________                     _____Briana Uriarte______  Patient signature/date         Patient printed  name                                                                                  _____________________                    ____________________  Physician signature/printed name/date         Witness/date    Anaid Osei MD 4/17/2019                BEHAVIOR AGREEMENT FOR THE USE OF CONTROLLED DRUGS  The following has been explained to me:  1. It is possible that I may become physically dependent, psychologically dependent, tolerant and/or addicted to controlled substance medications.   2. Physical dependence occurs if withdrawal symptoms are experienced when the drug is suddenly discontinued.  3. Tolerance is the need for higher doses of the drug to achieve the same amount of pain control.  4. Addition is a psychological and behavioral syndrome that is recognized when the patient abuses the drug to obtain mental numbness or euphoria (get high), or shows drug craving behavior or manipulative attitude toward the physician in order to obtain the drug.  5. Withdrawal symptoms may occur if pain medication is stopped abruptly.   These symptoms include yawning, sweating, watery eyes, runny nose, anxiety, tremors, achy muscles, hot and cold flashes, "gooseflesh ", abdominal cramps or diarrhea.  6. I will not cut or chew " long-acting pain medication.  7. If severe sedation (sleepiness) or any other medical emergency relating to my pain medication occurs, I will contact my doctor's office or seek ER attention immediately.  8. I will not combine these drugs with alcohol or recreational drugs (this includes marijuana).     9. I must inform my doctor if I am taking any other sedating drugs such as Valium, Ativan, seizure medication or psychiatric drugs.    10. I will inform my physician of any current or prior history of drug abuse or prescription medication misuse.  11. These medications may be harmful to an unborn child.  I have been advised to use 2 forms of birth control (at least one barrier, such as condoms) while using these medications.    12. If I test positive for drugs that my doctor has not prescribed, and/or if I refuses a random drug test, my physician has the right to stop my controlled substance, end  his/her relationship with me, and I may be terminated from the clinic.   13. If at any time I become violent or abusive, verbally or physically, my actions will be considered cause to terminate care from the clinic and discontinuation of pain medications.          I understand and agree that if I fail to abide by the above agreements, or if I show signs suspicious of narcotic over use or abuse, my pain management physician may discontinue treatment, and narcotic prescriptions will be discontinued.            _____________________                     _____Briana Uriarte______  Patient signature/date          Patient printed  name                                                                                _____________________                    ____________________  Physician signature/printed name/date           Witness/date    Anaid Osei MD 4/17/2019

## 2019-04-26 ENCOUNTER — PATIENT MESSAGE (OUTPATIENT)
Dept: DERMATOLOGY | Facility: CLINIC | Age: 44
End: 2019-04-26

## 2019-04-29 ENCOUNTER — PATIENT MESSAGE (OUTPATIENT)
Dept: DERMATOLOGY | Facility: CLINIC | Age: 44
End: 2019-04-29

## 2019-04-30 ENCOUNTER — OFFICE VISIT (OUTPATIENT)
Dept: SURGERY | Facility: CLINIC | Age: 44
End: 2019-04-30
Payer: MEDICARE

## 2019-04-30 VITALS
HEIGHT: 61 IN | SYSTOLIC BLOOD PRESSURE: 125 MMHG | RESPIRATION RATE: 16 BRPM | TEMPERATURE: 99 F | BODY MASS INDEX: 55.32 KG/M2 | HEART RATE: 95 BPM | WEIGHT: 293 LBS | DIASTOLIC BLOOD PRESSURE: 93 MMHG

## 2019-04-30 DIAGNOSIS — J31.0 CHRONIC RHINITIS: ICD-10-CM

## 2019-04-30 DIAGNOSIS — K42.0 UMBILICAL HERNIA WITH OBSTRUCTION, WITHOUT GANGRENE: Primary | ICD-10-CM

## 2019-04-30 PROCEDURE — 99214 OFFICE O/P EST MOD 30 MIN: CPT | Mod: PBBFAC,PO | Performed by: SURGERY

## 2019-04-30 PROCEDURE — 99999 PR PBB SHADOW E&M-EST. PATIENT-LVL IV: ICD-10-PCS | Mod: PBBFAC,,, | Performed by: SURGERY

## 2019-04-30 PROCEDURE — 99024 POSTOP FOLLOW-UP VISIT: CPT | Mod: POP,,, | Performed by: SURGERY

## 2019-04-30 PROCEDURE — 99999 PR PBB SHADOW E&M-EST. PATIENT-LVL IV: CPT | Mod: PBBFAC,,, | Performed by: SURGERY

## 2019-04-30 PROCEDURE — 99024 PR POST-OP FOLLOW-UP VISIT: ICD-10-PCS | Mod: POP,,, | Performed by: SURGERY

## 2019-05-02 ENCOUNTER — PATIENT MESSAGE (OUTPATIENT)
Dept: FAMILY MEDICINE | Facility: CLINIC | Age: 44
End: 2019-05-02

## 2019-05-02 RX ORDER — HYDROCODONE BITARTRATE AND ACETAMINOPHEN 7.5; 325 MG/1; MG/1
1 TABLET ORAL EVERY 6 HOURS PRN
Qty: 120 TABLET | Refills: 0 | Status: SHIPPED | OUTPATIENT
Start: 2019-06-01 | End: 2019-05-02 | Stop reason: SDUPTHER

## 2019-05-02 RX ORDER — MONTELUKAST SODIUM 10 MG/1
TABLET ORAL
Qty: 30 TABLET | Refills: 11 | Status: SHIPPED | OUTPATIENT
Start: 2019-05-02 | End: 2019-05-09 | Stop reason: SDUPTHER

## 2019-05-02 RX ORDER — HYDROCODONE BITARTRATE AND ACETAMINOPHEN 7.5; 325 MG/1; MG/1
1 TABLET ORAL EVERY 6 HOURS PRN
Qty: 120 TABLET | Refills: 0 | Status: SHIPPED | OUTPATIENT
Start: 2019-05-02 | End: 2019-06-01

## 2019-05-02 RX ORDER — HYDROCODONE BITARTRATE AND ACETAMINOPHEN 7.5; 325 MG/1; MG/1
1 TABLET ORAL EVERY 6 HOURS PRN
Qty: 120 TABLET | Refills: 0 | Status: SHIPPED | OUTPATIENT
Start: 2019-06-28 | End: 2019-05-02

## 2019-05-02 NOTE — PROGRESS NOTES
Drainage has significantly decreased from wound  Family worried about green tinge to bandages covering suprapubic catheter and rashes in skin folds- sees derm, will see urology, and pcp  She has been eating more healthy and feels like she is losing weight    Vitals:    04/30/19 1004   BP: (!) 125/93   Pulse: 95   Resp: 16   Temp: 98.5 °F (36.9 °C)       Wound healing well, mild dehiscence in midline otherwise almost completely healed, no obvious drainage, no signs of infection    A/P    Sp primary hernia repair  Doing well  Continue to lose weight by working on eliminating very sugary foods  Continue daily wound care  Follow up with urology and derm

## 2019-05-09 ENCOUNTER — OFFICE VISIT (OUTPATIENT)
Dept: PULMONOLOGY | Facility: CLINIC | Age: 44
End: 2019-05-09
Payer: MEDICARE

## 2019-05-09 VITALS
WEIGHT: 293 LBS | BODY MASS INDEX: 55.32 KG/M2 | HEIGHT: 61 IN | SYSTOLIC BLOOD PRESSURE: 147 MMHG | HEART RATE: 82 BPM | OXYGEN SATURATION: 98 % | DIASTOLIC BLOOD PRESSURE: 89 MMHG

## 2019-05-09 DIAGNOSIS — J45.909 PERSISTENT ASTHMA WITHOUT COMPLICATION, UNSPECIFIED ASTHMA SEVERITY: Primary | ICD-10-CM

## 2019-05-09 DIAGNOSIS — J39.8 TRACHEOBRONCHOMALACIA: ICD-10-CM

## 2019-05-09 DIAGNOSIS — J31.0 CHRONIC RHINITIS: ICD-10-CM

## 2019-05-09 DIAGNOSIS — R06.00 DYSPNEA, UNSPECIFIED TYPE: ICD-10-CM

## 2019-05-09 DIAGNOSIS — J45.40 MODERATE PERSISTENT ASTHMA WITHOUT COMPLICATION: ICD-10-CM

## 2019-05-09 DIAGNOSIS — R60.0 LOCALIZED EDEMA: ICD-10-CM

## 2019-05-09 DIAGNOSIS — J44.9 CHRONIC OBSTRUCTIVE PULMONARY DISEASE, UNSPECIFIED COPD TYPE: ICD-10-CM

## 2019-05-09 DIAGNOSIS — J44.89 ASTHMA-COPD OVERLAP SYNDROME: ICD-10-CM

## 2019-05-09 DIAGNOSIS — J45.31 MILD PERSISTENT ASTHMA WITH ACUTE EXACERBATION: ICD-10-CM

## 2019-05-09 PROCEDURE — 99214 OFFICE O/P EST MOD 30 MIN: CPT | Mod: PBBFAC,PO | Performed by: INTERNAL MEDICINE

## 2019-05-09 PROCEDURE — 99999 PR PBB SHADOW E&M-EST. PATIENT-LVL IV: CPT | Mod: PBBFAC,,, | Performed by: INTERNAL MEDICINE

## 2019-05-09 PROCEDURE — 99999 PR PBB SHADOW E&M-EST. PATIENT-LVL IV: ICD-10-PCS | Mod: PBBFAC,,, | Performed by: INTERNAL MEDICINE

## 2019-05-09 PROCEDURE — 99213 PR OFFICE/OUTPT VISIT, EST, LEVL III, 20-29 MIN: ICD-10-PCS | Mod: S$PBB,,, | Performed by: INTERNAL MEDICINE

## 2019-05-09 PROCEDURE — 99213 OFFICE O/P EST LOW 20 MIN: CPT | Mod: S$PBB,,, | Performed by: INTERNAL MEDICINE

## 2019-05-09 RX ORDER — TERBINAFINE HYDROCHLORIDE 250 MG/1
TABLET ORAL
Refills: 1 | COMMUNITY
Start: 2019-04-26 | End: 2019-07-26

## 2019-05-09 RX ORDER — ALBUTEROL SULFATE 90 UG/1
AEROSOL, METERED RESPIRATORY (INHALATION)
Qty: 1 INHALER | Refills: 11 | Status: SHIPPED | OUTPATIENT
Start: 2019-05-09 | End: 2020-08-21 | Stop reason: SDUPTHER

## 2019-05-09 RX ORDER — MONTELUKAST SODIUM 10 MG/1
TABLET ORAL
Qty: 30 TABLET | Refills: 11 | Status: SHIPPED | OUTPATIENT
Start: 2019-05-09 | End: 2020-01-29

## 2019-05-09 NOTE — PATIENT INSTRUCTIONS
Lungs have been stable- would continue trelegy/singulair,  Use as needed albuterol.    If  Right leg develops pain or worse swelling - would recommend another leg test.    Could have Dr Osei write for  Needed  Breathing medications, call as needed

## 2019-05-09 NOTE — PROGRESS NOTES
"5/9/2019    Briana Ila Uriarte       Chief Complaint   Patient presents with    Follow-up    Medication Refill       HPI:   May  9 2019- had  Hernia corrected.  No major problems.  Lungs doing well    July 16, , 2018- got distended with hosp eval for 1 day then returned for 3 days.  Uses trelegy with occ albuterol use.  No major wheezes - no snoring now.  Patient Instructions   Will need to verify good 02 levels for sleep.     Lung capacity is about 50% - part from body size.    Asthma - copd concerns should do better with regular Trelegy.     May 2, 2018no fh asthma and wheelchair bound, no h/o pe.  Has swollen legs - no dvt.  No renal dz, chr suprapubic catheter, h/o loss  renal infection and cyst evolved and stones and only right works now.      Pt has occ wheezes.      Snores somewhat- more puffs.  Use nebulizer helps a little. No pft.    No pft nor sleep study.                  The chief compliant  problem is new to me",   PFSH:  Past Medical History:   Diagnosis Date    Asthma     Back pain     Blood transfusion     Chronic kidney disease     kidney stones    Diabetes mellitus     Diabetes mellitus, type 2     Esotropia     GERD (gastroesophageal reflux disease)     Hydrocephalus     Hypertension     Non-healing ulcer of buttock     Nystagmus, congenital     Paralysis     Spinal bifida, closed     Wheezing          Past Surgical History:   Procedure Laterality Date    BRAIN SURGERY       shunt revision    CHOLECYSTECTOMY      CYSTOSCOPY W/ LASER LITHOTRIPSY      DEBRIDEMENT-WOUND Left 5/11/2015    Performed by [facility]to [facility] at [facility] OR    INSERTION-CATHETER-GROSHONG N/A 10/16/2013    Performed by Darius Stewart [facility] at [facility] OR    LAPAROTOMY, EXPLORATORY N/A 3/8/2019    Performed by Ziyad Hancock MD at [facility] OR    REPAIR, HERNIA, UMBILICAL, INCARCERATED, AGE 5 YEARS OR OLDER N/A 3/8/2019    Performed by [facility]ad Hancock [facility] at [facility] OR    [id number] " "Left 10/20/2014    Performed by Preston Riojas MD at [facility] OR [address] [place] [address]    SPINE SURGERY       Social History     Tobacco Use    Smoking status: Never Smoker    Smokeless tobacco: Never Used   Substance Use Topics    Alcohol use: No    Drug use: No     Family History   Problem Relation Age of Onset    Cancer Mother     Glaucoma Father     Heart disease Maternal Grandmother     Cancer Maternal Grandmother     Glaucoma Paternal Grandfather     Psoriasis Maternal Uncle     Melanoma Neg Hx     Lupus Neg Hx     Eczema Neg Hx      Review of patient's allergies indicates:   Allergen Reactions    Cefepime      HIVES    Latex Swelling       Performance Status:The patient's activity level is bedbound.      Review of Systems:  a review of eleven systems covering constitutional, Eye, HEENT, Psych, Respiratory, Cardiac, GI, , Musculoskeletal, Endocrine, Dermatologic was negative except for pertinent findings as listed ABOVE and below:   pertinent positive as above, rest is good       Exam:Comprehensive exam done. BP (!) 147/89 (BP Location: Left arm, Patient Position: Sitting)   Pulse 82   Ht 5' 1" (1.549 m)   Wt (!) 140.2 kg (309 lb)   LMP 04/23/2019   SpO2 98% Comment: on room air  BMI 58.39 kg/m²   Exam included Vitals as listed, and patient's appearance and affect and alertness and mood, oral exam for yeast and hygiene and pharynx lesions and Mallapatti (M) score, neck with inspection for jvd and masses and thyroid abnormalities and lymph nodes (supraclavicular and infraclavicular nodes and axillary also examined and noted if abn), chest exam included symmetry and effort and fremitus and percussion and auscultation, cardiac exam included rhythm and gallops and murmur and rubs and jvd and edema, abdominal exam for mass and hepatosplenomegaly and tenderness and hernias and bowel sounds, Musculoskeletal exam with muscle tone and posture and mobility/gait and  strength, and skin for rashes and cyanosis " and pallor and turgor, extremity for clubbing.  Findings were normal except for pertinent findings listed below:  MOrbid obese, M1, right >> left leg edema,  shunt apparent in neck, chest is symmetric, no distress, normal percussion, normal fremitus and good normal breath sounds  RRR, no hs megaly nor mass nor clubbing, right leg >> left witht edema- 2018 u/s neg for  clott    Radiographs (ct chest and cxr) reviewed: view by direct vision  2015 ct chest tb [place] and retained secretions lll    Ct abd  3/12/19 good lower lungs    Labs reviewed       PFT   Pulmonary Functions, including spirometry and bronchodilator response and lung volumes  , study was done May  8, 2018.  Spirometry shows loss of vital capacity and fev1 with no obstruction and no bronchodilator response.   FEV1 is 45% or 1.24 liters.  Lung volumes show  loss of TLC with restriction 51%.     Pulmonary functions show restriction. Clinical correlation recommended.     Ramirez Corrigan M.D.     Plan:  Clinical impression is apparently straight forward and impression with management as below.    Briana was seen today for follow-up and medication refill.    Diagnoses and all orders for this visit:    Persistent asthma without complication, unspecified asthma severity    Asthma-COPD overlap syndrome    Mild persistent asthma with acute exacerbation    Chronic rhinitis  -     montelukast (SINGULAIR) 10 mg tablet; TAKE 1 TABLET(10 MG) BY MOUTH EVERY EVENING    Moderate persistent asthma without complication  -     fluticasone-umeclidin-vilanter (TRELEGY ELLIPTA) 100-62.5-25 mcg DsDv; Inhale 1 puff into the lungs once daily.  -     albuterol (PROVENTIL/VENTOLIN HFA) 90 mcg/actuation inhaler; 2 puffs every 4 hours as needed for cough, wheeze, or shortness of breath    Tracheobronchomalacia  -     fluticasone-umeclidin-vilanter (TRELEGY ELLIPTA) 100-62.5-25 mcg DsDv; Inhale 1 puff into the lungs once daily.  -     albuterol (PROVENTIL/VENTOLIN HFA) 90  mcg/actuation inhaler; 2 puffs every 4 hours as needed for cough, wheeze, or shortness of breath    Localized edema  -     albuterol (PROVENTIL/VENTOLIN HFA) 90 mcg/actuation inhaler; 2 puffs every 4 hours as needed for cough, wheeze, or shortness of breath    Dyspnea, unspecified type  -     albuterol (PROVENTIL/VENTOLIN HFA) 90 mcg/actuation inhaler; 2 puffs every 4 hours as needed for cough, wheeze, or shortness of breath    Chronic obstructive pulmonary disease, unspecified COPD type  -     albuterol (PROVENTIL/VENTOLIN HFA) 90 mcg/actuation inhaler; 2 puffs every 4 hours as needed for cough, wheeze, or shortness of breath        Follow up if symptoms worsen or fail to improve.    Discussed with patient above for education the following:      Patient Instructions   Lungs have been stable- would continue trelegy/singulair,  Use as needed albuterol.    If  Right leg develops pain or worse swelling - would recommend another leg test.    Could have Dr Osei write for  Needed  Breathing medications, call as needed

## 2019-05-17 ENCOUNTER — OFFICE VISIT (OUTPATIENT)
Dept: DERMATOLOGY | Facility: CLINIC | Age: 44
End: 2019-05-17
Payer: MEDICARE

## 2019-05-17 VITALS — HEIGHT: 61 IN | WEIGHT: 293 LBS | BODY MASS INDEX: 55.32 KG/M2

## 2019-05-17 DIAGNOSIS — L98.9 DISEASE OF SKIN AND SUBCUTANEOUS TISSUE: Primary | ICD-10-CM

## 2019-05-17 PROCEDURE — 99999 PR PBB SHADOW E&M-EST. PATIENT-LVL III: ICD-10-PCS | Mod: PBBFAC,,, | Performed by: DERMATOLOGY

## 2019-05-17 PROCEDURE — 88312 TISSUE SPECIMEN TO PATHOLOGY, DERMATOLOGY: ICD-10-PCS | Mod: 26,,, | Performed by: PATHOLOGY

## 2019-05-17 PROCEDURE — 87077 CULTURE AEROBIC IDENTIFY: CPT

## 2019-05-17 PROCEDURE — 11105 PR PUNCH BIOPSY, SKIN, EA ADDTL LESION: ICD-10-PCS | Mod: S$PBB,,, | Performed by: DERMATOLOGY

## 2019-05-17 PROCEDURE — 11104 PUNCH BX SKIN SINGLE LESION: CPT | Mod: S$PBB,,, | Performed by: DERMATOLOGY

## 2019-05-17 PROCEDURE — 88305 TISSUE EXAM BY PATHOLOGIST: CPT | Performed by: PATHOLOGY

## 2019-05-17 PROCEDURE — 11105 PUNCH BX SKIN EA SEP/ADDL: CPT | Mod: S$PBB,,, | Performed by: DERMATOLOGY

## 2019-05-17 PROCEDURE — 87176 TISSUE HOMOGENIZATION CULTR: CPT

## 2019-05-17 PROCEDURE — 99213 OFFICE O/P EST LOW 20 MIN: CPT | Mod: 25,S$PBB,, | Performed by: DERMATOLOGY

## 2019-05-17 PROCEDURE — 11104 PR PUNCH BIOPSY, SKIN, SINGLE LESION: ICD-10-PCS | Mod: S$PBB,,, | Performed by: DERMATOLOGY

## 2019-05-17 PROCEDURE — 11104 PUNCH BX SKIN SINGLE LESION: CPT | Mod: PBBFAC,PO | Performed by: DERMATOLOGY

## 2019-05-17 PROCEDURE — 87186 SC STD MICRODIL/AGAR DIL: CPT | Mod: 59

## 2019-05-17 PROCEDURE — 88305 TISSUE SPECIMEN TO PATHOLOGY, DERMATOLOGY: ICD-10-PCS | Mod: 26,,, | Performed by: PATHOLOGY

## 2019-05-17 PROCEDURE — 11105 PUNCH BX SKIN EA SEP/ADDL: CPT | Mod: PBBFAC,PO | Performed by: DERMATOLOGY

## 2019-05-17 PROCEDURE — 87101 SKIN FUNGI CULTURE: CPT

## 2019-05-17 PROCEDURE — 99213 OFFICE O/P EST LOW 20 MIN: CPT | Mod: PBBFAC,PO,25 | Performed by: DERMATOLOGY

## 2019-05-17 PROCEDURE — 99999 PR PBB SHADOW E&M-EST. PATIENT-LVL III: CPT | Mod: PBBFAC,,, | Performed by: DERMATOLOGY

## 2019-05-17 PROCEDURE — 99213 PR OFFICE/OUTPT VISIT, EST, LEVL III, 20-29 MIN: ICD-10-PCS | Mod: 25,S$PBB,, | Performed by: DERMATOLOGY

## 2019-05-17 PROCEDURE — 88312 SPECIAL STAINS GROUP 1: CPT | Mod: 26,,, | Performed by: PATHOLOGY

## 2019-05-17 PROCEDURE — 87070 CULTURE OTHR SPECIMN AEROBIC: CPT

## 2019-05-17 RX ORDER — DOXYCYCLINE 100 MG/1
TABLET ORAL
Qty: 20 TABLET | Refills: 0 | Status: SHIPPED | OUTPATIENT
Start: 2019-05-17 | End: 2019-06-04 | Stop reason: ALTCHOICE

## 2019-05-17 NOTE — PROGRESS NOTES
Subjective:       Patient ID:  Briana Uriarte is a 43 y.o. female who presents for   Chief Complaint   Patient presents with    Intertrigo     1 month f/u, no progress with regimen     Patient last seen 04/2016/2019    chronic inflammation of all folds, intertrigo vs inverse psoriasis  Failed treatment with vinegars soaks, TAC-keto-zinc oxide paste, PO diflucan    Surgery 3/8/19 (bowel), developed pressure sore on sacrum, managed by wound care  Pre-suregery, numerous liquid bowel movements    Intertrigo vs Inverse psoriasis +/- element of irritant derm (suprapubic catheter with some urine leakage)  S/p Fluconazole 300mg PO BIW x 4 doses total  Mix keto cream, TAC cream and zinc oxide cream (OTC) 1:1:1 and apply BID PRN flare  Once improved, wean out TAC cream  Recommend white vinegar: water 1:2 compresses daily followed by cool blow dry and then application of prescription medication.     Patient morbidly obese with limited mobility; skin on skin   Mother bathes daily  This is going to be a chronic process requiring a lot of work to manage (drying measures, barrier protection)    H/o seb derm ears, manages with dermotic oil and OTC sal acid gel    Visit with Dr TIP [facility], 01/2019 x several FUs, bacterial cultures obtained and positive (mother accompanies and helps with history, unknown organism)  Doxycycline PO, terbinafine PO and TAC cream, cleared completely  Then recurred    Morbid obesity. History spina bifida with paraplegia. Numerous spinal surgeries in past.    Intertrigo  - Follow-up  Symptom course: unchanged  Affected locations: abdomen  Signs / symptoms: oozing, irritated and inflamed  Severity: moderate        Review of Systems   Constitutional: Negative for fever, chills and fatigue.   Skin: Negative for dry skin, daily sunscreen use and activity-related sunscreen use.   Hematologic/Lymphatic: Does not bruise/bleed easily.        Objective:    Physical Exam   Constitutional: She appears  well-developed. She is obese.  She is in a wheelchair.    Skin:   Areas Examined (abnormalities noted in diagram):   Chest / Axilla Inspection Performed  Abdomen Inspection Performed  Genitals / Buttocks / Groin Inspection Performed                       Diagram Legend     Erythematous scaling macule/papule c/w actinic keratosis       Vascular papule c/w angioma      Pigmented verrucoid papule/plaque c/w seborrheic keratosis      Yellow umbilicated papule c/w sebaceous hyperplasia      Irregularly shaped tan macule c/w lentigo     1-2 mm smooth white papules consistent with Milia      Movable subcutaneous cyst with punctum c/w epidermal inclusion cyst      Subcutaneous movable cyst c/w pilar cyst      Firm pink to brown papule c/w dermatofibroma      Pedunculated fleshy papule(s) c/w skin tag(s)      Evenly pigmented macule c/w junctional nevus     Mildly variegated pigmented, slightly irregular-bordered macule c/w mildly atypical nevus      Flesh colored to evenly pigmented papule c/w intradermal nevus       Pink pearly papule/plaque c/w basal cell carcinoma      Erythematous hyperkeratotic cursted plaque c/w SCC      Surgical scar with no sign of skin cancer recurrence      Open and closed comedones      Inflammatory papules and pustules      Verrucoid papule consistent consistent with wart     Erythematous eczematous patches and plaques     Dystrophic onycholytic nail with subungual debris c/w onychomycosis     Umbilicated papule    Erythematous-base heme-crusted tan verrucoid plaque consistent with inflamed seborrheic keratosis     Erythematous Silvery Scaling Plaque c/w Psoriasis     See annotation                  Assessment / Plan:      Pathology Orders:     Normal Orders This Visit    Tissue Specimen To Pathology, Dermatology     Questions:    Directional Terms:  Other(comment)    Clinical Information:  chronic erythema and maceration of all folds, wheel chair bound patient, intertrigo with candida vs strep  superinfection vs inverse psoriasis vs other    Specific Site:  left inframammary        Disease of skin and subcutaneous tissue  -     Tissue Specimen To Pathology, Dermatology  -     Fungal culture , skin, hair, or nails  -     Aerobic culture    Punch biopsy procedure note:x2  Punch biopsy performed after verbal consent obtained. Area marked and prepped with alcohol. Approximately 1cc of 1% lidocaine with epinephrine injected. 3 mm disposable punch used to remove lesion. Hemostasis obtained and biopsy site closed with 1 - 2 Prolene sutures. Wound care instructions reviewed with patient and handout given.    -     doxycycline monohydrate 100 mg Tab; Take 1 po BID  Dispense: 20 tablet; Refill: 0         Past fluconazole weekly 400 x 1 month  Prior to this biweekly 300 x 1 month  Failed improvement with vinegar soaks, TAC-Keto cream-zinc oxide paste BiD  Added calcipotriene cream with no improvement  Mother feels worsening  Have not treated for bacterial superinfection (strep vs staph)  Used tea tree oil shortly, no longer     H/o rash to cefepime IV     Follow up in about 2 weeks (around 5/31/2019).     Asim fitch, grew staph (MSSA) and proteus, treated with doxy   Bad tinea of groin, treated with terbinafine   TAC cream

## 2019-05-20 DIAGNOSIS — A49.8 BACTERIAL INFECTION DUE TO PROTEUS MIRABILIS: Primary | ICD-10-CM

## 2019-05-20 RX ORDER — AMOXICILLIN AND CLAVULANATE POTASSIUM 875; 125 MG/1; MG/1
TABLET, FILM COATED ORAL
Qty: 20 TABLET | Refills: 0 | Status: SHIPPED | OUTPATIENT
Start: 2019-05-20 | End: 2019-06-04 | Stop reason: ALTCHOICE

## 2019-05-21 ENCOUNTER — PATIENT MESSAGE (OUTPATIENT)
Dept: DERMATOLOGY | Facility: CLINIC | Age: 44
End: 2019-05-21

## 2019-05-21 LAB
BACTERIA SPEC AEROBE CULT: NORMAL

## 2019-06-04 ENCOUNTER — OFFICE VISIT (OUTPATIENT)
Dept: DERMATOLOGY | Facility: CLINIC | Age: 44
End: 2019-06-04
Payer: MEDICARE

## 2019-06-04 ENCOUNTER — PATIENT MESSAGE (OUTPATIENT)
Dept: DERMATOLOGY | Facility: CLINIC | Age: 44
End: 2019-06-04

## 2019-06-04 VITALS — HEIGHT: 61 IN | BODY MASS INDEX: 55.32 KG/M2 | WEIGHT: 293 LBS

## 2019-06-04 DIAGNOSIS — A49.8 BACTERIAL INFECTION DUE TO PROTEUS MIRABILIS: ICD-10-CM

## 2019-06-04 DIAGNOSIS — L30.4 INTERTRIGO: Primary | ICD-10-CM

## 2019-06-04 PROCEDURE — 99213 PR OFFICE/OUTPT VISIT, EST, LEVL III, 20-29 MIN: ICD-10-PCS | Mod: S$PBB,,, | Performed by: DERMATOLOGY

## 2019-06-04 PROCEDURE — 99999 PR PBB SHADOW E&M-EST. PATIENT-LVL IV: CPT | Mod: PBBFAC,,, | Performed by: DERMATOLOGY

## 2019-06-04 PROCEDURE — 99213 OFFICE O/P EST LOW 20 MIN: CPT | Mod: S$PBB,,, | Performed by: DERMATOLOGY

## 2019-06-04 PROCEDURE — 99214 OFFICE O/P EST MOD 30 MIN: CPT | Mod: PBBFAC,PO | Performed by: DERMATOLOGY

## 2019-06-04 PROCEDURE — 99999 PR PBB SHADOW E&M-EST. PATIENT-LVL IV: ICD-10-PCS | Mod: PBBFAC,,, | Performed by: DERMATOLOGY

## 2019-06-04 RX ORDER — DOXYCYCLINE 100 MG/1
TABLET ORAL
Qty: 20 TABLET | Refills: 0 | Status: SHIPPED | OUTPATIENT
Start: 2019-06-04 | End: 2019-07-09

## 2019-06-04 RX ORDER — AMOXICILLIN AND CLAVULANATE POTASSIUM 875; 125 MG/1; MG/1
TABLET, FILM COATED ORAL
Qty: 20 TABLET | Refills: 0 | Status: SHIPPED | OUTPATIENT
Start: 2019-06-04 | End: 2019-07-09

## 2019-06-04 NOTE — PROGRESS NOTES
Subjective:       Patient ID:  Briana Uriarte is a 43 y.o. female who presents for   Chief Complaint   Patient presents with    Rash     1 month f/u, tx w/ doxy and augmentin, mild progress      Patient last seen 05/17/2019  Biopsy and tissue culture  Grew klebsiella, proteus and staph aureus, covered with doxy and augmentin x 10 days, improved some, buttocks still very red and tender    Suprapubic catheter with green discharge, mother treated with course of cipro (left from previous ER visit) and discharge stopped      chronic inflammation of all folds, intertrigo vs inverse psoriasis  Failed treatment with vinegars soaks, TAC-keto-zinc oxide paste, PO diflucan    Surgery 3/8/19 (bowel), developed pressure sore on sacrum, managed by wound care  Pre-suregery, numerous liquid bowel movements    Intertrigo vs Inverse psoriasis +/- element of irritant derm (suprapubic catheter with some urine leakage)  S/p Fluconazole 300mg PO BIW x 4 doses total  Mix keto cream, TAC cream and zinc oxide cream (OTC) 1:1:1 and apply BID PRN flare  Once improved, wean out TAC cream  Recommend white vinegar: water 1:2 compresses daily followed by cool blow dry and then application of prescription medication.     Patient morbidly obese with limited mobility; skin on skin History spina bifida with paraplegia. Numerous spinal surgeries in past.  Mother bathes daily  This is going to be a chronic process requiring a lot of work to manage (drying measures, barrier protection)    H/o seb derm ears, manages with dermotic oil and OTC sal acid gel    Visit with Dr TIP Dailey, 01/2019 x several FUs, bacterial cultures obtained and positive (mother accompanies and helps with history, unknown organism)  Doxycycline PO, terbinafine PO and TAC cream, cleared completely  Then recurred      Rash  - Follow-up  Symptom course: improving  Currently using: completed doxy and augmentin.  AFFECTED LOCATIONS F/U: folds.  Signs / symptoms: itching,  dryness, irritated and inflamed  Severity: moderate        Review of Systems   Constitutional: Negative for fever, chills and fatigue.   Skin: Positive for rash. Negative for dry skin, daily sunscreen use and activity-related sunscreen use.   Hematologic/Lymphatic: Does not bruise/bleed easily.        Objective:    Physical Exam   Constitutional: She appears well-developed. She is obese.  She is in a wheelchair.    Skin:   Areas Examined (abnormalities noted in diagram):   Chest / Axilla Inspection Performed  Abdomen Inspection Performed  Genitals / Buttocks / Groin Inspection Performed                       Diagram Legend     Erythematous scaling macule/papule c/w actinic keratosis       Vascular papule c/w angioma      Pigmented verrucoid papule/plaque c/w seborrheic keratosis      Yellow umbilicated papule c/w sebaceous hyperplasia      Irregularly shaped tan macule c/w lentigo     1-2 mm smooth white papules consistent with Milia      Movable subcutaneous cyst with punctum c/w epidermal inclusion cyst      Subcutaneous movable cyst c/w pilar cyst      Firm pink to brown papule c/w dermatofibroma      Pedunculated fleshy papule(s) c/w skin tag(s)      Evenly pigmented macule c/w junctional nevus     Mildly variegated pigmented, slightly irregular-bordered macule c/w mildly atypical nevus      Flesh colored to evenly pigmented papule c/w intradermal nevus       Pink pearly papule/plaque c/w basal cell carcinoma      Erythematous hyperkeratotic cursted plaque c/w SCC      Surgical scar with no sign of skin cancer recurrence      Open and closed comedones      Inflammatory papules and pustules      Verrucoid papule consistent consistent with wart     Erythematous eczematous patches and plaques     Dystrophic onycholytic nail with subungual debris c/w onychomycosis     Umbilicated papule    Erythematous-base heme-crusted tan verrucoid plaque consistent with inflamed seborrheic keratosis     Erythematous Silvery  Scaling Plaque c/w Psoriasis     See annotation              Assessment / Plan:        Intertrigo of all folds, vs inverse psoriasis   Bacterial infection due to Proteus mirabilis, staph and klebsiella  Improved with antibiotherapy, needs longer course  Add hibiclens dilute spray 1/10 in spray bottle, spray to AA prior to sponge bath, rinse well)  -     amoxicillin-clavulanate 875-125mg (AUGMENTIN) 875-125 mg per tablet; One tab PO BID  Dispense: 20 tablet; Refill: 0  -     doxycycline monohydrate 100 mg Tab; Take 1 po BID  Dispense: 20 tablet; Refill: 0    Past fluconazole weekly 400 x 1 month  Prior to this biweekly 300 x 1 month  Failed improvement with vinegar soaks, TAC-Keto cream-zinc oxide paste BiD  Added calcipotriene cream with no improvement    H/o rash to cefepime IV, tolerated augmentin    [facility] records, grew staph (MSSA) and proteus, treated with doxy   Bad tinea of groin, treated with terbinafine   TAC cream          No follow-ups on file.

## 2019-06-06 ENCOUNTER — PATIENT MESSAGE (OUTPATIENT)
Dept: DERMATOLOGY | Facility: CLINIC | Age: 44
End: 2019-06-06

## 2019-06-07 RX ORDER — TRIAMCINOLONE ACETONIDE 1 MG/G
CREAM TOPICAL
Qty: 454 G | Refills: 0 | Status: SHIPPED | OUTPATIENT
Start: 2019-06-07 | End: 2019-09-16

## 2019-06-13 ENCOUNTER — PATIENT MESSAGE (OUTPATIENT)
Dept: DERMATOLOGY | Facility: CLINIC | Age: 44
End: 2019-06-13

## 2019-06-18 LAB — FUNGUS BLD CULT: NORMAL

## 2019-06-27 ENCOUNTER — PATIENT MESSAGE (OUTPATIENT)
Dept: FAMILY MEDICINE | Facility: CLINIC | Age: 44
End: 2019-06-27

## 2019-06-27 RX ORDER — HYDROCODONE BITARTRATE AND ACETAMINOPHEN 7.5; 325 MG/1; MG/1
TABLET ORAL
Refills: 0 | COMMUNITY
Start: 2019-06-01 | End: 2019-06-27 | Stop reason: SDUPTHER

## 2019-06-27 RX ORDER — HYDROCODONE BITARTRATE AND ACETAMINOPHEN 7.5; 325 MG/1; MG/1
TABLET ORAL
Qty: 120 TABLET | Refills: 0 | Status: SHIPPED | OUTPATIENT
Start: 2019-06-27 | End: 2019-08-05 | Stop reason: SDUPTHER

## 2019-06-27 NOTE — TELEPHONE ENCOUNTER
Last office visit 1/5/19  Last refill 6/1/19  Follow up 7/10/19    Doesn't run out until 7/2/19 she sent request In early

## 2019-07-09 ENCOUNTER — PATIENT MESSAGE (OUTPATIENT)
Dept: FAMILY MEDICINE | Facility: CLINIC | Age: 44
End: 2019-07-09

## 2019-07-09 ENCOUNTER — OFFICE VISIT (OUTPATIENT)
Dept: DERMATOLOGY | Facility: CLINIC | Age: 44
End: 2019-07-09
Payer: MEDICARE

## 2019-07-09 VITALS — BODY MASS INDEX: 55.32 KG/M2 | WEIGHT: 293 LBS | HEIGHT: 61 IN

## 2019-07-09 DIAGNOSIS — A49.8 BACTERIAL INFECTION DUE TO PROTEUS MIRABILIS: ICD-10-CM

## 2019-07-09 DIAGNOSIS — L40.8 INVERSE PSORIASIS: Primary | ICD-10-CM

## 2019-07-09 DIAGNOSIS — B95.8 INFECTION, SKIN, STAPH: ICD-10-CM

## 2019-07-09 DIAGNOSIS — L08.9 INFECTION, SKIN, STAPH: ICD-10-CM

## 2019-07-09 PROCEDURE — 99214 OFFICE O/P EST MOD 30 MIN: CPT | Mod: S$PBB,,, | Performed by: DERMATOLOGY

## 2019-07-09 PROCEDURE — 99999 PR PBB SHADOW E&M-EST. PATIENT-LVL III: CPT | Mod: PBBFAC,,, | Performed by: DERMATOLOGY

## 2019-07-09 PROCEDURE — 99999 PR PBB SHADOW E&M-EST. PATIENT-LVL III: ICD-10-PCS | Mod: PBBFAC,,, | Performed by: DERMATOLOGY

## 2019-07-09 PROCEDURE — 99213 OFFICE O/P EST LOW 20 MIN: CPT | Mod: PBBFAC,PO | Performed by: DERMATOLOGY

## 2019-07-09 PROCEDURE — 99214 PR OFFICE/OUTPT VISIT, EST, LEVL IV, 30-39 MIN: ICD-10-PCS | Mod: S$PBB,,, | Performed by: DERMATOLOGY

## 2019-07-09 RX ORDER — DOXYCYCLINE 100 MG/1
TABLET ORAL
Qty: 20 TABLET | Refills: 0 | Status: SHIPPED | OUTPATIENT
Start: 2019-07-09 | End: 2019-08-06 | Stop reason: ALTCHOICE

## 2019-07-09 NOTE — PROGRESS NOTES
Subjective:       Patient ID:  Briana Uriarte is a 43 y.o. female who presents for   Chief Complaint   Patient presents with    Follow-up     Bacterial infection, completed 2 course abx 6/15     Patient last seen 06/04/2019    Biopsy and tissue cultures obtained  Grew klebsiella, proteus and staph aureus, covered with doxy and augmentin x 10 days, improved some, buttocks still very red and tender, repeated course at last visit (total of 20 days)  Enjoyed a stretch of clear skin for about one week then started to turn red again  Few pustules on abdomen    Per mother, continues to see green discharge around catheter and under the breast    Suprapubic catheter with green discharge, mother treated with course of cipro (left from previous ER visit) and discharge stopped  Chronic UTI, no longer treating with abx    biopsy inframammary  FINAL PATHOLOGIC DIAGNOSIS  [place] FINAL DIAGNOSIS:  Left inframammary, [id number], 5/17/2019:  -Psoriasiform epidermal hyperplasia with hypogranulosis and intracorneal pustules consistent with psoriasis.  Comment:  PAS stain performed at the referring institution and PAS-D stain performed at [facility] on sections from the  tissue block provided by the referring      chronic inflammation of all folds, intertrigo vs inverse psoriasis  Failed treatment with vinegars soaks, TAC-keto-zinc oxide paste, PO diflucan    Surgery 3/8/19 (bowel), developed pressure sore on sacrum, managed by wound care  Pre-suregery, numerous liquid bowel movements    Past tx include:   Fluconazole 300mg PO BIW x 4 doses total  Mix keto cream, TAC cream and zinc oxide cream (OTC) 1:1:1 and apply BID PRN flare  Recommend white vinegar: water 1:2 compresses daily followed by cool blow dry and then application of prescription medication.     Patient morbidly obese with limited mobility; skin on skin History spina bifida with paraplegia. Numerous spinal surgeries in past.  Mother bathes daily  This is going to  be a chronic process requiring a lot of work to manage (drying measures, barrier protection)    H/o seb derm ears, manages with dermotic oil and OTC sal acid gel    Visit with Dr TIP Dailey, 01/2019 x several FUs, bacterial cultures obtained and positive (mother accompanies and helps with history, unknown organism)  Doxycycline PO, terbinafine PO and TAC cream, cleared completely then recurred      Current Outpatient Medications:     albuterol (PROVENTIL/VENTOLIN HFA) 90 mcg/actuation inhaler, 2 puffs every 4 hours as needed for cough, wheeze, or shortness of breath, Disp: 1 Inhaler, Rfl: 11    ascorbic acid (VITAMIN C) 500 MG tablet, Take 500 mg by mouth once daily., Disp: , Rfl:     betamethasone dipropionate (DIPROLENE) 0.05 % lotion, AAA scalp QHS PRN itchy red plaques, Disp: 60 mL, Rfl: 5    calcipotriene (DOVONOX) 0.005 % cream, AAA buttocks daily, Disp: 120 g, Rfl: 0    catheter 20 Fr Misc, 1 application by Misc.(Non-Drug; Combo Route) route every 30 days., Disp: 6 each, Rfl: 11    fluocinolone acetonide oil (DERMOTIC OIL) 0.01 % Drop, 5 drops in right ear bid x 7-10 days until resolution, use intermittenlty for flares., Disp: 20 mL, Rfl: 2    fluticasone-umeclidin-vilanter (TRELEGY ELLIPTA) 100-62.5-25 mcg DsDv, Inhale 1 puff into the lungs once daily., Disp: 1 each, Rfl: 11    FREESTYLE LITE STRIPS Strp, TEST BS ONCE D, Disp: , Rfl: 3    HYDROcodone-acetaminophen (NORCO) 7.5-325 mg per tablet, TK 1 T PO Q 6 H PRF PAIN, Disp: 120 tablet, Rfl: 0    ipratropium (ATROVENT) 42 mcg (0.06 %) nasal spray, 1 spray by Nasal route daily as needed for Rhinitis. , Disp: , Rfl:     ketoconazole (NIZORAL) 2 % cream, Apply to AA folds BID PRN flare, Disp: 120 g, Rfl: 3    lisinopril (PRINIVIL,ZESTRIL) 20 MG tablet, Take 1.5 tablets (30 mg total) by mouth once daily., Disp: 90 tablet, Rfl: 3    metoprolol succinate (TOPROL-XL) 50 MG 24 hr tablet, TAKE 1 TABLET(50 MG) BY MOUTH EVERY DAY, Disp: 90 tablet, Rfl: 3     miconazole nitrate 2 % AerP, Apply 1 Dose topically 2 (two) times daily. Apply to rash on buttocks, Disp: , Rfl:     montelukast (SINGULAIR) 10 mg tablet, TAKE 1 TABLET(10 MG) BY MOUTH EVERY EVENING, Disp: 30 tablet, Rfl: 11    multivitamin with minerals tablet, Take 1 tablet by mouth once daily., Disp: , Rfl:     SANTYL ointment, APPLY TO WOUND DAILY, Disp: , Rfl: 3    SSD 1 % cream, , Disp: , Rfl:     terbinafine HCl (LAMISIL) 250 mg tablet, TK 1 T PO QD, Disp: , Rfl: 1    triamcinolone acetonide 0.1% (KENALOG) 0.1 % cream, APPLY TO THE AFFECTED AREA TWICE DAILY FOR 2 TO 3 WEEKS, Disp: 454 g, Rfl: 0    triamcinolone acetonide 0.1% (KENALOG) 0.1 % ointment, Apply topically 2 (two) times daily., Disp: 1 Tube, Rfl: 11  No current facility-administered medications for this visit.           Review of Systems   Constitutional: Negative for fever, chills and fatigue.   Skin: Positive for rash. Negative for dry skin, daily sunscreen use and activity-related sunscreen use.   Hematologic/Lymphatic: Does not bruise/bleed easily.        Objective:    Physical Exam   Constitutional: She appears well-developed. She is obese.  She is in a wheelchair.    Skin:   Areas Examined (abnormalities noted in diagram):   Chest / Axilla Inspection Performed  Abdomen Inspection Performed  Genitals / Buttocks / Groin Inspection Performed                       Diagram Legend     Erythematous scaling macule/papule c/w actinic keratosis       Vascular papule c/w angioma      Pigmented verrucoid papule/plaque c/w seborrheic keratosis      Yellow umbilicated papule c/w sebaceous hyperplasia      Irregularly shaped tan macule c/w lentigo     1-2 mm smooth white papules consistent with Milia      Movable subcutaneous cyst with punctum c/w epidermal inclusion cyst      Subcutaneous movable cyst c/w pilar cyst      Firm pink to brown papule c/w dermatofibroma      Pedunculated fleshy papule(s) c/w skin tag(s)      Evenly pigmented macule c/w  junctional nevus     Mildly variegated pigmented, slightly irregular-bordered macule c/w mildly atypical nevus      Flesh colored to evenly pigmented papule c/w intradermal nevus       Pink pearly papule/plaque c/w basal cell carcinoma      Erythematous hyperkeratotic cursted plaque c/w SCC      Surgical scar with no sign of skin cancer recurrence      Open and closed comedones      Inflammatory papules and pustules      Verrucoid papule consistent consistent with wart     Erythematous eczematous patches and plaques     Dystrophic onycholytic nail with subungual debris c/w onychomycosis     Umbilicated papule    Erythematous-base heme-crusted tan verrucoid plaque consistent with inflamed seborrheic keratosis     Erythematous Silvery Scaling Plaque c/w Psoriasis     See annotation                      Mother with iphone pictures of buttocks with solid striking erythema almost back to pre abx tx presentation    Assessment / Plan:        Inverse psoriasis,  All Folds    Supported by biopsy, flared by bacterial superinfection  patient cleared with antibiotherapy tailored to 3 organisms grown on tissue culture (klebsiella proteus and staph aureus). Lasted one week then started to recur  Needs systemic therapy to address psoriasis component  Needs least immunosuppressive option given chronic UTI and high risk for infectious complications  Consider otezla    Hold zinc oxide paste, seems to be having an irritant effect on Ms Briana, superficial desquamation    Trial of TAC cream once daily and calcipotriene cream once daily    Complex patient, immobility and skin on skin maceration complicates matters  Drying measures in place, use white cotton diaper like material to absorb moisture  No bra or underwear  Frequent positional changes    Bacterial infection due to Proteus mirabilis  -     doxycycline monohydrate 100 mg Tab; Take 1 po BID  Dispense: 20 tablet; Refill: 0    Infection, skin, staph  -     doxycycline monohydrate  100 mg Tab; Take 1 po BID  Dispense: 20 tablet; Refill: 0             Follow up in about 1 month (around 8/6/2019).

## 2019-07-10 DIAGNOSIS — L40.8 INVERSE PSORIASIS: ICD-10-CM

## 2019-07-11 RX ORDER — CALCIPOTRIENE 50 UG/G
CREAM TOPICAL
Qty: 120 G | Refills: 0 | Status: SHIPPED | OUTPATIENT
Start: 2019-07-11 | End: 2019-07-11 | Stop reason: SDUPTHER

## 2019-07-11 RX ORDER — CALCIPOTRIENE 50 UG/G
CREAM TOPICAL
Qty: 120 G | Refills: 0 | Status: SHIPPED | OUTPATIENT
Start: 2019-07-11 | End: 2020-01-29

## 2019-07-19 ENCOUNTER — TELEPHONE (OUTPATIENT)
Dept: DERMATOLOGY | Facility: CLINIC | Age: 44
End: 2019-07-19

## 2019-07-19 NOTE — TELEPHONE ENCOUNTER
Called to check on pt's progress. Mom states rash is under control right now w/ Doxy, TAC am, and Calci pm. Today is last dose of doxy. Informed to decrease TAC in folds to maybe 3 x week. Will check back on pt 7/26/2019 to see how she does off doxy. Also discussed trying treatment with Otezla. Mom asked me to check on coverage. Will reach out to Drug Rep per her insurance

## 2019-07-26 ENCOUNTER — TELEPHONE (OUTPATIENT)
Dept: PHARMACY | Facility: CLINIC | Age: 44
End: 2019-07-26

## 2019-07-26 ENCOUNTER — PATIENT MESSAGE (OUTPATIENT)
Dept: DERMATOLOGY | Facility: CLINIC | Age: 44
End: 2019-07-26

## 2019-07-26 DIAGNOSIS — L40.8 INVERSE PSORIASIS: Primary | ICD-10-CM

## 2019-07-26 RX ORDER — APREMILAST 30 MG/1
30 TABLET, FILM COATED ORAL 2 TIMES DAILY
Qty: 60 TABLET | Refills: 2 | Status: SHIPPED | OUTPATIENT
Start: 2019-07-26 | End: 2020-01-29

## 2019-07-26 RX ORDER — FLUCONAZOLE 200 MG/1
TABLET ORAL
Qty: 12 TABLET | Refills: 0 | Status: SHIPPED | OUTPATIENT
Start: 2019-07-26 | End: 2019-07-29

## 2019-07-26 NOTE — TELEPHONE ENCOUNTER
Informed Parent Manuela  that [facility] received prescription for Otezla & Fluconazole and prior authorization is required.  [facility] will be back in touch once insurance determination is received.  She would like the Fluconazole to be sent to [place].

## 2019-07-29 RX ORDER — FLUCONAZOLE 200 MG/1
TABLET ORAL
Qty: 12 TABLET | Refills: 0 | Status: SHIPPED | OUTPATIENT
Start: 2019-07-29 | End: 2019-09-16

## 2019-07-31 ENCOUNTER — PATIENT MESSAGE (OUTPATIENT)
Dept: DERMATOLOGY | Facility: CLINIC | Age: 44
End: 2019-07-31

## 2019-08-01 ENCOUNTER — DOCUMENTATION ONLY (OUTPATIENT)
Dept: FAMILY MEDICINE | Facility: CLINIC | Age: 44
End: 2019-08-01

## 2019-08-01 NOTE — PROGRESS NOTES
Pre-Visit Chart Review  For Appointment Scheduled on 8/5/19    Health Maintenance Due   Topic Date Due    Pneumococcal Vaccine (Medium Risk) (1 of 1 - PPSV23) 09/24/1994

## 2019-08-02 NOTE — TELEPHONE ENCOUNTER
DOCUMENTATION ONLY:   Prior authorization for Otezla 30 mg approved from 05/04/2019 to 08/01/2020.       Co-pay: $3.80    Patient Assistance IS NOT required.     Forward to clinical pharmacist for consult & shipment. [facility] 8/2/2019 3:05 pm        Submitted Prior Authorization for OTEZLA to insurance on 7/31/2019 4:25 PM. [facility]

## 2019-08-05 ENCOUNTER — OFFICE VISIT (OUTPATIENT)
Dept: FAMILY MEDICINE | Facility: CLINIC | Age: 44
End: 2019-08-05
Payer: MEDICARE

## 2019-08-05 VITALS
DIASTOLIC BLOOD PRESSURE: 80 MMHG | HEIGHT: 61 IN | SYSTOLIC BLOOD PRESSURE: 138 MMHG | TEMPERATURE: 99 F | WEIGHT: 293 LBS | HEART RATE: 95 BPM | BODY MASS INDEX: 55.32 KG/M2

## 2019-08-05 DIAGNOSIS — Q05.9 SPINA BIFIDA MANIFESTA: ICD-10-CM

## 2019-08-05 DIAGNOSIS — F11.20 CONTINUOUS OPIOID DEPENDENCE: Primary | Chronic | ICD-10-CM

## 2019-08-05 PROCEDURE — 99215 PR OFFICE/OUTPT VISIT, EST, LEVL V, 40-54 MIN: ICD-10-PCS | Mod: S$PBB,,, | Performed by: FAMILY MEDICINE

## 2019-08-05 PROCEDURE — 99999 PR PBB SHADOW E&M-EST. PATIENT-LVL IV: ICD-10-PCS | Mod: PBBFAC,,, | Performed by: FAMILY MEDICINE

## 2019-08-05 PROCEDURE — 99214 OFFICE O/P EST MOD 30 MIN: CPT | Mod: PBBFAC,PO | Performed by: FAMILY MEDICINE

## 2019-08-05 PROCEDURE — 99215 OFFICE O/P EST HI 40 MIN: CPT | Mod: S$PBB,,, | Performed by: FAMILY MEDICINE

## 2019-08-05 PROCEDURE — 99999 PR PBB SHADOW E&M-EST. PATIENT-LVL IV: CPT | Mod: PBBFAC,,, | Performed by: FAMILY MEDICINE

## 2019-08-05 RX ORDER — HYDROCODONE BITARTRATE AND ACETAMINOPHEN 7.5; 325 MG/1; MG/1
1 TABLET ORAL EVERY 6 HOURS PRN
Qty: 120 TABLET | Refills: 0 | Status: SHIPPED | OUTPATIENT
Start: 2019-08-05 | End: 2019-10-29 | Stop reason: SDUPTHER

## 2019-08-05 RX ORDER — HYDROCODONE BITARTRATE AND ACETAMINOPHEN 7.5; 325 MG/1; MG/1
TABLET ORAL
Qty: 120 TABLET | Refills: 0 | Status: CANCELLED | OUTPATIENT
Start: 2019-08-05

## 2019-08-05 RX ORDER — HYDROCODONE BITARTRATE AND ACETAMINOPHEN 7.5; 325 MG/1; MG/1
1 TABLET ORAL EVERY 6 HOURS PRN
Qty: 120 TABLET | Refills: 0 | Status: SHIPPED | OUTPATIENT
Start: 2019-08-05 | End: 2019-08-05 | Stop reason: SDUPTHER

## 2019-08-05 RX ORDER — HYDROCODONE BITARTRATE AND ACETAMINOPHEN 7.5; 325 MG/1; MG/1
TABLET ORAL
Qty: 120 TABLET | Refills: 0 | Status: SHIPPED | OUTPATIENT
Start: 2019-08-05 | End: 2019-08-05 | Stop reason: SDUPTHER

## 2019-08-06 ENCOUNTER — OFFICE VISIT (OUTPATIENT)
Dept: DERMATOLOGY | Facility: CLINIC | Age: 44
End: 2019-08-06
Payer: MEDICARE

## 2019-08-06 VITALS — WEIGHT: 293 LBS | BODY MASS INDEX: 55.32 KG/M2 | HEIGHT: 61 IN

## 2019-08-06 DIAGNOSIS — Z11.1 SCREENING FOR TUBERCULOSIS: ICD-10-CM

## 2019-08-06 DIAGNOSIS — L30.4 INTERTRIGO: ICD-10-CM

## 2019-08-06 DIAGNOSIS — N39.0 CHRONIC UTI: ICD-10-CM

## 2019-08-06 DIAGNOSIS — L08.9 INFECTION, SKIN, STAPH: ICD-10-CM

## 2019-08-06 DIAGNOSIS — B95.8 INFECTION, SKIN, STAPH: ICD-10-CM

## 2019-08-06 DIAGNOSIS — L40.8 INVERSE PSORIASIS: Primary | ICD-10-CM

## 2019-08-06 DIAGNOSIS — A49.8 BACTERIAL INFECTION DUE TO PROTEUS MIRABILIS: ICD-10-CM

## 2019-08-06 PROCEDURE — 99214 PR OFFICE/OUTPT VISIT, EST, LEVL IV, 30-39 MIN: ICD-10-PCS | Mod: S$PBB,,, | Performed by: DERMATOLOGY

## 2019-08-06 PROCEDURE — 99999 PR PBB SHADOW E&M-EST. PATIENT-LVL III: CPT | Mod: PBBFAC,,, | Performed by: DERMATOLOGY

## 2019-08-06 PROCEDURE — 99999 PR PBB SHADOW E&M-EST. PATIENT-LVL III: ICD-10-PCS | Mod: PBBFAC,,, | Performed by: DERMATOLOGY

## 2019-08-06 PROCEDURE — 99213 OFFICE O/P EST LOW 20 MIN: CPT | Mod: PBBFAC,PO | Performed by: DERMATOLOGY

## 2019-08-06 PROCEDURE — 99214 OFFICE O/P EST MOD 30 MIN: CPT | Mod: S$PBB,,, | Performed by: DERMATOLOGY

## 2019-08-06 NOTE — PROGRESS NOTES
Subjective:       Patient ID:  Briana Uriarte is a 43 y.o. female who presents for   Chief Complaint   Patient presents with    Psoriasis     flared after completing Doxy     Patient last seen 07/09/2019  Chronic inflammation of all folds  Chronic UTI, colonized, suprapubic catheter may seed to skin?  Skin however is doing best while on antibiotherapy    Completed course of doxy and on diflucan 300 TIW x 4 weeks  Currently using aquaphore only  Post calcipotriene cream (did not seem to do much) and TAC cream (excessive thinning)    Maternal uncle with psoriasis    Biopsy and tissue cultures obtained 06/2019  Grew klebsiella, proteus and staph aureus, covered with doxy and augmentin x 10 days, improved some, buttocks still very red and tender, repeated course at last visit (total of 20 days) Enjoyed a stretch of clear skin for about one week then started to turn red again    h/o green discharge around catheter and under the breast  mother treated with course of cipro (left from previous ER visit) and discharge stopped  Chronic UTI, no longer treating with abx    biopsy inframammary  FINAL PATHOLOGIC DIAGNOSIS  [place] FINAL DIAGNOSIS:  Left inframammary, [id number], 5/17/2019:  -Psoriasiform epidermal hyperplasia with hypogranulosis and intracorneal pustules consistent with psoriasis.  Comment:  PAS stain performed at the referring institution and PAS-D stain performed at [facility] on sections from the  tissue block provided by the referring      chronic inflammation of all folds, intertrigo vs inverse psoriasis  Failed treatment with vinegars soaks, TAC-keto-zinc oxide paste, PO diflucan    Surgery 3/8/19 (bowel), developed pressure sore on sacrum, managed by wound care  Pre-suregery, numerous liquid bowel movements    Past tx include:   Fluconazole 300mg PO BIW x 4 doses total  Mix keto cream, TAC cream and zinc oxide cream (OTC) 1:1:1 and apply BID PRN flare  Recommend white vinegar: water 1:2  compresses daily followed by cool blow dry and then application of prescription medication.     Patient morbidly obese with limited mobility; skin on skin History spina bifida with paraplegia. Numerous spinal surgeries in past.  Mother bathes daily  This is going to be a chronic process requiring a lot of work to manage (drying measures, barrier protection)    H/o seb derm ears, manages with dermotic oil and OTC sal acid gel    Visit with Dr TIP Dailey, 01/2019 x several FUs, bacterial cultures obtained and positive (mother accompanies and helps with history, unknown organism)  Doxycycline PO, terbinafine PO and TAC cream, cleared completely then recurred      Current Outpatient Medications:     albuterol (PROVENTIL/VENTOLIN HFA) 90 mcg/actuation inhaler, 2 puffs every 4 hours as needed for cough, wheeze, or shortness of breath, Disp: 1 Inhaler, Rfl: 11    ascorbic acid (VITAMIN C) 500 MG tablet, Take 500 mg by mouth once daily., Disp: , Rfl:     catheter 20 Fr Misc, 1 application by Misc.(Non-Drug; Combo Route) route every 30 days., Disp: 6 each, Rfl: 11    fluconazole (DIFLUCAN) 200 MG Tab, One tablet by mouth 3 times WEEKLY on non consecutive days, Disp: 12 tablet, Rfl: 0    fluocinolone acetonide oil (DERMOTIC OIL) 0.01 % Drop, 5 drops in right ear bid x 7-10 days until resolution, use intermittenlty for flares., Disp: 20 mL, Rfl: 2    fluticasone-umeclidin-vilanter (TRELEGY ELLIPTA) 100-62.5-25 mcg DsDv, Inhale 1 puff into the lungs once daily., Disp: 1 each, Rfl: 11    FREESTYLE LITE STRIPS Strp, TEST BS ONCE D, Disp: , Rfl: 3    HYDROcodone-acetaminophen (NORCO) 7.5-325 mg per tablet, Take 1 tablet by mouth every 6 (six) hours as needed for Pain. Do not fill until 10/5/19, Disp: 120 tablet, Rfl: 0    ipratropium (ATROVENT) 42 mcg (0.06 %) nasal spray, 1 spray by Nasal route daily as needed for Rhinitis. , Disp: , Rfl:     ketoconazole (NIZORAL) 2 % cream, Apply to AA folds BID PRN flare, Disp: 120 g,  Rfl: 3    lisinopril (PRINIVIL,ZESTRIL) 20 MG tablet, Take 1.5 tablets (30 mg total) by mouth once daily., Disp: 90 tablet, Rfl: 3    metoprolol succinate (TOPROL-XL) 50 MG 24 hr tablet, TAKE 1 TABLET(50 MG) BY MOUTH EVERY DAY, Disp: 90 tablet, Rfl: 3    miconazole nitrate 2 % AerP, Apply 1 Dose topically 2 (two) times daily. Apply to rash on buttocks, Disp: , Rfl:     montelukast (SINGULAIR) 10 mg tablet, TAKE 1 TABLET(10 MG) BY MOUTH EVERY EVENING, Disp: 30 tablet, Rfl: 11    multivitamin with minerals tablet, Take 1 tablet by mouth once daily., Disp: , Rfl:     betamethasone dipropionate (DIPROLENE) 0.05 % lotion, AAA scalp QHS PRN itchy red plaques, Disp: 60 mL, Rfl: 5    calcipotriene (DOVONOX) 0.005 % cream, APPLY TO THE AFFECTED AREA ON BUTTOCKS DAILY, Disp: 120 g, Rfl: 0    OTEZLA 30 mg Tab, Take 1 tablet (30 mg total) by mouth 2 (two) times daily., Disp: 60 tablet, Rfl: 2    SANTYL ointment, APPLY TO WOUND DAILY, Disp: , Rfl: 3    SSD 1 % cream, , Disp: , Rfl:     triamcinolone acetonide 0.1% (KENALOG) 0.1 % cream, APPLY TO THE AFFECTED AREA TWICE DAILY FOR 2 TO 3 WEEKS, Disp: 454 g, Rfl: 0    triamcinolone acetonide 0.1% (KENALOG) 0.1 % ointment, Apply topically 2 (two) times daily., Disp: 1 Tube, Rfl: 11        Review of Systems   Constitutional: Negative for fever and chills.   Skin: Positive for rash.        Objective:    Physical Exam   Constitutional: She appears well-developed. She is obese.  She is in a wheelchair.    Skin:   Areas Examined (abnormalities noted in diagram):   Chest / Axilla Inspection Performed  Abdomen Inspection Performed  Genitals / Buttocks / Groin Inspection Performed                       Diagram Legend     Erythematous scaling macule/papule c/w actinic keratosis       Vascular papule c/w angioma      Pigmented verrucoid papule/plaque c/w seborrheic keratosis      Yellow umbilicated papule c/w sebaceous hyperplasia      Irregularly shaped tan macule c/w lentigo      1-2 mm smooth white papules consistent with Milia      Movable subcutaneous cyst with punctum c/w epidermal inclusion cyst      Subcutaneous movable cyst c/w pilar cyst      Firm pink to brown papule c/w dermatofibroma      Pedunculated fleshy papule(s) c/w skin tag(s)      Evenly pigmented macule c/w junctional nevus     Mildly variegated pigmented, slightly irregular-bordered macule c/w mildly atypical nevus      Flesh colored to evenly pigmented papule c/w intradermal nevus       Pink pearly papule/plaque c/w basal cell carcinoma      Erythematous hyperkeratotic cursted plaque c/w SCC      Surgical scar with no sign of skin cancer recurrence      Open and closed comedones      Inflammatory papules and pustules      Verrucoid papule consistent consistent with wart     Erythematous eczematous patches and plaques     Dystrophic onycholytic nail with subungual debris c/w onychomycosis     Umbilicated papule    Erythematous-base heme-crusted tan verrucoid plaque consistent with inflamed seborrheic keratosis     Erythematous Silvery Scaling Plaque c/w Psoriasis     See annotation              Assessment / Plan:        Inverse psoriasis    Bacterial infection due to Proteus mirabilis  Infection, skin, staph  Tissue culture 5/2019    Intertrigo    Chronic UTI    patient failed improvement with all topical treatment modalities (hibiclens wash, zinc oxide, topical steroids and antifungals)  Biopsy supports inverse psoriasis and treatment will be started tomorrow (otezla)  Needs TB test (quant gold ordered)  Starter pack x 2 for slow uptitering  Past punch biopsy tissue culture grew multiple organisms. (staph klebsiella proteus) and clinically PATIENT IMPROVES SPECTACULARLY WHILE ON ANTIBIOTHERAPY (AUMGENTIN + DOXY)  This is not consistent with psoriasis being the only underlying issue (although doxy with anti inflammatory properties)  H/o chronic UTI, never evaluated by ID  NEED ID INPUT;   Will message Dr Anguiano for close  appointment    Continue dilute hibiclens wash           Follow up in about 2 months (around 10/6/2019).

## 2019-08-06 NOTE — Clinical Note
Dear Dr Sequeira,Could you please see Ms Briana Uriarte for multiple infectious issuesI have been unable to make significant progress with her skin issue and antibiotherapy seems to be the only intervention with significant clinical results. Thank youHE

## 2019-08-08 NOTE — TELEPHONE ENCOUNTER
Spoke with patients' mom Manuela, she says they have 2 starter packs on hand from Dr. Mejia from 8/6, started 8/7. Will call back in 2 weeks for initial consult and shipment from [facility].

## 2019-08-09 ENCOUNTER — TELEPHONE (OUTPATIENT)
Dept: INFECTIOUS DISEASES | Facility: CLINIC | Age: 44
End: 2019-08-09

## 2019-08-09 NOTE — TELEPHONE ENCOUNTER
Spoke with Mom and instructed per Dr Sequeira's message-appt scheduled for 9/16 @ 1:30, but advised that we may be calling her to come in sooner if we have availability. Appt slip mailed.

## 2019-08-09 NOTE — TELEPHONE ENCOUNTER
----- Message from Malgorzata Sequeira MD sent at 8/8/2019  8:50 PM CDT -----  Tony  Next available. She would be more urgent then some of our other referrals. Let's also keep her on a list for sooner appointment if someone cancels.   Thanks

## 2019-08-14 NOTE — PROGRESS NOTES
Subjective:   Patient ID: Briana Uriarte is a 43 y.o. female     Chief Complaint:Establish Care (Is the depression and anxiet causing psoriasis)      Patient here for medication refill and establish care.    Review of Systems   Respiratory: Negative for shortness of breath.    Cardiovascular: Negative for chest pain.   Gastrointestinal: Negative for abdominal pain.   Genitourinary: Negative for dysuria.     Past Medical History:   Diagnosis Date    Asthma     Back pain     Blood transfusion     Chronic kidney disease     kidney stones    Diabetes mellitus     Diabetes mellitus, type 2     Esotropia     GERD (gastroesophageal reflux disease)     Hydrocephalus     Hypertension     Non-healing ulcer of buttock     Nystagmus, congenital     Paralysis     Spinal bifida, closed     Wheezing      Objective:     Vitals:    08/05/19 1021   BP: 138/80   Pulse: 95   Temp: 98.7 °F (37.1 °C)     Body mass index is 57.63 kg/m².  Physical Exam   Neck: Neck supple. No tracheal deviation present.   Cardiovascular: Normal rate, regular rhythm and normal heart sounds.   Pulmonary/Chest: Effort normal. No respiratory distress.   Musculoskeletal: Normal range of motion. She exhibits no edema.     Assessment:     1. Continuous opioid dependence- contract 3/21/19    2. Spina bifida manifesta      Plan:   Continuous opioid dependence- contract 3/21/19  -     HYDROcodone-acetaminophen (NORCO) 7.5-325 mg per tablet; Take 1 tablet by mouth every 6 (six) hours as needed for Pain. Do not fill until 10/5/19  Dispense: 120 tablet; Refill: 0  Counseled the patient in length the risks of taking this medication.  These risks include respiratory depression and death.  Patient understands these risks are increased when mixing the medication with alcohol or other medications.  Patient advised to only take the medication as prescribed and understands must follow up in my clinic for refills of this medication.  Patient also  counseled on the importance of weaning this medication to its lowest tolerable dose.  Patient will follow up with my clinic for any further questions.    Spina bifida manifesta  -     Discontinue: catheter 20 Fr Misc; 1 application by Misc.(Non-Drug; Combo Route) route every 30 days.  Dispense: 6 each; Refill: 11  -     catheter 20 Fr Misc; 1 application by Misc.(Non-Drug; Combo Route) route every 30 days.  Dispense: 6 each; Refill: 11    Other orders  -     Discontinue: HYDROcodone-acetaminophen (NORCO) 7.5-325 mg per tablet; TK 1 T PO Q 6 H PRF PAIN  Dispense: 120 tablet; Refill: 0  -     Discontinue: HYDROcodone-acetaminophen (NORCO) 7.5-325 mg per tablet; Take 1 tablet by mouth every 6 (six) hours as needed for Pain. Do not fill until 9/5/19  Dispense: 120 tablet; Refill: 0        Time spent with patient: 30 minutes and over half of that time was spent on counseling an coordination of care.    Jose De Jesus Curran MD  08/14/2019    Portions of this note have been dictated with DILLON Spear

## 2019-08-23 ENCOUNTER — PATIENT MESSAGE (OUTPATIENT)
Dept: FAMILY MEDICINE | Facility: CLINIC | Age: 44
End: 2019-08-23

## 2019-08-23 ENCOUNTER — PATIENT MESSAGE (OUTPATIENT)
Dept: DERMATOLOGY | Facility: CLINIC | Age: 44
End: 2019-08-23

## 2019-08-23 NOTE — TELEPHONE ENCOUNTER
Please assist with scheduling a provider visit.  Blood sugars not dangerously high but does need to be evaluated.  Blood pressure also need to be evaluated.  If Patient is having chest pain or shortness of breath please have them go to the emergency room otherwise schedule provider visit as soon as possible understanding this may be next week since this is Friday.

## 2019-08-23 NOTE — TELEPHONE ENCOUNTER
See [employer] message. Should I schedule a nurse visit or would you like it to be a provider visit?

## 2019-08-23 NOTE — TELEPHONE ENCOUNTER
Spoke with mom, advised that headaches are common with Otezla but it does not affect blood pressure.  She also states that her blood sugar has been elevated as well.  Recommend that she reach out to her PCP as this is not something Dr Mejia can address.  She can stop the Otezla in the mean time if she wants.  She states she does not feel that is the problem..  She will reach out to her PCP.

## 2019-08-26 ENCOUNTER — DOCUMENTATION ONLY (OUTPATIENT)
Dept: FAMILY MEDICINE | Facility: CLINIC | Age: 44
End: 2019-08-26

## 2019-08-26 ENCOUNTER — PATIENT MESSAGE (OUTPATIENT)
Dept: FAMILY MEDICINE | Facility: CLINIC | Age: 44
End: 2019-08-26

## 2019-08-26 NOTE — PROGRESS NOTES
Pre-Visit Chart Review  For Appointment Scheduled on (8/26/19)    Health Maintenance Due   Topic Date Due    Pneumococcal Vaccine (Medium Risk) (1 of 1 - PPSV23) 09/24/1994

## 2019-08-27 ENCOUNTER — PATIENT MESSAGE (OUTPATIENT)
Dept: INFECTIOUS DISEASES | Facility: CLINIC | Age: 44
End: 2019-08-27

## 2019-08-28 ENCOUNTER — TELEPHONE (OUTPATIENT)
Dept: PHARMACY | Facility: CLINIC | Age: 44
End: 2019-08-28

## 2019-08-28 NOTE — TELEPHONE ENCOUNTER
Otezla initial consult / shipment complete on 19.  Pt's mother has medical POA, and confirmed shipping of Otezla on  for delivery on . Pt  and address verified. $3.80 copay in 004. CCOF.  Warning, side effects, and drug information reviewed in previous call.  See previous note.  Pharmacist spoke with pt's mother, but she was not ready to have medication sent to her at the time because MD gave her 2 sample packs.  Since the patient started the medication on , this activity will also replace the 7 Day post start touch base.  Pt's mother reports no side effects since starting on .  She has not missed any doses, and takes the medication at 8 am and 6 pm.  So far the results have been positive, as the psoriasis on the front of the pt has cleared.  She has 2 spots under her arm that itch.  The psoriasis on the pt's buttocks and back of legs has been slow to clear.  Pt was born with spina bifida, and spends most of life sitting.  The long periods of sitting may be contributing to the slow response.  The pt's mother did say that these areas aren't shedding as much as they were.  Pt does not have feeling from the waist down due to her condition.  Therefore, she does not appear to itch the more persistent psoriasis on the back of the leg and buttock.  Pt's did not feel that QOL questions were necessary because the pt did not even notice the psoriasis.  Her parents, who are her caretakers noticed the plaques and decided to have her treated.  Pt's mom is confident with the treatment, and wants to continue.  I reviewed the [facility] refill procedures, pharmacy services, hours of operation, on call pharmacist, and welcome packet.  She voiced understanding and wrote down the [facility] phone number.  Since I have already conducted the 7 day touch base, our next call will be for a refill.  Pt had no further questions or concerns.

## 2019-09-16 ENCOUNTER — OFFICE VISIT (OUTPATIENT)
Dept: INFECTIOUS DISEASES | Facility: CLINIC | Age: 44
End: 2019-09-16
Payer: MEDICARE

## 2019-09-16 ENCOUNTER — LAB VISIT (OUTPATIENT)
Dept: LAB | Facility: HOSPITAL | Age: 44
End: 2019-09-16
Attending: INTERNAL MEDICINE
Payer: MEDICARE

## 2019-09-16 DIAGNOSIS — Z16.12 INFECTION DUE TO ESBL-PRODUCING KLEBSIELLA PNEUMONIAE: ICD-10-CM

## 2019-09-16 DIAGNOSIS — Z22.7 TB LUNG, LATENT: ICD-10-CM

## 2019-09-16 DIAGNOSIS — A49.8 INFECTION DUE TO ESBL-PRODUCING KLEBSIELLA PNEUMONIAE: ICD-10-CM

## 2019-09-16 DIAGNOSIS — B37.2 CANDIDAL INTERTRIGO: Primary | ICD-10-CM

## 2019-09-16 DIAGNOSIS — A49.01 MSSA (METHICILLIN SUSCEPTIBLE STAPHYLOCOCCUS AUREUS) INFECTION: ICD-10-CM

## 2019-09-16 DIAGNOSIS — A49.8 PROTEUS INFECTION: ICD-10-CM

## 2019-09-16 DIAGNOSIS — B37.2 CANDIDAL INTERTRIGO: ICD-10-CM

## 2019-09-16 LAB
CREAT SERPL-MCNC: 0.8 MG/DL (ref 0.5–1.4)
EST. GFR  (AFRICAN AMERICAN): >60 ML/MIN/1.73 M^2
EST. GFR  (NON AFRICAN AMERICAN): >60 ML/MIN/1.73 M^2

## 2019-09-16 PROCEDURE — 36415 COLL VENOUS BLD VENIPUNCTURE: CPT | Mod: PO

## 2019-09-16 PROCEDURE — 82565 ASSAY OF CREATININE: CPT

## 2019-09-16 PROCEDURE — 99999 PR PBB SHADOW E&M-EST. PATIENT-LVL II: CPT | Mod: PBBFAC,,, | Performed by: INTERNAL MEDICINE

## 2019-09-16 PROCEDURE — 99203 OFFICE O/P NEW LOW 30 MIN: CPT | Mod: S$PBB,,, | Performed by: INTERNAL MEDICINE

## 2019-09-16 PROCEDURE — 86480 TB TEST CELL IMMUN MEASURE: CPT

## 2019-09-16 PROCEDURE — 99999 PR PBB SHADOW E&M-EST. PATIENT-LVL II: ICD-10-PCS | Mod: PBBFAC,,, | Performed by: INTERNAL MEDICINE

## 2019-09-16 PROCEDURE — 99212 OFFICE O/P EST SF 10 MIN: CPT | Mod: PBBFAC,PO | Performed by: INTERNAL MEDICINE

## 2019-09-16 PROCEDURE — 99203 PR OFFICE/OUTPT VISIT, NEW, LEVL III, 30-44 MIN: ICD-10-PCS | Mod: S$PBB,,, | Performed by: INTERNAL MEDICINE

## 2019-09-16 RX ORDER — LEVOFLOXACIN 500 MG/1
500 TABLET, FILM COATED ORAL DAILY
Qty: 21 TABLET | Refills: 0 | Status: SHIPPED | OUTPATIENT
Start: 2019-09-16 | End: 2019-10-07

## 2019-09-16 RX ORDER — FLUCONAZOLE 100 MG/1
100 TABLET ORAL DAILY
Qty: 21 TABLET | Refills: 0 | Status: SHIPPED | OUTPATIENT
Start: 2019-09-16 | End: 2019-10-07

## 2019-09-16 RX ORDER — DOXYCYCLINE 100 MG/1
100 CAPSULE ORAL 2 TIMES DAILY
Qty: 42 CAPSULE | Refills: 0 | Status: SHIPPED | OUTPATIENT
Start: 2019-09-16 | End: 2021-02-12 | Stop reason: SDUPTHER

## 2019-09-16 NOTE — LETTER
September 16, 2019      Mary Mejia MD  [address] [place] [address] [address]  [facility] [id number]           [facility]  [address] [facility] [address]  [place] [address]  Phone: [phone]  Fax: [phone]          Patient: Briana Uriarte   MR Number: [id number]   YOB: 1975   Date of Visit: 9/16/2019       Dear Dr. Mary Mejia:    Thank you for referring Briana Uriarte to me for evaluation. Attached you will find relevant portions of my assessment and plan of care.    If you have questions, please do not hesitate to call me. I look forward to following Briana Uriarte along with you.    Sincerely,    Malgorzata Sequeira MD    Enclosure  CC:  No Recipients    If you would like to receive this communication electronically, please contact [email or web address] or [phone] to request more information on [facility] access.    For providers and/or their staff who would like to refer a patient to [facility], please contact us through our one-stop-shop provider referral line, [facility], at [phone].    If you feel you have received this communication in error or would no longer like to receive these types of communications, please e-mail [email or web address]

## 2019-09-16 NOTE — PROGRESS NOTES
Subjective:      Patient ID: Briana Uriarte is a 43 y.o. female.    Chief Complaint:Consult (SSTI)      History of Present Illness  HPI     Consult      Additional comments: SSTI          Last edited by Malgorzata Sequeira MD on 9/16/2019  1:14 PM. (History)      History gathered from chart review and interviewing her parents.     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 She is currently cared for by Dr. Mejia (Dermatology) who recently started patient on Otezla for management of her inverse psoriasis. Her tissue biopsy grew ESBL k pneumoniae, P mirabilis, and MSSA. She has tried several short course of Augmentin and doxycycline with mild improvement.     Review of Systems   Constitution: Negative for chills, decreased appetite, fever, malaise/fatigue, night sweats, weight gain and weight loss.   HENT: Negative for congestion, ear pain, hearing loss, hoarse voice, sore throat and tinnitus.    Eyes: Negative for blurred vision, redness and visual disturbance.   Cardiovascular: Negative for chest pain, leg swelling and palpitations.   Respiratory: Negative for cough, hemoptysis, shortness of breath and sputum production.    Hematologic/Lymphatic: Negative for adenopathy. Does not bruise/bleed easily.   Skin: Negative for dry skin, itching, rash and suspicious lesions.   Musculoskeletal: Negative for back pain, joint pain, myalgias and neck pain.   Gastrointestinal: Negative for abdominal pain, constipation, diarrhea, heartburn, nausea and vomiting.   Genitourinary: Negative for dysuria, flank pain, frequency, hematuria, hesitancy and urgency.   Neurological: Negative for dizziness, headaches, light-headedness and weakness.   Psychiatric/Behavioral: Negative for depression  and memory loss. The patient does not have insomnia and is not nervous/anxious.      Objective:   Physical Exam   Constitutional: She is oriented to person, place, and time. She appears well-nourished.   HENT:   Head: Normocephalic and atraumatic.   Right Ear: External ear normal.   Left Ear: External ear normal.   Eyes: Conjunctivae are normal. Right eye exhibits no discharge. Left eye exhibits no discharge. No scleral icterus.   Cardiovascular: Normal rate, regular rhythm and normal heart sounds. Exam reveals no friction rub.   No murmur heard.  Pulmonary/Chest: Effort normal and breath sounds normal. No stridor. No respiratory distress. She has no wheezes.   Abdominal: Soft. Bowel sounds are normal. She exhibits no distension. There is no tenderness. There is no guarding.   Genitourinary:   Genitourinary Comments: Suprapubic catheter.    Musculoskeletal: She exhibits no edema.   Neurological: She is alert and oriented to person, place, and time.   Skin:   Fissuring deep fold, erythema, and maceration of the left breast, abdomen, sacrum, and bilateral posterior knees.    Nursing note and vitals reviewed.    Assessment:       1. Candidal intertrigo    2. Proteus infection    3. Infection due to ESBL-producing Klebsiella pneumoniae    4. MSSA (methicillin susceptible Staphylococcus aureus) infection    5. TB lung, latent          Plan:   Inverse psoriasis complicated by secondary bacterial infection. Likely from antibiotic exposure, hospital admissions, and urine leak thru suprapubic catheter. Has regular mattress which does not remove pressure points. Has evidence of above organisms. Some are of maceration over the left breast concerning for candidal intertrigo complicating her psoriasis.   · Diflucan 100 mg PO daily x 21 days.   · Levaquin 500 mg PO daily x 21 days.   · Doxycycline 100 mg BID x 21 days.   · Will get serum creatinine for antibiotic dose adjustments.   · Will re-order Quantiferon Gold Tb in  preparation for continuing Otezla.   · If above oral antibiotics fail then will consider treating with IV antibiotics such as Ertapenem plus daptomycin vs dalbavancin.  · RTC in 4 weeks.

## 2019-09-18 LAB
M TB IFN-G CD4+ BCKGRND COR BLD-ACNC: 0 IU/ML
MITOGEN IGNF BCKGRD COR BLD-ACNC: 0.05 IU/ML
MITOGEN IGNF BCKGRD COR BLD-ACNC: ABNORMAL [IU]/ML
NIL: 0.03 IU/ML
TB2 - NIL: 0 IU/ML

## 2019-09-26 ENCOUNTER — TELEPHONE (OUTPATIENT)
Dept: PHARMACY | Facility: CLINIC | Age: 44
End: 2019-09-26

## 2019-09-26 NOTE — TELEPHONE ENCOUNTER
Refill call regarding Otezla at [facility]. Will prepare for shipment on 10/1 to arrive 10/2 with mom Manuela consent. Copay 0.00. Patient has not started any new medications, no missed doses/ side effects. Patient did not have any concerns or questions for the pharmacist at this time.  & address verified.  ~8 days on hand.

## 2019-10-01 ENCOUNTER — PATIENT MESSAGE (OUTPATIENT)
Dept: FAMILY MEDICINE | Facility: CLINIC | Age: 44
End: 2019-10-01

## 2019-10-07 ENCOUNTER — OFFICE VISIT (OUTPATIENT)
Dept: DERMATOLOGY | Facility: CLINIC | Age: 44
End: 2019-10-07
Payer: MEDICARE

## 2019-10-07 VITALS — HEIGHT: 61 IN | WEIGHT: 293 LBS | BODY MASS INDEX: 55.32 KG/M2

## 2019-10-07 DIAGNOSIS — L30.4 INTERTRIGO: ICD-10-CM

## 2019-10-07 DIAGNOSIS — L40.8 INVERSE PSORIASIS: Primary | ICD-10-CM

## 2019-10-07 PROCEDURE — 99213 OFFICE O/P EST LOW 20 MIN: CPT | Mod: PBBFAC,PO | Performed by: DERMATOLOGY

## 2019-10-07 PROCEDURE — 99999 PR PBB SHADOW E&M-EST. PATIENT-LVL III: ICD-10-PCS | Mod: PBBFAC,,, | Performed by: DERMATOLOGY

## 2019-10-07 PROCEDURE — 99213 PR OFFICE/OUTPT VISIT, EST, LEVL III, 20-29 MIN: ICD-10-PCS | Mod: S$PBB,,, | Performed by: DERMATOLOGY

## 2019-10-07 PROCEDURE — 99213 OFFICE O/P EST LOW 20 MIN: CPT | Mod: S$PBB,,, | Performed by: DERMATOLOGY

## 2019-10-07 PROCEDURE — 99999 PR PBB SHADOW E&M-EST. PATIENT-LVL III: CPT | Mod: PBBFAC,,, | Performed by: DERMATOLOGY

## 2019-10-07 NOTE — PROGRESS NOTES
Subjective:       Patient ID:  Briana Uriarte is a 44 y.o. female who presents for   Chief Complaint   Patient presents with    Intertrigo     F/u, improvement    Follow-up     Inverse psoriasis, gluteal and inguinal fold, L popliteal fossa flared, tx with OTCs     Pt last seen 8-6-19     Here today for reevaluation for inverse psoriasis and intertrigo.  On otezla 30mg BID  Flared L popliteal fossa, inguinal and gluteal folds.  Pt feels improved, managing well with Aquaphor and Goldbond moisturizers daily after showering.  Uses goat soap to bath.    Seen by ID 9-16-19  Rx'd 100 mg doxy and 500 mg Levaquin, finishing course today.    Past tx include:  Fluconazole 300mg PO BIW x 4 doses total  Mix keto cream, TAC cream and zinc oxide cream (OTC) 1:1:1 and apply BID PRN flare  Recommend white vinegar: water 1:2 compresses daily followed by cool blow dry and then application of prescription medication.      Patient morbidly obese with limited mobility; skin on skin History spina bifida with paraplegia. Numerous spinal surgeries in past.  Mother bathes daily  This is going to be a chronic process requiring a lot of work to manage (drying measures, barrier protection)     H/o seb derm ears, manages with dermotic oil and OTC sal acid gel     Visit with Dr TIP Dailey, 01/2019 x several FUs, bacterial cultures obtained and positive (mother accompanies and helps with history, unknown organism)  Doxycycline PO, terbinafine PO and TAC cream, cleared completely then recurred              Review of Systems   Constitutional: Positive for fatigue. Negative for fever and chills.   Skin: Positive for itching, rash and dry skin.   Hematologic/Lymphatic: Does not bruise/bleed easily.        Objective:    Physical Exam   Constitutional: She appears well-developed. She is obese.  She is in a wheelchair.    Skin:   Areas Examined (abnormalities noted in diagram):   Chest / Axilla Inspection Performed  Abdomen Inspection  Performed  Genitals / Buttocks / Groin Inspection Performed                       Diagram Legend     Erythematous scaling macule/papule c/w actinic keratosis       Vascular papule c/w angioma      Pigmented verrucoid papule/plaque c/w seborrheic keratosis      Yellow umbilicated papule c/w sebaceous hyperplasia      Irregularly shaped tan macule c/w lentigo     1-2 mm smooth white papules consistent with Milia      Movable subcutaneous cyst with punctum c/w epidermal inclusion cyst      Subcutaneous movable cyst c/w pilar cyst      Firm pink to brown papule c/w dermatofibroma      Pedunculated fleshy papule(s) c/w skin tag(s)      Evenly pigmented macule c/w junctional nevus     Mildly variegated pigmented, slightly irregular-bordered macule c/w mildly atypical nevus      Flesh colored to evenly pigmented papule c/w intradermal nevus       Pink pearly papule/plaque c/w basal cell carcinoma      Erythematous hyperkeratotic cursted plaque c/w SCC      Surgical scar with no sign of skin cancer recurrence      Open and closed comedones      Inflammatory papules and pustules      Verrucoid papule consistent consistent with wart     Erythematous eczematous patches and plaques     Dystrophic onycholytic nail with subungual debris c/w onychomycosis     Umbilicated papule    Erythematous-base heme-crusted tan verrucoid plaque consistent with inflamed seborrheic keratosis     Erythematous Silvery Scaling Plaque c/w Psoriasis     See annotation      Assessment / Plan:        Inverse psoriasis    Intertrigo    markedly improved since starting otelza, continue   Tolerates well  Keto cream alt with calciptotriene cream to popliteal fossa and pannus fold    Under care of ID Dr Sequeira, just completed course of levaquin and doxy  Quant gold indeterminate, plan to repeat, ID FU next week    Prior to otezla, patient failed improvement with all topical treatment modalities (hibiclens wash, zinc oxide, topical steroids and  antifungals)  Biopsy supports inverse psoriasis    Past punch biopsy tissue culture grew multiple organisms. (staph klebsiella proteus) and clinically PATIENT IMPROVES SPECTACULARLY WHILE ON ANTIBIOTHERAPY (AUMGENTIN + DOXY)  H/o chronic UTI           Follow up in about 6 months (around 4/7/2020), or if symptoms worsen or fail to improve.   MESSAGE 11/19/2019  Macey Montano, PharmD  Mary Mejia MD; [facility] Staff             Good morning Dr. Mejia and staff,     I spoke with Ms. Uriarte's mom this morning regarding ongoing side effect of diarrhea with Otezla. She reports it was continuous and fairly severe during October, patient has been off Otezla last few weeks and diarrhea has resolved. She wanted us to send you an update that Ms. Warren will not plan to continue taking Otezla. Managing the diarrhea has been too much.     Mom reports that patients' skin has actually been doing well, they are managing with washes and creams, as well as zeasorb. I just wanted to make sure you were aware and could reach out to patient as appropriate.     Please let me know if I can be of further assistance. Thank you,   Macey Montano, PharmD   [facility]   [phone]

## 2019-10-09 RX ORDER — METOPROLOL SUCCINATE 50 MG/1
TABLET, EXTENDED RELEASE ORAL
Qty: 90 TABLET | Refills: 3 | Status: SHIPPED | OUTPATIENT
Start: 2019-10-09 | End: 2020-10-06 | Stop reason: SDUPTHER

## 2019-10-09 RX ORDER — LISINOPRIL 20 MG/1
TABLET ORAL
Qty: 90 TABLET | Refills: 3 | Status: SHIPPED | OUTPATIENT
Start: 2019-10-09 | End: 2020-04-07

## 2019-10-16 ENCOUNTER — OFFICE VISIT (OUTPATIENT)
Dept: INFECTIOUS DISEASES | Facility: CLINIC | Age: 44
End: 2019-10-16
Payer: MEDICARE

## 2019-10-16 VITALS — WEIGHT: 293 LBS | HEIGHT: 61 IN | BODY MASS INDEX: 55.32 KG/M2

## 2019-10-16 DIAGNOSIS — B37.2 CANDIDAL INTERTRIGO: Primary | ICD-10-CM

## 2019-10-16 PROCEDURE — 99212 OFFICE O/P EST SF 10 MIN: CPT | Mod: PBBFAC,PO | Performed by: INTERNAL MEDICINE

## 2019-10-16 PROCEDURE — 99999 PR PBB SHADOW E&M-EST. PATIENT-LVL II: CPT | Mod: PBBFAC,,, | Performed by: INTERNAL MEDICINE

## 2019-10-16 PROCEDURE — 99212 PR OFFICE/OUTPT VISIT, EST, LEVL II, 10-19 MIN: ICD-10-PCS | Mod: S$PBB,,, | Performed by: INTERNAL MEDICINE

## 2019-10-16 PROCEDURE — 99999 PR PBB SHADOW E&M-EST. PATIENT-LVL II: ICD-10-PCS | Mod: PBBFAC,,, | Performed by: INTERNAL MEDICINE

## 2019-10-16 PROCEDURE — 99212 OFFICE O/P EST SF 10 MIN: CPT | Mod: S$PBB,,, | Performed by: INTERNAL MEDICINE

## 2019-10-16 RX ORDER — FLUCONAZOLE 200 MG/1
200 TABLET ORAL DAILY
Qty: 28 TABLET | Refills: 0 | Status: SHIPPED | OUTPATIENT
Start: 2019-10-16 | End: 2019-11-13

## 2019-10-16 NOTE — PROGRESS NOTES
Subjective:      Patient ID: Briana Uriarte is a 44 y.o. female.    Chief Complaint:Follow-up      History of Present Illness    History gathered from chart review and interviewing her parents.     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 At her last visit she was placed on fluconazole and levofloxacin. Her mother does not believe antimicrobial therapy helped heal her infections but did note improvement in all areas except her buttocks.     She is currently cared for by Dr. Mejia (Dermatology) who recently started patient on Otezla for management of her inverse psoriasis. Her tissue biopsy grew ESBL k pneumoniae, P mirabilis, and MSSA. She has tried several short course of Augmentin and doxycycline with mild improvement.     Review of Systems   Constitution: Negative for chills, decreased appetite, fever, malaise/fatigue, night sweats, weight gain and weight loss.   HENT: Negative for congestion, ear pain, hearing loss, hoarse voice, sore throat and tinnitus.    Eyes: Negative for blurred vision, redness and visual disturbance.   Cardiovascular: Negative for chest pain, leg swelling and palpitations.   Respiratory: Negative for cough, hemoptysis, shortness of breath and sputum production.    Hematologic/Lymphatic: Negative for adenopathy. Does not bruise/bleed easily.   Skin: Negative for dry skin, itching, rash and suspicious lesions.   Musculoskeletal: Negative for back pain, joint pain, myalgias and neck pain.   Gastrointestinal: Negative for abdominal pain, constipation, diarrhea, heartburn, nausea and vomiting.   Genitourinary: Negative for dysuria, flank pain, frequency, hematuria, hesitancy and urgency.   Neurological: Negative for dizziness, headaches,  light-headedness and weakness.   Psychiatric/Behavioral: Negative for depression and memory loss. The patient does not have insomnia and is not nervous/anxious.      Objective:   Physical Exam   Constitutional: She is oriented to person, place, and time. She appears well-nourished.   HENT:   Head: Normocephalic and atraumatic.   Right Ear: External ear normal.   Left Ear: External ear normal.   Eyes: Conjunctivae are normal. Right eye exhibits no discharge. Left eye exhibits no discharge. No scleral icterus.   Cardiovascular: Normal rate, regular rhythm and normal heart sounds. Exam reveals no friction rub.   No murmur heard.  Pulmonary/Chest: Effort normal and breath sounds normal. No stridor. No respiratory distress. She has no wheezes.   Abdominal: Soft. Bowel sounds are normal. She exhibits no distension. There is no tenderness. There is no guarding.   Genitourinary:   Genitourinary Comments: Suprapubic catheter.    Musculoskeletal: She exhibits no edema.   Neurological: She is alert and oriented to person, place, and time.   Skin:   Left breast skin fold with thin skin, excessive moisture and erythema with irregular borders. Dry cracked skin behind knee.    Nursing note and vitals reviewed.    Assessment:       1. Candidal intertrigo        Plan:   Inverse psoriasis complicated by secondary bacterial infection. Significantly improved since last visit. Some skin changes still concerning for candidal intertrigo. No gross evidence of ongoing bacterial infection.    · Diflucan 200 mg PO daily x 28 days.   · If skin breakdown occurs once again with concern for secondary bacterial infection then will have PICC placement and IV antibiotics.   · Family will update any skin changes via photograph on [employer]ter.   · RTC as needed.

## 2019-10-18 ENCOUNTER — PATIENT MESSAGE (OUTPATIENT)
Dept: INFECTIOUS DISEASES | Facility: CLINIC | Age: 44
End: 2019-10-18

## 2019-10-18 ENCOUNTER — PATIENT MESSAGE (OUTPATIENT)
Dept: DERMATOLOGY | Facility: CLINIC | Age: 44
End: 2019-10-18

## 2019-10-18 DIAGNOSIS — R19.7 DIARRHEA OF PRESUMED INFECTIOUS ORIGIN: Primary | ICD-10-CM

## 2019-10-25 ENCOUNTER — TELEPHONE (OUTPATIENT)
Dept: PHARMACY | Facility: CLINIC | Age: 44
End: 2019-10-25

## 2019-10-28 ENCOUNTER — DOCUMENTATION ONLY (OUTPATIENT)
Dept: FAMILY MEDICINE | Facility: CLINIC | Age: 44
End: 2019-10-28

## 2019-10-28 NOTE — PROGRESS NOTES
Pre-Visit Chart Review  For Appointment Scheduled on 10/29/19    Health Maintenance Due   Topic Date Due    Pneumococcal Vaccine (Medium Risk) (1 of 1 - PPSV23) 09[birth year]24/1994

## 2019-10-29 ENCOUNTER — OFFICE VISIT (OUTPATIENT)
Dept: FAMILY MEDICINE | Facility: CLINIC | Age: 44
End: 2019-10-29
Payer: MEDICARE

## 2019-10-29 VITALS
BODY MASS INDEX: 55.32 KG/M2 | HEIGHT: 61 IN | DIASTOLIC BLOOD PRESSURE: 80 MMHG | WEIGHT: 293 LBS | TEMPERATURE: 98 F | SYSTOLIC BLOOD PRESSURE: 132 MMHG | OXYGEN SATURATION: 96 % | HEART RATE: 95 BPM

## 2019-10-29 DIAGNOSIS — Z23 IMMUNIZATION DUE: ICD-10-CM

## 2019-10-29 DIAGNOSIS — F11.20 CONTINUOUS OPIOID DEPENDENCE: Primary | Chronic | ICD-10-CM

## 2019-10-29 DIAGNOSIS — R19.7 DIARRHEA, UNSPECIFIED TYPE: ICD-10-CM

## 2019-10-29 PROCEDURE — 99215 PR OFFICE/OUTPT VISIT, EST, LEVL V, 40-54 MIN: ICD-10-PCS | Mod: S$PBB,,, | Performed by: FAMILY MEDICINE

## 2019-10-29 PROCEDURE — 99215 OFFICE O/P EST HI 40 MIN: CPT | Mod: S$PBB,,, | Performed by: FAMILY MEDICINE

## 2019-10-29 PROCEDURE — 99214 OFFICE O/P EST MOD 30 MIN: CPT | Mod: PBBFAC,PO,25 | Performed by: FAMILY MEDICINE

## 2019-10-29 PROCEDURE — 99999 PR PBB SHADOW E&M-EST. PATIENT-LVL IV: CPT | Mod: PBBFAC,,, | Performed by: FAMILY MEDICINE

## 2019-10-29 PROCEDURE — 99999 PR PBB SHADOW E&M-EST. PATIENT-LVL IV: ICD-10-PCS | Mod: PBBFAC,,, | Performed by: FAMILY MEDICINE

## 2019-10-29 PROCEDURE — 90686 IIV4 VACC NO PRSV 0.5 ML IM: CPT | Mod: PBBFAC,PO

## 2019-10-29 RX ORDER — DIPHENOXYLATE HYDROCHLORIDE AND ATROPINE SULFATE 2.5; .025 MG/1; MG/1
1 TABLET ORAL 4 TIMES DAILY
Qty: 40 TABLET | Refills: 0 | Status: SHIPPED | OUTPATIENT
Start: 2019-10-29 | End: 2019-11-08

## 2019-10-29 RX ORDER — HYDROCODONE BITARTRATE AND ACETAMINOPHEN 7.5; 325 MG/1; MG/1
1 TABLET ORAL EVERY 6 HOURS PRN
Qty: 120 TABLET | Refills: 0 | Status: SHIPPED | OUTPATIENT
Start: 2019-10-29 | End: 2019-10-29 | Stop reason: SDUPTHER

## 2019-10-29 RX ORDER — HYDROCODONE BITARTRATE AND ACETAMINOPHEN 7.5; 325 MG/1; MG/1
1 TABLET ORAL EVERY 6 HOURS PRN
Qty: 120 TABLET | Refills: 0 | Status: SHIPPED | OUTPATIENT
Start: 2019-10-29 | End: 2020-01-29 | Stop reason: SDUPTHER

## 2019-10-29 NOTE — PROGRESS NOTES
Identified patient's name and . Administered flu vaccine, IM. Patient tolerated well, aseptic technique maintained. Pain scale 0/10 with injection. Instructed patient to wait in the clinic for 15 minutes after the injection was given.

## 2019-11-09 ENCOUNTER — PATIENT MESSAGE (OUTPATIENT)
Dept: FAMILY MEDICINE | Facility: CLINIC | Age: 44
End: 2019-11-09

## 2019-11-11 ENCOUNTER — PATIENT MESSAGE (OUTPATIENT)
Dept: FAMILY MEDICINE | Facility: CLINIC | Age: 44
End: 2019-11-11

## 2020-01-21 ENCOUNTER — TELEPHONE (OUTPATIENT)
Dept: PHYSICAL MEDICINE AND REHAB | Facility: CLINIC | Age: 45
End: 2020-01-21

## 2020-01-21 ENCOUNTER — PATIENT MESSAGE (OUTPATIENT)
Dept: PHYSICAL MEDICINE AND REHAB | Facility: CLINIC | Age: 45
End: 2020-01-21

## 2020-01-22 ENCOUNTER — TELEPHONE (OUTPATIENT)
Dept: PAIN MEDICINE | Facility: CLINIC | Age: 45
End: 2020-01-22

## 2020-01-23 ENCOUNTER — DOCUMENTATION ONLY (OUTPATIENT)
Dept: FAMILY MEDICINE | Facility: CLINIC | Age: 45
End: 2020-01-23

## 2020-01-23 ENCOUNTER — TELEPHONE (OUTPATIENT)
Dept: PHYSICAL MEDICINE AND REHAB | Facility: CLINIC | Age: 45
End: 2020-01-23

## 2020-01-23 NOTE — TELEPHONE ENCOUNTER
pls cancel her appt. Tomorrow and get her off schedule tomm. She cannot come b/c her wheelchair does not work  Fax the paper work , hopefully they will approve it

## 2020-01-23 NOTE — PROGRESS NOTES
Pre-Visit Chart Review  For Appointment Scheduled on 1/29/2020    Health Maintenance Due   Topic Date Due    Pneumococcal Vaccine (Medium Risk) (1 of 1 - PPSV23) 09/24/1994

## 2020-01-23 NOTE — TELEPHONE ENCOUNTER
Spoke to patient's dad, informing him I will fax letter and order for wheelchair.  appt has been canceled.

## 2020-01-29 ENCOUNTER — OFFICE VISIT (OUTPATIENT)
Dept: FAMILY MEDICINE | Facility: CLINIC | Age: 45
End: 2020-01-29
Payer: MEDICARE

## 2020-01-29 VITALS
HEIGHT: 61 IN | DIASTOLIC BLOOD PRESSURE: 68 MMHG | HEART RATE: 85 BPM | TEMPERATURE: 98 F | WEIGHT: 293 LBS | OXYGEN SATURATION: 92 % | SYSTOLIC BLOOD PRESSURE: 120 MMHG | BODY MASS INDEX: 55.32 KG/M2

## 2020-01-29 DIAGNOSIS — F11.20 CONTINUOUS OPIOID DEPENDENCE: Chronic | ICD-10-CM

## 2020-01-29 DIAGNOSIS — R79.9 ABNORMAL FINDING OF BLOOD CHEMISTRY, UNSPECIFIED: ICD-10-CM

## 2020-01-29 DIAGNOSIS — I10 ESSENTIAL HYPERTENSION: Primary | ICD-10-CM

## 2020-01-29 PROCEDURE — 99999 PR PBB SHADOW E&M-EST. PATIENT-LVL III: CPT | Mod: PBBFAC,,, | Performed by: FAMILY MEDICINE

## 2020-01-29 PROCEDURE — 99213 OFFICE O/P EST LOW 20 MIN: CPT | Mod: PBBFAC,PO | Performed by: FAMILY MEDICINE

## 2020-01-29 PROCEDURE — 99999 PR PBB SHADOW E&M-EST. PATIENT-LVL III: ICD-10-PCS | Mod: PBBFAC,,, | Performed by: FAMILY MEDICINE

## 2020-01-29 PROCEDURE — 99214 OFFICE O/P EST MOD 30 MIN: CPT | Mod: S$PBB,,, | Performed by: FAMILY MEDICINE

## 2020-01-29 PROCEDURE — 99214 PR OFFICE/OUTPT VISIT, EST, LEVL IV, 30-39 MIN: ICD-10-PCS | Mod: S$PBB,,, | Performed by: FAMILY MEDICINE

## 2020-01-29 RX ORDER — HYDROCODONE BITARTRATE AND ACETAMINOPHEN 7.5; 325 MG/1; MG/1
1 TABLET ORAL EVERY 6 HOURS PRN
Qty: 120 TABLET | Refills: 0 | Status: SHIPPED | OUTPATIENT
Start: 2020-01-29 | End: 2020-01-29 | Stop reason: SDUPTHER

## 2020-01-29 RX ORDER — HYDROCODONE BITARTRATE AND ACETAMINOPHEN 7.5; 325 MG/1; MG/1
1 TABLET ORAL EVERY 6 HOURS PRN
Qty: 120 TABLET | Refills: 0 | Status: SHIPPED | OUTPATIENT
Start: 2020-01-29 | End: 2020-04-29 | Stop reason: SDUPTHER

## 2020-01-29 NOTE — PROGRESS NOTES
Subjective:   Patient ID: Briana Uriarte is a 44 y.o. female     Chief Complaint:Medication Refill and Follow-up (3 months)      Patient here for medication refill for controlled substance.  Patient endorses compliance with medication and taking only as instructed.  Patient notes improvement in symptoms with the medication.  Patient has been on stable dose. .  Patient understands must be seen in clinic for refills of this medication under most circumstances.    Review of Systems   Constitutional: Negative for chills and fever.   HENT: Negative for sore throat.    Eyes: Negative for visual disturbance.   Respiratory: Negative for shortness of breath.    Cardiovascular: Negative for chest pain.   Gastrointestinal: Negative for abdominal pain.   Endocrine: Negative for polyuria.   Genitourinary: Negative for dysuria.   Musculoskeletal: Negative for back pain.   Skin: Negative for color change.   Neurological: Negative for headaches.   Psychiatric/Behavioral: Negative for agitation and confusion.     Past Medical History:   Diagnosis Date    Asthma     Back pain     Blood transfusion     Chronic kidney disease     kidney stones    Diabetes mellitus     Diabetes mellitus, type 2     Esotropia     GERD (gastroesophageal reflux disease)     Hydrocephalus     Hypertension     Non-healing ulcer of buttock     Nystagmus, congenital     Paralysis     Spinal bifida, closed     Wheezing      Objective:     Vitals:    01/29/20 1118   BP: 120/68   Pulse: 85   Temp: 98.3 °F (36.8 °C)     Body mass index is 57.44 kg/m².  Physical Exam   Constitutional: No distress.   HENT:   Head: Normocephalic and atraumatic.   Eyes: EOM are normal.   Cardiovascular: Normal rate and normal heart sounds.   Pulmonary/Chest: Effort normal.   Abdominal: Bowel sounds are normal.   Musculoskeletal: Normal range of motion.   Neurological: She is alert.   Psychiatric: She has a normal mood and affect.     Assessment:     1.  Essential hypertension    2. Continuous opioid dependence- contract 3/21/19    3. Abnormal finding of blood chemistry, unspecified       Plan:   Essential hypertension  -     Lipid panel; Future; Expected date: 04/29/2020  -     Comprehensive metabolic panel; Future; Expected date: 04/29/2020  -     Hemoglobin A1c; Future; Expected date: 04/29/2020    Continuous opioid dependence- contract 3/21/19  -     Discontinue: HYDROcodone-acetaminophen (NORCO) 7.5-325 mg per tablet; Take 1 tablet by mouth every 6 (six) hours as needed for Pain.  Dispense: 120 tablet; Refill: 0  -     Discontinue: HYDROcodone-acetaminophen (NORCO) 7.5-325 mg per tablet; Take 1 tablet by mouth every 6 (six) hours as needed for Pain. Do not fill until 2/29/2020  Dispense: 120 tablet; Refill: 0  -     HYDROcodone-acetaminophen (NORCO) 7.5-325 mg per tablet; Take 1 tablet by mouth every 6 (six) hours as needed for Pain. Do not fill until 3/29/2020  Dispense: 120 tablet; Refill: 0  Counseled the patient in length the risks of taking this medication.  These risks include respiratory depression and death.  Patient understands these risks are increased when mixing the medication with alcohol or other medications.  Patient advised to only take the medication as prescribed and understands must follow up in my clinic for refills of this medication.  Patient also counseled on the importance of weaning this medication to its lowest tolerable dose.  Patient will follow up with my clinic for any further questions.    Abnormal finding of blood chemistry, unspecified   -     Lipid panel; Future; Expected date: 04/29/2020  -     Hemoglobin A1c; Future; Expected date: 04/29/2020        Time spent with patient: 15 minutes and over half of that time was spent on counseling an coordination of care.    Jose De Jesus Curran MD  01/29/2020    Portions of this note have been dictated with DILLON Spear

## 2020-02-26 ENCOUNTER — CLINICAL SUPPORT (OUTPATIENT)
Dept: URGENT CARE | Facility: CLINIC | Age: 45
End: 2020-02-26
Payer: MEDICARE

## 2020-02-26 VITALS
OXYGEN SATURATION: 96 % | TEMPERATURE: 100 F | RESPIRATION RATE: 16 BRPM | WEIGHT: 293 LBS | BODY MASS INDEX: 57.44 KG/M2 | HEART RATE: 88 BPM | DIASTOLIC BLOOD PRESSURE: 85 MMHG | SYSTOLIC BLOOD PRESSURE: 128 MMHG

## 2020-02-26 DIAGNOSIS — J32.9 SINUSITIS, UNSPECIFIED CHRONICITY, UNSPECIFIED LOCATION: ICD-10-CM

## 2020-02-26 DIAGNOSIS — J40 BRONCHITIS: Primary | ICD-10-CM

## 2020-02-26 PROCEDURE — 99204 OFFICE O/P NEW MOD 45 MIN: CPT | Mod: S$GLB,,, | Performed by: NURSE PRACTITIONER

## 2020-02-26 PROCEDURE — 99204 PR OFFICE/OUTPT VISIT, NEW, LEVL IV, 45-59 MIN: ICD-10-PCS | Mod: S$GLB,,, | Performed by: NURSE PRACTITIONER

## 2020-02-26 RX ORDER — FLUTICASONE PROPIONATE 50 MCG
1 SPRAY, SUSPENSION (ML) NASAL DAILY
Qty: 9.9 ML | Refills: 0 | Status: SHIPPED | OUTPATIENT
Start: 2020-02-26 | End: 2021-04-06 | Stop reason: CLARIF

## 2020-02-26 RX ORDER — CETIRIZINE HYDROCHLORIDE 10 MG/1
10 TABLET ORAL DAILY
Qty: 30 TABLET | Refills: 0 | Status: SHIPPED | OUTPATIENT
Start: 2020-02-26 | End: 2020-07-28

## 2020-02-26 RX ORDER — AZITHROMYCIN 250 MG/1
TABLET, FILM COATED ORAL
Qty: 6 TABLET | Refills: 0 | Status: SHIPPED | OUTPATIENT
Start: 2020-02-26 | End: 2021-04-06

## 2020-02-26 NOTE — PROGRESS NOTES
Subjective: congestion, cough, wheezing, post nasal drip, stuffy nose, diarrhea       Patient ID: Briana Uriarte is a 44 y.o. female.    Vitals:  weight is 137.9 kg (304 lb) (abnormal). Her temperature is 99.5 °F (37.5 °C). Her blood pressure is 128/85 and her pulse is 88. Her respiration is 16 and oxygen saturation is 96%.     Chief Complaint: Sinus Problem (congestion, cough, post nasal drip, wheezing, stuffy nose, diarrhea)    Pt presents for sinus congestion, cough and low grade fever. Pt states resp symptoms started several days and are gradually worsening. She reports low grade fever last night. She denies n/v/d. She is lilian po intake well. Pt denies cp or sob.     Sinus Problem   This is a new problem. The current episode started yesterday. Associated symptoms include congestion, coughing and a hoarse voice. Pertinent negatives include no chills, diaphoresis, ear pain, shortness of breath, sinus pressure or sore throat.       Constitution: Positive for fever. Negative for chills, sweating and fatigue.   HENT: Positive for congestion. Negative for ear pain, sinus pain, sinus pressure, sore throat and voice change.    Neck: Negative for painful lymph nodes.   Eyes: Negative for eye redness.   Respiratory: Positive for cough and sputum production. Negative for chest tightness, bloody sputum, COPD, shortness of breath, stridor, wheezing and asthma.    Gastrointestinal: Negative for nausea and vomiting.   Musculoskeletal: Negative for muscle ache.   Skin: Negative for rash.   Allergic/Immunologic: Negative for seasonal allergies and asthma.   Hematologic/Lymphatic: Negative for swollen lymph nodes.       Objective:      Physical Exam   Constitutional: She is oriented to person, place, and time. She appears well-developed and well-nourished. She is cooperative.  Non-toxic appearance. She does not have a sickly appearance. She does not appear ill. No distress.   HENT:   Head: Normocephalic and atraumatic.    Right Ear: Hearing, tympanic membrane, external ear and ear canal normal.   Left Ear: Hearing, tympanic membrane, external ear and ear canal normal.   Nose: Mucosal edema and rhinorrhea present. No nasal deformity. No epistaxis. Right sinus exhibits maxillary sinus tenderness. Right sinus exhibits no frontal sinus tenderness. Left sinus exhibits maxillary sinus tenderness. Left sinus exhibits no frontal sinus tenderness.   Mouth/Throat: Uvula is midline, oropharynx is clear and moist and mucous membranes are normal. No trismus in the jaw. Normal dentition. No uvula swelling. No oropharyngeal exudate, posterior oropharyngeal edema or posterior oropharyngeal erythema.   Eyes: Pupils are equal, round, and reactive to light. Conjunctivae, EOM and lids are normal. No scleral icterus.   Neck: Trachea normal, full passive range of motion without pain and phonation normal. Neck supple. No neck rigidity. No edema and no erythema present.   Cardiovascular: Normal rate, regular rhythm, normal heart sounds, intact distal pulses and normal pulses.   Pulmonary/Chest: Effort normal and breath sounds normal. No stridor. No respiratory distress. She has no decreased breath sounds. She has no wheezes. She has no rhonchi. She has no rales. She exhibits no tenderness.   Abdominal: Normal appearance.   Musculoskeletal: Normal range of motion. She exhibits no edema or deformity.   Neurological: She is alert and oriented to person, place, and time. She exhibits normal muscle tone. Coordination normal.   Skin: Skin is warm, dry, intact, not diaphoretic and not pale.   Psychiatric: She has a normal mood and affect. Her speech is normal and behavior is normal. Judgment and thought content normal. Cognition and memory are normal.   Nursing note and vitals reviewed.        Assessment:       1. Bronchitis    2. Sinusitis, unspecified chronicity, unspecified location        Plan:         Bronchitis    Sinusitis, unspecified chronicity,  unspecified location    Other orders  -     azithromycin (Z-CASSIDY) 250 MG tablet; Take 2 tablets by mouth on day 1; Take 1 tablet by mouth on days 2-5  Dispense: 6 tablet; Refill: 0  -     fluticasone propionate (FLONASE) 50 mcg/actuation nasal spray; 1 spray (50 mcg total) by Each Nostril route once daily.  Dispense: 9.9 mL; Refill: 0  -     cetirizine (ZYRTEC) 10 MG tablet; Take 1 tablet (10 mg total) by mouth once daily.  Dispense: 30 tablet; Refill: 0

## 2020-02-26 NOTE — PATIENT INSTRUCTIONS
Sinusitis (Antibiotic Treatment)    The sinuses are air-filled spaces within the bones of the face. They connect to the inside of the nose. Sinusitis is an inflammation of the tissue lining the sinus cavity. Sinus inflammation can occur during a cold. It can also be due to allergies to pollens and other particles in the air. Sinusitis can cause symptoms of sinus congestion and fullness. A sinus infection causes fever, headache and facial pain. There is often green or yellow drainage from the nose or into the back of the throat (post-nasal drip). You have been given antibiotics to treat this condition.  Home care:  · Take the full course of antibiotics as instructed. Do not stop taking them, even if you feel better.  · Drink plenty of water, hot tea, and other liquids. This may help thin mucus. It also may promote sinus drainage.  · Heat may help soothe painful areas of the face. Use a towel soaked in hot water. Or,  the shower and direct the hot spray onto your face. Using a vaporizer along with a menthol rub at night may also help.   · An expectorant containing guaifenesin may help thin the mucus and promote drainage from the sinuses.  · Over-the-counter decongestants may be used unless a similar medicine was prescribed. Nasal sprays work the fastest. Use one that contains phenylephrine or oxymetazoline. First blow the nose gently. Then use the spray. Do not use these medicines more often than directed on the label or symptoms may get worse. You may also use tablets containing pseudoephedrine. Avoid products that combine ingredients, because side effects may be increased. Read labels. You can also ask the pharmacist for help. (NOTE: Persons with high blood pressure should not use decongestants. They can raise blood pressure.)  · Over-the-counter antihistamines may help if allergies contributed to your sinusitis.    · Do not use nasal rinses or irrigation during an acute sinus infection, unless told to by  your health care provider. Rinsing may spread the infection to other sinuses.  · Use acetaminophen or ibuprofen to control pain, unless another pain medicine was prescribed. (If you have chronic liver or kidney disease or ever had a stomach ulcer, talk with your doctor before using these medicines. Aspirin should never be used in anyone under 18 years of age who is ill with a fever. It may cause severe liver damage.)  · Don't smoke. This can worsen symptoms.  Follow-up care  Follow up with your healthcare provider or our staff if you are not improving within the next week.  When to seek medical advice  Call your healthcare provider if any of these occur:  · Facial pain or headache becoming more severe  · Stiff neck  · Unusual drowsiness or confusion  · Swelling of the forehead or eyelids  · Vision problems, including blurred or double vision  · Fever of 100.4ºF (38ºC) or higher, or as directed by your healthcare provider  · Seizure  · Breathing problems  · Symptoms not resolving within 10 days  Date Last Reviewed: 4/13/2015  © 6946-3202 [employer]. [address] [place] [address]. All rights reserved. This information is not intended as a substitute for professional medical care. Always follow your healthcare professional's instructions.        What Is Acute Bronchitis?  Acute bronchitis is when the airways in your lungs (bronchial tubes) become red and swollen (inflamed). It is usually caused by a viral infection. But it can also occur because of a bacteria or allergen. Symptoms include a cough that produces yellow or greenish mucus and can last for days or sometimes weeks.  Inside healthy lungs    Air travels in and out of the lungs through the airways. The linings of these airways produce sticky mucus. This mucus traps particles that enter the lungs. Tiny structures called cilia then sweep the particles out of the airways.     Healthy airway: Airways are normally open. Air moves in and  out easily.      Healthy cilia: Tiny, hairlike cilia sweep mucus and particles up and out of the airways.   Lungs with bronchitis  Bronchitis often occurs with a cold or the flu virus. The airways become inflamed (red and swollen). There is a deep hacking cough from the extra mucus. Other symptoms may include:  · Wheezing  · Chest discomfort  · Shortness of breath  · Mild fever  A second infection, this time due to bacteria, may then occur. And airways irritated by allergens or smoke are more likely to get infected.        Inflamed airway: Inflammation and extra mucus narrow the airway, causing shortness of breath.      Impaired cilia: Extra mucus impairs cilia, causing congestion and wheezing. Smoking makes the problem worse.   Making a diagnosis  A physical exam, health history, and certain tests help your healthcare provider make the diagnosis.  Health history  Your healthcare provider will ask you about your symptoms.  The exam  Your provider listens to your chest for signs of congestion. He or she may also check your ears, nose, and throat.  Possible tests  · A sputum test for bacteria. This requires a sample of mucus from your lungs.  · A nasal or throat swab. This tests to see if you have a bacterial infection.  · A chest X-ray. This is done if your healthcare provider thinks you have pneumonia.  · Tests to check for an underlying condition. Other tests may be done to check for things such as allergies, asthma, or COPD (chronic obstructive pulmonary disease). You may need to see a specialist for more lung function testing.  Treating a cough  The main treatment for bronchitis is easing symptoms. Avoiding smoke, allergens, and other things that trigger coughing can often help. If the infection is bacterial, you may be given antibiotics. During the illness, it's important to get plenty of sleep. To ease symptoms:  · Dont smoke. Also avoid secondhand smoke.  · Use a humidifier. Or try breathing in steam from a  hot shower. This may help loosen mucus.  · Drink a lot of water and juice. They can soothe the throat and may help thin mucus.  · Sit up or use extra pillows when in bed. This helps to lessen coughing and congestion.  · Ask your provider about using medicine. Ask about using cough medicine, pain and fever medicine, or a decongestant.  Antibiotics  Most cases of bronchitis are caused by cold or flu viruses. They dont need antibiotics to treat them, even if your mucus is thick and green or yellow. Antibiotics dont treat viral illness and antibiotics have not been shown to have any benefit in cases of acute bronchitis. Taking antibiotics when they are not needed increases your risk of getting an infection later that is antibiotic-resistant. Antibiotics can also cause severe cases of diarrhea that require other antibiotics to treat.  It is important that you accept your healthcare provider's opinion to not use antibiotics. Your provider will prescribe antibiotics if the infection is caused by bacteria. If they are prescribed:  · Take all of the medicine. Take the medicine until it is used up, even if symptoms have improved. If you dont, the bronchitis may come back.  · Take the medicines as directed. For instance, some medicines should be taken with food.  · Ask about side effects. Ask your provider or pharmacist what side effects are common, and what to do about them.  Follow-up care  You should see your provider again in 2 to 3 weeks. By this time, symptoms should have improved. An infection that lasts longer may mean you have a more serious problem.  Prevention  · Avoid tobacco smoke. If you smoke, quit. Stay away from smoky places. Ask friends and family not to smoke around you, or in your home or car.  · Get checked for allergies.  · Ask your provider about getting a yearly flu shot. Also ask about pneumococcal or pneumonia shots.  · Wash your hands often. This helps reduce the chance of picking up viruses that  cause colds and flu.  Call your healthcare provider if:  · Symptoms worsen, or you have new symptoms  · Breathing problems worsen or  become severe  · Symptoms dont get better within a week, or within 3 days of taking antibiotics   Date Last Reviewed: 2/1/2017  © 3765-3349 [employer]. [address] [place] [address]. All rights reserved. This information is not intended as a substitute for professional medical care. Always follow your healthcare professional's instructions.

## 2020-03-12 ENCOUNTER — PATIENT OUTREACH (OUTPATIENT)
Dept: ADMINISTRATIVE | Facility: OTHER | Age: 45
End: 2020-03-12

## 2020-03-13 ENCOUNTER — OFFICE VISIT (OUTPATIENT)
Dept: UROLOGY | Facility: CLINIC | Age: 45
End: 2020-03-13
Payer: MEDICARE

## 2020-03-13 VITALS
TEMPERATURE: 98 F | SYSTOLIC BLOOD PRESSURE: 131 MMHG | BODY MASS INDEX: 55.32 KG/M2 | HEART RATE: 87 BPM | HEIGHT: 61 IN | WEIGHT: 293 LBS | RESPIRATION RATE: 18 BRPM | DIASTOLIC BLOOD PRESSURE: 71 MMHG

## 2020-03-13 DIAGNOSIS — G83.4 CAUDA EQUINA SYNDROME WITH NEUROGENIC BLADDER: ICD-10-CM

## 2020-03-13 DIAGNOSIS — Z43.5 ENCOUNTER FOR CARE OR REPLACEMENT OF SUPRAPUBIC TUBE: ICD-10-CM

## 2020-03-13 DIAGNOSIS — G91.9 HYDROCEPHALUS, UNSPECIFIED TYPE: Primary | ICD-10-CM

## 2020-03-13 PROCEDURE — 99999 PR PBB SHADOW E&M-EST. PATIENT-LVL III: CPT | Mod: PBBFAC,,, | Performed by: UROLOGY

## 2020-03-13 PROCEDURE — 99214 PR OFFICE/OUTPT VISIT, EST, LEVL IV, 30-39 MIN: ICD-10-PCS | Mod: S$PBB,,, | Performed by: UROLOGY

## 2020-03-13 PROCEDURE — 99999 PR PBB SHADOW E&M-EST. PATIENT-LVL III: ICD-10-PCS | Mod: PBBFAC,,, | Performed by: UROLOGY

## 2020-03-13 PROCEDURE — 99214 OFFICE O/P EST MOD 30 MIN: CPT | Mod: S$PBB,,, | Performed by: UROLOGY

## 2020-03-13 PROCEDURE — 99213 OFFICE O/P EST LOW 20 MIN: CPT | Mod: PBBFAC,PN | Performed by: UROLOGY

## 2020-03-13 NOTE — PROGRESS NOTES
OFFICE NOTE  [unfilled]  [id number]  3/13/2020       CHIEF COMPLAINT:   hydrocephalus, neurogenic bladder, indwelling suprapubic catheter     HISTORY OF PRESENT ILLNESS:   this 44-year-old female returns for recheck.  She has a history of hydrocephalus with resultant paraplegia and neurogenic bladder that is being managed with a suprapubic catheter that is changed every 3-4 weeks by her mother and has had no recent problems.    Underwent umbilical hernia repair on 03/2019.  Continues to be treated for psoriasis.    Physical exam:  Abdomen - soft benign nontender protuberant no masses no hernias no organomegaly, indwelling suprapubic catheter                             psoriasis rash next affecting the lower abdomen       FINAL IMPRESSION:   hydrocephalus, neurogenic bladder, indwelling suprapubic catheter     RECOMMENDATIONS:   continue with suprapubic tube that is changed every 3-4 weeks by her mother and mother stated she will decide when to make the next appointment.

## 2020-03-24 RX ORDER — TRIAMCINOLONE ACETONIDE 1 MG/G
CREAM TOPICAL
Qty: 454 G | Refills: 0 | Status: SHIPPED | OUTPATIENT
Start: 2020-03-24 | End: 2020-07-28

## 2020-03-26 ENCOUNTER — PATIENT MESSAGE (OUTPATIENT)
Dept: DERMATOLOGY | Facility: CLINIC | Age: 45
End: 2020-03-26

## 2020-04-01 ENCOUNTER — PATIENT MESSAGE (OUTPATIENT)
Dept: FAMILY MEDICINE | Facility: CLINIC | Age: 45
End: 2020-04-01

## 2020-04-01 RX ORDER — LISINOPRIL 30 MG/1
30 TABLET ORAL DAILY
Qty: 90 TABLET | Refills: 3 | Status: SHIPPED | OUTPATIENT
Start: 2020-04-01 | End: 2021-03-24 | Stop reason: SDUPTHER

## 2020-04-01 NOTE — TELEPHONE ENCOUNTER
Pts mother is requesting lisinopril 30 mg rx. Please advise.  Pt is currently on 20mg and cutting another tablet in half every day to equal the 30mg dosage.      LOV  1/29/2020  Last refill 10/9/2019  Next f/u 4/29/2020

## 2020-04-07 RX ORDER — LISINOPRIL 20 MG/1
TABLET ORAL
Qty: 90 TABLET | Refills: 3 | Status: SHIPPED | OUTPATIENT
Start: 2020-04-07 | End: 2021-04-06

## 2020-04-20 ENCOUNTER — PATIENT MESSAGE (OUTPATIENT)
Dept: DERMATOLOGY | Facility: CLINIC | Age: 45
End: 2020-04-20

## 2020-04-23 ENCOUNTER — PATIENT OUTREACH (OUTPATIENT)
Dept: ADMINISTRATIVE | Facility: OTHER | Age: 45
End: 2020-04-23

## 2020-04-24 ENCOUNTER — PATIENT MESSAGE (OUTPATIENT)
Dept: DERMATOLOGY | Facility: CLINIC | Age: 45
End: 2020-04-24

## 2020-04-27 ENCOUNTER — PATIENT MESSAGE (OUTPATIENT)
Dept: DERMATOLOGY | Facility: CLINIC | Age: 45
End: 2020-04-27

## 2020-04-27 ENCOUNTER — OFFICE VISIT (OUTPATIENT)
Dept: DERMATOLOGY | Facility: CLINIC | Age: 45
End: 2020-04-27
Payer: MEDICARE

## 2020-04-27 DIAGNOSIS — L40.8 INVERSE PSORIASIS: Primary | ICD-10-CM

## 2020-04-27 DIAGNOSIS — L30.4 INTERTRIGO: ICD-10-CM

## 2020-04-27 PROCEDURE — 99442 PR PHYSICIAN TELEPHONE EVALUATION 11-20 MIN: CPT | Mod: 95,,, | Performed by: DERMATOLOGY

## 2020-04-27 PROCEDURE — 87077 CULTURE AEROBIC IDENTIFY: CPT | Mod: 59

## 2020-04-27 PROCEDURE — 99442 PR PHYSICIAN TELEPHONE EVALUATION 11-20 MIN: ICD-10-PCS | Mod: 95,,, | Performed by: DERMATOLOGY

## 2020-04-27 PROCEDURE — 87186 SC STD MICRODIL/AGAR DIL: CPT | Mod: 59

## 2020-04-27 PROCEDURE — 87070 CULTURE OTHR SPECIMN AEROBIC: CPT

## 2020-04-27 RX ORDER — FLUCONAZOLE 200 MG/1
TABLET ORAL
Qty: 7 TABLET | Refills: 0 | Status: SHIPPED | OUTPATIENT
Start: 2020-04-27 | End: 2020-07-28

## 2020-04-27 RX ORDER — CALCIPOTRIENE 50 UG/G
CREAM TOPICAL
Qty: 120 G | Refills: 2 | Status: SHIPPED | OUTPATIENT
Start: 2020-04-27 | End: 2021-04-06 | Stop reason: SDUPTHER

## 2020-04-27 RX ORDER — TRIAMCINOLONE ACETONIDE 0.25 MG/G
CREAM TOPICAL
Qty: 454 G | Refills: 2 | Status: SHIPPED | OUTPATIENT
Start: 2020-04-27 | End: 2021-04-06 | Stop reason: CLARIF

## 2020-04-27 RX ORDER — DOXYCYCLINE 100 MG/1
TABLET ORAL
Qty: 28 TABLET | Refills: 2 | Status: SHIPPED | OUTPATIENT
Start: 2020-04-27 | End: 2020-07-28

## 2020-04-27 NOTE — PROGRESS NOTES
"Established Patient - Audio Only Telehealth Visit     The patient location is: home  The chief complaint leading to consultation is: rash flare  Visit type: Virtual visit with audio only (telephone)  Time spent with patient and parents: 20min     The reason for the audio only service rather than synchronous audio and video virtual visit was related to technical difficulties on patient's side.     Each patient to whom I provide medical services by telemedicine is:  (1) informed of the relationship between the physician and patient and the respective role of any other health care provider with respect to management of the patient; and (2) notified that they may decline to receive medical services by telemedicine and may withdraw from such care at any time. Patient verbally consented to receive this service via voice-only telephone call.       HPI: Complex patient, last seen      h/o inverse psoriasis and intertrigo. 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  Comment:  PAS stain performed at the referring institution and PAS-D stain performed at       Previously on otezla 30mg BID with good results, discontinued due to diarrhea    Was doing fine in January February; had discontinued all rx topicals  beginning of march, worsening again, all folds plus legs, ears,   Currently washing with dove soap, sprays dilute vinegar water, front is "scalding"  Itchy across the back  Stopped TAC  Uses calendula and oatmeal balm, CBD oil "turned it around for awhile"    Last antibiotherapy "a while ago", mother estimates last fall (doxy levaquin rx by ID Dr Sequeira 9/2019)  Lives with chronic UTI, "colonized urinary tract"    Past tx include:  Fluconazole 300mg PO BIW x 4 doses total  Mix keto cream, TAC cream and zinc oxide cream (OTC) 1:1:1 and apply BID PRN flare  Recommend white " vinegar: water 1:2 compresses daily followed by cool blow dry and then application of prescription medication.      Patient morbidly obese with limited mobility; multiple skin on skin folds, history spina bifida with paraplegia. Numerous spinal surgeries in past.  Mother bathes daily  This is going to be a chronic process requiring a lot of work to manage (drying measures, barrier protection)     H/o seb derm ears, manages with dermotic oil and OTC sal acid gel     Visit with Dr TIP Dailey, 01/2019 x several FUs, bacterial cultures obtained and positive (mother accompanies and helps with history, unknown organism)  Doxycycline PO, terbinafine PO and TAC cream, cleared completely then recurred                   Assessment and plan:      Inverse psoriasis and intertrigo (candidal vs bacterial vs both)  Chronic issue, has been in severe flares in the past, managed to regain control with mix of PO abx and topicals treatment  Past bacterial cultures grew Klebsiella, proteus and staph, did well with doxy and augmentin  Biopsy of left inframammary 5/2019 c/w psoriasis  Was doing well on otezla, had to discontinue due to severe diarrhea (extensive antibiotherapy at the time)    Plan repeat culture (will give father a swab, will return to clinic immediately after swabbing, too dangerous to bring Ms Warren in during COVID)    Plan to resume calcipotriene cream once daily to folds  Mix triamcinolone 0.025% cream with ketoconazole cream and aquaphore (approximately 1:1:1 ratio) and apply once daily (mom found zinc oxide too difficult to manage/remove and not helpful)  One week PO fluconazole 200mg daily  14 days docycycline 100mg PO twice daily  May continue dilute vinegar spray if they do not burn  Cool hair dryer to all folds after bath    Supplement Zinc by mouth (xinc picolinate 50mg daily, easier to find on amazon)  Supplement vitamin D3 2000 IU daily    Plan to resume otezla once antibiotherapy completed (starter pack will be  given to father; avoid overlap to prevent combining diarrhea inducing potential of abx and otezla; patient still has 30 d supply at home)        This service was not originating from a related E/M service provided within the previous 7 days nor will  to an E/M service or procedure within the next 24 hours or my soonest available appointment.  Prevailing standard of care was able to be met in this audio-only visit.

## 2020-04-28 ENCOUNTER — TELEPHONE (OUTPATIENT)
Dept: UROLOGY | Facility: CLINIC | Age: 45
End: 2020-04-28

## 2020-04-28 NOTE — TELEPHONE ENCOUNTER
I spoke with the pt's mother (Manuela) and advised her I received the catheter paper that was dropped off and attempted to reorder with the quantities that were requested and notified her of the following. I spoke with Gloria @ [facility] @ [phone] and she stated that an order is not required at this time and they can not send the quantities requested per Medicare guidelines. She then stated that she will call the pt and explain everything to them.  The pt's mother voiced understanding.

## 2020-04-29 ENCOUNTER — PATIENT MESSAGE (OUTPATIENT)
Dept: UROLOGY | Facility: CLINIC | Age: 45
End: 2020-04-29

## 2020-04-29 ENCOUNTER — OFFICE VISIT (OUTPATIENT)
Dept: FAMILY MEDICINE | Facility: CLINIC | Age: 45
End: 2020-04-29
Payer: MEDICARE

## 2020-04-29 DIAGNOSIS — J45.40 MODERATE PERSISTENT ASTHMA WITHOUT COMPLICATION: ICD-10-CM

## 2020-04-29 DIAGNOSIS — J39.8 TRACHEOBRONCHOMALACIA: ICD-10-CM

## 2020-04-29 DIAGNOSIS — F11.20 CONTINUOUS OPIOID DEPENDENCE: Chronic | ICD-10-CM

## 2020-04-29 PROCEDURE — 99214 PR OFFICE/OUTPT VISIT, EST, LEVL IV, 30-39 MIN: ICD-10-PCS | Mod: 95,,, | Performed by: FAMILY MEDICINE

## 2020-04-29 PROCEDURE — 99214 OFFICE O/P EST MOD 30 MIN: CPT | Mod: 95,,, | Performed by: FAMILY MEDICINE

## 2020-04-29 RX ORDER — HYDROCODONE BITARTRATE AND ACETAMINOPHEN 7.5; 325 MG/1; MG/1
1 TABLET ORAL EVERY 6 HOURS PRN
Qty: 120 TABLET | Refills: 0 | Status: SHIPPED | OUTPATIENT
Start: 2020-04-29 | End: 2020-04-29 | Stop reason: SDUPTHER

## 2020-04-29 RX ORDER — HYDROCODONE BITARTRATE AND ACETAMINOPHEN 7.5; 325 MG/1; MG/1
1 TABLET ORAL EVERY 6 HOURS PRN
Qty: 120 TABLET | Refills: 0 | Status: SHIPPED | OUTPATIENT
Start: 2020-04-29 | End: 2020-07-28 | Stop reason: SDUPTHER

## 2020-04-30 ENCOUNTER — PATIENT MESSAGE (OUTPATIENT)
Dept: DERMATOLOGY | Facility: CLINIC | Age: 45
End: 2020-04-30

## 2020-05-01 DIAGNOSIS — A49.8 PROTEUS MIRABILIS INFECTION: Primary | ICD-10-CM

## 2020-05-01 RX ORDER — AMOXICILLIN AND CLAVULANATE POTASSIUM 875; 125 MG/1; MG/1
TABLET, FILM COATED ORAL
Qty: 20 TABLET | Refills: 0 | Status: SHIPPED | OUTPATIENT
Start: 2020-05-01 | End: 2020-07-28

## 2020-05-04 LAB
BACTERIA SPEC AEROBE CULT: ABNORMAL
BACTERIA SPEC AEROBE CULT: ABNORMAL

## 2020-05-04 NOTE — PROGRESS NOTES
The patient location is:  [place]  The chief complaint leading to consultation is: Medication Refill    Visit type: Virtual visit with synchronous audio and video  Total time spent with patient:  15 minutes   Each patient to whom he or she provides medical services by telemedicine is:  (1) informed of the relationship between the physician and patient and the respective role of any other health care provider with respect to management of the patient; and (2) notified that he or she may decline to receive medical services by telemedicine and may withdraw from such care at any time.    Notes:  See below    Subjective:   Patient ID: Briana Uriarte is a 44 y.o. female     Chief Complaint:Medication Refill      Patient for medication refill.  Some issues with constipation.  Otherwise patient currently stable.    Review of Systems   Constitutional: Negative for fever.   Respiratory: Negative for shortness of breath.    Cardiovascular: Negative for chest pain.   Gastrointestinal: Negative for abdominal pain.   Genitourinary: Negative for dysuria, hematuria and pelvic pain.   Musculoskeletal: Positive for back pain.   Neurological: Positive for weakness. Negative for numbness and headaches.     Past Medical History:   Diagnosis Date    Asthma     Back pain     Blood transfusion     Chronic kidney disease     kidney stones    Diabetes mellitus     Diabetes mellitus, type 2     Esotropia     GERD (gastroesophageal reflux disease)     Hydrocephalus     Hypertension     Non-healing ulcer of buttock     Nystagmus, congenital     Paralysis     Spinal bifida, closed     Wheezing      Objective:   There were no vitals filed for this visit.  There is no height or weight on file to calculate BMI.  Physical Exam   Constitutional: She is oriented to person, place, and time. She appears well-developed and well-nourished.   HENT:   Head: Normocephalic and atraumatic.   Eyes: Conjunctivae are normal.    Pulmonary/Chest: Effort normal. No respiratory distress.   Neurological: She is alert and oriented to person, place, and time.   Skin: No pallor.   Psychiatric: She has a normal mood and affect. Her behavior is normal. Judgment and thought content normal.     Assessment:     1. Moderate persistent asthma without complication    2. Tracheobronchomalacia    3. Continuous opioid dependence- contract 3/21/19      Plan:   Moderate persistent asthma without complication  -     fluticasone-umeclidin-vilanter (TRELEGY ELLIPTA) 100-62.5-25 mcg DsDv; Inhale 1 puff into the lungs once daily.  Dispense: 1 each; Refill: 11    Tracheobronchomalacia  -     fluticasone-umeclidin-vilanter (TRELEGY ELLIPTA) 100-62.5-25 mcg DsDv; Inhale 1 puff into the lungs once daily.  Dispense: 1 each; Refill: 11    Continuous opioid dependence- contract 3/21/19  -     Discontinue: HYDROcodone-acetaminophen (NORCO) 7.5-325 mg per tablet; Take 1 tablet by mouth every 6 (six) hours as needed for Pain. Do not fill until 3/29/2020  Dispense: 120 tablet; Refill: 0  -     Discontinue: HYDROcodone-acetaminophen (NORCO) 7.5-325 mg per tablet; Take 1 tablet by mouth every 6 (six) hours as needed for Pain. Do not fill until 5/29/2020  Dispense: 120 tablet; Refill: 0  -     HYDROcodone-acetaminophen (NORCO) 7.5-325 mg per tablet; Take 1 tablet by mouth every 6 (six) hours as needed for Pain. Do not fill until 6/29/2020  Dispense: 120 tablet; Refill: 0            Time spent with patient: 15 minutes and over half of that time was spent on counseling an coordination of care.    Jose De Jesus Curran MD  05/04/2020    Portions of this note have been dictated with DILLON Spear

## 2020-05-05 ENCOUNTER — PATIENT MESSAGE (OUTPATIENT)
Dept: ADMINISTRATIVE | Facility: HOSPITAL | Age: 45
End: 2020-05-05

## 2020-05-08 ENCOUNTER — PATIENT MESSAGE (OUTPATIENT)
Dept: DERMATOLOGY | Facility: CLINIC | Age: 45
End: 2020-05-08

## 2020-05-23 DIAGNOSIS — L30.4 INTERTRIGO: ICD-10-CM

## 2020-05-25 RX ORDER — KETOCONAZOLE 20 MG/G
CREAM TOPICAL
Qty: 120 G | Refills: 3 | Status: SHIPPED | OUTPATIENT
Start: 2020-05-25 | End: 2021-04-06 | Stop reason: CLARIF

## 2020-07-07 DIAGNOSIS — J45.40 MODERATE PERSISTENT ASTHMA WITHOUT COMPLICATION: ICD-10-CM

## 2020-07-07 DIAGNOSIS — J39.8 TRACHEOBRONCHOMALACIA: ICD-10-CM

## 2020-07-16 ENCOUNTER — PATIENT MESSAGE (OUTPATIENT)
Dept: FAMILY MEDICINE | Facility: CLINIC | Age: 45
End: 2020-07-16

## 2020-07-26 ENCOUNTER — NURSE TRIAGE (OUTPATIENT)
Dept: ADMINISTRATIVE | Facility: CLINIC | Age: 45
End: 2020-07-26

## 2020-07-26 ENCOUNTER — HOSPITAL ENCOUNTER (EMERGENCY)
Facility: HOSPITAL | Age: 45
Discharge: HOME | End: 2020-07-26
Attending: EMERGENCY MEDICINE
Payer: MEDICARE

## 2020-07-26 VITALS
WEIGHT: 293 LBS | OXYGEN SATURATION: 97 % | RESPIRATION RATE: 22 BRPM | HEART RATE: 113 BPM | BODY MASS INDEX: 57.44 KG/M2 | TEMPERATURE: 99 F | SYSTOLIC BLOOD PRESSURE: 118 MMHG | DIASTOLIC BLOOD PRESSURE: 82 MMHG

## 2020-07-26 DIAGNOSIS — K42.9 UMBILICAL HERNIA WITHOUT OBSTRUCTION AND WITHOUT GANGRENE: Primary | ICD-10-CM

## 2020-07-26 DIAGNOSIS — R19.00 ABDOMINAL SWELLING: ICD-10-CM

## 2020-07-26 DIAGNOSIS — R14.0 ABDOMINAL DISTENSION: ICD-10-CM

## 2020-07-26 LAB
ANION GAP SERPL CALC-SCNC: 14 MMOL/L (ref 8–16)
BACTERIA #/AREA URNS HPF: ABNORMAL /HPF
BASOPHILS # BLD AUTO: 0.05 K/UL (ref 0–0.2)
BASOPHILS NFR BLD: 0.5 % (ref 0–1.9)
BILIRUB UR QL STRIP: NEGATIVE
BUN SERPL-MCNC: 8 MG/DL (ref 6–20)
CALCIUM SERPL-MCNC: 9.9 MG/DL (ref 8.7–10.5)
CHLORIDE SERPL-SCNC: 97 MMOL/L (ref 95–110)
CLARITY UR: CLEAR
CO2 SERPL-SCNC: 26 MMOL/L (ref 23–29)
COLOR UR: YELLOW
CREAT SERPL-MCNC: 0.8 MG/DL (ref 0.5–1.4)
DIFFERENTIAL METHOD: ABNORMAL
EOSINOPHIL # BLD AUTO: 0.3 K/UL (ref 0–0.5)
EOSINOPHIL NFR BLD: 2.3 % (ref 0–8)
ERYTHROCYTE [DISTWIDTH] IN BLOOD BY AUTOMATED COUNT: 17.3 % (ref 11.5–14.5)
EST. GFR  (AFRICAN AMERICAN): >60 ML/MIN/1.73 M^2
EST. GFR  (NON AFRICAN AMERICAN): >60 ML/MIN/1.73 M^2
GLUCOSE SERPL-MCNC: 95 MG/DL (ref 70–110)
GLUCOSE UR QL STRIP: NEGATIVE
HCT VFR BLD AUTO: 33.1 % (ref 37–48.5)
HGB BLD-MCNC: 9.6 G/DL (ref 12–16)
HGB UR QL STRIP: NEGATIVE
IMM GRANULOCYTES # BLD AUTO: 0.15 K/UL (ref 0–0.04)
IMM GRANULOCYTES NFR BLD AUTO: 1.4 % (ref 0–0.5)
KETONES UR QL STRIP: NEGATIVE
LEUKOCYTE ESTERASE UR QL STRIP: ABNORMAL
LYMPHOCYTES # BLD AUTO: 1.3 K/UL (ref 1–4.8)
LYMPHOCYTES NFR BLD: 11.6 % (ref 18–48)
MCH RBC QN AUTO: 23.5 PG (ref 27–31)
MCHC RBC AUTO-ENTMCNC: 29 G/DL (ref 32–36)
MCV RBC AUTO: 81 FL (ref 82–98)
MICROSCOPIC COMMENT: ABNORMAL
MONOCYTES # BLD AUTO: 0.5 K/UL (ref 0.3–1)
MONOCYTES NFR BLD: 4.6 % (ref 4–15)
NEUTROPHILS # BLD AUTO: 8.7 K/UL (ref 1.8–7.7)
NEUTROPHILS NFR BLD: 79.6 % (ref 38–73)
NITRITE UR QL STRIP: NEGATIVE
NRBC BLD-RTO: 0 /100 WBC
PH UR STRIP: 7 [PH] (ref 5–8)
PLATELET # BLD AUTO: 511 K/UL (ref 150–350)
PMV BLD AUTO: 8.8 FL (ref 9.2–12.9)
POTASSIUM SERPL-SCNC: 4.5 MMOL/L (ref 3.5–5.1)
PROT UR QL STRIP: NEGATIVE
RBC # BLD AUTO: 4.08 M/UL (ref 4–5.4)
SARS-COV-2 RDRP RESP QL NAA+PROBE: NEGATIVE
SODIUM SERPL-SCNC: 137 MMOL/L (ref 136–145)
SP GR UR STRIP: 1.01 (ref 1–1.03)
SQUAMOUS #/AREA URNS HPF: 1 /HPF
URN SPEC COLLECT METH UR: ABNORMAL
UROBILINOGEN UR STRIP-ACNC: NEGATIVE EU/DL
WBC # BLD AUTO: 10.96 K/UL (ref 3.9–12.7)
WBC #/AREA URNS HPF: 25 /HPF (ref 0–5)
WBC CLUMPS URNS QL MICRO: ABNORMAL

## 2020-07-26 PROCEDURE — 36415 COLL VENOUS BLD VENIPUNCTURE: CPT

## 2020-07-26 PROCEDURE — 99284 EMERGENCY DEPT VISIT MOD MDM: CPT | Mod: 25

## 2020-07-26 PROCEDURE — U0002 COVID-19 LAB TEST NON-CDC: HCPCS

## 2020-07-26 PROCEDURE — 85025 COMPLETE CBC W/AUTO DIFF WBC: CPT

## 2020-07-26 PROCEDURE — 81000 URINALYSIS NONAUTO W/SCOPE: CPT

## 2020-07-26 PROCEDURE — 80048 BASIC METABOLIC PNL TOTAL CA: CPT

## 2020-07-26 PROCEDURE — 25000003 PHARM REV CODE 250: Performed by: EMERGENCY MEDICINE

## 2020-07-26 PROCEDURE — 99283 EMERGENCY DEPT VISIT LOW MDM: CPT | Mod: 25

## 2020-07-26 PROCEDURE — 87086 URINE CULTURE/COLONY COUNT: CPT

## 2020-07-26 RX ORDER — OXYCODONE HYDROCHLORIDE 10 MG/1
10 TABLET ORAL
Status: COMPLETED | OUTPATIENT
Start: 2020-07-26 | End: 2020-07-26

## 2020-07-26 RX ADMIN — OXYCODONE HYDROCHLORIDE 10 MG: 10 TABLET ORAL at 11:07

## 2020-07-26 NOTE — ED PROVIDER NOTES
Encounter Date: 7/26/2020    SCRIBE #1 NOTE: IDarlene am scribing for, and in the presence of, Fernando Wiley III, MD.       History     Chief Complaint   Patient presents with    Bloated     mother concerned for bowel obstruction d/t small pasty BMs x approx 1 month, pt is mentally challenged per mother       Time seen by provider: 9:05 AM on 07/26/2020    Briana Uriarte is a 44 y.o. female who presents to the ED with worsening abdominal distension x1 month. Mother is concerned for bowel obstruction due to patient not having her normal BMs. Mother reports that she has given patient enemas and done digital rectal exams without subsequent BMs. Patient had a TMAX of 101.2 F last night. Patient has had a suprapubic catheter placed for 10 years. The patient denies vomiting, hematuria, or any other symptoms at this time. PMHx of paralysis, hydrocephalus, closed spina bifida, type 2 DM, and HTN. PSHx of cholecystectomy, spine surgery and incarcerated umbilical hernia repair.     The history is provided by the patient.     Review of patient's allergies indicates:   Allergen Reactions    Cefepime      HIVES    Latex Swelling     Past Medical History:   Diagnosis Date    Asthma     Back pain     Blood transfusion     Chronic kidney disease     kidney stones    Diabetes mellitus     Diabetes mellitus, type 2     Esotropia     GERD (gastroesophageal reflux disease)     Hydrocephalus     Hypertension     Non-healing ulcer of buttock     Nystagmus, congenital     Paralysis     Spinal bifida, closed     Wheezing      Past Surgical History:   Procedure Laterality Date    BRAIN SURGERY       shunt revision    CHOLECYSTECTOMY      CYSTOSCOPY W/ LASER LITHOTRIPSY      REPAIR OF INCARCERATED UMBILICAL HERNIA N/A 3/8/2019    Procedure: REPAIR, HERNIA, UMBILICAL, INCARCERATED, AGE 5 YEARS OR OLDER;  Surgeon: Ziyad Hancock MD;  Location: [facility];  Service: General;  Laterality: N/A;    SPINE  SURGERY       Family History   Problem Relation Age of Onset    Cancer Mother     Glaucoma Father     Heart disease Maternal Grandmother     Cancer Maternal Grandmother     Glaucoma Paternal Grandfather     Psoriasis Maternal Uncle     Melanoma Neg Hx     Lupus Neg Hx     Eczema Neg Hx      Social History     Tobacco Use    Smoking status: Never Smoker    Smokeless tobacco: Never Used   Substance Use Topics    Alcohol use: No     Frequency: Never     Drinks per session: Patient refused     Binge frequency: Never    Drug use: Yes     Types: Hydrocodone     Review of Systems   Constitutional: Positive for fever. Negative for appetite change, chills and fatigue.   Eyes: Negative for visual disturbance.   Respiratory: Negative for apnea and shortness of breath.    Cardiovascular: Negative for chest pain.   Gastrointestinal: Positive for abdominal distention. Negative for abdominal pain, blood in stool and vomiting.   Genitourinary: Negative for decreased urine volume and hematuria.   Musculoskeletal: Negative for neck pain.   Skin: Negative for pallor and rash.   Neurological: Negative for seizures.   Hematological: Does not bruise/bleed easily.       Physical Exam     Initial Vitals [07/26/20 0856]   BP Pulse Resp Temp SpO2   (!) 150/63 (!) 118 (!) 22 99.2 °F (37.3 °C) 97 %      MAP       --         Physical Exam    Nursing note and vitals reviewed.  Constitutional: She appears well-developed and well-nourished. She is Obese .   Morbidly obese.   HENT:   Head: Normocephalic and atraumatic.   Eyes: Conjunctivae are normal.   Neck: Normal range of motion. Neck supple.   Cardiovascular: Normal rate, regular rhythm and normal heart sounds. Exam reveals no gallop and no friction rub.    No murmur heard.  Pulmonary/Chest: Effort normal and breath sounds normal. No respiratory distress. She has no wheezes. She has no rhonchi. She has no rales.   Abdominal: Soft. She exhibits distension. There is no abdominal  tenderness.   Musculoskeletal: Normal range of motion.   Neurological: She is alert and oriented to person, place, and time.   Skin: Skin is warm and dry. No erythema.   Psychiatric: She has a normal mood and affect.         ED Course   Procedures  Labs Reviewed   CBC W/ AUTO DIFFERENTIAL - Abnormal; Notable for the following components:       Result Value    Hemoglobin 9.6 (*)     Hematocrit 33.1 (*)     Mean Corpuscular Volume 81 (*)     Mean Corpuscular Hemoglobin 23.5 (*)     Mean Corpuscular Hemoglobin Conc 29.0 (*)     RDW 17.3 (*)     Platelets 511 (*)     MPV 8.8 (*)     Immature Granulocytes 1.4 (*)     Gran # (ANC) 8.7 (*)     Immature Grans (Abs) 0.15 (*)     Gran% 79.6 (*)     Lymph% 11.6 (*)     All other components within normal limits   URINALYSIS, REFLEX TO URINE CULTURE - Abnormal; Notable for the following components:    Leukocytes, UA 3+ (*)     All other components within normal limits    Narrative:     Specimen Source->Urine   URINALYSIS MICROSCOPIC - Abnormal; Notable for the following components:    WBC, UA 25 (*)     WBC Clumps, UA Occasional (*)     Bacteria Few (*)     All other components within normal limits    Narrative:     Specimen Source->Urine   CULTURE, URINE   BASIC METABOLIC PANEL   SARS-COV-2 RNA AMPLIFICATION, QUAL          Imaging Results          CT Abdomen Pelvis  Without Contrast (Final result)  Result time 07/26/20 11:39:51    Final result by Paulino Charlton MD (07/26/20 11:39:51)                 Impression:      1. Recurrent umbilical hernia with a wide neck and containing several loops of nondilated, mildly distended loops of small bowel with air-fluid levels.  Findings are nonspecific and could reflect superimposed ileus or partial small bowel obstruction.  No convincing evidence of high-grade bowel obstruction.  No pneumoperitoneum.  2. Suprapubic catheter in place.  3. Multiple additional stable findings, as above, including markedly enlarged and chronic multi-cystic  dysplastic appearance of the left kidney and hydronephrosis, right nephrolithiasis, and extensive postoperative changes to the thoracolumbar spine.      Electronically signed by: Paulino Charlton  Date:    07/26/2020  Time:    11:39             Narrative:    EXAMINATION:  CT ABDOMEN PELVIS WITHOUT CONTRAST    CLINICAL HISTORY:  Abdominal distension; Abdominal distension (gaseous)    TECHNIQUE:  Low dose axial images, sagittal and coronal reformations were obtained from the lung bases to the pubic symphysis, Oral contrast was not administered.    COMPARISON:  Abdominal radiograph 07/26/2020, CT abdomen and pelvis 03/12/2019    FINDINGS:  Artifact related to the patient's large body habitus and close proximity to the gantry.  Additional metallic artifact from postsurgical changes of the spine with spinal rods in place.    Heart: Normal in size. No pericardial effusion.    Lung Bases: Scattered bandlike opacities in the right middle, lingula and right lower lobe may reflect subsegmental atelectasis versus scarring.  No focal consolidation or pleural effusion.    Liver: Normal in size and attenuation.  Stable small geographic area of hypoattenuation in the gallbladder fossa, which is nonspecific and may reflect focal fatty infiltration or scarring.  No new suspicious focal hepatic lesion on this limited noncontrast study.    Gallbladder: Gallbladder is surgically absent.    Bile Ducts: No evidence of dilated ducts.    Pancreas: No mass or peripancreatic fat stranding.    Spleen: Unremarkable.    Adrenals: Unremarkable.    Kidneys/ Ureters: Markedly enlarged and chronic multi-cystic dysplastic appearance of the left kidney with hydronephrosis.  Multiple right renal hypodensities suggestive of renal cysts are suboptimally evaluated.  No right-sided hydronephrosis.  Three calcification within the lower pole of the right kidney suggestive of nonobstructive renal stones appear unchanged.    Bladder: Urinary bladder is relatively  decompressed.  A suprapubic catheter is in place.  Few foci of air within the urinary bladder lumen.    Reproductive organs: No significant abnormality.    Abdominal wall/GI Tract/Mesentery: Postoperative changes of prior abdominal wall hernia repair with recurrent wide neck ventral hernia containing mesenteric fat and loops of predominantly nondilated, mildly distended small bowel with air and fluid levels.  Mildly prominent loop of small bowel in the right lower quadrant with air-fluid level adjacent to the ventriculoperitoneal shunt catheter, similar to prior study on 03/12/2019.  No evidence of bowel obstruction or inflammation.  Ventriculoperitoneal shunt catheter tubing coils in the lower abdomen.    Peritoneal Space: No ascites. No free air.    Retroperitoneum: No significant adenopathy.    Vasculature: No significant atherosclerosis or aneurysm.    Bones: No acute fracture.  Extensive postoperative changes of the thoracolumbar spine with posterior decompression and spinal mindi placement.  Chronic deformity at the thoracolumbar junction appears grossly unchanged.  Multiple chronic bilateral posterior rib deformities.                               X-Ray Abdomen AP 1 View (Final result)  Result time 07/26/20 10:15:34    Final result by Paulino Charlton MD (07/26/20 10:15:34)                 Impression:      Mild dilatation of small bowel loops in the right mid abdomen which could reflect ileus or obstruction.      Electronically signed by: Paulino Charlton  Date:    07/26/2020  Time:    10:15             Narrative:    EXAMINATION:  XR ABDOMEN AP 1 VIEW    CLINICAL HISTORY:  Intra-abdominal and pelvic swelling, mass and lump, unspecified site    TECHNIQUE:  Single AP View of the abdomen was performed.    COMPARISON:  CT abdomen and pelvis 03/12/2019, abdominal radiograph 07/01/2018.    FINDINGS:  X-ray beam attenuation and scatter occur in generous overlying soft tissues, which limits evaluation of the intra-abdominal  structures.  Mildly dilated loops of small bowel in the right abdomen which may reflect ileus or obstruction.  No obvious free air.  Postoperative changes of the spine with spinal rods in place.                                 Medical Decision Making:   History:   Old Medical Records: I decided to obtain old medical records.  Clinical Tests:   Lab Tests: Ordered and Reviewed  Radiological Study: Ordered and Reviewed  ED Management:  44-year-old female presents with a 1 month history of abdominal distension.  CT of the abdomen and pelvis is obtained and demonstrates a large umbilical hernia with no definitive evidence of obstruction.  She has minimal pain at present with no nausea vomiting and bowel obstruction unlikely.  She is encouraged to return immediately for worsening pain.       APC / Resident Notes:   I, Dr. Fernando Wilye III, personally performed the services described in this documentation. All medical record entries made by the scribe were at my direction and in my presence.  I have reviewed the chart and agree that the record reflects my personal performance and is accurate and complete       Scribe Attestation:   Scribe #1: I performed the above scribed service and the documentation accurately describes the services I performed. I attest to the accuracy of the note.                          Clinical Impression:       ICD-10-CM ICD-9-CM   1. Umbilical hernia without obstruction and without gangrene  K42.9 553.1   2. Abdominal swelling  R19.00 789.30   3. Abdominal distension  R14.0 787.3         Disposition:   Disposition: Discharged  Condition: Stable     ED Disposition Condition    Discharge Stable        ED Prescriptions     None        Follow-up Information     Follow up With Specialties Details Why Contact Info    Ziyad Hancock MD General Surgery, Bariatrics, Surgery In 3 days  [address] [facility] [address] [facility] [address]  [place] [address]  [phone]                                       Fernando Wiley III,  MD  07/26/20 1648

## 2020-07-26 NOTE — TELEPHONE ENCOUNTER
Patient's mother is calling, patient has spinal biffida, has been running a temperature of 101.2 at night, has watery diarrhea and  distended abd, mother feels she is impacted, feels this is impacting her breathing, she now has a strange cough, asking if I would recommend ED,questioned re primary concern, states the possibility of a fecal impaction is what is concerning to her as this has happened in the past. She states she does not seem right, c/o pain but poor hx due to her condition, Triage for Constipation done ED advice given, verb understanding, states they can transport her to the ED, enc to call back for any further needs,     Reason for Disposition   Patient sounds very sick or weak to the triager    Additional Information   Negative: [1] Abdomen pain is main symptom AND [2] male   Negative: [1] Abdomen pain is main symptom AND [2] adult female   Negative: Rectal bleeding or blood in stool is main symptom   Negative: Rectal pain or itching is main symptom   Negative: Constipation in a cancer patient who is currently (or recently) receiving chemotherapy or radiation therapy, or cancer patient who has metastatic or end-stage cancer and is receiving palliative care    Protocols used: CONSTIPATION-A-

## 2020-07-26 NOTE — ED NOTES
Mother verbalizes readiness for discharge.  Returned patient to her wheelchair via [place] lift without incident.

## 2020-07-27 ENCOUNTER — DOCUMENTATION ONLY (OUTPATIENT)
Dept: FAMILY MEDICINE | Facility: CLINIC | Age: 45
End: 2020-07-27

## 2020-07-27 ENCOUNTER — PATIENT MESSAGE (OUTPATIENT)
Dept: FAMILY MEDICINE | Facility: CLINIC | Age: 45
End: 2020-07-27

## 2020-07-27 LAB
BACTERIA UR CULT: NORMAL
BACTERIA UR CULT: NORMAL

## 2020-07-27 NOTE — PROGRESS NOTES
Pre-Visit Chart Review  For Appointment Scheduled on 7/28/2020    Health Maintenance Due   Topic Date Due    Hepatitis C Screening  1975    HIV Screening  09/24/1990    Pneumococcal Vaccine (Medium Risk) (1 of 1 - PPSV23) 09/24/1994

## 2020-07-28 ENCOUNTER — OFFICE VISIT (OUTPATIENT)
Dept: FAMILY MEDICINE | Facility: CLINIC | Age: 45
End: 2020-07-28
Payer: MEDICARE

## 2020-07-28 DIAGNOSIS — K59.00 CONSTIPATION, UNSPECIFIED CONSTIPATION TYPE: Primary | ICD-10-CM

## 2020-07-28 DIAGNOSIS — F11.20 CONTINUOUS OPIOID DEPENDENCE: Chronic | ICD-10-CM

## 2020-07-28 PROCEDURE — 99214 OFFICE O/P EST MOD 30 MIN: CPT | Mod: 95,,, | Performed by: FAMILY MEDICINE

## 2020-07-28 PROCEDURE — 99214 PR OFFICE/OUTPT VISIT, EST, LEVL IV, 30-39 MIN: ICD-10-PCS | Mod: 95,,, | Performed by: FAMILY MEDICINE

## 2020-07-28 RX ORDER — HYDROCODONE BITARTRATE AND ACETAMINOPHEN 7.5; 325 MG/1; MG/1
1 TABLET ORAL EVERY 6 HOURS PRN
Qty: 120 TABLET | Refills: 0 | Status: SHIPPED | OUTPATIENT
Start: 2020-07-28 | End: 2020-07-28 | Stop reason: SDUPTHER

## 2020-07-28 RX ORDER — HYDROCODONE BITARTRATE AND ACETAMINOPHEN 7.5; 325 MG/1; MG/1
1 TABLET ORAL EVERY 6 HOURS PRN
Qty: 120 TABLET | Refills: 0 | Status: SHIPPED | OUTPATIENT
Start: 2020-07-28 | End: 2020-10-28 | Stop reason: SDUPTHER

## 2020-07-29 NOTE — PROGRESS NOTES
The patient location is:  [place]  The chief complaint leading to consultation is: Follow-up (3 months)    Visit type: Virtual visit with synchronous audio and video  Total time spent with patient:  25 minutes  Each patient to whom he or she provides medical services by telemedicine is:  (1) informed of the relationship between the physician and patient and the respective role of any other health care provider with respect to management of the patient; and (2) notified that he or she may decline to receive medical services by telemedicine and may withdraw from such care at any time. Vital signs recorded were provided by the patient.    Notes:  See below    Subjective:   Patient ID: Briana Uriarte is a 44 y.o. female     Chief Complaint:Follow-up (3 months)      Patient with constipation which been going on for the past month.  Family reports decreased bowel movements.  Family reports giving MiraLax daily with limited improvement.  Patient was seen in the emergency room and had CT scan which did show stool but no blockage.  Otherwise denies any other symptoms such as shortness of breath or fever.    Back Pain  Pertinent negatives include no abdominal pain, chest pain or dysuria.     Review of Systems   Respiratory: Negative for shortness of breath.    Cardiovascular: Negative for chest pain.   Gastrointestinal: Positive for constipation. Negative for abdominal pain.   Genitourinary: Negative for dysuria.     Past Medical History:   Diagnosis Date    Asthma     Back pain     Blood transfusion     Chronic kidney disease     kidney stones    Diabetes mellitus     Diabetes mellitus, type 2     Esotropia     GERD (gastroesophageal reflux disease)     Hydrocephalus     Hypertension     Non-healing ulcer of buttock     Nystagmus, congenital     Paralysis     Spinal bifida, closed     Wheezing      Past Surgical History:   Procedure Laterality Date    BRAIN SURGERY       shunt revision     CHOLECYSTECTOMY      CYSTOSCOPY W/ LASER LITHOTRIPSY      REPAIR OF INCARCERATED UMBILICAL HERNIA N/A 3/8/2019    Procedure: REPAIR, HERNIA, UMBILICAL, INCARCERATED, AGE 5 YEARS OR OLDER;  Surgeon: Ziyad Hancock MD;  Location: [facility];  Service: General;  Laterality: N/A;    SPINE SURGERY       Objective:   There were no vitals filed for this visit.  There is no height or weight on file to calculate BMI.  Physical Exam  Vitals signs and nursing note reviewed.   Constitutional:       Appearance: She is well-developed.   HENT:      Head: Normocephalic and atraumatic.   Eyes:      General: No scleral icterus.     Conjunctiva/sclera: Conjunctivae normal.   Neck:      Musculoskeletal: Normal range of motion and neck supple.   Pulmonary:      Effort: Pulmonary effort is normal. No respiratory distress.   Musculoskeletal: Normal range of motion.         General: No deformity.   Skin:     Coloration: Skin is not pale.      Findings: No rash.   Neurological:      Mental Status: She is alert and oriented to person, place, and time.   Psychiatric:         Behavior: Behavior normal.         Thought Content: Thought content normal.         Judgment: Judgment normal.       Assessment:     1. Constipation, unspecified constipation type    2. Continuous opioid dependence- contract 3/21/19      Plan:   Constipation, unspecified constipation type  Counseled patient how this is likely due to the chronic opioid therapy.  Advised I would begin taking the stool softener twice daily.  Also advised not to do MiraLax daily.  Was recently given prescription for Linzess by different provider and started today.  Advised I would give Linzess 5-7 days to see if constipation improves while increasing fiber intake and taking stool softener twice daily.  If this does not improve symptoms patient may benefit from Relistor or another opioid antagonist.    Continuous opioid dependence- contract 3/21/19  -     Discontinue:  HYDROcodone-acetaminophen (NORCO) 7.5-325 mg per tablet; Take 1 tablet by mouth every 6 (six) hours as needed for Pain.  Dispense: 120 tablet; Refill: 0  -     Discontinue: HYDROcodone-acetaminophen (NORCO) 7.5-325 mg per tablet; Take 1 tablet by mouth every 6 (six) hours as needed for Pain. Do not fill until 8/28/2020  Dispense: 120 tablet; Refill: 0  -     HYDROcodone-acetaminophen (NORCO) 7.5-325 mg per tablet; Take 1 tablet by mouth every 6 (six) hours as needed for Pain. Do not fill until 9/28/2020  Dispense: 120 tablet; Refill: 0            Time spent with patient: 25 minutes and over half of that time was spent on counseling an coordination of care.    Jose De Jesus Curran MD  07/29/2020    Portions of this note have been dictated with DILLON Spear

## 2020-07-30 ENCOUNTER — PATIENT MESSAGE (OUTPATIENT)
Dept: FAMILY MEDICINE | Facility: CLINIC | Age: 45
End: 2020-07-30

## 2020-08-21 ENCOUNTER — DOCUMENTATION ONLY (OUTPATIENT)
Dept: FAMILY MEDICINE | Facility: CLINIC | Age: 45
End: 2020-08-21

## 2020-08-21 ENCOUNTER — OFFICE VISIT (OUTPATIENT)
Dept: FAMILY MEDICINE | Facility: CLINIC | Age: 45
End: 2020-08-21
Payer: MEDICARE

## 2020-08-21 VITALS
RESPIRATION RATE: 12 BRPM | OXYGEN SATURATION: 96 % | BODY MASS INDEX: 55.32 KG/M2 | SYSTOLIC BLOOD PRESSURE: 116 MMHG | WEIGHT: 293 LBS | DIASTOLIC BLOOD PRESSURE: 70 MMHG | HEIGHT: 61 IN | TEMPERATURE: 98 F | HEART RATE: 97 BPM

## 2020-08-21 DIAGNOSIS — J45.901 EXACERBATION OF ASTHMA, UNSPECIFIED ASTHMA SEVERITY, UNSPECIFIED WHETHER PERSISTENT: Primary | ICD-10-CM

## 2020-08-21 DIAGNOSIS — J39.8 TRACHEOBRONCHOMALACIA: ICD-10-CM

## 2020-08-21 DIAGNOSIS — J45.40 MODERATE PERSISTENT ASTHMA WITHOUT COMPLICATION: ICD-10-CM

## 2020-08-21 DIAGNOSIS — J44.9 CHRONIC OBSTRUCTIVE PULMONARY DISEASE, UNSPECIFIED COPD TYPE: ICD-10-CM

## 2020-08-21 DIAGNOSIS — R06.00 DYSPNEA, UNSPECIFIED TYPE: ICD-10-CM

## 2020-08-21 DIAGNOSIS — R60.0 LOCALIZED EDEMA: ICD-10-CM

## 2020-08-21 PROCEDURE — 99214 OFFICE O/P EST MOD 30 MIN: CPT | Mod: PBBFAC,PO | Performed by: FAMILY MEDICINE

## 2020-08-21 PROCEDURE — 99214 PR OFFICE/OUTPT VISIT, EST, LEVL IV, 30-39 MIN: ICD-10-PCS | Mod: S$PBB,,, | Performed by: FAMILY MEDICINE

## 2020-08-21 PROCEDURE — 99999 PR PBB SHADOW E&M-EST. PATIENT-LVL IV: ICD-10-PCS | Mod: PBBFAC,,, | Performed by: FAMILY MEDICINE

## 2020-08-21 PROCEDURE — 99999 PR PBB SHADOW E&M-EST. PATIENT-LVL IV: CPT | Mod: PBBFAC,,, | Performed by: FAMILY MEDICINE

## 2020-08-21 PROCEDURE — 99214 OFFICE O/P EST MOD 30 MIN: CPT | Mod: S$PBB,,, | Performed by: FAMILY MEDICINE

## 2020-08-21 RX ORDER — PROMETHAZINE HYDROCHLORIDE AND CODEINE PHOSPHATE 6.25; 1 MG/5ML; MG/5ML
5 SOLUTION ORAL EVERY 6 HOURS PRN
Qty: 240 ML | Refills: 0 | Status: SHIPPED | OUTPATIENT
Start: 2020-08-21 | End: 2020-08-31

## 2020-08-21 RX ORDER — LINACLOTIDE 72 UG/1
72 CAPSULE, GELATIN COATED ORAL
COMMUNITY
Start: 2020-07-27 | End: 2021-08-24

## 2020-08-21 RX ORDER — ALBUTEROL SULFATE 90 UG/1
AEROSOL, METERED RESPIRATORY (INHALATION)
Qty: 18 G | Refills: 11 | Status: SHIPPED | OUTPATIENT
Start: 2020-08-21 | End: 2021-08-24

## 2020-08-21 RX ORDER — FLUCONAZOLE 150 MG/1
150 TABLET ORAL DAILY
Qty: 1 TABLET | Refills: 0 | Status: SHIPPED | OUTPATIENT
Start: 2020-08-21 | End: 2020-08-22

## 2020-08-21 RX ORDER — PREDNISONE 20 MG/1
40 TABLET ORAL DAILY
Qty: 10 TABLET | Refills: 0 | Status: SHIPPED | OUTPATIENT
Start: 2020-08-21 | End: 2020-08-26

## 2020-08-21 RX ORDER — AZITHROMYCIN 250 MG/1
TABLET, FILM COATED ORAL
Qty: 6 TABLET | Refills: 0 | Status: SHIPPED | OUTPATIENT
Start: 2020-08-21 | End: 2020-08-26

## 2020-08-21 NOTE — PROGRESS NOTES
Subjective:   Patient ID: Briana Uriarte is a 44 y.o. female     Chief Complaint:Cough and Nasal Congestion      Patient with worsening shortness of breath, cough, congestion, and wheezing going on for the past month.  Has been doing albuterol inhalers with limited improvement.  No recent sick contact.  Also endorses persistent constipation though some improvement with Linzess    Review of Systems   Constitutional: Negative for chills and fever.   HENT: Positive for rhinorrhea. Negative for ear pain, postnasal drip and sore throat.    Respiratory: Positive for cough, shortness of breath and wheezing.    Cardiovascular: Positive for chest pain.   Musculoskeletal: Negative for myalgias.   Skin: Negative for rash.   Neurological: Negative for headaches.     Past Medical History:   Diagnosis Date    Asthma     Back pain     Blood transfusion     Chronic kidney disease     kidney stones    Diabetes mellitus     Diabetes mellitus, type 2     Esotropia     GERD (gastroesophageal reflux disease)     Hydrocephalus     Hypertension     Non-healing ulcer of buttock     Nystagmus, congenital     Paralysis     Spinal bifida, closed     Wheezing      Past Surgical History:   Procedure Laterality Date    BRAIN SURGERY       shunt revision    CHOLECYSTECTOMY      CYSTOSCOPY W/ LASER LITHOTRIPSY      REPAIR OF INCARCERATED UMBILICAL HERNIA N/A 3/8/2019    Procedure: REPAIR, HERNIA, UMBILICAL, INCARCERATED, AGE 5 YEARS OR OLDER;  Surgeon: Ziyad Hancock MD;  Location: [facility];  Service: General;  Laterality: N/A;    SPINE SURGERY       Objective:     Vitals:    08/21/20 1003   BP: 116/70   Pulse: 97   Resp: 12   Temp: 98 °F (36.7 °C)     Body mass index is 60.46 kg/m².  Physical Exam  Vitals signs and nursing note reviewed.   Constitutional:       Appearance: She is well-developed.   HENT:      Head: Normocephalic and atraumatic.   Eyes:      General: No scleral icterus.     Conjunctiva/sclera:  Conjunctivae normal.   Neck:      Musculoskeletal: Normal range of motion and neck supple.   Cardiovascular:      Heart sounds: No murmur.   Pulmonary:      Breath sounds: Wheezing present. No rhonchi or rales.   Musculoskeletal: Normal range of motion.         General: No deformity.   Skin:     Coloration: Skin is not pale.      Findings: No rash.   Neurological:      Mental Status: She is alert and oriented to person, place, and time.   Psychiatric:         Behavior: Behavior normal.         Thought Content: Thought content normal.         Judgment: Judgment normal.       Assessment:     1. Exacerbation of asthma, unspecified asthma severity, unspecified whether persistent    2. Moderate persistent asthma without complication    3. Tracheobronchomalacia    4. Localized edema    5. Dyspnea, unspecified type    6. Chronic obstructive pulmonary disease, unspecified COPD type      Plan:   Exacerbation of asthma, unspecified asthma severity, unspecified whether persistent  -     predniSONE (DELTASONE) 20 MG tablet; Take 2 tablets (40 mg total) by mouth once daily. for 5 days  Dispense: 10 tablet; Refill: 0  -     azithromycin (Z-CASSIDY) 250 MG tablet; Take 2 tablets by mouth on day 1; Take 1 tablet by mouth on days 2-5  Dispense: 6 tablet; Refill: 0  -     promethazine-codeine 6.25-10 mg/5 ml (PHENERGAN WITH CODEINE) 6.25-10 mg/5 mL syrup; Take 5 mLs by mouth every 6 (six) hours as needed for Cough.  Dispense: 240 mL; Refill: 0  -     albuterol (PROVENTIL/VENTOLIN HFA) 90 mcg/actuation inhaler; 2 puffs every 4 hours as needed for cough, wheeze, or shortness of breath  Dispense: 18 g; Refill: 11  If symptoms have not improved on follow-up after treatment will consider imaging such as chest x-ray or CT.  Moderate persistent asthma without complication  -     albuterol (PROVENTIL/VENTOLIN HFA) 90 mcg/actuation inhaler; 2 puffs every 4 hours as needed for cough, wheeze, or shortness of breath  Dispense: 18 g; Refill:  11    Tracheobronchomalacia  -     albuterol (PROVENTIL/VENTOLIN HFA) 90 mcg/actuation inhaler; 2 puffs every 4 hours as needed for cough, wheeze, or shortness of breath  Dispense: 18 g; Refill: 11    Localized edema  -     albuterol (PROVENTIL/VENTOLIN HFA) 90 mcg/actuation inhaler; 2 puffs every 4 hours as needed for cough, wheeze, or shortness of breath  Dispense: 18 g; Refill: 11    Dyspnea, unspecified type  -     albuterol (PROVENTIL/VENTOLIN HFA) 90 mcg/actuation inhaler; 2 puffs every 4 hours as needed for cough, wheeze, or shortness of breath  Dispense: 18 g; Refill: 11    Chronic obstructive pulmonary disease, unspecified COPD type  -     albuterol (PROVENTIL/VENTOLIN HFA) 90 mcg/actuation inhaler; 2 puffs every 4 hours as needed for cough, wheeze, or shortness of breath  Dispense: 18 g; Refill: 11    Other orders  -     fluconazole (DIFLUCAN) 150 MG Tab; Take 1 tablet (150 mg total) by mouth once daily. for 1 day  Dispense: 1 tablet; Refill: 0    Plan to follow-up in 1 week to discuss change in treatment for constipation.        Time spent with patient: 15 minutes and over half of that time was spent on counseling an coordination of care.    Jose De Jesus Curran MD  08/21/2020    Portions of this note have been dictated with DILLON Spear

## 2020-08-21 NOTE — PROGRESS NOTES
Pre-Visit Chart Review  For Appointment Scheduled on 8/21/2020    Health Maintenance Due   Topic Date Due    Hepatitis C Screening  1975    HIV Screening  09/24/1990    Pneumococcal Vaccine (Medium Risk) (1 of 1 - PPSV23) 09/24/1994

## 2020-08-27 ENCOUNTER — OFFICE VISIT (OUTPATIENT)
Dept: FAMILY MEDICINE | Facility: CLINIC | Age: 45
End: 2020-08-27
Payer: MEDICARE

## 2020-08-27 ENCOUNTER — TELEPHONE (OUTPATIENT)
Dept: FAMILY MEDICINE | Facility: CLINIC | Age: 45
End: 2020-08-27

## 2020-08-27 DIAGNOSIS — K59.00 CONSTIPATION, UNSPECIFIED CONSTIPATION TYPE: Primary | ICD-10-CM

## 2020-08-27 PROCEDURE — 99213 OFFICE O/P EST LOW 20 MIN: CPT | Mod: 95,,, | Performed by: FAMILY MEDICINE

## 2020-08-27 PROCEDURE — 99213 PR OFFICE/OUTPT VISIT, EST, LEVL III, 20-29 MIN: ICD-10-PCS | Mod: 95,,, | Performed by: FAMILY MEDICINE

## 2020-08-31 NOTE — PROGRESS NOTES
The patient location is:  [place]  The chief complaint leading to consultation is: Follow-up (one week) and Shortness of Breath    Visit type: Virtual visit with synchronous audio and video  Total time spent with patient:  15 minutes  Each patient to whom he or she provides medical services by telemedicine is:  (1) informed of the relationship between the physician and patient and the respective role of any other health care provider with respect to management of the patient; and (2) notified that he or she may decline to receive medical services by telemedicine and may withdraw from such care at any time. Vital signs recorded were provided by the patient.    Notes:  See below    Subjective:   Patient ID: Briana Uriarte is a 44 y.o. female     Chief Complaint:Follow-up (one week) and Shortness of Breath      Patient here for follow-up.  Patient endorses improvement in symptoms as well as improvement in constipation.  Patient elected to continue on Linzess.    Review of Systems   Respiratory: Negative for shortness of breath.    Cardiovascular: Negative for chest pain.   Gastrointestinal: Negative for abdominal pain.   Genitourinary: Negative for dysuria.     Past Medical History:   Diagnosis Date    Asthma     Back pain     Blood transfusion     Chronic kidney disease     kidney stones    Diabetes mellitus     Diabetes mellitus, type 2     Esotropia     GERD (gastroesophageal reflux disease)     Hydrocephalus     Hypertension     Non-healing ulcer of buttock     Nystagmus, congenital     Paralysis     Spinal bifida, closed     Wheezing      Past Surgical History:   Procedure Laterality Date    BRAIN SURGERY       shunt revision    CHOLECYSTECTOMY      CYSTOSCOPY W/ LASER LITHOTRIPSY      REPAIR OF INCARCERATED UMBILICAL HERNIA N/A 3/8/2019    Procedure: REPAIR, HERNIA, UMBILICAL, INCARCERATED, AGE 5 YEARS OR OLDER;  Surgeon: Ziyad Hancock MD;  [facility]: [facility];  Service:  General;  Laterality: N/A;    SPINE SURGERY       Objective:   There were no vitals filed for this visit.  There is no height or weight on file to calculate BMI.  Physical Exam  Vitals signs and nursing note reviewed.   Constitutional:       Appearance: She is well-developed.   HENT:      Head: Normocephalic and atraumatic.   Eyes:      General: No scleral icterus.     Conjunctiva/sclera: Conjunctivae normal.   Neck:      Musculoskeletal: Normal range of motion and neck supple.   Pulmonary:      Effort: Pulmonary effort is normal. No respiratory distress.   Musculoskeletal: Normal range of motion.         General: No deformity.   Skin:     Coloration: Skin is not pale.      Findings: No rash.   Neurological:      Mental Status: She is alert and oriented to person, place, and time.   Psychiatric:         Behavior: Behavior normal.         Thought Content: Thought content normal.         Judgment: Judgment normal.       Assessment:     1. Constipation, unspecified constipation type      Plan:   Constipation, unspecified constipation type  Constipation improved on Linzess.  Will continue Linzess and have patient follow with any further issues.  Did consider opioid antagonist will hold off at this time.            Time spent with patient: 15 minutes and over half of that time was spent on counseling an coordination of care.    Jose De Jesus Curran MD  08/31/2020    Portions of this note have been dictated with DILLON Spear

## 2020-09-27 ENCOUNTER — PATIENT MESSAGE (OUTPATIENT)
Dept: FAMILY MEDICINE | Facility: CLINIC | Age: 45
End: 2020-09-27

## 2020-09-29 ENCOUNTER — PATIENT MESSAGE (OUTPATIENT)
Dept: OTHER | Facility: OTHER | Age: 45
End: 2020-09-29

## 2020-09-29 ENCOUNTER — PATIENT MESSAGE (OUTPATIENT)
Dept: ADMINISTRATIVE | Facility: OTHER | Age: 45
End: 2020-09-29

## 2020-10-07 RX ORDER — METOPROLOL SUCCINATE 50 MG/1
50 TABLET, EXTENDED RELEASE ORAL DAILY
Qty: 90 TABLET | Refills: 3 | Status: SHIPPED | OUTPATIENT
Start: 2020-10-07 | End: 2021-09-28

## 2020-10-28 ENCOUNTER — TELEPHONE (OUTPATIENT)
Dept: FAMILY MEDICINE | Facility: CLINIC | Age: 45
End: 2020-10-28

## 2020-10-28 ENCOUNTER — PATIENT MESSAGE (OUTPATIENT)
Dept: FAMILY MEDICINE | Facility: CLINIC | Age: 45
End: 2020-10-28

## 2020-10-28 DIAGNOSIS — F11.20 CONTINUOUS OPIOID DEPENDENCE: Chronic | ICD-10-CM

## 2020-10-28 RX ORDER — HYDROCODONE BITARTRATE AND ACETAMINOPHEN 7.5; 325 MG/1; MG/1
1 TABLET ORAL EVERY 6 HOURS PRN
Qty: 120 TABLET | Refills: 0 | Status: SHIPPED | OUTPATIENT
Start: 2020-10-28 | End: 2020-11-24 | Stop reason: SDUPTHER

## 2020-10-28 NOTE — TELEPHONE ENCOUNTER
----- Message from Regla Denton MA sent at 10/28/2020 10:09 AM CDT -----  Regarding: Refill for Today  Script for pain medication has not be filled   Pharmacy [id number] - [place], [place] - [address] [facility] [phone] (Phone)   Medication: HYDROcodone-acetaminophen (NORCO) 7.5-325 mg per tablet  Call Back # [phone]

## 2020-11-23 ENCOUNTER — LAB VISIT (OUTPATIENT)
Dept: LAB | Facility: HOSPITAL | Age: 45
End: 2020-11-23
Attending: FAMILY MEDICINE
Payer: MEDICARE

## 2020-11-23 ENCOUNTER — OFFICE VISIT (OUTPATIENT)
Dept: FAMILY MEDICINE | Facility: CLINIC | Age: 45
End: 2020-11-23
Payer: MEDICARE

## 2020-11-23 VITALS
HEIGHT: 61 IN | BODY MASS INDEX: 55.32 KG/M2 | TEMPERATURE: 98 F | DIASTOLIC BLOOD PRESSURE: 76 MMHG | HEART RATE: 96 BPM | WEIGHT: 293 LBS | SYSTOLIC BLOOD PRESSURE: 118 MMHG | OXYGEN SATURATION: 95 %

## 2020-11-23 DIAGNOSIS — I10 ESSENTIAL HYPERTENSION: ICD-10-CM

## 2020-11-23 DIAGNOSIS — F11.20 CONTINUOUS OPIOID DEPENDENCE: Chronic | ICD-10-CM

## 2020-11-23 DIAGNOSIS — I10 ESSENTIAL HYPERTENSION: Primary | ICD-10-CM

## 2020-11-23 LAB
ALBUMIN SERPL BCP-MCNC: 3.1 G/DL (ref 3.5–5.2)
ALP SERPL-CCNC: 72 U/L (ref 55–135)
ALT SERPL W/O P-5'-P-CCNC: 7 U/L (ref 10–44)
ANION GAP SERPL CALC-SCNC: 11 MMOL/L (ref 8–16)
AST SERPL-CCNC: 16 U/L (ref 10–40)
BASOPHILS # BLD AUTO: 0.05 K/UL (ref 0–0.2)
BASOPHILS NFR BLD: 0.6 % (ref 0–1.9)
BILIRUB SERPL-MCNC: 0.1 MG/DL (ref 0.1–1)
BUN SERPL-MCNC: 20 MG/DL (ref 6–20)
CALCIUM SERPL-MCNC: 9.3 MG/DL (ref 8.7–10.5)
CHLORIDE SERPL-SCNC: 99 MMOL/L (ref 95–110)
CHOLEST SERPL-MCNC: 193 MG/DL (ref 120–199)
CHOLEST/HDLC SERPL: 3 {RATIO} (ref 2–5)
CO2 SERPL-SCNC: 24 MMOL/L (ref 23–29)
CREAT SERPL-MCNC: 1 MG/DL (ref 0.5–1.4)
DIFFERENTIAL METHOD: ABNORMAL
EOSINOPHIL # BLD AUTO: 0.4 K/UL (ref 0–0.5)
EOSINOPHIL NFR BLD: 3.9 % (ref 0–8)
ERYTHROCYTE [DISTWIDTH] IN BLOOD BY AUTOMATED COUNT: 19.2 % (ref 11.5–14.5)
EST. GFR  (AFRICAN AMERICAN): >60 ML/MIN/1.73 M^2
EST. GFR  (NON AFRICAN AMERICAN): >60 ML/MIN/1.73 M^2
GLUCOSE SERPL-MCNC: 129 MG/DL (ref 70–110)
HCT VFR BLD AUTO: 33.5 % (ref 37–48.5)
HDLC SERPL-MCNC: 65 MG/DL (ref 40–75)
HDLC SERPL: 33.7 % (ref 20–50)
HGB BLD-MCNC: 9.4 G/DL (ref 12–16)
IMM GRANULOCYTES # BLD AUTO: 0.05 K/UL (ref 0–0.04)
IMM GRANULOCYTES NFR BLD AUTO: 0.6 % (ref 0–0.5)
LDLC SERPL CALC-MCNC: 97.2 MG/DL (ref 63–159)
LYMPHOCYTES # BLD AUTO: 1.4 K/UL (ref 1–4.8)
LYMPHOCYTES NFR BLD: 15.2 % (ref 18–48)
MCH RBC QN AUTO: 21.2 PG (ref 27–31)
MCHC RBC AUTO-ENTMCNC: 28.1 G/DL (ref 32–36)
MCV RBC AUTO: 76 FL (ref 82–98)
MONOCYTES # BLD AUTO: 0.5 K/UL (ref 0.3–1)
MONOCYTES NFR BLD: 5 % (ref 4–15)
NEUTROPHILS # BLD AUTO: 6.8 K/UL (ref 1.8–7.7)
NEUTROPHILS NFR BLD: 74.7 % (ref 38–73)
NONHDLC SERPL-MCNC: 128 MG/DL
NRBC BLD-RTO: 0 /100 WBC
PLATELET # BLD AUTO: 329 K/UL (ref 150–350)
PMV BLD AUTO: 10.1 FL (ref 9.2–12.9)
POTASSIUM SERPL-SCNC: 4.8 MMOL/L (ref 3.5–5.1)
PROT SERPL-MCNC: 9 G/DL (ref 6–8.4)
RBC # BLD AUTO: 4.43 M/UL (ref 4–5.4)
SODIUM SERPL-SCNC: 134 MMOL/L (ref 136–145)
TRIGL SERPL-MCNC: 154 MG/DL (ref 30–150)
WBC # BLD AUTO: 9.03 K/UL (ref 3.9–12.7)

## 2020-11-23 PROCEDURE — 80053 COMPREHEN METABOLIC PANEL: CPT

## 2020-11-23 PROCEDURE — 99999 PR PBB SHADOW E&M-EST. PATIENT-LVL III: ICD-10-PCS | Mod: PBBFAC,,, | Performed by: FAMILY MEDICINE

## 2020-11-23 PROCEDURE — 99214 PR OFFICE/OUTPT VISIT, EST, LEVL IV, 30-39 MIN: ICD-10-PCS | Mod: S$PBB,,, | Performed by: FAMILY MEDICINE

## 2020-11-23 PROCEDURE — 36415 COLL VENOUS BLD VENIPUNCTURE: CPT | Mod: PO

## 2020-11-23 PROCEDURE — 99999 PR PBB SHADOW E&M-EST. PATIENT-LVL III: CPT | Mod: PBBFAC,,, | Performed by: FAMILY MEDICINE

## 2020-11-23 PROCEDURE — 80061 LIPID PANEL: CPT

## 2020-11-23 PROCEDURE — 99213 OFFICE O/P EST LOW 20 MIN: CPT | Mod: PBBFAC,PO,25 | Performed by: FAMILY MEDICINE

## 2020-11-23 PROCEDURE — 85025 COMPLETE CBC W/AUTO DIFF WBC: CPT

## 2020-11-23 PROCEDURE — 99214 OFFICE O/P EST MOD 30 MIN: CPT | Mod: S$PBB,,, | Performed by: FAMILY MEDICINE

## 2020-11-23 PROCEDURE — 90686 IIV4 VACC NO PRSV 0.5 ML IM: CPT | Mod: PBBFAC,PO

## 2020-11-23 NOTE — PROGRESS NOTES
Identified patient's name and . Administered flu vaccine, IM. Patient tolerated well, aseptic technique maintained. Pain scale 0 out of 10 with injection. Instructed patient to wait in clinic for 15 minutes after injection was given.

## 2020-11-24 RX ORDER — HYDROCODONE BITARTRATE AND ACETAMINOPHEN 7.5; 325 MG/1; MG/1
1 TABLET ORAL EVERY 6 HOURS PRN
Qty: 120 TABLET | Refills: 0 | Status: SHIPPED | OUTPATIENT
Start: 2020-11-24 | End: 2021-02-23 | Stop reason: SDUPTHER

## 2020-11-24 RX ORDER — HYDROCODONE BITARTRATE AND ACETAMINOPHEN 7.5; 325 MG/1; MG/1
1 TABLET ORAL EVERY 6 HOURS PRN
Qty: 120 TABLET | Refills: 0 | Status: SHIPPED | OUTPATIENT
Start: 2020-11-24 | End: 2020-11-24 | Stop reason: SDUPTHER

## 2020-11-24 NOTE — PROGRESS NOTES
Subjective:   Patient ID: Briana Uriarte is a 45 y.o. female     Chief Complaint:No chief complaint on file.      Pt here for checkup with family. Doing well.     Review of Systems   Unable to perform ROS: Other     Past Medical History:   Diagnosis Date    Asthma     Back pain     Blood transfusion     Chronic kidney disease     kidney stones    Diabetes mellitus     Diabetes mellitus, type 2     Esotropia     GERD (gastroesophageal reflux disease)     Hydrocephalus     Hypertension     Non-healing ulcer of buttock     Nystagmus, congenital     Paralysis     Spinal bifida, closed     Wheezing      Past Surgical History:   Procedure Laterality Date    BRAIN SURGERY       shunt revision    CHOLECYSTECTOMY      CYSTOSCOPY W/ LASER LITHOTRIPSY      REPAIR OF INCARCERATED UMBILICAL HERNIA N/A 3/8/2019    Procedure: REPAIR, HERNIA, UMBILICAL, INCARCERATED, AGE 5 YEARS OR OLDER;  Surgeon: Ziyad Hancock MD;  Location: [facility];  Service: General;  Laterality: N/A;    SPINE SURGERY       Objective:     Vitals:    11/23/20 1307   BP: 118/76   Pulse: 96   Temp: 97.7 °F (36.5 °C)     Body mass index is 58.2 kg/m².  Physical Exam  Vitals signs and nursing note reviewed.   Constitutional:       Appearance: She is well-developed.   HENT:      Head: Normocephalic and atraumatic.   Eyes:      General: No scleral icterus.     Conjunctiva/sclera: Conjunctivae normal.   Neck:      Musculoskeletal: Normal range of motion and neck supple.   Cardiovascular:      Heart sounds: No murmur.   Pulmonary:      Effort: Pulmonary effort is normal. No respiratory distress.   Musculoskeletal: Normal range of motion.         General: No deformity.   Skin:     Coloration: Skin is not pale.      Findings: No rash.   Neurological:      Mental Status: She is alert and oriented to person, place, and time.   Psychiatric:         Behavior: Behavior normal.         Thought Content: Thought content normal.          Judgment: Judgment normal.       Assessment:     1. Essential hypertension    2. Continuous opioid dependence- contract 3/21/19      Plan:   Essential hypertension  -     Lipid Panel; Future; Expected date: 11/23/2020  -     CBC Auto Differential; Future; Expected date: 11/23/2020  -     Comprehensive Metabolic Panel; Future; Expected date: 11/23/2020    Continuous opioid dependence- contract 3/21/19  -     Discontinue: HYDROcodone-acetaminophen (NORCO) 7.5-325 mg per tablet; Take 1 tablet by mouth every 6 (six) hours as needed for Pain.  Dispense: 120 tablet; Refill: 0  -     Discontinue: HYDROcodone-acetaminophen (NORCO) 7.5-325 mg per tablet; Take 1 tablet by mouth every 6 (six) hours as needed for Pain. Do not fill until 12/23/2020  Dispense: 120 tablet; Refill: 0  -     HYDROcodone-acetaminophen (NORCO) 7.5-325 mg per tablet; Take 1 tablet by mouth every 6 (six) hours as needed for Pain. Do not fill until 1/23/2021  Dispense: 120 tablet; Refill: 0    Other orders  -     Influenza - Quadrivalent *Preferred* (6 months+) (PF)            Time spent with patient: 15 minutes and over half of that time was spent on counseling an coordination of care.    Jose De Jesus Curran MD  11/24/2020    Portions of this note have been dictated with DILLON Spear

## 2021-01-05 DIAGNOSIS — J39.8 TRACHEOBRONCHOMALACIA: ICD-10-CM

## 2021-01-05 DIAGNOSIS — J45.40 MODERATE PERSISTENT ASTHMA WITHOUT COMPLICATION: ICD-10-CM

## 2021-01-05 RX ORDER — FLUTICASONE FUROATE, UMECLIDINIUM BROMIDE AND VILANTEROL TRIFENATATE 100; 62.5; 25 UG/1; UG/1; UG/1
POWDER RESPIRATORY (INHALATION)
Qty: 60 EACH | Refills: 5 | Status: SHIPPED | OUTPATIENT
Start: 2021-01-05 | End: 2021-08-24

## 2021-02-02 ENCOUNTER — PATIENT MESSAGE (OUTPATIENT)
Dept: FAMILY MEDICINE | Facility: CLINIC | Age: 46
End: 2021-02-02

## 2021-02-02 DIAGNOSIS — Z93.59 SUPRAPUBIC CATHETER: Primary | ICD-10-CM

## 2021-02-04 ENCOUNTER — OFFICE VISIT (OUTPATIENT)
Dept: WOUND CARE | Facility: HOSPITAL | Age: 46
End: 2021-02-04
Attending: FAMILY MEDICINE
Payer: MEDICARE

## 2021-02-04 VITALS
SYSTOLIC BLOOD PRESSURE: 128 MMHG | TEMPERATURE: 98 F | DIASTOLIC BLOOD PRESSURE: 87 MMHG | HEART RATE: 96 BPM | RESPIRATION RATE: 18 BRPM

## 2021-02-04 DIAGNOSIS — L89.323 STAGE III PRESSURE ULCER OF LEFT BUTTOCK: ICD-10-CM

## 2021-02-04 DIAGNOSIS — L89.313 STAGE III PRESSURE ULCER OF RIGHT BUTTOCK: ICD-10-CM

## 2021-02-04 DIAGNOSIS — L89.153 STAGE III PRESSURE ULCER OF SACRAL REGION: Primary | ICD-10-CM

## 2021-02-04 PROCEDURE — 99203 OFFICE O/P NEW LOW 30 MIN: CPT | Mod: 25,,, | Performed by: FAMILY MEDICINE

## 2021-02-04 PROCEDURE — 99213 OFFICE O/P EST LOW 20 MIN: CPT | Mod: 25 | Performed by: FAMILY MEDICINE

## 2021-02-04 PROCEDURE — 11042 DBRDMT SUBQ TIS 1ST 20SQCM/<: CPT | Performed by: FAMILY MEDICINE

## 2021-02-04 PROCEDURE — 99203 PR OFFICE/OUTPT VISIT, NEW, LEVL III, 30-44 MIN: ICD-10-PCS | Mod: 25,,, | Performed by: FAMILY MEDICINE

## 2021-02-04 PROCEDURE — 11042 PR DEBRIDEMENT, SKIN, SUB-Q TISSUE,=<20 SQ CM: ICD-10-PCS | Mod: ,,, | Performed by: FAMILY MEDICINE

## 2021-02-04 PROCEDURE — 11042 DBRDMT SUBQ TIS 1ST 20SQCM/<: CPT | Mod: ,,, | Performed by: FAMILY MEDICINE

## 2021-02-08 ENCOUNTER — TELEPHONE (OUTPATIENT)
Dept: FAMILY MEDICINE | Facility: CLINIC | Age: 46
End: 2021-02-08

## 2021-02-11 ENCOUNTER — OFFICE VISIT (OUTPATIENT)
Dept: WOUND CARE | Facility: HOSPITAL | Age: 46
End: 2021-02-11
Attending: FAMILY MEDICINE
Payer: MEDICARE

## 2021-02-11 VITALS — HEART RATE: 80 BPM | RESPIRATION RATE: 17 BRPM | TEMPERATURE: 98 F

## 2021-02-11 DIAGNOSIS — L89.313 STAGE III PRESSURE ULCER OF RIGHT BUTTOCK: ICD-10-CM

## 2021-02-11 DIAGNOSIS — L89.153 STAGE III PRESSURE ULCER OF SACRAL REGION: Primary | ICD-10-CM

## 2021-02-11 DIAGNOSIS — L89.323 STAGE III PRESSURE ULCER OF LEFT BUTTOCK: ICD-10-CM

## 2021-02-11 PROCEDURE — 11042 DBRDMT SUBQ TIS 1ST 20SQCM/<: CPT | Performed by: FAMILY MEDICINE

## 2021-02-11 PROCEDURE — 11042 PR DEBRIDEMENT, SKIN, SUB-Q TISSUE,=<20 SQ CM: ICD-10-PCS | Mod: ,,, | Performed by: FAMILY MEDICINE

## 2021-02-11 PROCEDURE — 11042 DBRDMT SUBQ TIS 1ST 20SQCM/<: CPT | Mod: ,,, | Performed by: FAMILY MEDICINE

## 2021-02-12 DIAGNOSIS — A49.8 INFECTION DUE TO ESBL-PRODUCING KLEBSIELLA PNEUMONIAE: ICD-10-CM

## 2021-02-12 DIAGNOSIS — A49.8 PROTEUS INFECTION: ICD-10-CM

## 2021-02-12 DIAGNOSIS — B37.2 CANDIDAL INTERTRIGO: ICD-10-CM

## 2021-02-12 DIAGNOSIS — Z16.12 INFECTION DUE TO ESBL-PRODUCING KLEBSIELLA PNEUMONIAE: ICD-10-CM

## 2021-02-12 DIAGNOSIS — A49.01 MSSA (METHICILLIN SUSCEPTIBLE STAPHYLOCOCCUS AUREUS) INFECTION: ICD-10-CM

## 2021-02-12 RX ORDER — DOXYCYCLINE 100 MG/1
100 CAPSULE ORAL 2 TIMES DAILY
Qty: 42 CAPSULE | Refills: 0 | Status: SHIPPED | OUTPATIENT
Start: 2021-02-12 | End: 2021-03-05

## 2021-02-23 ENCOUNTER — PATIENT MESSAGE (OUTPATIENT)
Dept: FAMILY MEDICINE | Facility: CLINIC | Age: 46
End: 2021-02-23

## 2021-02-23 ENCOUNTER — OFFICE VISIT (OUTPATIENT)
Dept: FAMILY MEDICINE | Facility: CLINIC | Age: 46
End: 2021-02-23
Payer: MEDICARE

## 2021-02-23 ENCOUNTER — OUTPATIENT CASE MANAGEMENT (OUTPATIENT)
Dept: ADMINISTRATIVE | Facility: OTHER | Age: 46
End: 2021-02-23

## 2021-02-23 DIAGNOSIS — F11.20 CONTINUOUS OPIOID DEPENDENCE: Chronic | ICD-10-CM

## 2021-02-23 PROCEDURE — 99214 PR OFFICE/OUTPT VISIT, EST, LEVL IV, 30-39 MIN: ICD-10-PCS | Mod: 95,,, | Performed by: FAMILY MEDICINE

## 2021-02-23 PROCEDURE — 99214 OFFICE O/P EST MOD 30 MIN: CPT | Mod: 95,,, | Performed by: FAMILY MEDICINE

## 2021-02-23 RX ORDER — HYDROCODONE BITARTRATE AND ACETAMINOPHEN 7.5; 325 MG/1; MG/1
1 TABLET ORAL EVERY 6 HOURS PRN
Qty: 120 TABLET | Refills: 0 | Status: SHIPPED | OUTPATIENT
Start: 2021-02-23 | End: 2021-02-23 | Stop reason: SDUPTHER

## 2021-02-23 RX ORDER — HYDROCODONE BITARTRATE AND ACETAMINOPHEN 7.5; 325 MG/1; MG/1
1 TABLET ORAL EVERY 6 HOURS PRN
Qty: 120 TABLET | Refills: 0 | Status: SHIPPED | OUTPATIENT
Start: 2021-02-23 | End: 2021-03-02 | Stop reason: SDUPTHER

## 2021-02-25 ENCOUNTER — OFFICE VISIT (OUTPATIENT)
Dept: WOUND CARE | Facility: HOSPITAL | Age: 46
End: 2021-02-25
Attending: INTERNAL MEDICINE
Payer: MEDICARE

## 2021-02-25 VITALS
HEART RATE: 80 BPM | DIASTOLIC BLOOD PRESSURE: 82 MMHG | TEMPERATURE: 98 F | SYSTOLIC BLOOD PRESSURE: 134 MMHG | RESPIRATION RATE: 18 BRPM

## 2021-02-25 DIAGNOSIS — L89.323 STAGE III PRESSURE ULCER OF LEFT BUTTOCK: ICD-10-CM

## 2021-02-25 DIAGNOSIS — L89.153 STAGE III PRESSURE ULCER OF SACRAL REGION: Primary | ICD-10-CM

## 2021-02-25 PROCEDURE — 11042 DBRDMT SUBQ TIS 1ST 20SQCM/<: CPT | Performed by: INTERNAL MEDICINE

## 2021-03-02 ENCOUNTER — TELEPHONE (OUTPATIENT)
Dept: FAMILY MEDICINE | Facility: CLINIC | Age: 46
End: 2021-03-02

## 2021-03-02 ENCOUNTER — PATIENT MESSAGE (OUTPATIENT)
Dept: FAMILY MEDICINE | Facility: CLINIC | Age: 46
End: 2021-03-02

## 2021-03-02 DIAGNOSIS — F11.20 CONTINUOUS OPIOID DEPENDENCE: Chronic | ICD-10-CM

## 2021-03-02 RX ORDER — HYDROCODONE BITARTRATE AND ACETAMINOPHEN 7.5; 325 MG/1; MG/1
1 TABLET ORAL EVERY 6 HOURS PRN
Qty: 120 TABLET | Refills: 0 | Status: SHIPPED | OUTPATIENT
Start: 2021-03-02 | End: 2021-03-02 | Stop reason: SDUPTHER

## 2021-03-02 RX ORDER — HYDROCODONE BITARTRATE AND ACETAMINOPHEN 7.5; 325 MG/1; MG/1
1 TABLET ORAL EVERY 6 HOURS PRN
Qty: 120 TABLET | Refills: 0 | Status: SHIPPED | OUTPATIENT
Start: 2021-03-02 | End: 2021-05-19 | Stop reason: SDUPTHER

## 2021-03-24 ENCOUNTER — PATIENT MESSAGE (OUTPATIENT)
Dept: FAMILY MEDICINE | Facility: CLINIC | Age: 46
End: 2021-03-24

## 2021-03-24 RX ORDER — LISINOPRIL 30 MG/1
30 TABLET ORAL DAILY
Qty: 90 TABLET | Refills: 3 | Status: ON HOLD | OUTPATIENT
Start: 2021-03-24 | End: 2021-04-11 | Stop reason: HOSPADM

## 2021-03-31 ENCOUNTER — OUTPATIENT CASE MANAGEMENT (OUTPATIENT)
Dept: ADMINISTRATIVE | Facility: OTHER | Age: 46
End: 2021-03-31

## 2021-03-31 DIAGNOSIS — J44.89 ASTHMA-COPD OVERLAP SYNDROME: Primary | ICD-10-CM

## 2021-04-02 ENCOUNTER — PATIENT MESSAGE (OUTPATIENT)
Dept: PULMONOLOGY | Facility: CLINIC | Age: 46
End: 2021-04-02

## 2021-04-05 ENCOUNTER — PATIENT MESSAGE (OUTPATIENT)
Dept: DERMATOLOGY | Facility: CLINIC | Age: 46
End: 2021-04-05

## 2021-04-06 ENCOUNTER — PATIENT MESSAGE (OUTPATIENT)
Dept: PULMONOLOGY | Facility: CLINIC | Age: 46
End: 2021-04-06

## 2021-04-06 ENCOUNTER — PATIENT OUTREACH (OUTPATIENT)
Dept: ADMINISTRATIVE | Facility: OTHER | Age: 46
End: 2021-04-06

## 2021-04-06 ENCOUNTER — HOSPITAL ENCOUNTER (INPATIENT)
Facility: HOSPITAL | Age: 46
LOS: 5 days | Discharge: HOME | DRG: 189 | End: 2021-04-11
Attending: EMERGENCY MEDICINE | Admitting: STUDENT IN AN ORGANIZED HEALTH CARE EDUCATION/TRAINING PROGRAM
Payer: MEDICARE

## 2021-04-06 ENCOUNTER — PATIENT MESSAGE (OUTPATIENT)
Dept: DERMATOLOGY | Facility: CLINIC | Age: 46
End: 2021-04-06

## 2021-04-06 ENCOUNTER — PATIENT MESSAGE (OUTPATIENT)
Dept: UROLOGY | Facility: CLINIC | Age: 46
End: 2021-04-06

## 2021-04-06 ENCOUNTER — OFFICE VISIT (OUTPATIENT)
Dept: DERMATOLOGY | Facility: CLINIC | Age: 46
End: 2021-04-06
Payer: MEDICARE

## 2021-04-06 DIAGNOSIS — L40.8 INVERSE PSORIASIS: ICD-10-CM

## 2021-04-06 DIAGNOSIS — J44.9 CHRONIC OBSTRUCTIVE PULMONARY DISEASE, UNSPECIFIED COPD TYPE: ICD-10-CM

## 2021-04-06 DIAGNOSIS — J96.21 ACUTE ON CHRONIC RESPIRATORY FAILURE WITH HYPOXIA: ICD-10-CM

## 2021-04-06 DIAGNOSIS — E66.2 OBESITY HYPOVENTILATION SYNDROME: ICD-10-CM

## 2021-04-06 DIAGNOSIS — M41.9 RESTRICTIVE LUNG DISEASE DUE TO KYPHOSCOLIOSIS: Primary | ICD-10-CM

## 2021-04-06 DIAGNOSIS — E87.1 HYPONATREMIA: ICD-10-CM

## 2021-04-06 DIAGNOSIS — Z20.822 SUSPECTED COVID-19 VIRUS INFECTION: ICD-10-CM

## 2021-04-06 DIAGNOSIS — R06.02 SOB (SHORTNESS OF BREATH): ICD-10-CM

## 2021-04-06 DIAGNOSIS — L89.313 DECUBITUS ULCER OF RIGHT BUTTOCK, STAGE 3: ICD-10-CM

## 2021-04-06 DIAGNOSIS — L30.4 INTERTRIGO: Primary | ICD-10-CM

## 2021-04-06 DIAGNOSIS — J98.4 RESTRICTIVE LUNG DISEASE DUE TO KYPHOSCOLIOSIS: Primary | ICD-10-CM

## 2021-04-06 DIAGNOSIS — M41.9 SCOLIOSIS, UNSPECIFIED SCOLIOSIS TYPE, UNSPECIFIED SPINAL REGION: ICD-10-CM

## 2021-04-06 LAB
ALBUMIN SERPL BCP-MCNC: 3 G/DL (ref 3.5–5.2)
ALLENS TEST: ABNORMAL
ALP SERPL-CCNC: 71 U/L (ref 55–135)
ALT SERPL W/O P-5'-P-CCNC: 5 U/L (ref 10–44)
ANION GAP SERPL CALC-SCNC: 11 MMOL/L (ref 8–16)
AST SERPL-CCNC: 12 U/L (ref 10–40)
BASOPHILS # BLD AUTO: 0.07 K/UL (ref 0–0.2)
BASOPHILS NFR BLD: 0.7 % (ref 0–1.9)
BILIRUB SERPL-MCNC: 0.2 MG/DL (ref 0.1–1)
BNP SERPL-MCNC: 29 PG/ML (ref 0–99)
BUN SERPL-MCNC: 12 MG/DL (ref 6–20)
CALCIUM SERPL-MCNC: 9.7 MG/DL (ref 8.7–10.5)
CHLORIDE SERPL-SCNC: 84 MMOL/L (ref 95–110)
CK SERPL-CCNC: 46 U/L (ref 20–180)
CO2 SERPL-SCNC: 27 MMOL/L (ref 23–29)
CREAT SERPL-MCNC: 0.7 MG/DL (ref 0.5–1.4)
CRP SERPL-MCNC: 53 MG/L (ref 0–8.2)
DELSYS: ABNORMAL
DIFFERENTIAL METHOD: ABNORMAL
EOSINOPHIL # BLD AUTO: 0.6 K/UL (ref 0–0.5)
EOSINOPHIL NFR BLD: 6 % (ref 0–8)
ERYTHROCYTE [DISTWIDTH] IN BLOOD BY AUTOMATED COUNT: 17.6 % (ref 11.5–14.5)
EST. GFR  (AFRICAN AMERICAN): >60 ML/MIN/1.73 M^2
EST. GFR  (NON AFRICAN AMERICAN): >60 ML/MIN/1.73 M^2
FERRITIN SERPL-MCNC: 40 NG/ML (ref 20–300)
FIO2: 28
FLOW: 2
GLUCOSE SERPL-MCNC: 90 MG/DL (ref 70–110)
HCO3 UR-SCNC: 34.6 MMOL/L (ref 24–28)
HCT VFR BLD AUTO: 30.5 % (ref 37–48.5)
HGB BLD-MCNC: 8.8 G/DL (ref 12–16)
IMM GRANULOCYTES # BLD AUTO: 0.12 K/UL (ref 0–0.04)
IMM GRANULOCYTES NFR BLD AUTO: 1.2 % (ref 0–0.5)
LACTATE SERPL-SCNC: 1.2 MMOL/L (ref 0.5–2.2)
LDH SERPL L TO P-CCNC: 217 U/L (ref 110–260)
LYMPHOCYTES # BLD AUTO: 1.3 K/UL (ref 1–4.8)
LYMPHOCYTES NFR BLD: 13.2 % (ref 18–48)
MCH RBC QN AUTO: 19.4 PG (ref 27–31)
MCHC RBC AUTO-ENTMCNC: 28.9 G/DL (ref 32–36)
MCV RBC AUTO: 67 FL (ref 82–98)
MODE: ABNORMAL
MONOCYTES # BLD AUTO: 0.6 K/UL (ref 0.3–1)
MONOCYTES NFR BLD: 6.4 % (ref 4–15)
NEUTROPHILS # BLD AUTO: 7 K/UL (ref 1.8–7.7)
NEUTROPHILS NFR BLD: 72.5 % (ref 38–73)
NRBC BLD-RTO: 0 /100 WBC
PCO2 BLDA: 67.3 MMHG (ref 35–45)
PH SMN: 7.32 [PH] (ref 7.35–7.45)
PLATELET # BLD AUTO: 361 K/UL (ref 150–450)
PMV BLD AUTO: 8.9 FL (ref 9.2–12.9)
PO2 BLDA: 63 MMHG (ref 80–100)
POC BE: 8 MMOL/L
POC SATURATED O2: 89 % (ref 95–100)
POC TCO2: 37 MMOL/L (ref 23–27)
POTASSIUM SERPL-SCNC: 4.9 MMOL/L (ref 3.5–5.1)
PROT SERPL-MCNC: 9.5 G/DL (ref 6–8.4)
RBC # BLD AUTO: 4.53 M/UL (ref 4–5.4)
SAMPLE: ABNORMAL
SARS-COV-2 RDRP RESP QL NAA+PROBE: NEGATIVE
SITE: ABNORMAL
SODIUM SERPL-SCNC: 122 MMOL/L (ref 136–145)
SP02: 100
TROPONIN I SERPL DL<=0.01 NG/ML-MCNC: 0.01 NG/ML (ref 0–0.03)
WBC # BLD AUTO: 9.71 K/UL (ref 3.9–12.7)

## 2021-04-06 PROCEDURE — 83930 ASSAY OF BLOOD OSMOLALITY: CPT | Performed by: STUDENT IN AN ORGANIZED HEALTH CARE EDUCATION/TRAINING PROGRAM

## 2021-04-06 PROCEDURE — 99900035 HC TECH TIME PER 15 MIN (STAT)

## 2021-04-06 PROCEDURE — 27000221 HC OXYGEN, UP TO 24 HOURS

## 2021-04-06 PROCEDURE — U0002 COVID-19 LAB TEST NON-CDC: HCPCS | Performed by: EMERGENCY MEDICINE

## 2021-04-06 PROCEDURE — 82570 ASSAY OF URINE CREATININE: CPT | Performed by: STUDENT IN AN ORGANIZED HEALTH CARE EDUCATION/TRAINING PROGRAM

## 2021-04-06 PROCEDURE — 25000003 PHARM REV CODE 250: Performed by: STUDENT IN AN ORGANIZED HEALTH CARE EDUCATION/TRAINING PROGRAM

## 2021-04-06 PROCEDURE — 20000000 HC ICU ROOM

## 2021-04-06 PROCEDURE — 36600 WITHDRAWAL OF ARTERIAL BLOOD: CPT

## 2021-04-06 PROCEDURE — 94640 AIRWAY INHALATION TREATMENT: CPT

## 2021-04-06 PROCEDURE — 94644 CONT INHLJ TX 1ST HOUR: CPT

## 2021-04-06 PROCEDURE — 99214 OFFICE O/P EST MOD 30 MIN: CPT | Mod: 95,,, | Performed by: DERMATOLOGY

## 2021-04-06 PROCEDURE — 86140 C-REACTIVE PROTEIN: CPT | Performed by: EMERGENCY MEDICINE

## 2021-04-06 PROCEDURE — 93005 ELECTROCARDIOGRAM TRACING: CPT

## 2021-04-06 PROCEDURE — 81000 URINALYSIS NONAUTO W/SCOPE: CPT | Performed by: STUDENT IN AN ORGANIZED HEALTH CARE EDUCATION/TRAINING PROGRAM

## 2021-04-06 PROCEDURE — 84484 ASSAY OF TROPONIN QUANT: CPT | Performed by: EMERGENCY MEDICINE

## 2021-04-06 PROCEDURE — 84300 ASSAY OF URINE SODIUM: CPT | Performed by: STUDENT IN AN ORGANIZED HEALTH CARE EDUCATION/TRAINING PROGRAM

## 2021-04-06 PROCEDURE — 83615 LACTATE (LD) (LDH) ENZYME: CPT | Performed by: EMERGENCY MEDICINE

## 2021-04-06 PROCEDURE — 85025 COMPLETE CBC W/AUTO DIFF WBC: CPT | Performed by: EMERGENCY MEDICINE

## 2021-04-06 PROCEDURE — 96374 THER/PROPH/DIAG INJ IV PUSH: CPT

## 2021-04-06 PROCEDURE — 82803 BLOOD GASES ANY COMBINATION: CPT

## 2021-04-06 PROCEDURE — 25000242 PHARM REV CODE 250 ALT 637 W/ HCPCS: Performed by: EMERGENCY MEDICINE

## 2021-04-06 PROCEDURE — 87088 URINE BACTERIA CULTURE: CPT | Performed by: STUDENT IN AN ORGANIZED HEALTH CARE EDUCATION/TRAINING PROGRAM

## 2021-04-06 PROCEDURE — 80048 BASIC METABOLIC PNL TOTAL CA: CPT | Performed by: STUDENT IN AN ORGANIZED HEALTH CARE EDUCATION/TRAINING PROGRAM

## 2021-04-06 PROCEDURE — 82728 ASSAY OF FERRITIN: CPT | Performed by: EMERGENCY MEDICINE

## 2021-04-06 PROCEDURE — 99214 PR OFFICE/OUTPT VISIT, EST, LEVL IV, 30-39 MIN: ICD-10-PCS | Mod: 95,,, | Performed by: DERMATOLOGY

## 2021-04-06 PROCEDURE — 83935 ASSAY OF URINE OSMOLALITY: CPT | Performed by: STUDENT IN AN ORGANIZED HEALTH CARE EDUCATION/TRAINING PROGRAM

## 2021-04-06 PROCEDURE — 80053 COMPREHEN METABOLIC PANEL: CPT | Performed by: EMERGENCY MEDICINE

## 2021-04-06 PROCEDURE — 99291 CRITICAL CARE FIRST HOUR: CPT | Mod: 25

## 2021-04-06 PROCEDURE — 82533 TOTAL CORTISOL: CPT | Performed by: INTERNAL MEDICINE

## 2021-04-06 PROCEDURE — 93010 ELECTROCARDIOGRAM REPORT: CPT | Mod: ,,, | Performed by: INTERNAL MEDICINE

## 2021-04-06 PROCEDURE — 36415 COLL VENOUS BLD VENIPUNCTURE: CPT | Performed by: STUDENT IN AN ORGANIZED HEALTH CARE EDUCATION/TRAINING PROGRAM

## 2021-04-06 PROCEDURE — 82550 ASSAY OF CK (CPK): CPT | Performed by: EMERGENCY MEDICINE

## 2021-04-06 PROCEDURE — 94761 N-INVAS EAR/PLS OXIMETRY MLT: CPT

## 2021-04-06 PROCEDURE — 87086 URINE CULTURE/COLONY COUNT: CPT | Performed by: STUDENT IN AN ORGANIZED HEALTH CARE EDUCATION/TRAINING PROGRAM

## 2021-04-06 PROCEDURE — 36415 COLL VENOUS BLD VENIPUNCTURE: CPT | Performed by: EMERGENCY MEDICINE

## 2021-04-06 PROCEDURE — 63600175 PHARM REV CODE 636 W HCPCS: Performed by: EMERGENCY MEDICINE

## 2021-04-06 PROCEDURE — 83605 ASSAY OF LACTIC ACID: CPT | Performed by: EMERGENCY MEDICINE

## 2021-04-06 PROCEDURE — 83880 ASSAY OF NATRIURETIC PEPTIDE: CPT | Performed by: EMERGENCY MEDICINE

## 2021-04-06 PROCEDURE — 93010 EKG 12-LEAD: ICD-10-PCS | Mod: ,,, | Performed by: INTERNAL MEDICINE

## 2021-04-06 PROCEDURE — 87077 CULTURE AEROBIC IDENTIFY: CPT | Performed by: STUDENT IN AN ORGANIZED HEALTH CARE EDUCATION/TRAINING PROGRAM

## 2021-04-06 PROCEDURE — 87186 SC STD MICRODIL/AGAR DIL: CPT | Performed by: STUDENT IN AN ORGANIZED HEALTH CARE EDUCATION/TRAINING PROGRAM

## 2021-04-06 RX ORDER — OMEPRAZOLE 40 MG/1
40 CAPSULE, DELAYED RELEASE ORAL DAILY
COMMUNITY
Start: 2021-04-01 | End: 2022-03-25

## 2021-04-06 RX ORDER — SODIUM CHLORIDE 0.9 % (FLUSH) 0.9 %
10 SYRINGE (ML) INJECTION
Status: DISCONTINUED | OUTPATIENT
Start: 2021-04-06 | End: 2021-04-11 | Stop reason: HOSPADM

## 2021-04-06 RX ORDER — FLUCONAZOLE 200 MG/1
TABLET ORAL
Qty: 2 TABLET | Refills: 0 | Status: SHIPPED | OUTPATIENT
Start: 2021-04-06 | End: 2021-08-24

## 2021-04-06 RX ORDER — HYDROCODONE BITARTRATE AND ACETAMINOPHEN 7.5; 325 MG/1; MG/1
1 TABLET ORAL EVERY 6 HOURS PRN
Status: DISCONTINUED | OUTPATIENT
Start: 2021-04-06 | End: 2021-04-11 | Stop reason: HOSPADM

## 2021-04-06 RX ORDER — MUPIROCIN 20 MG/G
OINTMENT TOPICAL 2 TIMES DAILY
Status: DISCONTINUED | OUTPATIENT
Start: 2021-04-06 | End: 2021-04-07

## 2021-04-06 RX ORDER — SODIUM CHLORIDE 9 MG/ML
INJECTION, SOLUTION INTRAVENOUS CONTINUOUS
Status: DISCONTINUED | OUTPATIENT
Start: 2021-04-06 | End: 2021-04-07

## 2021-04-06 RX ORDER — DOXYCYCLINE 100 MG/1
CAPSULE ORAL
Qty: 20 CAPSULE | Refills: 0 | Status: SHIPPED | OUTPATIENT
Start: 2021-04-06 | End: 2021-08-24

## 2021-04-06 RX ORDER — PREDNISONE 20 MG/1
40 TABLET ORAL DAILY
Status: DISCONTINUED | OUTPATIENT
Start: 2021-04-07 | End: 2021-04-07

## 2021-04-06 RX ORDER — IPRATROPIUM BROMIDE AND ALBUTEROL SULFATE 2.5; .5 MG/3ML; MG/3ML
10 SOLUTION RESPIRATORY (INHALATION) CONTINUOUS
Status: DISCONTINUED | OUTPATIENT
Start: 2021-04-06 | End: 2021-04-06

## 2021-04-06 RX ORDER — METHYLPREDNISOLONE SOD SUCC 125 MG
125 VIAL (EA) INJECTION
Status: COMPLETED | OUTPATIENT
Start: 2021-04-06 | End: 2021-04-06

## 2021-04-06 RX ORDER — PANTOPRAZOLE SODIUM 40 MG/1
40 TABLET, DELAYED RELEASE ORAL DAILY
Status: DISCONTINUED | OUTPATIENT
Start: 2021-04-07 | End: 2021-04-11 | Stop reason: HOSPADM

## 2021-04-06 RX ORDER — METOPROLOL SUCCINATE 50 MG/1
50 TABLET, EXTENDED RELEASE ORAL DAILY
Status: DISCONTINUED | OUTPATIENT
Start: 2021-04-07 | End: 2021-04-11 | Stop reason: HOSPADM

## 2021-04-06 RX ORDER — IPRATROPIUM BROMIDE AND ALBUTEROL SULFATE 2.5; .5 MG/3ML; MG/3ML
3 SOLUTION RESPIRATORY (INHALATION)
Status: COMPLETED | OUTPATIENT
Start: 2021-04-06 | End: 2021-04-06

## 2021-04-06 RX ORDER — IPRATROPIUM BROMIDE AND ALBUTEROL SULFATE 2.5; .5 MG/3ML; MG/3ML
3 SOLUTION RESPIRATORY (INHALATION)
Status: DISCONTINUED | OUTPATIENT
Start: 2021-04-06 | End: 2021-04-06

## 2021-04-06 RX ORDER — SODIUM CHLORIDE 9 MG/ML
INJECTION, SOLUTION INTRAVENOUS CONTINUOUS
Status: CANCELLED | OUTPATIENT
Start: 2021-04-06

## 2021-04-06 RX ORDER — LISINOPRIL 10 MG/1
30 TABLET ORAL DAILY
Status: DISCONTINUED | OUTPATIENT
Start: 2021-04-07 | End: 2021-04-07

## 2021-04-06 RX ORDER — DOXYCYCLINE 25 MG/5ML
100 POWDER, FOR SUSPENSION ORAL 2 TIMES DAILY
Status: DISCONTINUED | OUTPATIENT
Start: 2021-04-06 | End: 2021-04-07 | Stop reason: SDUPTHER

## 2021-04-06 RX ORDER — CALCIPOTRIENE 50 UG/G
CREAM TOPICAL
Qty: 120 G | Refills: 2 | Status: SHIPPED | OUTPATIENT
Start: 2021-04-06 | End: 2021-04-06 | Stop reason: CLARIF

## 2021-04-06 RX ORDER — IPRATROPIUM BROMIDE AND ALBUTEROL SULFATE 2.5; .5 MG/3ML; MG/3ML
3 SOLUTION RESPIRATORY (INHALATION) EVERY 6 HOURS
Status: DISCONTINUED | OUTPATIENT
Start: 2021-04-07 | End: 2021-04-07

## 2021-04-06 RX ADMIN — IPRATROPIUM BROMIDE AND ALBUTEROL SULFATE 3 ML: .5; 2.5 SOLUTION RESPIRATORY (INHALATION) at 02:04

## 2021-04-06 RX ADMIN — IPRATROPIUM BROMIDE AND ALBUTEROL SULFATE 10 ML: .5; 2.5 SOLUTION RESPIRATORY (INHALATION) at 11:04

## 2021-04-06 RX ADMIN — SODIUM CHLORIDE 125 ML/HR: 0.9 INJECTION, SOLUTION INTRAVENOUS at 11:04

## 2021-04-06 RX ADMIN — METHYLPREDNISOLONE SODIUM SUCCINATE 125 MG: 125 INJECTION, POWDER, FOR SOLUTION INTRAMUSCULAR; INTRAVENOUS at 12:04

## 2021-04-06 RX ADMIN — HYDROCODONE BITARTRATE AND ACETAMINOPHEN 1 TABLET: 7.5; 325 TABLET ORAL at 05:04

## 2021-04-06 RX ADMIN — DOXYCYCLINE 100 MG: 25 FOR SUSPENSION ORAL at 11:04

## 2021-04-06 RX ADMIN — MUPIROCIN: 20 OINTMENT TOPICAL at 11:04

## 2021-04-06 RX ADMIN — HYDROCODONE BITARTRATE AND ACETAMINOPHEN 1 TABLET: 7.5; 325 TABLET ORAL at 11:04

## 2021-04-07 ENCOUNTER — OUTSIDE PLACE OF SERVICE (OUTPATIENT)
Dept: PULMONOLOGY | Facility: CLINIC | Age: 46
End: 2021-04-07
Payer: MEDICARE

## 2021-04-07 DIAGNOSIS — J96.22 ACUTE ON CHRONIC RESPIRATORY FAILURE WITH HYPOXIA AND HYPERCAPNIA: ICD-10-CM

## 2021-04-07 DIAGNOSIS — J96.21 ACUTE ON CHRONIC RESPIRATORY FAILURE WITH HYPOXIA AND HYPERCAPNIA: ICD-10-CM

## 2021-04-07 PROBLEM — E66.2 OBESITY HYPOVENTILATION SYNDROME: Status: ACTIVE | Noted: 2021-04-07

## 2021-04-07 PROBLEM — E87.70 VOLUME OVERLOAD: Status: ACTIVE | Noted: 2021-04-07

## 2021-04-07 LAB
ALLENS TEST: ABNORMAL
ANION GAP SERPL CALC-SCNC: 11 MMOL/L (ref 8–16)
ANION GAP SERPL CALC-SCNC: 8 MMOL/L (ref 8–16)
ANION GAP SERPL CALC-SCNC: 9 MMOL/L (ref 8–16)
BACTERIA #/AREA URNS HPF: ABNORMAL /HPF
BASOPHILS # BLD AUTO: 0.01 K/UL (ref 0–0.2)
BASOPHILS NFR BLD: 0.1 % (ref 0–1.9)
BILIRUB UR QL STRIP: NEGATIVE
BUN SERPL-MCNC: 14 MG/DL (ref 6–20)
BUN SERPL-MCNC: 16 MG/DL (ref 6–20)
BUN SERPL-MCNC: 17 MG/DL (ref 6–20)
BUN SERPL-MCNC: 17 MG/DL (ref 6–20)
BUN SERPL-MCNC: 19 MG/DL (ref 6–20)
CALCIUM SERPL-MCNC: 9.1 MG/DL (ref 8.7–10.5)
CALCIUM SERPL-MCNC: 9.1 MG/DL (ref 8.7–10.5)
CALCIUM SERPL-MCNC: 9.2 MG/DL (ref 8.7–10.5)
CALCIUM SERPL-MCNC: 9.3 MG/DL (ref 8.7–10.5)
CALCIUM SERPL-MCNC: 9.4 MG/DL (ref 8.7–10.5)
CHLORIDE SERPL-SCNC: 88 MMOL/L (ref 95–110)
CHLORIDE SERPL-SCNC: 89 MMOL/L (ref 95–110)
CHLORIDE SERPL-SCNC: 91 MMOL/L (ref 95–110)
CHLORIDE SERPL-SCNC: 91 MMOL/L (ref 95–110)
CHLORIDE SERPL-SCNC: 92 MMOL/L (ref 95–110)
CLARITY UR: ABNORMAL
CO2 SERPL-SCNC: 23 MMOL/L (ref 23–29)
CO2 SERPL-SCNC: 27 MMOL/L (ref 23–29)
CO2 SERPL-SCNC: 29 MMOL/L (ref 23–29)
CO2 SERPL-SCNC: 29 MMOL/L (ref 23–29)
CO2 SERPL-SCNC: 30 MMOL/L (ref 23–29)
COLOR UR: YELLOW
CORTIS SERPL-MCNC: 2.8 UG/DL
CREAT SERPL-MCNC: 0.7 MG/DL (ref 0.5–1.4)
CREAT SERPL-MCNC: 0.8 MG/DL (ref 0.5–1.4)
CREAT SERPL-MCNC: 0.9 MG/DL (ref 0.5–1.4)
CREAT UR-MCNC: 14.5 MG/DL (ref 15–325)
DELSYS: ABNORMAL
DIFFERENTIAL METHOD: ABNORMAL
EOSINOPHIL # BLD AUTO: 0 K/UL (ref 0–0.5)
EOSINOPHIL NFR BLD: 0 % (ref 0–8)
ERYTHROCYTE [DISTWIDTH] IN BLOOD BY AUTOMATED COUNT: 17.7 % (ref 11.5–14.5)
EST. GFR  (AFRICAN AMERICAN): >60 ML/MIN/1.73 M^2
EST. GFR  (NON AFRICAN AMERICAN): >60 ML/MIN/1.73 M^2
ESTIMATED AVG GLUCOSE: 128 MG/DL (ref 68–131)
GLUCOSE SERPL-MCNC: 100 MG/DL (ref 70–110)
GLUCOSE SERPL-MCNC: 101 MG/DL (ref 70–110)
GLUCOSE SERPL-MCNC: 123 MG/DL (ref 70–110)
GLUCOSE SERPL-MCNC: 151 MG/DL (ref 70–110)
GLUCOSE SERPL-MCNC: 152 MG/DL (ref 70–110)
GLUCOSE UR QL STRIP: NEGATIVE
HBA1C MFR BLD: 6.1 % (ref 4–5.6)
HCO3 UR-SCNC: 33.4 MMOL/L (ref 24–28)
HCT VFR BLD AUTO: 27.6 % (ref 37–48.5)
HGB BLD-MCNC: 7.6 G/DL (ref 12–16)
HGB UR QL STRIP: ABNORMAL
IMM GRANULOCYTES # BLD AUTO: 0.1 K/UL (ref 0–0.04)
IMM GRANULOCYTES NFR BLD AUTO: 1.2 % (ref 0–0.5)
IRON SERPL-MCNC: 14 UG/DL (ref 30–160)
KETONES UR QL STRIP: NEGATIVE
LEUKOCYTE ESTERASE UR QL STRIP: ABNORMAL
LYMPHOCYTES # BLD AUTO: 0.9 K/UL (ref 1–4.8)
LYMPHOCYTES NFR BLD: 10.8 % (ref 18–48)
MAGNESIUM SERPL-MCNC: 1.7 MG/DL (ref 1.6–2.6)
MAGNESIUM SERPL-MCNC: 1.9 MG/DL (ref 1.6–2.6)
MCH RBC QN AUTO: 19.5 PG (ref 27–31)
MCHC RBC AUTO-ENTMCNC: 27.5 G/DL (ref 32–36)
MCV RBC AUTO: 71 FL (ref 82–98)
MICROSCOPIC COMMENT: ABNORMAL
MONOCYTES # BLD AUTO: 0.4 K/UL (ref 0.3–1)
MONOCYTES NFR BLD: 4.9 % (ref 4–15)
NEUTROPHILS # BLD AUTO: 7 K/UL (ref 1.8–7.7)
NEUTROPHILS NFR BLD: 83 % (ref 38–73)
NITRITE UR QL STRIP: NEGATIVE
NRBC BLD-RTO: 0 /100 WBC
OSMOLALITY SERPL: 273 MOSM/KG (ref 275–295)
OSMOLALITY UR: 145 MOSM/KG (ref 50–1200)
PCO2 BLDA: 74.8 MMHG (ref 35–45)
PH SMN: 7.26 [PH] (ref 7.35–7.45)
PH UR STRIP: 7 [PH] (ref 5–8)
PLATELET # BLD AUTO: 448 K/UL (ref 150–450)
PMV BLD AUTO: 8.6 FL (ref 9.2–12.9)
PO2 BLDA: 90 MMHG (ref 80–100)
POC BE: 6 MMOL/L
POC SATURATED O2: 95 % (ref 95–100)
POC TCO2: 36 MMOL/L (ref 23–27)
POCT GLUCOSE: 141 MG/DL (ref 70–110)
POTASSIUM SERPL-SCNC: 4.5 MMOL/L (ref 3.5–5.1)
POTASSIUM SERPL-SCNC: 5.3 MMOL/L (ref 3.5–5.1)
POTASSIUM SERPL-SCNC: 5.4 MMOL/L (ref 3.5–5.1)
POTASSIUM SERPL-SCNC: 5.4 MMOL/L (ref 3.5–5.1)
POTASSIUM SERPL-SCNC: 5.8 MMOL/L (ref 3.5–5.1)
PROT UR QL STRIP: NEGATIVE
RBC # BLD AUTO: 3.9 M/UL (ref 4–5.4)
RBC #/AREA URNS HPF: 1 /HPF (ref 0–4)
SAMPLE: ABNORMAL
SATURATED IRON: 4 % (ref 20–50)
SITE: ABNORMAL
SODIUM SERPL-SCNC: 126 MMOL/L (ref 136–145)
SODIUM SERPL-SCNC: 127 MMOL/L (ref 136–145)
SODIUM SERPL-SCNC: 128 MMOL/L (ref 136–145)
SODIUM UR-SCNC: 20 MMOL/L (ref 20–250)
SP GR UR STRIP: <=1.005 (ref 1–1.03)
SQUAMOUS #/AREA URNS HPF: 2 /HPF
TOTAL IRON BINDING CAPACITY: 391 UG/DL (ref 250–450)
TRANSFERRIN SERPL-MCNC: 264 MG/DL (ref 200–375)
URN SPEC COLLECT METH UR: ABNORMAL
UROBILINOGEN UR STRIP-ACNC: NEGATIVE EU/DL
WBC # BLD AUTO: 8.37 K/UL (ref 3.9–12.7)
WBC #/AREA URNS HPF: 15 /HPF (ref 0–5)
WBC CLUMPS URNS QL MICRO: ABNORMAL

## 2021-04-07 PROCEDURE — 82803 BLOOD GASES ANY COMBINATION: CPT

## 2021-04-07 PROCEDURE — 27000190 HC CPAP FULL FACE MASK W/VALVE

## 2021-04-07 PROCEDURE — 63600175 PHARM REV CODE 636 W HCPCS: Performed by: INTERNAL MEDICINE

## 2021-04-07 PROCEDURE — 80048 BASIC METABOLIC PNL TOTAL CA: CPT | Mod: 91 | Performed by: STUDENT IN AN ORGANIZED HEALTH CARE EDUCATION/TRAINING PROGRAM

## 2021-04-07 PROCEDURE — 99223 1ST HOSP IP/OBS HIGH 75: CPT | Mod: S$GLB,,, | Performed by: INTERNAL MEDICINE

## 2021-04-07 PROCEDURE — 83540 ASSAY OF IRON: CPT | Performed by: STUDENT IN AN ORGANIZED HEALTH CARE EDUCATION/TRAINING PROGRAM

## 2021-04-07 PROCEDURE — 99900035 HC TECH TIME PER 15 MIN (STAT)

## 2021-04-07 PROCEDURE — 85025 COMPLETE CBC W/AUTO DIFF WBC: CPT | Performed by: STUDENT IN AN ORGANIZED HEALTH CARE EDUCATION/TRAINING PROGRAM

## 2021-04-07 PROCEDURE — 36600 WITHDRAWAL OF ARTERIAL BLOOD: CPT

## 2021-04-07 PROCEDURE — 25000242 PHARM REV CODE 250 ALT 637 W/ HCPCS: Performed by: STUDENT IN AN ORGANIZED HEALTH CARE EDUCATION/TRAINING PROGRAM

## 2021-04-07 PROCEDURE — 20000000 HC ICU ROOM

## 2021-04-07 PROCEDURE — 94660 CPAP INITIATION&MGMT: CPT

## 2021-04-07 PROCEDURE — 83735 ASSAY OF MAGNESIUM: CPT | Mod: 91 | Performed by: INTERNAL MEDICINE

## 2021-04-07 PROCEDURE — 99223 PR INITIAL HOSPITAL CARE,LEVL III: ICD-10-PCS | Mod: S$GLB,,, | Performed by: INTERNAL MEDICINE

## 2021-04-07 PROCEDURE — 94761 N-INVAS EAR/PLS OXIMETRY MLT: CPT

## 2021-04-07 PROCEDURE — 25000003 PHARM REV CODE 250: Performed by: STUDENT IN AN ORGANIZED HEALTH CARE EDUCATION/TRAINING PROGRAM

## 2021-04-07 PROCEDURE — 36415 COLL VENOUS BLD VENIPUNCTURE: CPT | Performed by: STUDENT IN AN ORGANIZED HEALTH CARE EDUCATION/TRAINING PROGRAM

## 2021-04-07 PROCEDURE — 63600175 PHARM REV CODE 636 W HCPCS: Performed by: STUDENT IN AN ORGANIZED HEALTH CARE EDUCATION/TRAINING PROGRAM

## 2021-04-07 PROCEDURE — 27000221 HC OXYGEN, UP TO 24 HOURS

## 2021-04-07 PROCEDURE — 83735 ASSAY OF MAGNESIUM: CPT | Performed by: STUDENT IN AN ORGANIZED HEALTH CARE EDUCATION/TRAINING PROGRAM

## 2021-04-07 PROCEDURE — 25000003 PHARM REV CODE 250: Performed by: INTERNAL MEDICINE

## 2021-04-07 PROCEDURE — 94640 AIRWAY INHALATION TREATMENT: CPT

## 2021-04-07 PROCEDURE — 83036 HEMOGLOBIN GLYCOSYLATED A1C: CPT | Performed by: STUDENT IN AN ORGANIZED HEALTH CARE EDUCATION/TRAINING PROGRAM

## 2021-04-07 RX ORDER — IPRATROPIUM BROMIDE AND ALBUTEROL SULFATE 2.5; .5 MG/3ML; MG/3ML
3 SOLUTION RESPIRATORY (INHALATION) EVERY 4 HOURS PRN
Status: DISCONTINUED | OUTPATIENT
Start: 2021-04-07 | End: 2021-04-11 | Stop reason: HOSPADM

## 2021-04-07 RX ORDER — FUROSEMIDE 10 MG/ML
40 INJECTION INTRAMUSCULAR; INTRAVENOUS
Status: DISCONTINUED | OUTPATIENT
Start: 2021-04-07 | End: 2021-04-08

## 2021-04-07 RX ORDER — MAGNESIUM SULFATE 1 G/100ML
1 INJECTION INTRAVENOUS ONCE
Status: COMPLETED | OUTPATIENT
Start: 2021-04-07 | End: 2021-04-07

## 2021-04-07 RX ORDER — DOXYCYCLINE 25 MG/5ML
100 POWDER, FOR SUSPENSION ORAL 2 TIMES DAILY
Status: DISCONTINUED | OUTPATIENT
Start: 2021-04-07 | End: 2021-04-11 | Stop reason: HOSPADM

## 2021-04-07 RX ADMIN — DOXYCYCLINE 100 MG: 25 POWDER, FOR SUSPENSION ORAL at 09:04

## 2021-04-07 RX ADMIN — IPRATROPIUM BROMIDE AND ALBUTEROL SULFATE 3 ML: .5; 2.5 SOLUTION RESPIRATORY (INHALATION) at 12:04

## 2021-04-07 RX ADMIN — PREDNISONE 40 MG: 20 TABLET ORAL at 10:04

## 2021-04-07 RX ADMIN — IPRATROPIUM BROMIDE AND ALBUTEROL SULFATE 3 ML: .5; 2.5 SOLUTION RESPIRATORY (INHALATION) at 06:04

## 2021-04-07 RX ADMIN — DOXYCYCLINE 100 MG: 25 FOR SUSPENSION ORAL at 10:04

## 2021-04-07 RX ADMIN — METOPROLOL SUCCINATE 50 MG: 50 TABLET, EXTENDED RELEASE ORAL at 10:04

## 2021-04-07 RX ADMIN — PANTOPRAZOLE SODIUM 40 MG: 40 TABLET, DELAYED RELEASE ORAL at 10:04

## 2021-04-07 RX ADMIN — MAGNESIUM SULFATE 1 G: 1 INJECTION INTRAVENOUS at 10:04

## 2021-04-07 RX ADMIN — CALCIUM GLUCONATE 1 G: 98 INJECTION, SOLUTION INTRAVENOUS at 12:04

## 2021-04-07 RX ADMIN — SODIUM ZIRCONIUM CYCLOSILICATE 5 G: 5 POWDER, FOR SUSPENSION ORAL at 10:04

## 2021-04-07 RX ADMIN — HYDROCODONE BITARTRATE AND ACETAMINOPHEN 1 TABLET: 7.5; 325 TABLET ORAL at 09:04

## 2021-04-07 RX ADMIN — MUPIROCIN: 20 OINTMENT TOPICAL at 10:04

## 2021-04-07 RX ADMIN — DEXTROSE MONOHYDRATE 25 G: 500 INJECTION PARENTERAL at 12:04

## 2021-04-07 RX ADMIN — SODIUM CHLORIDE: 0.9 INJECTION, SOLUTION INTRAVENOUS at 07:04

## 2021-04-07 RX ADMIN — HYDROCODONE BITARTRATE AND ACETAMINOPHEN 1 TABLET: 7.5; 325 TABLET ORAL at 06:04

## 2021-04-07 RX ADMIN — LINACLOTIDE 72 MCG: 72 CAPSULE, GELATIN COATED ORAL at 06:04

## 2021-04-07 RX ADMIN — HYDROCODONE BITARTRATE AND ACETAMINOPHEN 1 TABLET: 7.5; 325 TABLET ORAL at 04:04

## 2021-04-07 RX ADMIN — FUROSEMIDE 40 MG: 10 INJECTION, SOLUTION INTRAVENOUS at 04:04

## 2021-04-07 RX ADMIN — INSULIN HUMAN 10 UNITS: 100 INJECTION, SOLUTION PARENTERAL at 12:04

## 2021-04-08 ENCOUNTER — OUTPATIENT CASE MANAGEMENT (OUTPATIENT)
Dept: ADMINISTRATIVE | Facility: OTHER | Age: 46
End: 2021-04-08

## 2021-04-08 PROBLEM — E87.70 VOLUME OVERLOAD: Status: RESOLVED | Noted: 2021-04-07 | Resolved: 2021-04-08

## 2021-04-08 LAB
ANION GAP SERPL CALC-SCNC: 6 MMOL/L (ref 8–16)
ANION GAP SERPL CALC-SCNC: 7 MMOL/L (ref 8–16)
ANION GAP SERPL CALC-SCNC: 8 MMOL/L (ref 8–16)
ANION GAP SERPL CALC-SCNC: 9 MMOL/L (ref 8–16)
BASOPHILS # BLD AUTO: 0.01 K/UL (ref 0–0.2)
BASOPHILS NFR BLD: 0.1 % (ref 0–1.9)
BUN SERPL-MCNC: 20 MG/DL (ref 6–20)
BUN SERPL-MCNC: 21 MG/DL (ref 6–20)
BUN SERPL-MCNC: 21 MG/DL (ref 6–20)
BUN SERPL-MCNC: 23 MG/DL (ref 6–20)
CALCIUM SERPL-MCNC: 9.2 MG/DL (ref 8.7–10.5)
CALCIUM SERPL-MCNC: 9.3 MG/DL (ref 8.7–10.5)
CALCIUM SERPL-MCNC: 9.4 MG/DL (ref 8.7–10.5)
CALCIUM SERPL-MCNC: 9.5 MG/DL (ref 8.7–10.5)
CHLORIDE SERPL-SCNC: 90 MMOL/L (ref 95–110)
CHLORIDE SERPL-SCNC: 91 MMOL/L (ref 95–110)
CHLORIDE SERPL-SCNC: 91 MMOL/L (ref 95–110)
CHLORIDE SERPL-SCNC: 92 MMOL/L (ref 95–110)
CO2 SERPL-SCNC: 30 MMOL/L (ref 23–29)
CO2 SERPL-SCNC: 30 MMOL/L (ref 23–29)
CO2 SERPL-SCNC: 31 MMOL/L (ref 23–29)
CO2 SERPL-SCNC: 33 MMOL/L (ref 23–29)
CREAT SERPL-MCNC: 0.8 MG/DL (ref 0.5–1.4)
DIFFERENTIAL METHOD: ABNORMAL
EOSINOPHIL # BLD AUTO: 0 K/UL (ref 0–0.5)
EOSINOPHIL NFR BLD: 0.2 % (ref 0–8)
ERYTHROCYTE [DISTWIDTH] IN BLOOD BY AUTOMATED COUNT: 17.5 % (ref 11.5–14.5)
EST. GFR  (AFRICAN AMERICAN): >60 ML/MIN/1.73 M^2
EST. GFR  (NON AFRICAN AMERICAN): >60 ML/MIN/1.73 M^2
GLUCOSE SERPL-MCNC: 104 MG/DL (ref 70–110)
GLUCOSE SERPL-MCNC: 107 MG/DL (ref 70–110)
GLUCOSE SERPL-MCNC: 110 MG/DL (ref 70–110)
GLUCOSE SERPL-MCNC: 78 MG/DL (ref 70–110)
HCT VFR BLD AUTO: 26.4 % (ref 37–48.5)
HGB BLD-MCNC: 7.1 G/DL (ref 12–16)
IMM GRANULOCYTES # BLD AUTO: 0.06 K/UL (ref 0–0.04)
IMM GRANULOCYTES NFR BLD AUTO: 0.6 % (ref 0–0.5)
LYMPHOCYTES # BLD AUTO: 1.9 K/UL (ref 1–4.8)
LYMPHOCYTES NFR BLD: 18.2 % (ref 18–48)
MAGNESIUM SERPL-MCNC: 1.7 MG/DL (ref 1.6–2.6)
MCH RBC QN AUTO: 19.2 PG (ref 27–31)
MCHC RBC AUTO-ENTMCNC: 26.9 G/DL (ref 32–36)
MCV RBC AUTO: 71 FL (ref 82–98)
MONOCYTES # BLD AUTO: 1 K/UL (ref 0.3–1)
MONOCYTES NFR BLD: 9.3 % (ref 4–15)
NEUTROPHILS # BLD AUTO: 7.3 K/UL (ref 1.8–7.7)
NEUTROPHILS NFR BLD: 71.6 % (ref 38–73)
NRBC BLD-RTO: 0 /100 WBC
PLATELET # BLD AUTO: 458 K/UL (ref 150–450)
PMV BLD AUTO: 8.8 FL (ref 9.2–12.9)
POCT GLUCOSE: 95 MG/DL (ref 70–110)
POTASSIUM SERPL-SCNC: 5.2 MMOL/L (ref 3.5–5.1)
POTASSIUM SERPL-SCNC: 5.6 MMOL/L (ref 3.5–5.1)
RBC # BLD AUTO: 3.7 M/UL (ref 4–5.4)
SODIUM SERPL-SCNC: 129 MMOL/L (ref 136–145)
SODIUM SERPL-SCNC: 129 MMOL/L (ref 136–145)
SODIUM SERPL-SCNC: 130 MMOL/L (ref 136–145)
SODIUM SERPL-SCNC: 130 MMOL/L (ref 136–145)
WBC # BLD AUTO: 10.24 K/UL (ref 3.9–12.7)

## 2021-04-08 PROCEDURE — 99233 PR SUBSEQUENT HOSPITAL CARE,LEVL III: ICD-10-PCS | Mod: S$GLB,,, | Performed by: INTERNAL MEDICINE

## 2021-04-08 PROCEDURE — 25000003 PHARM REV CODE 250: Performed by: STUDENT IN AN ORGANIZED HEALTH CARE EDUCATION/TRAINING PROGRAM

## 2021-04-08 PROCEDURE — 82533 TOTAL CORTISOL: CPT | Performed by: INTERNAL MEDICINE

## 2021-04-08 PROCEDURE — 94660 CPAP INITIATION&MGMT: CPT

## 2021-04-08 PROCEDURE — 63600175 PHARM REV CODE 636 W HCPCS: Performed by: STUDENT IN AN ORGANIZED HEALTH CARE EDUCATION/TRAINING PROGRAM

## 2021-04-08 PROCEDURE — 27000221 HC OXYGEN, UP TO 24 HOURS

## 2021-04-08 PROCEDURE — 25000003 PHARM REV CODE 250: Performed by: NURSE PRACTITIONER

## 2021-04-08 PROCEDURE — 63600175 PHARM REV CODE 636 W HCPCS: Performed by: NURSE PRACTITIONER

## 2021-04-08 PROCEDURE — 85025 COMPLETE CBC W/AUTO DIFF WBC: CPT | Performed by: STUDENT IN AN ORGANIZED HEALTH CARE EDUCATION/TRAINING PROGRAM

## 2021-04-08 PROCEDURE — 36415 COLL VENOUS BLD VENIPUNCTURE: CPT | Performed by: INTERNAL MEDICINE

## 2021-04-08 PROCEDURE — 80048 BASIC METABOLIC PNL TOTAL CA: CPT | Mod: 91 | Performed by: STUDENT IN AN ORGANIZED HEALTH CARE EDUCATION/TRAINING PROGRAM

## 2021-04-08 PROCEDURE — 99900035 HC TECH TIME PER 15 MIN (STAT)

## 2021-04-08 PROCEDURE — 83735 ASSAY OF MAGNESIUM: CPT | Performed by: STUDENT IN AN ORGANIZED HEALTH CARE EDUCATION/TRAINING PROGRAM

## 2021-04-08 PROCEDURE — 36415 COLL VENOUS BLD VENIPUNCTURE: CPT | Performed by: STUDENT IN AN ORGANIZED HEALTH CARE EDUCATION/TRAINING PROGRAM

## 2021-04-08 PROCEDURE — 99233 SBSQ HOSP IP/OBS HIGH 50: CPT | Mod: S$GLB,,, | Performed by: INTERNAL MEDICINE

## 2021-04-08 PROCEDURE — 12000002 HC ACUTE/MED SURGE SEMI-PRIVATE ROOM

## 2021-04-08 PROCEDURE — 63600175 PHARM REV CODE 636 W HCPCS: Performed by: INTERNAL MEDICINE

## 2021-04-08 PROCEDURE — 94761 N-INVAS EAR/PLS OXIMETRY MLT: CPT

## 2021-04-08 RX ORDER — LEVOFLOXACIN 5 MG/ML
750 INJECTION, SOLUTION INTRAVENOUS
Status: DISCONTINUED | OUTPATIENT
Start: 2021-04-08 | End: 2021-04-11 | Stop reason: HOSPADM

## 2021-04-08 RX ORDER — LIDOCAINE 50 MG/G
1 PATCH TOPICAL
Status: DISCONTINUED | OUTPATIENT
Start: 2021-04-08 | End: 2021-04-11 | Stop reason: HOSPADM

## 2021-04-08 RX ORDER — FUROSEMIDE 10 MG/ML
20 INJECTION INTRAMUSCULAR; INTRAVENOUS
Status: DISCONTINUED | OUTPATIENT
Start: 2021-04-08 | End: 2021-04-09

## 2021-04-08 RX ORDER — FERROUS SULFATE 325(65) MG
325 TABLET, DELAYED RELEASE (ENTERIC COATED) ORAL
Status: DISCONTINUED | OUTPATIENT
Start: 2021-04-08 | End: 2021-04-11 | Stop reason: HOSPADM

## 2021-04-08 RX ORDER — ACETAMINOPHEN 325 MG/1
650 TABLET ORAL EVERY 6 HOURS PRN
Status: DISCONTINUED | OUTPATIENT
Start: 2021-04-08 | End: 2021-04-11 | Stop reason: HOSPADM

## 2021-04-08 RX ORDER — FLUTICASONE FUROATE AND VILANTEROL 100; 25 UG/1; UG/1
1 POWDER RESPIRATORY (INHALATION) DAILY
Status: DISCONTINUED | OUTPATIENT
Start: 2021-04-08 | End: 2021-04-08

## 2021-04-08 RX ADMIN — FUROSEMIDE 20 MG: 10 INJECTION, SOLUTION INTRAMUSCULAR; INTRAVENOUS at 05:04

## 2021-04-08 RX ADMIN — FUROSEMIDE 40 MG: 10 INJECTION, SOLUTION INTRAVENOUS at 03:04

## 2021-04-08 RX ADMIN — DOXYCYCLINE 100 MG: 25 POWDER, FOR SUSPENSION ORAL at 10:04

## 2021-04-08 RX ADMIN — HYDROCODONE BITARTRATE AND ACETAMINOPHEN 1 TABLET: 7.5; 325 TABLET ORAL at 11:04

## 2021-04-08 RX ADMIN — HYDROCODONE BITARTRATE AND ACETAMINOPHEN 1 TABLET: 7.5; 325 TABLET ORAL at 05:04

## 2021-04-08 RX ADMIN — DEXTROSE MONOHYDRATE 25 G: 500 INJECTION PARENTERAL at 01:04

## 2021-04-08 RX ADMIN — DOXYCYCLINE 100 MG: 25 POWDER, FOR SUSPENSION ORAL at 09:04

## 2021-04-08 RX ADMIN — APIXABAN 2.5 MG: 2.5 TABLET, FILM COATED ORAL at 09:04

## 2021-04-08 RX ADMIN — PANTOPRAZOLE SODIUM 40 MG: 40 TABLET, DELAYED RELEASE ORAL at 10:04

## 2021-04-08 RX ADMIN — INSULIN HUMAN 10 UNITS: 100 INJECTION, SOLUTION PARENTERAL at 01:04

## 2021-04-08 RX ADMIN — LINACLOTIDE 72 MCG: 72 CAPSULE, GELATIN COATED ORAL at 06:04

## 2021-04-08 RX ADMIN — LIDOCAINE 1 PATCH: 50 PATCH TOPICAL at 01:04

## 2021-04-08 RX ADMIN — FERROUS SULFATE TAB EC 325 MG (65 MG FE EQUIVALENT) 325 MG: 325 (65 FE) TABLET DELAYED RESPONSE at 10:04

## 2021-04-08 RX ADMIN — HYDROCODONE BITARTRATE AND ACETAMINOPHEN 1 TABLET: 7.5; 325 TABLET ORAL at 03:04

## 2021-04-08 RX ADMIN — ACETAMINOPHEN 650 MG: 325 TABLET ORAL at 11:04

## 2021-04-08 RX ADMIN — APIXABAN 2.5 MG: 2.5 TABLET, FILM COATED ORAL at 10:04

## 2021-04-08 RX ADMIN — LEVOFLOXACIN 750 MG: 750 INJECTION, SOLUTION INTRAVENOUS at 02:04

## 2021-04-08 RX ADMIN — METOPROLOL SUCCINATE 50 MG: 50 TABLET, EXTENDED RELEASE ORAL at 10:04

## 2021-04-09 ENCOUNTER — TELEPHONE (OUTPATIENT)
Dept: FAMILY MEDICINE | Facility: CLINIC | Age: 46
End: 2021-04-09

## 2021-04-09 DIAGNOSIS — R53.81 PHYSICAL DEBILITY: Primary | ICD-10-CM

## 2021-04-09 PROBLEM — M41.9 SCOLIOSIS: Status: ACTIVE | Noted: 2021-04-09

## 2021-04-09 LAB
ANION GAP SERPL CALC-SCNC: 12 MMOL/L (ref 8–16)
ANION GAP SERPL CALC-SCNC: 8 MMOL/L (ref 8–16)
ANION GAP SERPL CALC-SCNC: 9 MMOL/L (ref 8–16)
BACTERIA UR CULT: ABNORMAL
BASOPHILS # BLD AUTO: 0.06 K/UL (ref 0–0.2)
BASOPHILS NFR BLD: 0.6 % (ref 0–1.9)
BUN SERPL-MCNC: 22 MG/DL (ref 6–20)
BUN SERPL-MCNC: 23 MG/DL (ref 6–20)
BUN SERPL-MCNC: 32 MG/DL (ref 6–20)
CALCIUM SERPL-MCNC: 9.3 MG/DL (ref 8.7–10.5)
CALCIUM SERPL-MCNC: 9.6 MG/DL (ref 8.7–10.5)
CALCIUM SERPL-MCNC: 9.6 MG/DL (ref 8.7–10.5)
CHLORIDE SERPL-SCNC: 88 MMOL/L (ref 95–110)
CHLORIDE SERPL-SCNC: 90 MMOL/L (ref 95–110)
CHLORIDE SERPL-SCNC: 90 MMOL/L (ref 95–110)
CO2 SERPL-SCNC: 29 MMOL/L (ref 23–29)
CO2 SERPL-SCNC: 33 MMOL/L (ref 23–29)
CO2 SERPL-SCNC: 33 MMOL/L (ref 23–29)
CORTIS SERPL-MCNC: 12.7 UG/DL (ref 4.3–22.4)
CREAT SERPL-MCNC: 0.7 MG/DL (ref 0.5–1.4)
CREAT SERPL-MCNC: 0.8 MG/DL (ref 0.5–1.4)
CREAT SERPL-MCNC: 0.8 MG/DL (ref 0.5–1.4)
DIFFERENTIAL METHOD: ABNORMAL
EOSINOPHIL # BLD AUTO: 0.1 K/UL (ref 0–0.5)
EOSINOPHIL NFR BLD: 1 % (ref 0–8)
ERYTHROCYTE [DISTWIDTH] IN BLOOD BY AUTOMATED COUNT: 18 % (ref 11.5–14.5)
EST. GFR  (AFRICAN AMERICAN): >60 ML/MIN/1.73 M^2
EST. GFR  (NON AFRICAN AMERICAN): >60 ML/MIN/1.73 M^2
GLUCOSE SERPL-MCNC: 103 MG/DL (ref 70–110)
GLUCOSE SERPL-MCNC: 80 MG/DL (ref 70–110)
GLUCOSE SERPL-MCNC: 81 MG/DL (ref 70–110)
HCT VFR BLD AUTO: 27.5 % (ref 37–48.5)
HGB BLD-MCNC: 7.6 G/DL (ref 12–16)
IMM GRANULOCYTES # BLD AUTO: 0.07 K/UL (ref 0–0.04)
IMM GRANULOCYTES NFR BLD AUTO: 0.7 % (ref 0–0.5)
LYMPHOCYTES # BLD AUTO: 2.4 K/UL (ref 1–4.8)
LYMPHOCYTES NFR BLD: 22.5 % (ref 18–48)
MAGNESIUM SERPL-MCNC: 1.6 MG/DL (ref 1.6–2.6)
MCH RBC QN AUTO: 19.5 PG (ref 27–31)
MCHC RBC AUTO-ENTMCNC: 27.6 G/DL (ref 32–36)
MCV RBC AUTO: 71 FL (ref 82–98)
MONOCYTES # BLD AUTO: 0.8 K/UL (ref 0.3–1)
MONOCYTES NFR BLD: 7.4 % (ref 4–15)
NEUTROPHILS # BLD AUTO: 7.1 K/UL (ref 1.8–7.7)
NEUTROPHILS NFR BLD: 67.8 % (ref 38–73)
NRBC BLD-RTO: 0 /100 WBC
PLATELET # BLD AUTO: 354 K/UL (ref 150–450)
PMV BLD AUTO: 9.6 FL (ref 9.2–12.9)
POTASSIUM SERPL-SCNC: 4.9 MMOL/L (ref 3.5–5.1)
POTASSIUM SERPL-SCNC: 5 MMOL/L (ref 3.5–5.1)
POTASSIUM SERPL-SCNC: 5.2 MMOL/L (ref 3.5–5.1)
RBC # BLD AUTO: 3.9 M/UL (ref 4–5.4)
SODIUM SERPL-SCNC: 130 MMOL/L (ref 136–145)
SODIUM SERPL-SCNC: 131 MMOL/L (ref 136–145)
SODIUM SERPL-SCNC: 131 MMOL/L (ref 136–145)
URATE SERPL-MCNC: 10.6 MG/DL (ref 2.4–5.7)
WBC # BLD AUTO: 10.52 K/UL (ref 3.9–12.7)

## 2021-04-09 PROCEDURE — 27000221 HC OXYGEN, UP TO 24 HOURS

## 2021-04-09 PROCEDURE — 99233 SBSQ HOSP IP/OBS HIGH 50: CPT | Mod: S$GLB,,, | Performed by: INTERNAL MEDICINE

## 2021-04-09 PROCEDURE — 94660 CPAP INITIATION&MGMT: CPT

## 2021-04-09 PROCEDURE — 63600175 PHARM REV CODE 636 W HCPCS: Performed by: STUDENT IN AN ORGANIZED HEALTH CARE EDUCATION/TRAINING PROGRAM

## 2021-04-09 PROCEDURE — 36415 COLL VENOUS BLD VENIPUNCTURE: CPT | Performed by: NURSE PRACTITIONER

## 2021-04-09 PROCEDURE — 12000002 HC ACUTE/MED SURGE SEMI-PRIVATE ROOM

## 2021-04-09 PROCEDURE — 83735 ASSAY OF MAGNESIUM: CPT | Performed by: STUDENT IN AN ORGANIZED HEALTH CARE EDUCATION/TRAINING PROGRAM

## 2021-04-09 PROCEDURE — 99900035 HC TECH TIME PER 15 MIN (STAT)

## 2021-04-09 PROCEDURE — 94761 N-INVAS EAR/PLS OXIMETRY MLT: CPT

## 2021-04-09 PROCEDURE — 99233 PR SUBSEQUENT HOSPITAL CARE,LEVL III: ICD-10-PCS | Mod: S$GLB,,, | Performed by: INTERNAL MEDICINE

## 2021-04-09 PROCEDURE — 84550 ASSAY OF BLOOD/URIC ACID: CPT | Performed by: INTERNAL MEDICINE

## 2021-04-09 PROCEDURE — 36415 COLL VENOUS BLD VENIPUNCTURE: CPT | Performed by: STUDENT IN AN ORGANIZED HEALTH CARE EDUCATION/TRAINING PROGRAM

## 2021-04-09 PROCEDURE — 85025 COMPLETE CBC W/AUTO DIFF WBC: CPT | Performed by: STUDENT IN AN ORGANIZED HEALTH CARE EDUCATION/TRAINING PROGRAM

## 2021-04-09 PROCEDURE — 87040 BLOOD CULTURE FOR BACTERIA: CPT | Performed by: NURSE PRACTITIONER

## 2021-04-09 PROCEDURE — 25000003 PHARM REV CODE 250: Performed by: STUDENT IN AN ORGANIZED HEALTH CARE EDUCATION/TRAINING PROGRAM

## 2021-04-09 PROCEDURE — 80048 BASIC METABOLIC PNL TOTAL CA: CPT | Performed by: STUDENT IN AN ORGANIZED HEALTH CARE EDUCATION/TRAINING PROGRAM

## 2021-04-09 RX ORDER — FUROSEMIDE 10 MG/ML
20 INJECTION INTRAMUSCULAR; INTRAVENOUS DAILY
Status: DISCONTINUED | OUTPATIENT
Start: 2021-04-10 | End: 2021-04-10

## 2021-04-09 RX ADMIN — LINACLOTIDE 72 MCG: 72 CAPSULE, GELATIN COATED ORAL at 05:04

## 2021-04-09 RX ADMIN — PANTOPRAZOLE SODIUM 40 MG: 40 TABLET, DELAYED RELEASE ORAL at 09:04

## 2021-04-09 RX ADMIN — LEVOFLOXACIN 750 MG: 750 INJECTION, SOLUTION INTRAVENOUS at 01:04

## 2021-04-09 RX ADMIN — HYDROCODONE BITARTRATE AND ACETAMINOPHEN 1 TABLET: 7.5; 325 TABLET ORAL at 05:04

## 2021-04-09 RX ADMIN — DOXYCYCLINE 100 MG: 25 POWDER, FOR SUSPENSION ORAL at 09:04

## 2021-04-09 RX ADMIN — LIDOCAINE 1 PATCH: 50 PATCH TOPICAL at 03:04

## 2021-04-09 RX ADMIN — APIXABAN 2.5 MG: 2.5 TABLET, FILM COATED ORAL at 09:04

## 2021-04-09 RX ADMIN — HYDROCODONE BITARTRATE AND ACETAMINOPHEN 1 TABLET: 7.5; 325 TABLET ORAL at 11:04

## 2021-04-09 RX ADMIN — ACETAMINOPHEN 650 MG: 325 TABLET ORAL at 09:04

## 2021-04-09 RX ADMIN — METOPROLOL SUCCINATE 50 MG: 50 TABLET, EXTENDED RELEASE ORAL at 09:04

## 2021-04-09 RX ADMIN — DOXYCYCLINE 100 MG: 25 POWDER, FOR SUSPENSION ORAL at 10:04

## 2021-04-09 RX ADMIN — FUROSEMIDE 20 MG: 10 INJECTION, SOLUTION INTRAMUSCULAR; INTRAVENOUS at 05:04

## 2021-04-10 PROBLEM — N89.8 VAGINAL DISCHARGE: Status: ACTIVE | Noted: 2021-04-10

## 2021-04-10 LAB
ANION GAP SERPL CALC-SCNC: 14 MMOL/L (ref 8–16)
ANION GAP SERPL CALC-SCNC: 8 MMOL/L (ref 8–16)
ANION GAP SERPL CALC-SCNC: 8 MMOL/L (ref 8–16)
BASOPHILS # BLD AUTO: 0.07 K/UL (ref 0–0.2)
BASOPHILS NFR BLD: 0.7 % (ref 0–1.9)
BUN SERPL-MCNC: 30 MG/DL (ref 6–20)
BUN SERPL-MCNC: 31 MG/DL (ref 6–20)
BUN SERPL-MCNC: 35 MG/DL (ref 6–20)
CALCIUM SERPL-MCNC: 9.4 MG/DL (ref 8.7–10.5)
CALCIUM SERPL-MCNC: 9.7 MG/DL (ref 8.7–10.5)
CALCIUM SERPL-MCNC: 9.9 MG/DL (ref 8.7–10.5)
CHLORIDE SERPL-SCNC: 89 MMOL/L (ref 95–110)
CHLORIDE SERPL-SCNC: 89 MMOL/L (ref 95–110)
CHLORIDE SERPL-SCNC: 90 MMOL/L (ref 95–110)
CO2 SERPL-SCNC: 27 MMOL/L (ref 23–29)
CO2 SERPL-SCNC: 33 MMOL/L (ref 23–29)
CO2 SERPL-SCNC: 34 MMOL/L (ref 23–29)
CREAT SERPL-MCNC: 0.8 MG/DL (ref 0.5–1.4)
DIFFERENTIAL METHOD: ABNORMAL
EOSINOPHIL # BLD AUTO: 0.3 K/UL (ref 0–0.5)
EOSINOPHIL NFR BLD: 3.1 % (ref 0–8)
ERYTHROCYTE [DISTWIDTH] IN BLOOD BY AUTOMATED COUNT: 18.1 % (ref 11.5–14.5)
EST. GFR  (AFRICAN AMERICAN): >60 ML/MIN/1.73 M^2
EST. GFR  (NON AFRICAN AMERICAN): >60 ML/MIN/1.73 M^2
GLUCOSE SERPL-MCNC: 110 MG/DL (ref 70–110)
GLUCOSE SERPL-MCNC: 91 MG/DL (ref 70–110)
GLUCOSE SERPL-MCNC: 99 MG/DL (ref 70–110)
HCT VFR BLD AUTO: 30.5 % (ref 37–48.5)
HGB BLD-MCNC: 8.4 G/DL (ref 12–16)
IMM GRANULOCYTES # BLD AUTO: 0.07 K/UL (ref 0–0.04)
IMM GRANULOCYTES NFR BLD AUTO: 0.7 % (ref 0–0.5)
LYMPHOCYTES # BLD AUTO: 2.2 K/UL (ref 1–4.8)
LYMPHOCYTES NFR BLD: 22.4 % (ref 18–48)
MAGNESIUM SERPL-MCNC: 1.5 MG/DL (ref 1.6–2.6)
MCH RBC QN AUTO: 19.4 PG (ref 27–31)
MCHC RBC AUTO-ENTMCNC: 27.5 G/DL (ref 32–36)
MCV RBC AUTO: 70 FL (ref 82–98)
MONOCYTES # BLD AUTO: 0.7 K/UL (ref 0.3–1)
MONOCYTES NFR BLD: 6.6 % (ref 4–15)
NEUTROPHILS # BLD AUTO: 6.6 K/UL (ref 1.8–7.7)
NEUTROPHILS NFR BLD: 66.5 % (ref 38–73)
NRBC BLD-RTO: 0 /100 WBC
PLATELET # BLD AUTO: 438 K/UL (ref 150–450)
PMV BLD AUTO: 8.8 FL (ref 9.2–12.9)
POTASSIUM SERPL-SCNC: 4.5 MMOL/L (ref 3.5–5.1)
POTASSIUM SERPL-SCNC: 4.6 MMOL/L (ref 3.5–5.1)
POTASSIUM SERPL-SCNC: 4.8 MMOL/L (ref 3.5–5.1)
RBC # BLD AUTO: 4.34 M/UL (ref 4–5.4)
SODIUM SERPL-SCNC: 130 MMOL/L (ref 136–145)
SODIUM SERPL-SCNC: 131 MMOL/L (ref 136–145)
SODIUM SERPL-SCNC: 131 MMOL/L (ref 136–145)
WBC # BLD AUTO: 9.88 K/UL (ref 3.9–12.7)

## 2021-04-10 PROCEDURE — 80048 BASIC METABOLIC PNL TOTAL CA: CPT | Mod: 91 | Performed by: STUDENT IN AN ORGANIZED HEALTH CARE EDUCATION/TRAINING PROGRAM

## 2021-04-10 PROCEDURE — 36415 COLL VENOUS BLD VENIPUNCTURE: CPT | Performed by: STUDENT IN AN ORGANIZED HEALTH CARE EDUCATION/TRAINING PROGRAM

## 2021-04-10 PROCEDURE — 94660 CPAP INITIATION&MGMT: CPT

## 2021-04-10 PROCEDURE — 85025 COMPLETE CBC W/AUTO DIFF WBC: CPT | Performed by: STUDENT IN AN ORGANIZED HEALTH CARE EDUCATION/TRAINING PROGRAM

## 2021-04-10 PROCEDURE — 27000221 HC OXYGEN, UP TO 24 HOURS

## 2021-04-10 PROCEDURE — 12000002 HC ACUTE/MED SURGE SEMI-PRIVATE ROOM

## 2021-04-10 PROCEDURE — 94761 N-INVAS EAR/PLS OXIMETRY MLT: CPT

## 2021-04-10 PROCEDURE — 25000003 PHARM REV CODE 250: Performed by: STUDENT IN AN ORGANIZED HEALTH CARE EDUCATION/TRAINING PROGRAM

## 2021-04-10 PROCEDURE — 63600175 PHARM REV CODE 636 W HCPCS: Performed by: NURSE PRACTITIONER

## 2021-04-10 PROCEDURE — 99900035 HC TECH TIME PER 15 MIN (STAT)

## 2021-04-10 PROCEDURE — 25000003 PHARM REV CODE 250: Performed by: INTERNAL MEDICINE

## 2021-04-10 PROCEDURE — 83735 ASSAY OF MAGNESIUM: CPT | Performed by: STUDENT IN AN ORGANIZED HEALTH CARE EDUCATION/TRAINING PROGRAM

## 2021-04-10 PROCEDURE — 63700000 PHARM REV CODE 250 ALT 637 W/O HCPCS: Performed by: INTERNAL MEDICINE

## 2021-04-10 PROCEDURE — 63600175 PHARM REV CODE 636 W HCPCS: Performed by: STUDENT IN AN ORGANIZED HEALTH CARE EDUCATION/TRAINING PROGRAM

## 2021-04-10 RX ORDER — LACTULOSE 10 G/15ML
15 SOLUTION ORAL 2 TIMES DAILY PRN
Status: DISCONTINUED | OUTPATIENT
Start: 2021-04-10 | End: 2021-04-11 | Stop reason: HOSPADM

## 2021-04-10 RX ORDER — MAGNESIUM SULFATE 1 G/100ML
1 INJECTION INTRAVENOUS ONCE
Status: COMPLETED | OUTPATIENT
Start: 2021-04-10 | End: 2021-04-10

## 2021-04-10 RX ORDER — FUROSEMIDE 20 MG/1
20 TABLET ORAL DAILY
Status: DISCONTINUED | OUTPATIENT
Start: 2021-04-11 | End: 2021-04-11 | Stop reason: HOSPADM

## 2021-04-10 RX ORDER — LACTULOSE 10 G/15ML
15 SOLUTION ORAL 2 TIMES DAILY
Status: DISCONTINUED | OUTPATIENT
Start: 2021-04-10 | End: 2021-04-10

## 2021-04-10 RX ADMIN — SODIUM PHOSPHATE, DIBASIC AND SODIUM PHOSPHATE, MONOBASIC 1 ENEMA: 7; 19 ENEMA RECTAL at 05:04

## 2021-04-10 RX ADMIN — HYDROCODONE BITARTRATE AND ACETAMINOPHEN 1 TABLET: 7.5; 325 TABLET ORAL at 06:04

## 2021-04-10 RX ADMIN — HYDROCODONE BITARTRATE AND ACETAMINOPHEN 1 TABLET: 7.5; 325 TABLET ORAL at 05:04

## 2021-04-10 RX ADMIN — HYDROCODONE BITARTRATE AND ACETAMINOPHEN 1 TABLET: 7.5; 325 TABLET ORAL at 12:04

## 2021-04-10 RX ADMIN — MAGNESIUM SULFATE 1 G: 1 INJECTION INTRAVENOUS at 09:04

## 2021-04-10 RX ADMIN — FERROUS SULFATE TAB EC 325 MG (65 MG FE EQUIVALENT) 325 MG: 325 (65 FE) TABLET DELAYED RESPONSE at 12:04

## 2021-04-10 RX ADMIN — DOXYCYCLINE 100 MG: 25 POWDER, FOR SUSPENSION ORAL at 09:04

## 2021-04-10 RX ADMIN — LINACLOTIDE 72 MCG: 72 CAPSULE, GELATIN COATED ORAL at 05:04

## 2021-04-10 RX ADMIN — FLUCONAZOLE 150 MG: 50 TABLET ORAL at 12:04

## 2021-04-10 RX ADMIN — PANTOPRAZOLE SODIUM 40 MG: 40 TABLET, DELAYED RELEASE ORAL at 08:04

## 2021-04-10 RX ADMIN — APIXABAN 2.5 MG: 2.5 TABLET, FILM COATED ORAL at 08:04

## 2021-04-10 RX ADMIN — DOXYCYCLINE 100 MG: 25 POWDER, FOR SUSPENSION ORAL at 11:04

## 2021-04-10 RX ADMIN — LIDOCAINE 1 PATCH: 50 PATCH TOPICAL at 03:04

## 2021-04-10 RX ADMIN — METOPROLOL SUCCINATE 50 MG: 50 TABLET, EXTENDED RELEASE ORAL at 08:04

## 2021-04-10 RX ADMIN — LEVOFLOXACIN 750 MG: 750 INJECTION, SOLUTION INTRAVENOUS at 05:04

## 2021-04-10 RX ADMIN — FUROSEMIDE 20 MG: 10 INJECTION, SOLUTION INTRAMUSCULAR; INTRAVENOUS at 08:04

## 2021-04-11 VITALS
HEART RATE: 103 BPM | DIASTOLIC BLOOD PRESSURE: 64 MMHG | BODY MASS INDEX: 55.32 KG/M2 | OXYGEN SATURATION: 96 % | SYSTOLIC BLOOD PRESSURE: 115 MMHG | WEIGHT: 293 LBS | TEMPERATURE: 98 F | RESPIRATION RATE: 16 BRPM | HEIGHT: 61 IN

## 2021-04-11 LAB
ANION GAP SERPL CALC-SCNC: 11 MMOL/L (ref 8–16)
ANION GAP SERPL CALC-SCNC: 11 MMOL/L (ref 8–16)
BASOPHILS # BLD AUTO: 0.04 K/UL (ref 0–0.2)
BASOPHILS NFR BLD: 0.4 % (ref 0–1.9)
BUN SERPL-MCNC: 30 MG/DL (ref 6–20)
BUN SERPL-MCNC: 32 MG/DL (ref 6–20)
CALCIUM SERPL-MCNC: 9.6 MG/DL (ref 8.7–10.5)
CALCIUM SERPL-MCNC: 9.6 MG/DL (ref 8.7–10.5)
CHLORIDE SERPL-SCNC: 89 MMOL/L (ref 95–110)
CHLORIDE SERPL-SCNC: 90 MMOL/L (ref 95–110)
CO2 SERPL-SCNC: 30 MMOL/L (ref 23–29)
CO2 SERPL-SCNC: 34 MMOL/L (ref 23–29)
CREAT SERPL-MCNC: 0.8 MG/DL (ref 0.5–1.4)
CREAT SERPL-MCNC: 0.8 MG/DL (ref 0.5–1.4)
DIFFERENTIAL METHOD: ABNORMAL
EOSINOPHIL # BLD AUTO: 0.4 K/UL (ref 0–0.5)
EOSINOPHIL NFR BLD: 4.2 % (ref 0–8)
ERYTHROCYTE [DISTWIDTH] IN BLOOD BY AUTOMATED COUNT: 18.1 % (ref 11.5–14.5)
EST. GFR  (AFRICAN AMERICAN): >60 ML/MIN/1.73 M^2
EST. GFR  (AFRICAN AMERICAN): >60 ML/MIN/1.73 M^2
EST. GFR  (NON AFRICAN AMERICAN): >60 ML/MIN/1.73 M^2
EST. GFR  (NON AFRICAN AMERICAN): >60 ML/MIN/1.73 M^2
GLUCOSE SERPL-MCNC: 88 MG/DL (ref 70–110)
GLUCOSE SERPL-MCNC: 99 MG/DL (ref 70–110)
HCT VFR BLD AUTO: 29.6 % (ref 37–48.5)
HGB BLD-MCNC: 8 G/DL (ref 12–16)
IMM GRANULOCYTES # BLD AUTO: 0.06 K/UL (ref 0–0.04)
IMM GRANULOCYTES NFR BLD AUTO: 0.6 % (ref 0–0.5)
LYMPHOCYTES # BLD AUTO: 1.6 K/UL (ref 1–4.8)
LYMPHOCYTES NFR BLD: 15.3 % (ref 18–48)
MAGNESIUM SERPL-MCNC: 1.7 MG/DL (ref 1.6–2.6)
MCH RBC QN AUTO: 19 PG (ref 27–31)
MCHC RBC AUTO-ENTMCNC: 27 G/DL (ref 32–36)
MCV RBC AUTO: 71 FL (ref 82–98)
MONOCYTES # BLD AUTO: 0.5 K/UL (ref 0.3–1)
MONOCYTES NFR BLD: 4.8 % (ref 4–15)
NEUTROPHILS # BLD AUTO: 7.6 K/UL (ref 1.8–7.7)
NEUTROPHILS NFR BLD: 74.7 % (ref 38–73)
NRBC BLD-RTO: 0 /100 WBC
PLATELET # BLD AUTO: 432 K/UL (ref 150–450)
PMV BLD AUTO: 8.6 FL (ref 9.2–12.9)
POTASSIUM SERPL-SCNC: 4.7 MMOL/L (ref 3.5–5.1)
POTASSIUM SERPL-SCNC: 4.8 MMOL/L (ref 3.5–5.1)
RBC # BLD AUTO: 4.2 M/UL (ref 4–5.4)
SODIUM SERPL-SCNC: 131 MMOL/L (ref 136–145)
SODIUM SERPL-SCNC: 134 MMOL/L (ref 136–145)
WBC # BLD AUTO: 10.14 K/UL (ref 3.9–12.7)

## 2021-04-11 PROCEDURE — 94618 PULMONARY STRESS TESTING: CPT

## 2021-04-11 PROCEDURE — 25000003 PHARM REV CODE 250: Performed by: INTERNAL MEDICINE

## 2021-04-11 PROCEDURE — 99900035 HC TECH TIME PER 15 MIN (STAT)

## 2021-04-11 PROCEDURE — 83735 ASSAY OF MAGNESIUM: CPT | Performed by: STUDENT IN AN ORGANIZED HEALTH CARE EDUCATION/TRAINING PROGRAM

## 2021-04-11 PROCEDURE — 63700000 PHARM REV CODE 250 ALT 637 W/O HCPCS: Performed by: INTERNAL MEDICINE

## 2021-04-11 PROCEDURE — 36415 COLL VENOUS BLD VENIPUNCTURE: CPT | Performed by: STUDENT IN AN ORGANIZED HEALTH CARE EDUCATION/TRAINING PROGRAM

## 2021-04-11 PROCEDURE — 85025 COMPLETE CBC W/AUTO DIFF WBC: CPT | Performed by: STUDENT IN AN ORGANIZED HEALTH CARE EDUCATION/TRAINING PROGRAM

## 2021-04-11 PROCEDURE — 25000003 PHARM REV CODE 250: Performed by: STUDENT IN AN ORGANIZED HEALTH CARE EDUCATION/TRAINING PROGRAM

## 2021-04-11 PROCEDURE — 80048 BASIC METABOLIC PNL TOTAL CA: CPT | Performed by: STUDENT IN AN ORGANIZED HEALTH CARE EDUCATION/TRAINING PROGRAM

## 2021-04-11 PROCEDURE — 94761 N-INVAS EAR/PLS OXIMETRY MLT: CPT

## 2021-04-11 PROCEDURE — 94660 CPAP INITIATION&MGMT: CPT

## 2021-04-11 RX ORDER — LEVOFLOXACIN 500 MG/1
500 TABLET, FILM COATED ORAL DAILY
Qty: 3 TABLET | Refills: 0 | Status: SHIPPED | OUTPATIENT
Start: 2021-04-11 | End: 2021-04-14

## 2021-04-11 RX ORDER — LANOLIN ALCOHOL/MO/W.PET/CERES
400 CREAM (GRAM) TOPICAL DAILY
Qty: 90 TABLET | Refills: 0 | Status: SHIPPED | OUTPATIENT
Start: 2021-04-11 | End: 2021-07-05 | Stop reason: SDUPTHER

## 2021-04-11 RX ORDER — MAGNESIUM SULFATE HEPTAHYDRATE 40 MG/ML
2 INJECTION, SOLUTION INTRAVENOUS ONCE
Status: DISCONTINUED | OUTPATIENT
Start: 2021-04-11 | End: 2021-04-11 | Stop reason: HOSPADM

## 2021-04-11 RX ORDER — FUROSEMIDE 20 MG/1
20 TABLET ORAL DAILY
Qty: 30 TABLET | Refills: 11 | Status: SHIPPED | OUTPATIENT
Start: 2021-04-12 | End: 2022-02-24 | Stop reason: SDUPTHER

## 2021-04-11 RX ADMIN — FUROSEMIDE 20 MG: 20 TABLET ORAL at 08:04

## 2021-04-11 RX ADMIN — FLUCONAZOLE 150 MG: 50 TABLET ORAL at 08:04

## 2021-04-11 RX ADMIN — PANTOPRAZOLE SODIUM 40 MG: 40 TABLET, DELAYED RELEASE ORAL at 08:04

## 2021-04-11 RX ADMIN — HYDROCODONE BITARTRATE AND ACETAMINOPHEN 1 TABLET: 7.5; 325 TABLET ORAL at 12:04

## 2021-04-11 RX ADMIN — HYDROCODONE BITARTRATE AND ACETAMINOPHEN 1 TABLET: 7.5; 325 TABLET ORAL at 06:04

## 2021-04-11 RX ADMIN — METOPROLOL SUCCINATE 50 MG: 50 TABLET, EXTENDED RELEASE ORAL at 08:04

## 2021-04-11 RX ADMIN — APIXABAN 2.5 MG: 2.5 TABLET, FILM COATED ORAL at 08:04

## 2021-04-11 RX ADMIN — DOXYCYCLINE 100 MG: 25 POWDER, FOR SUSPENSION ORAL at 09:04

## 2021-04-11 RX ADMIN — LIDOCAINE 1 PATCH: 50 PATCH TOPICAL at 02:04

## 2021-04-11 RX ADMIN — LINACLOTIDE 72 MCG: 72 CAPSULE, GELATIN COATED ORAL at 06:04

## 2021-04-11 RX ADMIN — HYDROCODONE BITARTRATE AND ACETAMINOPHEN 1 TABLET: 7.5; 325 TABLET ORAL at 01:04

## 2021-04-12 ENCOUNTER — TELEPHONE (OUTPATIENT)
Dept: MEDSURG UNIT | Facility: HOSPITAL | Age: 46
End: 2021-04-12

## 2021-04-13 ENCOUNTER — PATIENT OUTREACH (OUTPATIENT)
Dept: ADMINISTRATIVE | Facility: CLINIC | Age: 46
End: 2021-04-13

## 2021-04-13 ENCOUNTER — TELEPHONE (OUTPATIENT)
Dept: FAMILY MEDICINE | Facility: CLINIC | Age: 46
End: 2021-04-13

## 2021-04-13 DIAGNOSIS — J98.4 LUNG DISEASE: Primary | ICD-10-CM

## 2021-04-14 ENCOUNTER — PATIENT MESSAGE (OUTPATIENT)
Dept: FAMILY MEDICINE | Facility: CLINIC | Age: 46
End: 2021-04-14

## 2021-04-14 LAB — BACTERIA BLD CULT: NORMAL

## 2021-04-19 ENCOUNTER — CARE AT HOME (OUTPATIENT)
Dept: HOME HEALTH SERVICES | Facility: CLINIC | Age: 46
End: 2021-04-19
Payer: MEDICARE

## 2021-04-19 DIAGNOSIS — L89.44 PRESSURE INJURY OF CONTIGUOUS REGION INVOLVING BACK AND LEFT BUTTOCK, STAGE 4: ICD-10-CM

## 2021-04-19 DIAGNOSIS — M41.9 SCOLIOSIS, UNSPECIFIED SCOLIOSIS TYPE, UNSPECIFIED SPINAL REGION: ICD-10-CM

## 2021-04-19 DIAGNOSIS — B37.2 CANDIDAL INTERTRIGO: ICD-10-CM

## 2021-04-19 DIAGNOSIS — G89.29 CHRONIC BILATERAL LOW BACK PAIN, UNSPECIFIED WHETHER SCIATICA PRESENT: ICD-10-CM

## 2021-04-19 DIAGNOSIS — I10 ESSENTIAL HYPERTENSION: ICD-10-CM

## 2021-04-19 DIAGNOSIS — G82.20 PARAPLEGIA: Primary | ICD-10-CM

## 2021-04-19 DIAGNOSIS — J96.22 ACUTE ON CHRONIC RESPIRATORY FAILURE WITH HYPOXIA AND HYPERCAPNIA: ICD-10-CM

## 2021-04-19 DIAGNOSIS — E66.2 PICKWICKIAN SYNDROME: ICD-10-CM

## 2021-04-19 DIAGNOSIS — R53.81 PHYSICAL DEBILITY: ICD-10-CM

## 2021-04-19 DIAGNOSIS — E66.01 MORBID OBESITY WITH BMI OF 50.0-59.9, ADULT: ICD-10-CM

## 2021-04-19 DIAGNOSIS — N89.8 VAGINAL DISCHARGE: ICD-10-CM

## 2021-04-19 DIAGNOSIS — M54.50 CHRONIC BILATERAL LOW BACK PAIN, UNSPECIFIED WHETHER SCIATICA PRESENT: ICD-10-CM

## 2021-04-19 DIAGNOSIS — G91.1 OBSTRUCTIVE HYDROCEPHALUS: ICD-10-CM

## 2021-04-19 DIAGNOSIS — E87.1 DILUTIONAL HYPONATREMIA: ICD-10-CM

## 2021-04-19 DIAGNOSIS — Q05.2 SPINA BIFIDA OF LUMBAR REGION WITH HYDROCEPHALUS: ICD-10-CM

## 2021-04-19 DIAGNOSIS — Z09 HOSPITAL DISCHARGE FOLLOW-UP: ICD-10-CM

## 2021-04-19 DIAGNOSIS — E66.2 OBESITY HYPOVENTILATION SYNDROME: ICD-10-CM

## 2021-04-19 DIAGNOSIS — F11.20 CONTINUOUS OPIOID DEPENDENCE: Chronic | ICD-10-CM

## 2021-04-19 DIAGNOSIS — J96.21 ACUTE ON CHRONIC RESPIRATORY FAILURE WITH HYPOXIA AND HYPERCAPNIA: ICD-10-CM

## 2021-04-19 DIAGNOSIS — Z93.59 SUPRAPUBIC CATHETER: ICD-10-CM

## 2021-04-19 DIAGNOSIS — D50.9 IRON DEFICIENCY ANEMIA, UNSPECIFIED IRON DEFICIENCY ANEMIA TYPE: ICD-10-CM

## 2021-04-19 DIAGNOSIS — L40.8 INVERSE PSORIASIS: ICD-10-CM

## 2021-04-19 PROCEDURE — 99495 TRANSJ CARE MGMT MOD F2F 14D: CPT | Mod: S$GLB,,, | Performed by: NURSE PRACTITIONER

## 2021-04-19 PROCEDURE — 99495 TCM SERVICES (MODERATE COMPLEXITY): ICD-10-PCS | Mod: S$GLB,,, | Performed by: NURSE PRACTITIONER

## 2021-04-19 RX ORDER — SILVER SULFADIAZINE 10 G/1000G
CREAM TOPICAL 2 TIMES DAILY
Qty: 1000 G | Refills: 3 | Status: SHIPPED | OUTPATIENT
Start: 2021-04-19 | End: 2022-08-31

## 2021-04-21 ENCOUNTER — PATIENT MESSAGE (OUTPATIENT)
Dept: FAMILY MEDICINE | Facility: CLINIC | Age: 46
End: 2021-04-21

## 2021-04-21 DIAGNOSIS — Z93.59 SUPRAPUBIC CATHETER: Primary | ICD-10-CM

## 2021-04-25 VITALS
SYSTOLIC BLOOD PRESSURE: 102 MMHG | RESPIRATION RATE: 20 BRPM | OXYGEN SATURATION: 99 % | TEMPERATURE: 97 F | HEART RATE: 90 BPM | DIASTOLIC BLOOD PRESSURE: 68 MMHG

## 2021-04-25 PROBLEM — L89.44: Status: ACTIVE | Noted: 2019-04-17

## 2021-04-25 PROBLEM — Z09 HOSPITAL DISCHARGE FOLLOW-UP: Status: ACTIVE | Noted: 2021-04-25

## 2021-04-27 ENCOUNTER — PATIENT MESSAGE (OUTPATIENT)
Dept: HOME HEALTH SERVICES | Facility: CLINIC | Age: 46
End: 2021-04-27

## 2021-04-28 ENCOUNTER — PATIENT MESSAGE (OUTPATIENT)
Dept: HOME HEALTH SERVICES | Facility: CLINIC | Age: 46
End: 2021-04-28

## 2021-05-05 ENCOUNTER — PES CALL (OUTPATIENT)
Dept: ADMINISTRATIVE | Facility: CLINIC | Age: 46
End: 2021-05-05

## 2021-05-11 ENCOUNTER — TELEPHONE (OUTPATIENT)
Dept: PULMONOLOGY | Facility: CLINIC | Age: 46
End: 2021-05-11

## 2021-05-11 ENCOUNTER — OFFICE VISIT (OUTPATIENT)
Dept: PULMONOLOGY | Facility: CLINIC | Age: 46
End: 2021-05-11
Payer: MEDICARE

## 2021-05-11 VITALS — SYSTOLIC BLOOD PRESSURE: 140 MMHG | OXYGEN SATURATION: 100 % | HEART RATE: 95 BPM | DIASTOLIC BLOOD PRESSURE: 82 MMHG

## 2021-05-11 DIAGNOSIS — E66.01 MORBID OBESITY WITH BMI OF 50.0-59.9, ADULT: ICD-10-CM

## 2021-05-11 DIAGNOSIS — M41.9 SCOLIOSIS, UNSPECIFIED SCOLIOSIS TYPE, UNSPECIFIED SPINAL REGION: ICD-10-CM

## 2021-05-11 DIAGNOSIS — E66.2 PICKWICKIAN SYNDROME: Primary | ICD-10-CM

## 2021-05-11 DIAGNOSIS — E66.2 OBESITY HYPOVENTILATION SYNDROME: ICD-10-CM

## 2021-05-11 PROCEDURE — 99214 PR OFFICE/OUTPT VISIT, EST, LEVL IV, 30-39 MIN: ICD-10-PCS | Mod: S$GLB,,, | Performed by: INTERNAL MEDICINE

## 2021-05-11 PROCEDURE — 99214 OFFICE O/P EST MOD 30 MIN: CPT | Mod: S$GLB,,, | Performed by: INTERNAL MEDICINE

## 2021-05-11 RX ORDER — CALCIPOTRIENE 50 UG/G
CREAM TOPICAL
COMMUNITY
Start: 2021-04-07 | End: 2021-08-24

## 2021-05-11 RX ORDER — CLINDAMYCIN HYDROCHLORIDE 300 MG/1
300 CAPSULE ORAL 3 TIMES DAILY
COMMUNITY
Start: 2021-03-05 | End: 2021-08-24

## 2021-05-17 ENCOUNTER — OUTPATIENT CASE MANAGEMENT (OUTPATIENT)
Dept: ADMINISTRATIVE | Facility: OTHER | Age: 46
End: 2021-05-17

## 2021-05-17 ENCOUNTER — CARE AT HOME (OUTPATIENT)
Dept: HOME HEALTH SERVICES | Facility: CLINIC | Age: 46
End: 2021-05-17
Payer: MEDICARE

## 2021-05-17 ENCOUNTER — LAB VISIT (OUTPATIENT)
Dept: LAB | Facility: HOSPITAL | Age: 46
End: 2021-05-17
Attending: NURSE PRACTITIONER
Payer: MEDICARE

## 2021-05-17 DIAGNOSIS — J96.22 ACUTE ON CHRONIC RESPIRATORY FAILURE WITH HYPOXIA AND HYPERCAPNIA: ICD-10-CM

## 2021-05-17 DIAGNOSIS — Q05.2 SPINA BIFIDA OF LUMBAR REGION WITH HYDROCEPHALUS: ICD-10-CM

## 2021-05-17 DIAGNOSIS — E66.01 MORBID OBESITY WITH BMI OF 50.0-59.9, ADULT: ICD-10-CM

## 2021-05-17 DIAGNOSIS — E87.1 HYPONATREMIA: ICD-10-CM

## 2021-05-17 DIAGNOSIS — F11.20 CONTINUOUS OPIOID DEPENDENCE: Chronic | ICD-10-CM

## 2021-05-17 DIAGNOSIS — G91.1 OBSTRUCTIVE HYDROCEPHALUS: ICD-10-CM

## 2021-05-17 DIAGNOSIS — R53.81 PHYSICAL DEBILITY: ICD-10-CM

## 2021-05-17 DIAGNOSIS — J96.21 ACUTE ON CHRONIC RESPIRATORY FAILURE WITH HYPOXIA AND HYPERCAPNIA: ICD-10-CM

## 2021-05-17 DIAGNOSIS — E87.1 HYPONATREMIA: Primary | ICD-10-CM

## 2021-05-17 DIAGNOSIS — G82.20 PARAPLEGIA: ICD-10-CM

## 2021-05-17 DIAGNOSIS — I10 ESSENTIAL HYPERTENSION: ICD-10-CM

## 2021-05-17 DIAGNOSIS — L40.8 INVERSE PSORIASIS: ICD-10-CM

## 2021-05-17 LAB
ANION GAP SERPL CALC-SCNC: 13 MMOL/L (ref 8–16)
BUN SERPL-MCNC: 10 MG/DL (ref 6–20)
CALCIUM SERPL-MCNC: 9.9 MG/DL (ref 8.7–10.5)
CHLORIDE SERPL-SCNC: 100 MMOL/L (ref 95–110)
CO2 SERPL-SCNC: 26 MMOL/L (ref 23–29)
CREAT SERPL-MCNC: 0.7 MG/DL (ref 0.5–1.4)
EST. GFR  (AFRICAN AMERICAN): >60 ML/MIN/1.73 M^2
EST. GFR  (NON AFRICAN AMERICAN): >60 ML/MIN/1.73 M^2
GLUCOSE SERPL-MCNC: 65 MG/DL (ref 70–110)
POTASSIUM SERPL-SCNC: 4 MMOL/L (ref 3.5–5.1)
SODIUM SERPL-SCNC: 139 MMOL/L (ref 136–145)

## 2021-05-17 PROCEDURE — 99350 PR HOME VISIT,ESTAB PATIENT,LEVEL IV: ICD-10-PCS | Mod: S$GLB,,, | Performed by: NURSE PRACTITIONER

## 2021-05-17 PROCEDURE — 80048 BASIC METABOLIC PNL TOTAL CA: CPT | Performed by: NURSE PRACTITIONER

## 2021-05-17 PROCEDURE — 36415 PR COLLECTION VENOUS BLOOD,VENIPUNCTURE: ICD-10-PCS | Mod: S$GLB,,, | Performed by: NURSE PRACTITIONER

## 2021-05-17 PROCEDURE — 36415 COLL VENOUS BLD VENIPUNCTURE: CPT | Mod: S$GLB,,, | Performed by: NURSE PRACTITIONER

## 2021-05-17 PROCEDURE — 99350 HOME/RES VST EST HIGH MDM 60: CPT | Mod: S$GLB,,, | Performed by: NURSE PRACTITIONER

## 2021-05-19 ENCOUNTER — OFFICE VISIT (OUTPATIENT)
Dept: FAMILY MEDICINE | Facility: CLINIC | Age: 46
End: 2021-05-19
Payer: MEDICARE

## 2021-05-19 DIAGNOSIS — F11.20 CONTINUOUS OPIOID DEPENDENCE: Chronic | ICD-10-CM

## 2021-05-19 PROCEDURE — 99442 PR PHYSICIAN TELEPHONE EVALUATION 11-20 MIN: ICD-10-PCS | Mod: 95,,, | Performed by: FAMILY MEDICINE

## 2021-05-19 PROCEDURE — 99499 UNLISTED E&M SERVICE: CPT | Mod: 95,,, | Performed by: FAMILY MEDICINE

## 2021-05-19 PROCEDURE — 99499 NO LOS: ICD-10-PCS | Mod: 95,,, | Performed by: FAMILY MEDICINE

## 2021-05-19 PROCEDURE — 99442 PR PHYSICIAN TELEPHONE EVALUATION 11-20 MIN: CPT | Mod: 95,,, | Performed by: FAMILY MEDICINE

## 2021-05-19 RX ORDER — HYDROCODONE BITARTRATE AND ACETAMINOPHEN 7.5; 325 MG/1; MG/1
1 TABLET ORAL EVERY 6 HOURS PRN
Qty: 120 TABLET | Refills: 0 | Status: SHIPPED | OUTPATIENT
Start: 2021-05-19 | End: 2021-08-24 | Stop reason: SDUPTHER

## 2021-05-19 RX ORDER — HYDROCODONE BITARTRATE AND ACETAMINOPHEN 7.5; 325 MG/1; MG/1
1 TABLET ORAL EVERY 6 HOURS PRN
Qty: 120 TABLET | Refills: 0 | Status: SHIPPED | OUTPATIENT
Start: 2021-05-19 | End: 2021-05-19 | Stop reason: SDUPTHER

## 2021-05-19 RX ORDER — HYDROCODONE BITARTRATE AND ACETAMINOPHEN 7.5; 325 MG/1; MG/1
1 TABLET ORAL EVERY 6 HOURS PRN
Qty: 120 TABLET | Refills: 0 | Status: CANCELLED | OUTPATIENT
Start: 2021-05-19

## 2021-05-23 VITALS
SYSTOLIC BLOOD PRESSURE: 116 MMHG | TEMPERATURE: 99 F | HEART RATE: 94 BPM | RESPIRATION RATE: 20 BRPM | OXYGEN SATURATION: 95 % | BODY MASS INDEX: 55.32 KG/M2 | HEIGHT: 61 IN | DIASTOLIC BLOOD PRESSURE: 66 MMHG | WEIGHT: 293 LBS

## 2021-06-02 ENCOUNTER — TELEPHONE (OUTPATIENT)
Dept: PHYSICAL MEDICINE AND REHAB | Facility: CLINIC | Age: 46
End: 2021-06-02

## 2021-06-08 ENCOUNTER — TELEPHONE (OUTPATIENT)
Dept: FAMILY MEDICINE | Facility: CLINIC | Age: 46
End: 2021-06-08

## 2021-06-09 ENCOUNTER — OFFICE VISIT (OUTPATIENT)
Dept: UROLOGY | Facility: CLINIC | Age: 46
End: 2021-06-09
Payer: MEDICARE

## 2021-06-09 VITALS
RESPIRATION RATE: 18 BRPM | WEIGHT: 293 LBS | SYSTOLIC BLOOD PRESSURE: 136 MMHG | DIASTOLIC BLOOD PRESSURE: 80 MMHG | HEIGHT: 61 IN | BODY MASS INDEX: 55.32 KG/M2 | HEART RATE: 92 BPM

## 2021-06-09 DIAGNOSIS — Q61.4 MULTICYSTIC DYSPLASTIC KIDNEY: ICD-10-CM

## 2021-06-09 DIAGNOSIS — Q07.01 CHIARI MALFORMATION TYPE II: ICD-10-CM

## 2021-06-09 DIAGNOSIS — Z93.59 SUPRAPUBIC CATHETER: ICD-10-CM

## 2021-06-09 DIAGNOSIS — Q05.9 SPINA BIFIDA MANIFESTA: ICD-10-CM

## 2021-06-09 DIAGNOSIS — N31.9 NEUROGENIC BLADDER: Primary | ICD-10-CM

## 2021-06-09 DIAGNOSIS — I10 ESSENTIAL HYPERTENSION: ICD-10-CM

## 2021-06-09 DIAGNOSIS — E66.01 MORBID OBESITY WITH BMI OF 50.0-59.9, ADULT: ICD-10-CM

## 2021-06-09 DIAGNOSIS — F11.20 CONTINUOUS OPIOID DEPENDENCE: Chronic | ICD-10-CM

## 2021-06-09 DIAGNOSIS — G82.20 PARAPLEGIA: ICD-10-CM

## 2021-06-09 DIAGNOSIS — E66.2 PICKWICKIAN SYNDROME: ICD-10-CM

## 2021-06-09 DIAGNOSIS — G91.1 OBSTRUCTIVE HYDROCEPHALUS: ICD-10-CM

## 2021-06-09 DIAGNOSIS — E66.2 OBESITY HYPOVENTILATION SYNDROME: ICD-10-CM

## 2021-06-09 DIAGNOSIS — L89.44 PRESSURE INJURY OF CONTIGUOUS REGION INVOLVING BACK AND LEFT BUTTOCK, STAGE 4: ICD-10-CM

## 2021-06-09 DIAGNOSIS — G93.2 INTRACRANIAL HYPERTENSION: ICD-10-CM

## 2021-06-09 PROCEDURE — 99213 PR OFFICE/OUTPT VISIT, EST, LEVL III, 20-29 MIN: ICD-10-PCS | Mod: S$PBB,,, | Performed by: STUDENT IN AN ORGANIZED HEALTH CARE EDUCATION/TRAINING PROGRAM

## 2021-06-09 PROCEDURE — 99999 PR PBB SHADOW E&M-EST. PATIENT-LVL IV: CPT | Mod: PBBFAC,,, | Performed by: STUDENT IN AN ORGANIZED HEALTH CARE EDUCATION/TRAINING PROGRAM

## 2021-06-09 PROCEDURE — 99999 PR PBB SHADOW E&M-EST. PATIENT-LVL IV: ICD-10-PCS | Mod: PBBFAC,,, | Performed by: STUDENT IN AN ORGANIZED HEALTH CARE EDUCATION/TRAINING PROGRAM

## 2021-06-09 PROCEDURE — 99213 OFFICE O/P EST LOW 20 MIN: CPT | Mod: S$PBB,,, | Performed by: STUDENT IN AN ORGANIZED HEALTH CARE EDUCATION/TRAINING PROGRAM

## 2021-06-09 PROCEDURE — 99214 OFFICE O/P EST MOD 30 MIN: CPT | Mod: PBBFAC,PN | Performed by: STUDENT IN AN ORGANIZED HEALTH CARE EDUCATION/TRAINING PROGRAM

## 2021-06-28 ENCOUNTER — CARE AT HOME (OUTPATIENT)
Dept: HOME HEALTH SERVICES | Facility: CLINIC | Age: 46
End: 2021-06-28
Payer: MEDICARE

## 2021-06-28 DIAGNOSIS — L40.8 INVERSE PSORIASIS: ICD-10-CM

## 2021-06-28 DIAGNOSIS — M54.50 CHRONIC BILATERAL LOW BACK PAIN, UNSPECIFIED WHETHER SCIATICA PRESENT: ICD-10-CM

## 2021-06-28 DIAGNOSIS — E66.01 MORBID OBESITY WITH BMI OF 50.0-59.9, ADULT: ICD-10-CM

## 2021-06-28 DIAGNOSIS — Q05.2 SPINA BIFIDA OF LUMBAR REGION WITH HYDROCEPHALUS: ICD-10-CM

## 2021-06-28 DIAGNOSIS — R53.81 PHYSICAL DEBILITY: ICD-10-CM

## 2021-06-28 DIAGNOSIS — G89.29 CHRONIC BILATERAL LOW BACK PAIN, UNSPECIFIED WHETHER SCIATICA PRESENT: ICD-10-CM

## 2021-06-28 DIAGNOSIS — J96.12 CHRONIC RESPIRATORY FAILURE WITH HYPOXIA AND HYPERCAPNIA: ICD-10-CM

## 2021-06-28 DIAGNOSIS — L89.44 PRESSURE INJURY OF CONTIGUOUS REGION INVOLVING BACK AND LEFT BUTTOCK, STAGE 4: ICD-10-CM

## 2021-06-28 DIAGNOSIS — Z93.59 SUPRAPUBIC CATHETER: ICD-10-CM

## 2021-06-28 DIAGNOSIS — I10 ESSENTIAL HYPERTENSION: ICD-10-CM

## 2021-06-28 DIAGNOSIS — F11.20 CONTINUOUS OPIOID DEPENDENCE: Chronic | ICD-10-CM

## 2021-06-28 DIAGNOSIS — Z74.1 REQUIRES ASSISTANCE WITH ACTIVITIES OF DAILY LIVING (ADL): ICD-10-CM

## 2021-06-28 DIAGNOSIS — G82.20 PARAPLEGIA: Primary | ICD-10-CM

## 2021-06-28 DIAGNOSIS — E66.2 PICKWICKIAN SYNDROME: ICD-10-CM

## 2021-06-28 DIAGNOSIS — M41.9 SCOLIOSIS, UNSPECIFIED SCOLIOSIS TYPE, UNSPECIFIED SPINAL REGION: ICD-10-CM

## 2021-06-28 DIAGNOSIS — E66.2 OBESITY HYPOVENTILATION SYNDROME: ICD-10-CM

## 2021-06-28 DIAGNOSIS — J96.11 CHRONIC RESPIRATORY FAILURE WITH HYPOXIA AND HYPERCAPNIA: ICD-10-CM

## 2021-06-28 PROCEDURE — G0372 PR WELCOME MEDICARE ESTABLISHED VISIT FOR POWER MOBILITY DEVICE: ICD-10-PCS | Mod: S$GLB,,, | Performed by: NURSE PRACTITIONER

## 2021-06-28 PROCEDURE — 99350 HOME/RES VST EST HIGH MDM 60: CPT | Mod: S$GLB,,, | Performed by: NURSE PRACTITIONER

## 2021-06-28 PROCEDURE — 99350 PR HOME VISIT,ESTAB PATIENT,LEVEL IV: ICD-10-PCS | Mod: S$GLB,,, | Performed by: NURSE PRACTITIONER

## 2021-06-28 PROCEDURE — G0372 MD SERVICE REQUIRED FOR PMD: HCPCS | Mod: S$GLB,,, | Performed by: NURSE PRACTITIONER

## 2021-07-04 VITALS
TEMPERATURE: 98 F | OXYGEN SATURATION: 98 % | BODY MASS INDEX: 55.32 KG/M2 | RESPIRATION RATE: 20 BRPM | HEIGHT: 61 IN | HEART RATE: 94 BPM | SYSTOLIC BLOOD PRESSURE: 118 MMHG | WEIGHT: 293 LBS | DIASTOLIC BLOOD PRESSURE: 68 MMHG

## 2021-07-05 ENCOUNTER — PATIENT MESSAGE (OUTPATIENT)
Dept: HOME HEALTH SERVICES | Facility: CLINIC | Age: 46
End: 2021-07-05

## 2021-07-05 RX ORDER — LANOLIN ALCOHOL/MO/W.PET/CERES
400 CREAM (GRAM) TOPICAL DAILY
Qty: 90 TABLET | Refills: 0 | Status: SHIPPED | OUTPATIENT
Start: 2021-07-05 | End: 2021-10-03

## 2021-07-26 PROBLEM — Z09 HOSPITAL DISCHARGE FOLLOW-UP: Status: RESOLVED | Noted: 2021-04-25 | Resolved: 2021-07-26

## 2021-08-09 ENCOUNTER — PATIENT MESSAGE (OUTPATIENT)
Dept: FAMILY MEDICINE | Facility: CLINIC | Age: 46
End: 2021-08-09

## 2021-08-09 ENCOUNTER — CARE AT HOME (OUTPATIENT)
Dept: HOME HEALTH SERVICES | Facility: CLINIC | Age: 46
End: 2021-08-09
Payer: MEDICARE

## 2021-08-09 DIAGNOSIS — Z74.1 REQUIRES ASSISTANCE WITH ACTIVITIES OF DAILY LIVING (ADL): ICD-10-CM

## 2021-08-09 DIAGNOSIS — J96.11 CHRONIC RESPIRATORY FAILURE WITH HYPOXIA AND HYPERCAPNIA: ICD-10-CM

## 2021-08-09 DIAGNOSIS — Q05.2 SPINA BIFIDA OF LUMBAR REGION WITH HYDROCEPHALUS: Primary | ICD-10-CM

## 2021-08-09 DIAGNOSIS — F11.20 CONTINUOUS OPIOID DEPENDENCE: Chronic | ICD-10-CM

## 2021-08-09 DIAGNOSIS — G89.29 CHRONIC BILATERAL LOW BACK PAIN, UNSPECIFIED WHETHER SCIATICA PRESENT: ICD-10-CM

## 2021-08-09 DIAGNOSIS — J96.12 CHRONIC RESPIRATORY FAILURE WITH HYPOXIA AND HYPERCAPNIA: ICD-10-CM

## 2021-08-09 DIAGNOSIS — M54.50 CHRONIC BILATERAL LOW BACK PAIN, UNSPECIFIED WHETHER SCIATICA PRESENT: ICD-10-CM

## 2021-08-09 DIAGNOSIS — R53.81 PHYSICAL DEBILITY: ICD-10-CM

## 2021-08-09 DIAGNOSIS — S31.000A OPEN WOUND OF LOWER BACK: ICD-10-CM

## 2021-08-09 PROCEDURE — 99443 PR PHYSICIAN TELEPHONE EVALUATION 21-30 MIN: CPT | Mod: 95,,, | Performed by: NURSE PRACTITIONER

## 2021-08-09 PROCEDURE — 99443 PR PHYSICIAN TELEPHONE EVALUATION 21-30 MIN: ICD-10-PCS | Mod: 95,,, | Performed by: NURSE PRACTITIONER

## 2021-08-10 DIAGNOSIS — L89.159 PRESSURE INJURY OF SKIN OF SACRAL REGION, UNSPECIFIED INJURY STAGE: Primary | ICD-10-CM

## 2021-08-10 PROBLEM — S31.000A OPEN WOUND OF LOWER BACK: Status: ACTIVE | Noted: 2021-08-10

## 2021-08-12 ENCOUNTER — OFFICE VISIT (OUTPATIENT)
Dept: WOUND CARE | Facility: HOSPITAL | Age: 46
End: 2021-08-12
Attending: FAMILY MEDICINE
Payer: MEDICARE

## 2021-08-12 ENCOUNTER — TELEPHONE (OUTPATIENT)
Dept: FAMILY MEDICINE | Facility: CLINIC | Age: 46
End: 2021-08-12

## 2021-08-12 VITALS
RESPIRATION RATE: 18 BRPM | DIASTOLIC BLOOD PRESSURE: 76 MMHG | TEMPERATURE: 98 F | SYSTOLIC BLOOD PRESSURE: 128 MMHG | HEART RATE: 78 BPM

## 2021-08-12 DIAGNOSIS — L89.159 PRESSURE INJURY OF SKIN OF SACRAL REGION, UNSPECIFIED INJURY STAGE: ICD-10-CM

## 2021-08-12 DIAGNOSIS — L89.153 STAGE III PRESSURE ULCER OF SACRAL REGION: ICD-10-CM

## 2021-08-12 DIAGNOSIS — L89.133 PRESSURE ULCER OF RIGHT LOWER BACK, STAGE 3: Primary | ICD-10-CM

## 2021-08-12 PROCEDURE — 99202 OFFICE O/P NEW SF 15 MIN: CPT | Mod: 25,,, | Performed by: FAMILY MEDICINE

## 2021-08-12 PROCEDURE — 11042 DBRDMT SUBQ TIS 1ST 20SQCM/<: CPT | Mod: 59,,, | Performed by: FAMILY MEDICINE

## 2021-08-12 PROCEDURE — 11043 PR DEBRIDEMENT, SKIN, SUB-Q TISSUE,MUSCLE,=<20 SQ CM: ICD-10-PCS | Mod: ,,, | Performed by: FAMILY MEDICINE

## 2021-08-12 PROCEDURE — 99213 OFFICE O/P EST LOW 20 MIN: CPT | Mod: 25,PN | Performed by: FAMILY MEDICINE

## 2021-08-12 PROCEDURE — 99202 PR OFFICE/OUTPT VISIT, NEW, LEVL II, 15-29 MIN: ICD-10-PCS | Mod: 25,,, | Performed by: FAMILY MEDICINE

## 2021-08-12 PROCEDURE — 11043 DBRDMT MUSC&/FSCA 1ST 20/<: CPT | Mod: ,,, | Performed by: FAMILY MEDICINE

## 2021-08-12 PROCEDURE — 11043 DBRDMT MUSC&/FSCA 1ST 20/<: CPT | Mod: PN | Performed by: FAMILY MEDICINE

## 2021-08-12 PROCEDURE — 11042 DBRDMT SUBQ TIS 1ST 20SQCM/<: CPT | Mod: 59,PN | Performed by: FAMILY MEDICINE

## 2021-08-12 PROCEDURE — 11042 PR DEBRIDEMENT, SKIN, SUB-Q TISSUE,=<20 SQ CM: ICD-10-PCS | Mod: 59,,, | Performed by: FAMILY MEDICINE

## 2021-08-12 RX ORDER — COLLAGENASE SANTYL 250 [ARB'U]/G
OINTMENT TOPICAL DAILY
Qty: 30 G | Refills: 5 | Status: SHIPPED | OUTPATIENT
Start: 2021-08-12 | End: 2021-08-24

## 2021-08-19 ENCOUNTER — OFFICE VISIT (OUTPATIENT)
Dept: WOUND CARE | Facility: HOSPITAL | Age: 46
End: 2021-08-19
Attending: FAMILY MEDICINE
Payer: MEDICARE

## 2021-08-19 VITALS
SYSTOLIC BLOOD PRESSURE: 141 MMHG | RESPIRATION RATE: 18 BRPM | TEMPERATURE: 98 F | DIASTOLIC BLOOD PRESSURE: 71 MMHG | HEART RATE: 93 BPM

## 2021-08-19 DIAGNOSIS — L89.323 STAGE III PRESSURE ULCER OF LEFT BUTTOCK: ICD-10-CM

## 2021-08-19 DIAGNOSIS — L89.153 STAGE III PRESSURE ULCER OF SACRAL REGION: Primary | ICD-10-CM

## 2021-08-19 PROCEDURE — 99213 PR OFFICE/OUTPT VISIT, EST, LEVL III, 20-29 MIN: ICD-10-PCS | Mod: ,,, | Performed by: FAMILY MEDICINE

## 2021-08-19 PROCEDURE — 99213 OFFICE O/P EST LOW 20 MIN: CPT | Mod: ,,, | Performed by: FAMILY MEDICINE

## 2021-08-19 PROCEDURE — 99213 OFFICE O/P EST LOW 20 MIN: CPT | Mod: PN | Performed by: FAMILY MEDICINE

## 2021-08-19 PROCEDURE — 87186 SC STD MICRODIL/AGAR DIL: CPT | Performed by: FAMILY MEDICINE

## 2021-08-19 PROCEDURE — 87070 CULTURE OTHR SPECIMN AEROBIC: CPT | Performed by: FAMILY MEDICINE

## 2021-08-19 PROCEDURE — 87077 CULTURE AEROBIC IDENTIFY: CPT | Performed by: FAMILY MEDICINE

## 2021-08-20 ENCOUNTER — PATIENT MESSAGE (OUTPATIENT)
Dept: WOUND CARE | Facility: HOSPITAL | Age: 46
End: 2021-08-20

## 2021-08-23 PROBLEM — L89.323 STAGE III PRESSURE ULCER OF LEFT BUTTOCK: Status: ACTIVE | Noted: 2021-08-23

## 2021-08-23 LAB — BACTERIA SPEC AEROBE CULT: ABNORMAL

## 2021-08-24 ENCOUNTER — OFFICE VISIT (OUTPATIENT)
Dept: FAMILY MEDICINE | Facility: CLINIC | Age: 46
End: 2021-08-24
Payer: MEDICARE

## 2021-08-24 DIAGNOSIS — F11.20 CONTINUOUS OPIOID DEPENDENCE: Chronic | ICD-10-CM

## 2021-08-24 PROCEDURE — 99214 PR OFFICE/OUTPT VISIT, EST, LEVL IV, 30-39 MIN: ICD-10-PCS | Mod: 95,,, | Performed by: FAMILY MEDICINE

## 2021-08-24 PROCEDURE — 99214 OFFICE O/P EST MOD 30 MIN: CPT | Mod: 95,,, | Performed by: FAMILY MEDICINE

## 2021-08-24 RX ORDER — HYDROCODONE BITARTRATE AND ACETAMINOPHEN 7.5; 325 MG/1; MG/1
1 TABLET ORAL EVERY 6 HOURS PRN
Qty: 120 TABLET | Refills: 0 | Status: SHIPPED | OUTPATIENT
Start: 2021-08-24 | End: 2021-11-19 | Stop reason: SDUPTHER

## 2021-08-25 ENCOUNTER — HOSPITAL ENCOUNTER (OUTPATIENT)
Dept: RADIOLOGY | Facility: HOSPITAL | Age: 46
Discharge: HOME | End: 2021-08-25
Attending: FAMILY MEDICINE
Payer: MEDICARE

## 2021-08-25 DIAGNOSIS — L89.323 STAGE III PRESSURE ULCER OF LEFT BUTTOCK: ICD-10-CM

## 2021-08-25 PROCEDURE — 76857 US EXAM PELVIC LIMITED: CPT | Mod: TC

## 2021-08-26 ENCOUNTER — OFFICE VISIT (OUTPATIENT)
Dept: WOUND CARE | Facility: HOSPITAL | Age: 46
End: 2021-08-26
Attending: FAMILY MEDICINE
Payer: MEDICARE

## 2021-08-26 VITALS
HEART RATE: 98 BPM | SYSTOLIC BLOOD PRESSURE: 130 MMHG | DIASTOLIC BLOOD PRESSURE: 87 MMHG | TEMPERATURE: 98 F | RESPIRATION RATE: 18 BRPM

## 2021-08-26 DIAGNOSIS — L89.323 STAGE III PRESSURE ULCER OF LEFT BUTTOCK: ICD-10-CM

## 2021-08-26 DIAGNOSIS — L89.153 STAGE III PRESSURE ULCER OF SACRAL REGION: Primary | ICD-10-CM

## 2021-08-26 DIAGNOSIS — A49.8 PROTEUS INFECTION: ICD-10-CM

## 2021-08-26 PROCEDURE — 99214 OFFICE O/P EST MOD 30 MIN: CPT | Mod: PN | Performed by: FAMILY MEDICINE

## 2021-08-26 PROCEDURE — 99213 OFFICE O/P EST LOW 20 MIN: CPT | Mod: ,,, | Performed by: FAMILY MEDICINE

## 2021-08-26 PROCEDURE — 99213 PR OFFICE/OUTPT VISIT, EST, LEVL III, 20-29 MIN: ICD-10-PCS | Mod: ,,, | Performed by: FAMILY MEDICINE

## 2021-08-26 RX ORDER — AMOXICILLIN AND CLAVULANATE POTASSIUM 500; 125 MG/1; MG/1
1 TABLET, FILM COATED ORAL 2 TIMES DAILY
Qty: 20 TABLET | Refills: 0 | Status: SHIPPED | OUTPATIENT
Start: 2021-08-26 | End: 2021-09-05

## 2021-09-09 ENCOUNTER — OFFICE VISIT (OUTPATIENT)
Dept: WOUND CARE | Facility: HOSPITAL | Age: 46
End: 2021-09-09
Attending: FAMILY MEDICINE
Payer: MEDICARE

## 2021-09-09 VITALS
TEMPERATURE: 98 F | DIASTOLIC BLOOD PRESSURE: 65 MMHG | RESPIRATION RATE: 18 BRPM | SYSTOLIC BLOOD PRESSURE: 125 MMHG | HEART RATE: 80 BPM

## 2021-09-09 DIAGNOSIS — L89.153 STAGE III PRESSURE ULCER OF SACRAL REGION: Primary | ICD-10-CM

## 2021-09-09 DIAGNOSIS — K68.19 OTHER RETROPERITONEAL ABSCESS: ICD-10-CM

## 2021-09-09 DIAGNOSIS — I71.40 ABDOMINAL AORTIC ANEURYSM, WITHOUT RUPTURE: ICD-10-CM

## 2021-09-09 DIAGNOSIS — K65.1 PERITONEAL ABSCESS: ICD-10-CM

## 2021-09-09 DIAGNOSIS — L89.133 PRESSURE ULCER OF RIGHT LOWER BACK, STAGE 3: ICD-10-CM

## 2021-09-09 PROCEDURE — 99213 OFFICE O/P EST LOW 20 MIN: CPT | Mod: ,,, | Performed by: FAMILY MEDICINE

## 2021-09-09 PROCEDURE — 99213 PR OFFICE/OUTPT VISIT, EST, LEVL III, 20-29 MIN: ICD-10-PCS | Mod: ,,, | Performed by: FAMILY MEDICINE

## 2021-09-09 PROCEDURE — 99214 OFFICE O/P EST MOD 30 MIN: CPT | Mod: PN | Performed by: FAMILY MEDICINE

## 2021-09-10 ENCOUNTER — PATIENT MESSAGE (OUTPATIENT)
Dept: WOUND CARE | Facility: HOSPITAL | Age: 46
End: 2021-09-10

## 2021-09-10 RX ORDER — AMOXICILLIN AND CLAVULANATE POTASSIUM 500; 125 MG/1; MG/1
1 TABLET, FILM COATED ORAL 2 TIMES DAILY
Qty: 20 TABLET | Refills: 0 | Status: SHIPPED | OUTPATIENT
Start: 2021-09-10 | End: 2021-09-20

## 2021-09-13 ENCOUNTER — PATIENT MESSAGE (OUTPATIENT)
Dept: WOUND CARE | Facility: HOSPITAL | Age: 46
End: 2021-09-13

## 2021-09-13 ENCOUNTER — PATIENT MESSAGE (OUTPATIENT)
Dept: VASCULAR SURGERY | Facility: CLINIC | Age: 46
End: 2021-09-13

## 2021-09-13 ENCOUNTER — TELEPHONE (OUTPATIENT)
Dept: VASCULAR SURGERY | Facility: CLINIC | Age: 46
End: 2021-09-13

## 2021-09-15 ENCOUNTER — TELEPHONE (OUTPATIENT)
Dept: WOUND CARE | Facility: CLINIC | Age: 46
End: 2021-09-15

## 2021-09-16 ENCOUNTER — HOSPITAL ENCOUNTER (OUTPATIENT)
Dept: RADIOLOGY | Facility: HOSPITAL | Age: 46
Discharge: HOME | End: 2021-09-16
Attending: FAMILY MEDICINE
Payer: MEDICARE

## 2021-09-16 DIAGNOSIS — K65.1 PERITONEAL ABSCESS: ICD-10-CM

## 2021-09-16 LAB
CREAT SERPL-MCNC: 0.7 MG/DL (ref 0.5–1.4)
SAMPLE: NORMAL

## 2021-09-16 PROCEDURE — 74177 CT ABDOMEN PELVIS WITH CONTRAST: ICD-10-PCS | Mod: 26,,, | Performed by: RADIOLOGY

## 2021-09-16 PROCEDURE — 25500020 PHARM REV CODE 255

## 2021-09-16 PROCEDURE — 74177 CT ABD & PELVIS W/CONTRAST: CPT | Mod: 26,,, | Performed by: RADIOLOGY

## 2021-09-16 PROCEDURE — 74177 CT ABD & PELVIS W/CONTRAST: CPT | Mod: TC

## 2021-09-16 RX ADMIN — IOHEXOL 100 ML: 350 INJECTION, SOLUTION INTRAVENOUS at 10:09

## 2021-09-20 ENCOUNTER — CARE AT HOME (OUTPATIENT)
Dept: HOME HEALTH SERVICES | Facility: CLINIC | Age: 46
End: 2021-09-20
Payer: MEDICARE

## 2021-09-20 DIAGNOSIS — I10 ESSENTIAL HYPERTENSION: ICD-10-CM

## 2021-09-20 DIAGNOSIS — Z74.1 REQUIRES ASSISTANCE WITH ACTIVITIES OF DAILY LIVING (ADL): ICD-10-CM

## 2021-09-20 DIAGNOSIS — E66.01 MORBID OBESITY WITH BMI OF 50.0-59.9, ADULT: ICD-10-CM

## 2021-09-20 DIAGNOSIS — J96.11 CHRONIC RESPIRATORY FAILURE WITH HYPOXIA AND HYPERCAPNIA: ICD-10-CM

## 2021-09-20 DIAGNOSIS — J96.12 CHRONIC RESPIRATORY FAILURE WITH HYPOXIA AND HYPERCAPNIA: ICD-10-CM

## 2021-09-20 DIAGNOSIS — L40.8 INVERSE PSORIASIS: ICD-10-CM

## 2021-09-20 DIAGNOSIS — G82.20 PARAPLEGIA: Primary | ICD-10-CM

## 2021-09-20 DIAGNOSIS — R53.81 PHYSICAL DEBILITY: ICD-10-CM

## 2021-09-20 DIAGNOSIS — L89.133 PRESSURE ULCER OF RIGHT LOWER BACK, STAGE 3: ICD-10-CM

## 2021-09-20 DIAGNOSIS — Z93.59 SUPRAPUBIC CATHETER: ICD-10-CM

## 2021-09-20 DIAGNOSIS — L89.153 STAGE III PRESSURE ULCER OF SACRAL REGION: ICD-10-CM

## 2021-09-20 DIAGNOSIS — Q05.2 SPINA BIFIDA OF LUMBAR REGION WITH HYDROCEPHALUS: ICD-10-CM

## 2021-09-20 PROCEDURE — 99350 HOME/RES VST EST HIGH MDM 60: CPT | Mod: S$GLB,,, | Performed by: NURSE PRACTITIONER

## 2021-09-20 PROCEDURE — 99350 PR HOME VISIT,ESTAB PATIENT,LEVEL IV: ICD-10-PCS | Mod: S$GLB,,, | Performed by: NURSE PRACTITIONER

## 2021-09-22 ENCOUNTER — PATIENT MESSAGE (OUTPATIENT)
Dept: PULMONOLOGY | Facility: CLINIC | Age: 46
End: 2021-09-22

## 2021-09-23 ENCOUNTER — PATIENT MESSAGE (OUTPATIENT)
Dept: WOUND CARE | Facility: HOSPITAL | Age: 46
End: 2021-09-23

## 2021-10-02 VITALS
SYSTOLIC BLOOD PRESSURE: 126 MMHG | TEMPERATURE: 98 F | DIASTOLIC BLOOD PRESSURE: 72 MMHG | RESPIRATION RATE: 20 BRPM | HEART RATE: 64 BPM | OXYGEN SATURATION: 98 %

## 2021-10-05 ENCOUNTER — OFFICE VISIT (OUTPATIENT)
Dept: PULMONOLOGY | Facility: CLINIC | Age: 46
End: 2021-10-05
Payer: MEDICARE

## 2021-10-05 VITALS
WEIGHT: 290 LBS | SYSTOLIC BLOOD PRESSURE: 135 MMHG | HEART RATE: 114 BPM | BODY MASS INDEX: 54.8 KG/M2 | DIASTOLIC BLOOD PRESSURE: 82 MMHG | OXYGEN SATURATION: 97 %

## 2021-10-05 DIAGNOSIS — J96.22 ACUTE ON CHRONIC RESPIRATORY FAILURE WITH HYPOXIA AND HYPERCAPNIA: ICD-10-CM

## 2021-10-05 DIAGNOSIS — E66.01 MORBID OBESITY WITH BMI OF 50.0-59.9, ADULT: ICD-10-CM

## 2021-10-05 DIAGNOSIS — J96.21 ACUTE ON CHRONIC RESPIRATORY FAILURE WITH HYPOXIA AND HYPERCAPNIA: ICD-10-CM

## 2021-10-05 DIAGNOSIS — M41.9 SCOLIOSIS, UNSPECIFIED SCOLIOSIS TYPE, UNSPECIFIED SPINAL REGION: ICD-10-CM

## 2021-10-05 DIAGNOSIS — E66.2 PICKWICKIAN SYNDROME: Primary | ICD-10-CM

## 2021-10-05 DIAGNOSIS — E66.2 OBESITY HYPOVENTILATION SYNDROME: ICD-10-CM

## 2021-10-05 PROCEDURE — 99214 OFFICE O/P EST MOD 30 MIN: CPT | Mod: S$GLB,,, | Performed by: INTERNAL MEDICINE

## 2021-10-05 PROCEDURE — 99214 PR OFFICE/OUTPT VISIT, EST, LEVL IV, 30-39 MIN: ICD-10-PCS | Mod: S$GLB,,, | Performed by: INTERNAL MEDICINE

## 2021-10-07 ENCOUNTER — OFFICE VISIT (OUTPATIENT)
Dept: WOUND CARE | Facility: HOSPITAL | Age: 46
End: 2021-10-07
Attending: FAMILY MEDICINE
Payer: MEDICARE

## 2021-10-07 VITALS
RESPIRATION RATE: 18 BRPM | DIASTOLIC BLOOD PRESSURE: 66 MMHG | TEMPERATURE: 98 F | SYSTOLIC BLOOD PRESSURE: 111 MMHG | HEART RATE: 96 BPM

## 2021-10-07 DIAGNOSIS — L89.153 STAGE III PRESSURE ULCER OF SACRAL REGION: ICD-10-CM

## 2021-10-07 DIAGNOSIS — K68.19 OTHER RETROPERITONEAL ABSCESS: ICD-10-CM

## 2021-10-07 DIAGNOSIS — L89.313 STAGE III PRESSURE ULCER OF RIGHT BUTTOCK: ICD-10-CM

## 2021-10-07 DIAGNOSIS — S31.000A OPEN WOUND OF LOWER BACK: Primary | ICD-10-CM

## 2021-10-07 PROCEDURE — 99214 OFFICE O/P EST MOD 30 MIN: CPT | Mod: PN | Performed by: FAMILY MEDICINE

## 2021-10-11 ENCOUNTER — OFFICE VISIT (OUTPATIENT)
Dept: SURGERY | Facility: CLINIC | Age: 46
End: 2021-10-11
Payer: MEDICARE

## 2021-10-11 VITALS — HEIGHT: 61 IN | WEIGHT: 289.88 LBS | BODY MASS INDEX: 54.73 KG/M2

## 2021-10-11 DIAGNOSIS — K68.19 OTHER RETROPERITONEAL ABSCESS: ICD-10-CM

## 2021-10-11 PROCEDURE — 99999 PR PBB SHADOW E&M-EST. PATIENT-LVL III: CPT | Mod: PBBFAC,,, | Performed by: STUDENT IN AN ORGANIZED HEALTH CARE EDUCATION/TRAINING PROGRAM

## 2021-10-11 PROCEDURE — 99999 PR PBB SHADOW E&M-EST. PATIENT-LVL III: ICD-10-PCS | Mod: PBBFAC,,, | Performed by: STUDENT IN AN ORGANIZED HEALTH CARE EDUCATION/TRAINING PROGRAM

## 2021-10-11 PROCEDURE — 99214 PR OFFICE/OUTPT VISIT, EST, LEVL IV, 30-39 MIN: ICD-10-PCS | Mod: S$PBB,,, | Performed by: STUDENT IN AN ORGANIZED HEALTH CARE EDUCATION/TRAINING PROGRAM

## 2021-10-11 PROCEDURE — 99214 OFFICE O/P EST MOD 30 MIN: CPT | Mod: S$PBB,,, | Performed by: STUDENT IN AN ORGANIZED HEALTH CARE EDUCATION/TRAINING PROGRAM

## 2021-10-11 PROCEDURE — 99213 OFFICE O/P EST LOW 20 MIN: CPT | Mod: PBBFAC,PN | Performed by: STUDENT IN AN ORGANIZED HEALTH CARE EDUCATION/TRAINING PROGRAM

## 2021-11-19 ENCOUNTER — OFFICE VISIT (OUTPATIENT)
Dept: FAMILY MEDICINE | Facility: CLINIC | Age: 46
End: 2021-11-19
Payer: MEDICARE

## 2021-11-19 VITALS
WEIGHT: 289.88 LBS | BODY MASS INDEX: 54.73 KG/M2 | HEIGHT: 61 IN | TEMPERATURE: 99 F | OXYGEN SATURATION: 98 % | DIASTOLIC BLOOD PRESSURE: 72 MMHG | SYSTOLIC BLOOD PRESSURE: 126 MMHG | HEART RATE: 99 BPM

## 2021-11-19 DIAGNOSIS — F11.20 CONTINUOUS OPIOID DEPENDENCE: Chronic | ICD-10-CM

## 2021-11-19 DIAGNOSIS — I10 ESSENTIAL HYPERTENSION: Primary | ICD-10-CM

## 2021-11-19 PROCEDURE — 99999 PR PBB SHADOW E&M-EST. PATIENT-LVL III: CPT | Mod: PBBFAC,,, | Performed by: FAMILY MEDICINE

## 2021-11-19 PROCEDURE — 99214 PR OFFICE/OUTPT VISIT, EST, LEVL IV, 30-39 MIN: ICD-10-PCS | Mod: S$PBB,,, | Performed by: FAMILY MEDICINE

## 2021-11-19 PROCEDURE — 99213 OFFICE O/P EST LOW 20 MIN: CPT | Mod: PBBFAC,PO | Performed by: FAMILY MEDICINE

## 2021-11-19 PROCEDURE — 99214 OFFICE O/P EST MOD 30 MIN: CPT | Mod: S$PBB,,, | Performed by: FAMILY MEDICINE

## 2021-11-19 PROCEDURE — 99999 PR PBB SHADOW E&M-EST. PATIENT-LVL III: ICD-10-PCS | Mod: PBBFAC,,, | Performed by: FAMILY MEDICINE

## 2021-11-19 RX ORDER — HYDROCODONE BITARTRATE AND ACETAMINOPHEN 7.5; 325 MG/1; MG/1
1 TABLET ORAL EVERY 6 HOURS PRN
Qty: 120 TABLET | Refills: 0 | Status: SHIPPED | OUTPATIENT
Start: 2021-11-19 | End: 2022-02-24 | Stop reason: SDUPTHER

## 2021-12-08 ENCOUNTER — LAB VISIT (OUTPATIENT)
Dept: LAB | Facility: HOSPITAL | Age: 46
End: 2021-12-08
Attending: FAMILY MEDICINE
Payer: MEDICARE

## 2021-12-08 DIAGNOSIS — I10 ESSENTIAL HYPERTENSION: ICD-10-CM

## 2021-12-08 LAB
ALBUMIN SERPL BCP-MCNC: 2.8 G/DL (ref 3.5–5.2)
ALP SERPL-CCNC: 88 U/L (ref 55–135)
ALT SERPL W/O P-5'-P-CCNC: 10 U/L (ref 10–44)
ANION GAP SERPL CALC-SCNC: 13 MMOL/L (ref 8–16)
AST SERPL-CCNC: 17 U/L (ref 10–40)
BASOPHILS # BLD AUTO: 0.06 K/UL (ref 0–0.2)
BASOPHILS NFR BLD: 0.7 % (ref 0–1.9)
BILIRUB SERPL-MCNC: 0.3 MG/DL (ref 0.1–1)
BUN SERPL-MCNC: 8 MG/DL (ref 6–20)
CALCIUM SERPL-MCNC: 9.7 MG/DL (ref 8.7–10.5)
CHLORIDE SERPL-SCNC: 100 MMOL/L (ref 95–110)
CHOLEST SERPL-MCNC: 185 MG/DL (ref 120–199)
CHOLEST/HDLC SERPL: 2.9 {RATIO} (ref 2–5)
CO2 SERPL-SCNC: 23 MMOL/L (ref 23–29)
CREAT SERPL-MCNC: 0.7 MG/DL (ref 0.5–1.4)
DIFFERENTIAL METHOD: ABNORMAL
EOSINOPHIL # BLD AUTO: 0.4 K/UL (ref 0–0.5)
EOSINOPHIL NFR BLD: 4 % (ref 0–8)
ERYTHROCYTE [DISTWIDTH] IN BLOOD BY AUTOMATED COUNT: 20.1 % (ref 11.5–14.5)
EST. GFR  (AFRICAN AMERICAN): >60 ML/MIN/1.73 M^2
EST. GFR  (NON AFRICAN AMERICAN): >60 ML/MIN/1.73 M^2
GLUCOSE SERPL-MCNC: 89 MG/DL (ref 70–110)
HCT VFR BLD AUTO: 27.4 % (ref 37–48.5)
HDLC SERPL-MCNC: 63 MG/DL (ref 40–75)
HDLC SERPL: 34.1 % (ref 20–50)
HGB BLD-MCNC: 7.4 G/DL (ref 12–16)
IMM GRANULOCYTES # BLD AUTO: 0.06 K/UL (ref 0–0.04)
IMM GRANULOCYTES NFR BLD AUTO: 0.7 % (ref 0–0.5)
LDLC SERPL CALC-MCNC: 102.2 MG/DL (ref 63–159)
LYMPHOCYTES # BLD AUTO: 1.8 K/UL (ref 1–4.8)
LYMPHOCYTES NFR BLD: 19.8 % (ref 18–48)
MCH RBC QN AUTO: 17.7 PG (ref 27–31)
MCHC RBC AUTO-ENTMCNC: 27 G/DL (ref 32–36)
MCV RBC AUTO: 65 FL (ref 82–98)
MONOCYTES # BLD AUTO: 0.4 K/UL (ref 0.3–1)
MONOCYTES NFR BLD: 4.8 % (ref 4–15)
NEUTROPHILS # BLD AUTO: 6.2 K/UL (ref 1.8–7.7)
NEUTROPHILS NFR BLD: 70 % (ref 38–73)
NONHDLC SERPL-MCNC: 122 MG/DL
NRBC BLD-RTO: 0 /100 WBC
PLATELET # BLD AUTO: 525 K/UL (ref 150–450)
PMV BLD AUTO: 9.2 FL (ref 9.2–12.9)
POTASSIUM SERPL-SCNC: 3.8 MMOL/L (ref 3.5–5.1)
PROT SERPL-MCNC: 8.5 G/DL (ref 6–8.4)
RBC # BLD AUTO: 4.19 M/UL (ref 4–5.4)
SODIUM SERPL-SCNC: 136 MMOL/L (ref 136–145)
TRIGL SERPL-MCNC: 99 MG/DL (ref 30–150)
WBC # BLD AUTO: 8.91 K/UL (ref 3.9–12.7)

## 2021-12-08 PROCEDURE — 85025 COMPLETE CBC W/AUTO DIFF WBC: CPT | Performed by: FAMILY MEDICINE

## 2021-12-08 PROCEDURE — 80053 COMPREHEN METABOLIC PANEL: CPT | Performed by: FAMILY MEDICINE

## 2021-12-08 PROCEDURE — 80061 LIPID PANEL: CPT | Performed by: FAMILY MEDICINE

## 2021-12-08 PROCEDURE — 36415 COLL VENOUS BLD VENIPUNCTURE: CPT | Mod: PO | Performed by: FAMILY MEDICINE

## 2021-12-15 DIAGNOSIS — D50.9 IRON DEFICIENCY ANEMIA, UNSPECIFIED IRON DEFICIENCY ANEMIA TYPE: Primary | ICD-10-CM

## 2021-12-16 ENCOUNTER — LAB VISIT (OUTPATIENT)
Dept: LAB | Facility: HOSPITAL | Age: 46
End: 2021-12-16
Attending: FAMILY MEDICINE
Payer: MEDICARE

## 2021-12-16 DIAGNOSIS — D50.9 IRON DEFICIENCY ANEMIA, UNSPECIFIED IRON DEFICIENCY ANEMIA TYPE: ICD-10-CM

## 2021-12-16 LAB
FERRITIN SERPL-MCNC: 25 NG/ML (ref 20–300)
IRON SERPL-MCNC: 15 UG/DL (ref 30–160)
SATURATED IRON: 4 % (ref 20–50)
TOTAL IRON BINDING CAPACITY: 380 UG/DL (ref 250–450)
TRANSFERRIN SERPL-MCNC: 257 MG/DL (ref 200–375)

## 2021-12-16 PROCEDURE — 36415 COLL VENOUS BLD VENIPUNCTURE: CPT | Mod: PO | Performed by: FAMILY MEDICINE

## 2021-12-16 PROCEDURE — 82728 ASSAY OF FERRITIN: CPT | Performed by: FAMILY MEDICINE

## 2021-12-16 PROCEDURE — 84466 ASSAY OF TRANSFERRIN: CPT | Performed by: FAMILY MEDICINE

## 2021-12-30 DIAGNOSIS — D64.9 ANEMIA, UNSPECIFIED TYPE: Primary | ICD-10-CM

## 2022-01-06 ENCOUNTER — LAB VISIT (OUTPATIENT)
Dept: LAB | Facility: HOSPITAL | Age: 47
End: 2022-01-06
Attending: FAMILY MEDICINE
Payer: MEDICARE

## 2022-01-06 ENCOUNTER — OFFICE VISIT (OUTPATIENT)
Dept: WOUND CARE | Facility: HOSPITAL | Age: 47
End: 2022-01-06
Attending: FAMILY MEDICINE
Payer: MEDICARE

## 2022-01-06 VITALS
SYSTOLIC BLOOD PRESSURE: 131 MMHG | HEART RATE: 108 BPM | DIASTOLIC BLOOD PRESSURE: 94 MMHG | TEMPERATURE: 98 F | RESPIRATION RATE: 18 BRPM

## 2022-01-06 DIAGNOSIS — B37.2 CANDIDAL DERMATITIS: Primary | ICD-10-CM

## 2022-01-06 DIAGNOSIS — D64.9 ANEMIA, UNSPECIFIED TYPE: ICD-10-CM

## 2022-01-06 DIAGNOSIS — L89.313 STAGE III PRESSURE ULCER OF RIGHT BUTTOCK: ICD-10-CM

## 2022-01-06 PROBLEM — L89.152 PRESSURE ULCER OF SACRAL REGION, STAGE 2: Status: ACTIVE | Noted: 2022-01-06

## 2022-01-06 LAB
ALBUMIN SERPL BCP-MCNC: 2.6 G/DL (ref 3.5–5.2)
ALP SERPL-CCNC: 87 U/L (ref 55–135)
ALT SERPL W/O P-5'-P-CCNC: 6 U/L (ref 10–44)
ANION GAP SERPL CALC-SCNC: 9 MMOL/L (ref 8–16)
AST SERPL-CCNC: 12 U/L (ref 10–40)
BASOPHILS # BLD AUTO: 0.05 K/UL (ref 0–0.2)
BASOPHILS NFR BLD: 0.6 % (ref 0–1.9)
BILIRUB SERPL-MCNC: 0.2 MG/DL (ref 0.1–1)
BUN SERPL-MCNC: 10 MG/DL (ref 6–20)
CALCIUM SERPL-MCNC: 9.4 MG/DL (ref 8.7–10.5)
CHLORIDE SERPL-SCNC: 104 MMOL/L (ref 95–110)
CO2 SERPL-SCNC: 25 MMOL/L (ref 23–29)
CREAT SERPL-MCNC: 0.6 MG/DL (ref 0.5–1.4)
DIFFERENTIAL METHOD: ABNORMAL
EOSINOPHIL # BLD AUTO: 0.4 K/UL (ref 0–0.5)
EOSINOPHIL NFR BLD: 4.2 % (ref 0–8)
ERYTHROCYTE [DISTWIDTH] IN BLOOD BY AUTOMATED COUNT: 20.6 % (ref 11.5–14.5)
EST. GFR  (AFRICAN AMERICAN): >60 ML/MIN/1.73 M^2
EST. GFR  (NON AFRICAN AMERICAN): >60 ML/MIN/1.73 M^2
GLUCOSE SERPL-MCNC: 87 MG/DL (ref 70–110)
HCT VFR BLD AUTO: 28 % (ref 37–48.5)
HGB BLD-MCNC: 7.2 G/DL (ref 12–16)
IMM GRANULOCYTES # BLD AUTO: 0.05 K/UL (ref 0–0.04)
IMM GRANULOCYTES NFR BLD AUTO: 0.6 % (ref 0–0.5)
LYMPHOCYTES # BLD AUTO: 1.8 K/UL (ref 1–4.8)
LYMPHOCYTES NFR BLD: 19.9 % (ref 18–48)
MCH RBC QN AUTO: 17.1 PG (ref 27–31)
MCHC RBC AUTO-ENTMCNC: 25.7 G/DL (ref 32–36)
MCV RBC AUTO: 67 FL (ref 82–98)
MONOCYTES # BLD AUTO: 0.5 K/UL (ref 0.3–1)
MONOCYTES NFR BLD: 5.1 % (ref 4–15)
NEUTROPHILS # BLD AUTO: 6.1 K/UL (ref 1.8–7.7)
NEUTROPHILS NFR BLD: 69.6 % (ref 38–73)
NRBC BLD-RTO: 0 /100 WBC
PLATELET # BLD AUTO: 564 K/UL (ref 150–450)
PMV BLD AUTO: 9.5 FL (ref 9.2–12.9)
POTASSIUM SERPL-SCNC: 4.1 MMOL/L (ref 3.5–5.1)
PROT SERPL-MCNC: 8.2 G/DL (ref 6–8.4)
RBC # BLD AUTO: 4.2 M/UL (ref 4–5.4)
SODIUM SERPL-SCNC: 138 MMOL/L (ref 136–145)
WBC # BLD AUTO: 8.78 K/UL (ref 3.9–12.7)

## 2022-01-06 PROCEDURE — 99213 OFFICE O/P EST LOW 20 MIN: CPT | Mod: 25,,, | Performed by: FAMILY MEDICINE

## 2022-01-06 PROCEDURE — 85025 COMPLETE CBC W/AUTO DIFF WBC: CPT | Performed by: FAMILY MEDICINE

## 2022-01-06 PROCEDURE — 11042 PR DEBRIDEMENT, SKIN, SUB-Q TISSUE,=<20 SQ CM: ICD-10-PCS | Mod: ,,, | Performed by: FAMILY MEDICINE

## 2022-01-06 PROCEDURE — 36415 COLL VENOUS BLD VENIPUNCTURE: CPT | Mod: PO | Performed by: FAMILY MEDICINE

## 2022-01-06 PROCEDURE — 99213 OFFICE O/P EST LOW 20 MIN: CPT | Mod: 25 | Performed by: FAMILY MEDICINE

## 2022-01-06 PROCEDURE — 80053 COMPREHEN METABOLIC PANEL: CPT | Performed by: FAMILY MEDICINE

## 2022-01-06 PROCEDURE — 99213 PR OFFICE/OUTPT VISIT, EST, LEVL III, 20-29 MIN: ICD-10-PCS | Mod: 25,,, | Performed by: FAMILY MEDICINE

## 2022-01-06 PROCEDURE — 11042 DBRDMT SUBQ TIS 1ST 20SQCM/<: CPT | Performed by: FAMILY MEDICINE

## 2022-01-06 PROCEDURE — 11042 DBRDMT SUBQ TIS 1ST 20SQCM/<: CPT | Mod: ,,, | Performed by: FAMILY MEDICINE

## 2022-01-06 PROCEDURE — 11045 DBRDMT SUBQ TISS EACH ADDL: CPT | Performed by: FAMILY MEDICINE

## 2022-01-06 RX ORDER — NYSTATIN 100000 U/G
CREAM TOPICAL 2 TIMES DAILY
Qty: 90 G | Refills: 3 | Status: SHIPPED | OUTPATIENT
Start: 2022-01-06 | End: 2022-08-31

## 2022-01-06 RX ORDER — FLUCONAZOLE 150 MG/1
150 TABLET ORAL DAILY
Qty: 7 TABLET | Refills: 0 | Status: SHIPPED | OUTPATIENT
Start: 2022-01-06 | End: 2022-01-13

## 2022-01-06 NOTE — PROGRESS NOTES
Wound Care and Hyperbaric Medicine                Progress Note    Subjective:       Patient ID: Briana Uriarte is a 46 y.o. female.    Chief Complaint: Non-healing Wound (buttock)    HPI  Pt seen in clinic for a FU visit for a right buttock pressure ulcer. Pt also has a BL buttock diffuse candida infection with flaking and erythema. Pt has no other new complaints at this visit. She is with her caregiver, father for the visit.  Review of Systems   Skin: Positive for wound.        Right buttock open wound, BL buttock rash   All other systems reviewed and are negative.        Objective:        Physical Exam  Vitals and nursing note reviewed. Exam conducted with a chaperone present.   Constitutional:       General: She is not in acute distress.     Appearance: Normal appearance. She is not ill-appearing, toxic-appearing or diaphoretic.   Skin:     General: Skin is warm and dry.      Coloration: Skin is not jaundiced or pale.      Findings: Lesion present. No bruising, erythema or rash.      Comments: Right buttock pressure ulcer, BL buttock erythematous rash, see wound care assessment documentation in chart review scanned under the media tab   Neurological:      General: No focal deficit present.      Mental Status: She is alert and oriented to person, place, and time.   Psychiatric:         Mood and Affect: Mood normal.         Behavior: Behavior normal.         Thought Content: Thought content normal.         Judgment: Judgment normal.         Vitals:    01/06/22 0945   BP: (!) 131/94   Pulse: 108   Resp: 18   Temp: 98.1 °F (36.7 °C)       Assessment:           ICD-10-CM ICD-9-CM   1. Candidal dermatitis  B37.2 112.3   2. Stage III pressure ulcer of right buttock  L89.313 707.05     707.23                Plan:                  Briana was seen today for non-healing wound.    Diagnoses and all orders for this visit:    Candidal dermatitis    Stage III pressure ulcer of right buttock    Other  orders  -     nystatin (MYCOSTATIN) cream; Apply topically 2 (two) times daily.  -     fluconazole (DIFLUCAN) 150 MG Tab; Take 1 tablet (150 mg total) by mouth once daily. for 7 days      Much of the documentation for this visit was completed in the Wound Docs system. Please see the attached documentation for further details about this patient's care.

## 2022-01-06 NOTE — PATIENT INSTRUCTIONS
Much of the documentation for this visit was completed in the W[facility]s system. Please see the attached documentation for further details about this patient's care.

## 2022-01-07 ENCOUNTER — TELEPHONE (OUTPATIENT)
Dept: HEMATOLOGY/ONCOLOGY | Facility: CLINIC | Age: 47
End: 2022-01-07
Payer: MEDICARE

## 2022-01-07 DIAGNOSIS — D64.9 ANEMIA, UNSPECIFIED TYPE: Primary | ICD-10-CM

## 2022-01-20 ENCOUNTER — PATIENT MESSAGE (OUTPATIENT)
Dept: HEMATOLOGY/ONCOLOGY | Facility: CLINIC | Age: 47
End: 2022-01-20
Payer: MEDICARE

## 2022-01-20 ENCOUNTER — OFFICE VISIT (OUTPATIENT)
Dept: WOUND CARE | Facility: HOSPITAL | Age: 47
End: 2022-01-20
Attending: FAMILY MEDICINE
Payer: MEDICARE

## 2022-01-20 VITALS
TEMPERATURE: 98 F | RESPIRATION RATE: 18 BRPM | DIASTOLIC BLOOD PRESSURE: 80 MMHG | HEART RATE: 70 BPM | SYSTOLIC BLOOD PRESSURE: 120 MMHG

## 2022-01-20 DIAGNOSIS — L89.313 STAGE III PRESSURE ULCER OF RIGHT BUTTOCK: ICD-10-CM

## 2022-01-20 DIAGNOSIS — B37.2 CANDIDAL DERMATITIS: Primary | ICD-10-CM

## 2022-01-20 DIAGNOSIS — L89.133 PRESSURE ULCER OF RIGHT LOWER BACK, STAGE 3: ICD-10-CM

## 2022-01-20 DIAGNOSIS — S31.000A OPEN WOUND OF LOWER BACK: ICD-10-CM

## 2022-01-20 PROCEDURE — 11045 PR DEB SUBQ TISSUE ADD-ON: ICD-10-PCS | Mod: ,,, | Performed by: FAMILY MEDICINE

## 2022-01-20 PROCEDURE — 11042 PR DEBRIDEMENT, SKIN, SUB-Q TISSUE,=<20 SQ CM: ICD-10-PCS | Mod: ,,, | Performed by: FAMILY MEDICINE

## 2022-01-20 PROCEDURE — 99499 NO LOS: ICD-10-PCS | Mod: ,,, | Performed by: FAMILY MEDICINE

## 2022-01-20 PROCEDURE — 11045 DBRDMT SUBQ TISS EACH ADDL: CPT | Performed by: FAMILY MEDICINE

## 2022-01-20 PROCEDURE — 11042 DBRDMT SUBQ TIS 1ST 20SQCM/<: CPT | Performed by: FAMILY MEDICINE

## 2022-01-20 PROCEDURE — 11045 DBRDMT SUBQ TISS EACH ADDL: CPT | Mod: ,,, | Performed by: FAMILY MEDICINE

## 2022-01-20 PROCEDURE — 11042 DBRDMT SUBQ TIS 1ST 20SQCM/<: CPT | Mod: ,,, | Performed by: FAMILY MEDICINE

## 2022-01-20 PROCEDURE — 99499 UNLISTED E&M SERVICE: CPT | Mod: ,,, | Performed by: FAMILY MEDICINE

## 2022-01-20 NOTE — PROGRESS NOTES
Wound Care and Hyperbaric Medicine                Progress Note    Subjective:       Patient ID: Briana Uriarte is a 46 y.o. female.    Chief Complaint: No chief complaint on file.    HPI  Pt seen in clinic for a FU visit for open wounds to the low back and right buttock. Pt has no new complaints today, with her father who is her care giver.   Review of Systems   Skin: Positive for wound.        Open wound right buttock and right lower back   All other systems reviewed and are negative.        Objective:        Physical Exam  Vitals and nursing note reviewed. Exam conducted with a chaperone present.   Constitutional:       General: She is not in acute distress.     Appearance: Normal appearance. She is not ill-appearing, toxic-appearing or diaphoretic.   Skin:     General: Skin is warm and dry.      Coloration: Skin is not jaundiced or pale.      Findings: Lesion and rash present. No bruising or erythema.      Comments: Right lower back and right buttock open wounds, candida rash leg and breast folds, see wound care assessment documentation in chart review scanned under the media tab   Neurological:      General: No focal deficit present.      Mental Status: She is alert and oriented to person, place, and time.   Psychiatric:         Mood and Affect: Mood normal.         Behavior: Behavior normal.         Thought Content: Thought content normal.         Judgment: Judgment normal.         There were no vitals filed for this visit.    Assessment:           ICD-10-CM ICD-9-CM   1. Candidal dermatitis  B37.2 112.3   2. Stage III pressure ulcer of right buttock  L89.313 707.05     707.23   3. Open wound of lower back  S31.000A 876.0   4. Pressure ulcer of right lower back, stage 3  L89.133 707.03     707.23                Plan:                  Diagnoses and all orders for this visit:    Candidal dermatitis    Stage III pressure ulcer of right buttock    Open wound of lower back    Pressure ulcer of  right lower back, stage 3

## 2022-01-21 ENCOUNTER — PATIENT MESSAGE (OUTPATIENT)
Dept: HEMATOLOGY/ONCOLOGY | Facility: CLINIC | Age: 47
End: 2022-01-21

## 2022-01-21 ENCOUNTER — OFFICE VISIT (OUTPATIENT)
Dept: HEMATOLOGY/ONCOLOGY | Facility: CLINIC | Age: 47
End: 2022-01-21
Payer: MEDICARE

## 2022-01-21 DIAGNOSIS — Q05.7 SPINA BIFIDA OF LUMBAR REGION, UNSPECIFIED HYDROCEPHALUS PRESENCE: Primary | ICD-10-CM

## 2022-01-21 DIAGNOSIS — D50.0 IRON DEFICIENCY ANEMIA DUE TO CHRONIC BLOOD LOSS: ICD-10-CM

## 2022-01-21 DIAGNOSIS — D64.9 ANEMIA, UNSPECIFIED TYPE: ICD-10-CM

## 2022-01-21 PROCEDURE — 99204 PR OFFICE/OUTPT VISIT, NEW, LEVL IV, 45-59 MIN: ICD-10-PCS | Mod: 95,,, | Performed by: INTERNAL MEDICINE

## 2022-01-21 PROCEDURE — 99204 OFFICE O/P NEW MOD 45 MIN: CPT | Mod: 95,,, | Performed by: INTERNAL MEDICINE

## 2022-01-21 RX ORDER — SODIUM CHLORIDE 9 MG/ML
INJECTION, SOLUTION INTRAVENOUS CONTINUOUS
Status: CANCELLED | OUTPATIENT
Start: 2022-01-21

## 2022-01-21 RX ORDER — SODIUM CHLORIDE 0.9 % (FLUSH) 0.9 %
10 SYRINGE (ML) INJECTION
Status: CANCELLED | OUTPATIENT
Start: 2022-01-21

## 2022-01-21 RX ORDER — HEPARIN 100 UNIT/ML
5 SYRINGE INTRAVENOUS
Status: CANCELLED | OUTPATIENT
Start: 2022-01-21

## 2022-01-21 NOTE — PROGRESS NOTES
This was a virtual visit due to COVID-19.  Patient was located at home.    Service Date:  1/21/22    Chief Complaint: Anemia    Briana Uriarte is a 46 y.o. female referred to me for anemia. Patient was noted to be anemic on routine blood, along with iron deficiency anemia. Patient has fatigue.  Patient's mother gave most of the history.  Denies any blood loss.  Patient has never had a colonoscopy.  Denies any black stools.  Patient is paraplegic from spina bifida.    Review of Systems   Constitutional: Negative.    HENT: Negative.    Eyes: Negative.    Respiratory: Negative.    Cardiovascular: Negative.    Gastrointestinal: Negative.    Endocrine: Negative.    Genitourinary: Negative.    Neurological: Negative.    Hematological: Negative.    Psychiatric/Behavioral: Negative.         Current Outpatient Medications   Medication Instructions    apixaban (ELIQUIS) 2.5 mg, Oral, 2 times daily    ascorbic acid (vitamin C) (VITAMIN C) 500 mg, Oral, Daily    furosemide (LASIX) 20 mg, Oral, Daily    HYDROcodone-acetaminophen (NORCO) 7.5-325 mg per tablet 1 tablet, Oral, Every 6 hours PRN    HYDROcodone-acetaminophen (NORCO) 7.5-325 mg per tablet 1 tablet, Oral, Every 6 hours PRN, Do not fill until 12/19/21    HYDROcodone-acetaminophen (NORCO) 7.5-325 mg per tablet 1 tablet, Oral, Every 6 hours PRN, Do not fill until 1/19/22    metoprolol succinate (TOPROL-XL) 50 MG 24 hr tablet TAKE 1 TABLET(50 MG) BY MOUTH EVERY DAY    multivitamin with minerals tablet 1 tablet, Daily    nystatin (MYCOSTATIN) cream Topical (Top), 2 times daily    omeprazole (PRILOSEC) 40 mg, Oral, Daily    silver sulfADIAZINE 1% (SILVADENE) 1 % cream Topical (Top), 2 times daily        Past Medical History:   Diagnosis Date    Chronic hypercapnic respiratory failure     Chronic kidney disease     kidney stones    Diabetes mellitus, type 2     Esotropia     GERD (gastroesophageal reflux disease)     Hydrocephalus     Hypertension      Morbid obesity     Obesity hypoventilation syndrome     Paralysis     Scoliosis     Spinal bifida, closed         Past Surgical History:   Procedure Laterality Date    BRAIN SURGERY       shunt revision    CHOLECYSTECTOMY      CYSTOSCOPY W/ LASER LITHOTRIPSY      REPAIR OF INCARCERATED UMBILICAL HERNIA N/A 3/8/2019    Procedure: REPAIR, HERNIA, UMBILICAL, INCARCERATED, AGE 5 YEARS OR OLDER;  Surgeon: Ziyad Hancock MD;  Location: [facility];  Service: General;  Laterality: N/A;    SPINE SURGERY          Family History   Problem Relation Age of Onset    Cancer Mother     Glaucoma Father     Heart disease Maternal Grandmother     Cancer Maternal Grandmother     Glaucoma Paternal Grandfather     Psoriasis Maternal Uncle     Melanoma Neg Hx     Lupus Neg Hx     Eczema Neg Hx        Social History     Tobacco Use    Smoking status: Never Smoker    Smokeless tobacco: Never Used   Substance Use Topics    Alcohol use: No    Drug use: Yes     Types: Hydrocodone         There were no vitals filed for this visit.     Physical Exam:  There were no vitals taken for this visit.    Physical Exam  Constitutional:       Appearance: Normal appearance.   HENT:      Head: Normocephalic and atraumatic.      Nose: Nose normal.      Mouth/Throat:      Mouth: Mucous membranes are moist.      Pharynx: Oropharynx is clear.   Eyes:      Conjunctiva/sclera: Conjunctivae normal.   Cardiovascular:      Rate and Rhythm: Normal rate and regular rhythm.   Pulmonary:      Effort: Pulmonary effort is normal.   Abdominal:      General: There is no distension.   Musculoskeletal:         General: Normal range of motion.      Cervical back: Normal range of motion and neck supple.   Skin:     General: Skin is warm and dry.   Neurological:      Mental Status: She is alert and oriented to person, place, and time. Mental status is at baseline.   Psychiatric:         Mood and Affect: Mood normal.          Labs:  Lab Results    Component Value Date    WBC 8.78 01/06/2022    RBC 4.20 01/06/2022    HGB 7.2 (L) 01/06/2022    HCT 28.0 (L) 01/06/2022    MCV 67 (L) 01/06/2022    MCH 17.1 (L) 01/06/2022    MCHC 25.7 (L) 01/06/2022    RDW 20.6 (H) 01/06/2022     (H) 01/06/2022    MPV 9.5 01/06/2022    GRAN 6.1 01/06/2022    GRAN 69.6 01/06/2022    LYMPH 1.8 01/06/2022    LYMPH 19.9 01/06/2022    MONO 0.5 01/06/2022    MONO 5.1 01/06/2022    EOS 0.4 01/06/2022    BASO 0.05 01/06/2022    EOSINOPHIL 4.2 01/06/2022    BASOPHIL 0.6 01/06/2022     Sodium   Date Value Ref Range Status   01/06/2022 138 136 - 145 mmol/L Final     Potassium   Date Value Ref Range Status   01/06/2022 4.1 3.5 - 5.1 mmol/L Final     Chloride   Date Value Ref Range Status   01/06/2022 104 95 - 110 mmol/L Final     CO2   Date Value Ref Range Status   01/06/2022 25 23 - 29 mmol/L Final     Glucose   Date Value Ref Range Status   01/06/2022 87 70 - 110 mg/dL Final     BUN   Date Value Ref Range Status   01/06/2022 10 6 - 20 mg/dL Final     Creatinine   Date Value Ref Range Status   01/06/2022 0.6 0.5 - 1.4 mg/dL Final     Calcium   Date Value Ref Range Status   01/06/2022 9.4 8.7 - 10.5 mg/dL Final     Total Protein   Date Value Ref Range Status   01/06/2022 8.2 6.0 - 8.4 g/dL Final     Albumin   Date Value Ref Range Status   01/06/2022 2.6 (L) 3.5 - 5.2 g/dL Final     Total Bilirubin   Date Value Ref Range Status   01/06/2022 0.2 0.1 - 1.0 mg/dL Final     Comment:     For infants and newborns, interpretation of results should be based  on gestational age, weight and in agreement with clinical  observations.    Premature Infant recommended reference ranges:  Up to 24 hours.............<8.0 mg/dL  Up to 48 hours............<12.0 mg/dL  3-5 days..................<15.0 mg/dL  6-29 days.................<15.0 mg/dL       Alkaline Phosphatase   Date Value Ref Range Status   01/06/2022 87 55 - 135 U/L Final     AST   Date Value Ref Range Status   01/06/2022 12 10 - 40 U/L Final      ALT   Date Value Ref Range Status   01/06/2022 6 (L) 10 - 44 U/L Final     Anion Gap   Date Value Ref Range Status   01/06/2022 9 8 - 16 mmol/L Final     eGFR if    Date Value Ref Range Status   01/06/2022 >60.0 >60 mL/min/1.73 m^2 Final     eGFR if non    Date Value Ref Range Status   01/06/2022 >60.0 >60 mL/min/1.73 m^2 Final     Comment:     Calculation used to obtain the estimated glomerular filtration  rate (eGFR) is the CKD-EPI equation.          A/P:    Iron deficiency anemia  -suspect due to chronic blood loss  -will set up for 2 doses of IV Injectafer.  I explained to the mom that we will 1st need authorization.  We will then schedule her for 2 infusions 1 week apart.  -recheck blood counts in 2 months with return appointment    Spina bifida  -patient prefers virtual visits as a result  -patient is paraplegic  -will limit access to treatment      Aurash Khoobehi, MD  Hematology and Oncology

## 2022-02-02 ENCOUNTER — PES CALL (OUTPATIENT)
Dept: ADMINISTRATIVE | Facility: CLINIC | Age: 47
End: 2022-02-02
Payer: MEDICARE

## 2022-02-03 ENCOUNTER — OFFICE VISIT (OUTPATIENT)
Dept: WOUND CARE | Facility: HOSPITAL | Age: 47
End: 2022-02-03
Attending: FAMILY MEDICINE
Payer: MEDICARE

## 2022-02-03 VITALS — HEART RATE: 96 BPM | DIASTOLIC BLOOD PRESSURE: 67 MMHG | TEMPERATURE: 98 F | SYSTOLIC BLOOD PRESSURE: 118 MMHG

## 2022-02-03 DIAGNOSIS — S21.201D OPEN WOUND OF RIGHT SIDE OF BACK, SUBSEQUENT ENCOUNTER: ICD-10-CM

## 2022-02-03 DIAGNOSIS — S31.819D OPEN WOUND OF RIGHT BUTTOCK WITH COMPLICATION, SUBSEQUENT ENCOUNTER: Primary | ICD-10-CM

## 2022-02-03 PROBLEM — S21.201A OPEN WOUND OF RIGHT SIDE OF BACK: Status: ACTIVE | Noted: 2022-02-03

## 2022-02-03 PROBLEM — S31.819A OPEN WOUND OF RIGHT BUTTOCK WITH COMPLICATION: Status: ACTIVE | Noted: 2022-02-03

## 2022-02-03 PROCEDURE — 99499 UNLISTED E&M SERVICE: CPT | Mod: ,,, | Performed by: FAMILY MEDICINE

## 2022-02-03 PROCEDURE — 11042 PR DEBRIDEMENT, SKIN, SUB-Q TISSUE,=<20 SQ CM: ICD-10-PCS | Mod: ,,, | Performed by: FAMILY MEDICINE

## 2022-02-03 PROCEDURE — 11042 DBRDMT SUBQ TIS 1ST 20SQCM/<: CPT | Mod: ,,, | Performed by: FAMILY MEDICINE

## 2022-02-03 PROCEDURE — 11045 DBRDMT SUBQ TISS EACH ADDL: CPT | Performed by: FAMILY MEDICINE

## 2022-02-03 PROCEDURE — 11042 DBRDMT SUBQ TIS 1ST 20SQCM/<: CPT | Performed by: FAMILY MEDICINE

## 2022-02-03 PROCEDURE — 87077 CULTURE AEROBIC IDENTIFY: CPT | Mod: 59 | Performed by: FAMILY MEDICINE

## 2022-02-03 PROCEDURE — 87186 SC STD MICRODIL/AGAR DIL: CPT | Performed by: FAMILY MEDICINE

## 2022-02-03 PROCEDURE — 87070 CULTURE OTHR SPECIMN AEROBIC: CPT | Mod: 59 | Performed by: FAMILY MEDICINE

## 2022-02-03 PROCEDURE — 99499 NO LOS: ICD-10-PCS | Mod: ,,, | Performed by: FAMILY MEDICINE

## 2022-02-03 NOTE — PROGRESS NOTES
Wound Care and Hyperbaric Medicine                Progress Note    Subjective:       Patient ID: Briana Uriarte is a 46 y.o. female.    Chief Complaint: Wound Care and Non-healing Wound    HPI  Pt seen in clinic for a FU visit for open wounds to the right buttock and back. Pt has no new complaints, but has yellow exudate in the back wound.   Review of Systems   Skin: Positive for wound.        Open wounds to back and right buttock wound   All other systems reviewed and are negative.        Objective:        Physical Exam  Vitals and nursing note reviewed. Exam conducted with a chaperone present.   Constitutional:       General: She is not in acute distress.     Appearance: Normal appearance. She is not ill-appearing, toxic-appearing or diaphoretic.   Skin:     General: Skin is warm and dry.      Coloration: Skin is not jaundiced or pale.      Findings: Lesion present. No bruising, erythema or rash.      Comments: Right back and buttock open wounds, see wound care assessment documentation in chart review scanned under the media tab   Neurological:      General: No focal deficit present.      Mental Status: She is alert and oriented to person, place, and time.   Psychiatric:         Mood and Affect: Mood normal.         Behavior: Behavior normal.         Thought Content: Thought content normal.         Judgment: Judgment normal.         Vitals:    02/03/22 1111   BP: 118/67   Pulse: 96   Temp: 98.2 °F (36.8 °C)       Assessment:           ICD-10-CM ICD-9-CM   1. Open wound of right buttock with complication, subsequent encounter  S31.819D V58.89     877.1   2. Open wound of right side of back, subsequent encounter  S21.201D V58.89     876.0                Plan:                  Briana was seen today for wound care and non-healing wound.    Diagnoses and all orders for this visit:    Open wound of right buttock with complication, subsequent encounter  -     Aerobic culture    Open wound of right side  of back, subsequent encounter  -     Aerobic culture    Much of the documentation for this visit was completed in wound docs system. Please see the attached documentation for further details about the patients care. Scanned under the media tab.

## 2022-02-03 NOTE — PATIENT INSTRUCTIONS
Much of the documentation for this visit was completed in wound docs system. Please see the attached documentation for further details about the patients care. Scanned under the media tab.

## 2022-02-07 LAB
BACTERIA SPEC AEROBE CULT: ABNORMAL
BACTERIA SPEC AEROBE CULT: ABNORMAL

## 2022-02-08 ENCOUNTER — INFUSION (OUTPATIENT)
Dept: INFUSION THERAPY | Facility: HOSPITAL | Age: 47
End: 2022-02-08
Attending: INTERNAL MEDICINE
Payer: MEDICARE

## 2022-02-08 VITALS
HEART RATE: 120 BPM | BODY MASS INDEX: 54.73 KG/M2 | WEIGHT: 289.88 LBS | TEMPERATURE: 98 F | SYSTOLIC BLOOD PRESSURE: 132 MMHG | RESPIRATION RATE: 18 BRPM | HEIGHT: 61 IN | DIASTOLIC BLOOD PRESSURE: 89 MMHG

## 2022-02-08 DIAGNOSIS — D50.0 IRON DEFICIENCY ANEMIA DUE TO CHRONIC BLOOD LOSS: Primary | ICD-10-CM

## 2022-02-08 LAB
BACTERIA SPEC AEROBE CULT: ABNORMAL
BACTERIA SPEC AEROBE CULT: ABNORMAL

## 2022-02-08 PROCEDURE — 25000003 PHARM REV CODE 250: Performed by: INTERNAL MEDICINE

## 2022-02-08 PROCEDURE — 96365 THER/PROPH/DIAG IV INF INIT: CPT

## 2022-02-08 PROCEDURE — 63600175 PHARM REV CODE 636 W HCPCS: Mod: JG | Performed by: INTERNAL MEDICINE

## 2022-02-08 PROCEDURE — A4216 STERILE WATER/SALINE, 10 ML: HCPCS | Performed by: INTERNAL MEDICINE

## 2022-02-08 RX ORDER — SODIUM CHLORIDE 0.9 % (FLUSH) 0.9 %
10 SYRINGE (ML) INJECTION
Status: CANCELLED | OUTPATIENT
Start: 2022-02-15

## 2022-02-08 RX ORDER — SODIUM CHLORIDE 0.9 % (FLUSH) 0.9 %
10 SYRINGE (ML) INJECTION
Status: DISCONTINUED | OUTPATIENT
Start: 2022-02-08 | End: 2022-02-08 | Stop reason: HOSPADM

## 2022-02-08 RX ORDER — HEPARIN 100 UNIT/ML
5 SYRINGE INTRAVENOUS
Status: CANCELLED | OUTPATIENT
Start: 2022-02-15

## 2022-02-08 RX ORDER — SODIUM CHLORIDE 9 MG/ML
INJECTION, SOLUTION INTRAVENOUS CONTINUOUS
Status: CANCELLED | OUTPATIENT
Start: 2022-02-15

## 2022-02-08 RX ORDER — SODIUM CHLORIDE 9 MG/ML
INJECTION, SOLUTION INTRAVENOUS CONTINUOUS
Status: DISCONTINUED | OUTPATIENT
Start: 2022-02-08 | End: 2022-02-08 | Stop reason: HOSPADM

## 2022-02-08 RX ADMIN — SODIUM CHLORIDE 10 ML: 9 INJECTION INTRAMUSCULAR; INTRAVENOUS; SUBCUTANEOUS at 03:02

## 2022-02-08 RX ADMIN — FERRIC CARBOXYMALTOSE INJECTION 750 MG: 50 INJECTION, SOLUTION INTRAVENOUS at 02:02

## 2022-02-08 NOTE — PLAN OF CARE
Problem: Fatigue  Goal: Improved Activity Tolerance  2/8/2022 1509 by Neisha Chavez RN  Outcome: Met  2/8/2022 1509 by Neisha Chavez RN  Outcome: Ongoing, Progressing

## 2022-02-09 ENCOUNTER — PATIENT MESSAGE (OUTPATIENT)
Dept: WOUND CARE | Facility: CLINIC | Age: 47
End: 2022-02-09
Payer: MEDICARE

## 2022-02-09 ENCOUNTER — PATIENT MESSAGE (OUTPATIENT)
Dept: WOUND CARE | Facility: HOSPITAL | Age: 47
End: 2022-02-09
Payer: MEDICARE

## 2022-02-10 RX ORDER — AMOXICILLIN AND CLAVULANATE POTASSIUM 500; 125 MG/1; MG/1
1 TABLET, FILM COATED ORAL 2 TIMES DAILY
Qty: 20 TABLET | Refills: 0 | Status: SHIPPED | OUTPATIENT
Start: 2022-02-10 | End: 2022-02-20

## 2022-02-15 ENCOUNTER — INFUSION (OUTPATIENT)
Dept: INFUSION THERAPY | Facility: HOSPITAL | Age: 47
End: 2022-02-15
Attending: INTERNAL MEDICINE
Payer: MEDICARE

## 2022-02-15 VITALS
DIASTOLIC BLOOD PRESSURE: 73 MMHG | SYSTOLIC BLOOD PRESSURE: 129 MMHG | OXYGEN SATURATION: 100 % | BODY MASS INDEX: 54.78 KG/M2 | HEART RATE: 92 BPM | WEIGHT: 289.88 LBS | TEMPERATURE: 97 F | RESPIRATION RATE: 18 BRPM

## 2022-02-15 DIAGNOSIS — D50.0 IRON DEFICIENCY ANEMIA DUE TO CHRONIC BLOOD LOSS: Primary | ICD-10-CM

## 2022-02-15 PROCEDURE — 25000003 PHARM REV CODE 250: Performed by: INTERNAL MEDICINE

## 2022-02-15 PROCEDURE — 96365 THER/PROPH/DIAG IV INF INIT: CPT

## 2022-02-15 PROCEDURE — 63600175 PHARM REV CODE 636 W HCPCS: Mod: JG | Performed by: INTERNAL MEDICINE

## 2022-02-15 RX ORDER — HEPARIN 100 UNIT/ML
5 SYRINGE INTRAVENOUS
Status: CANCELLED | OUTPATIENT
Start: 2022-02-22

## 2022-02-15 RX ORDER — SODIUM CHLORIDE 0.9 % (FLUSH) 0.9 %
10 SYRINGE (ML) INJECTION
Status: CANCELLED | OUTPATIENT
Start: 2022-02-22

## 2022-02-15 RX ORDER — SODIUM CHLORIDE 0.9 % (FLUSH) 0.9 %
10 SYRINGE (ML) INJECTION
Status: DISCONTINUED | OUTPATIENT
Start: 2022-02-15 | End: 2022-02-15 | Stop reason: HOSPADM

## 2022-02-15 RX ORDER — SODIUM CHLORIDE 9 MG/ML
INJECTION, SOLUTION INTRAVENOUS CONTINUOUS
Status: DISCONTINUED | OUTPATIENT
Start: 2022-02-15 | End: 2022-02-15 | Stop reason: HOSPADM

## 2022-02-15 RX ORDER — SODIUM CHLORIDE 9 MG/ML
INJECTION, SOLUTION INTRAVENOUS CONTINUOUS
Status: CANCELLED | OUTPATIENT
Start: 2022-02-22

## 2022-02-15 RX ADMIN — FERRIC CARBOXYMALTOSE INJECTION 750 MG: 50 INJECTION, SOLUTION INTRAVENOUS at 02:02

## 2022-02-15 RX ADMIN — SODIUM CHLORIDE: 0.9 INJECTION, SOLUTION INTRAVENOUS at 02:02

## 2022-02-15 NOTE — PLAN OF CARE
Problem: Fatigue  Goal: Improved Activity Tolerance  Outcome: Ongoing, Progressing  Intervention: Promote Improved Energy  Flowsheets (Taken 2/15/2022 1422)  Fatigue Management: frequent rest breaks encouraged

## 2022-02-17 ENCOUNTER — OFFICE VISIT (OUTPATIENT)
Dept: WOUND CARE | Facility: HOSPITAL | Age: 47
End: 2022-02-17
Attending: FAMILY MEDICINE
Payer: MEDICARE

## 2022-02-17 VITALS
RESPIRATION RATE: 19 BRPM | SYSTOLIC BLOOD PRESSURE: 136 MMHG | DIASTOLIC BLOOD PRESSURE: 83 MMHG | TEMPERATURE: 98 F | HEART RATE: 96 BPM

## 2022-02-17 DIAGNOSIS — S31.000A OPEN WOUND OF LOWER BACK: ICD-10-CM

## 2022-02-17 DIAGNOSIS — L89.313 STAGE III PRESSURE ULCER OF RIGHT BUTTOCK: ICD-10-CM

## 2022-02-17 DIAGNOSIS — L89.323 STAGE III PRESSURE ULCER OF LEFT BUTTOCK: ICD-10-CM

## 2022-02-17 DIAGNOSIS — S31.819D OPEN WOUND OF RIGHT BUTTOCK WITH COMPLICATION, SUBSEQUENT ENCOUNTER: Primary | ICD-10-CM

## 2022-02-17 DIAGNOSIS — L89.153 STAGE III PRESSURE ULCER OF SACRAL REGION: ICD-10-CM

## 2022-02-17 DIAGNOSIS — S21.201D OPEN WOUND OF RIGHT SIDE OF BACK, SUBSEQUENT ENCOUNTER: ICD-10-CM

## 2022-02-17 PROCEDURE — 11044 PR DEBRIDEMENT, SKIN, SUB-Q TISSUE,MUSCLE,BONE,=<20 SQ CM: ICD-10-PCS | Mod: ,,, | Performed by: FAMILY MEDICINE

## 2022-02-17 PROCEDURE — 11044 DBRDMT BONE 1ST 20 SQ CM/<: CPT | Mod: ,,, | Performed by: FAMILY MEDICINE

## 2022-02-17 PROCEDURE — 99499 UNLISTED E&M SERVICE: CPT | Mod: ,,, | Performed by: FAMILY MEDICINE

## 2022-02-17 PROCEDURE — 99499 NO LOS: ICD-10-PCS | Mod: ,,, | Performed by: FAMILY MEDICINE

## 2022-02-17 PROCEDURE — 11044 DBRDMT BONE 1ST 20 SQ CM/<: CPT | Performed by: FAMILY MEDICINE

## 2022-02-17 PROCEDURE — 11047 DBRDMT BONE EACH ADDL: CPT | Performed by: FAMILY MEDICINE

## 2022-02-17 NOTE — PROGRESS NOTES
Wound Care and Hyperbaric Medicine                Progress Note    Subjective:       Patient ID: Briana Uriarte is a 46 y.o. female.    Chief Complaint: Wound Care, Pressure Ulcer, and Non-healing Wound    HPI  Pt seen in clinic for a FU visit for BL buttock wounds and sacral wound. Wounds are improving except the right buttock. Pt is with parents and they have no new complaints at this visit.   Review of Systems   Skin: Positive for wound.        BL buttock wounds, sacral wound   All other systems reviewed and are negative.        Objective:        Physical Exam  Vitals and nursing note reviewed. Exam conducted with a chaperone present.   Constitutional:       General: She is not in acute distress.     Appearance: Normal appearance. She is not ill-appearing, toxic-appearing or diaphoretic.   Skin:     General: Skin is warm.      Coloration: Skin is not jaundiced or pale.      Findings: Lesion present. No bruising, erythema or rash.      Comments: BL buttock wounds, sacral wound, see wound care assessment documentation in chart review scanned under the media tab   Neurological:      General: No focal deficit present.      Mental Status: She is alert and oriented to person, place, and time.   Psychiatric:         Mood and Affect: Mood normal.         Behavior: Behavior normal.         Thought Content: Thought content normal.         Judgment: Judgment normal.         Vitals:    02/17/22 1104   BP: 136/83   Pulse: 96   Resp: 19   Temp: 98.4 °F (36.9 °C)       Assessment:           ICD-10-CM ICD-9-CM   1. Open wound of right buttock with complication, subsequent encounter  S31.819D V58.89     877.1   2. Stage III pressure ulcer of right buttock  L89.313 707.05     707.23   3. Open wound of right side of back, subsequent encounter  S21.201D V58.89     876.0   4. Open wound of lower back  S31.000A 876.0   5. Stage III pressure ulcer of sacral region  L89.153 707.03     707.23   6. Stage III pressure  ulcer of left buttock  L89.323 707.05     707.23                Plan:                  Briana was seen today for wound care, pressure ulcer and non-healing wound.    Diagnoses and all orders for this visit:    Open wound of right buttock with complication, subsequent encounter    Stage III pressure ulcer of right buttock    Open wound of right side of back, subsequent encounter    Open wound of lower back    Stage III pressure ulcer of sacral region    Stage III pressure ulcer of left buttock    Other orders  -     sodium hypochlorite (DAKINS) external solution; Apply topically once daily.      Much of the documentation for this visit was completed in wound docs system. Please see the attached documentation for further details about the patients care. Scanned under the media tab.

## 2022-02-24 ENCOUNTER — OFFICE VISIT (OUTPATIENT)
Dept: FAMILY MEDICINE | Facility: CLINIC | Age: 47
End: 2022-02-24
Payer: MEDICARE

## 2022-02-24 DIAGNOSIS — L08.9 WOUND INFECTION: Primary | ICD-10-CM

## 2022-02-24 DIAGNOSIS — F11.20 CONTINUOUS OPIOID DEPENDENCE: Chronic | ICD-10-CM

## 2022-02-24 DIAGNOSIS — T14.8XXA WOUND INFECTION: Primary | ICD-10-CM

## 2022-02-24 PROCEDURE — 99214 OFFICE O/P EST MOD 30 MIN: CPT | Mod: 95,,, | Performed by: FAMILY MEDICINE

## 2022-02-24 PROCEDURE — 99214 PR OFFICE/OUTPT VISIT, EST, LEVL IV, 30-39 MIN: ICD-10-PCS | Mod: 95,,, | Performed by: FAMILY MEDICINE

## 2022-02-24 RX ORDER — CLINDAMYCIN HYDROCHLORIDE 300 MG/1
600 CAPSULE ORAL EVERY 8 HOURS
Qty: 42 CAPSULE | Refills: 0 | Status: SHIPPED | OUTPATIENT
Start: 2022-02-24 | End: 2022-03-03

## 2022-02-24 RX ORDER — HYDROCODONE BITARTRATE AND ACETAMINOPHEN 7.5; 325 MG/1; MG/1
1 TABLET ORAL EVERY 6 HOURS PRN
Qty: 120 TABLET | Refills: 0 | Status: SHIPPED | OUTPATIENT
Start: 2022-02-24 | End: 2022-05-31 | Stop reason: SDUPTHER

## 2022-02-24 RX ORDER — FUROSEMIDE 20 MG/1
20 TABLET ORAL DAILY
Qty: 30 TABLET | Refills: 11 | Status: SHIPPED | OUTPATIENT
Start: 2022-02-24 | End: 2023-02-22 | Stop reason: SDUPTHER

## 2022-02-24 NOTE — PROGRESS NOTES
The patient location is:  [place]  The chief complaint leading to consultation is: checkup  Visit type: Virtual visit with synchronous audio and video  Total time spent with patient:  Less than 30 minutes  Each patient to whom he or she provides medical services by telemedicine is:  (1) informed of the relationship between the physician and patient and the respective role of any other health care provider with respect to management of the patient; and (2) notified that he or she may decline to receive medical services by telemedicine and may withdraw from such care at any time. Vital signs recorded were provided by the patient.    Notes:  See below    Subjective:   Patient ID: Briana Uriarte is a 46 y.o. female     Chief Complaint: checkup    Pt family notes diaphoresis and elevated temperatures in evening up to 101 degrees. Recently completed abx for wound infection being treated by wound care. Family notes foul smell from wound. Otherwise no new changes requesting refills.    Review of Systems   Respiratory: Negative for shortness of breath.    Cardiovascular: Negative for chest pain.   Gastrointestinal: Negative for abdominal pain.   Genitourinary: Negative for dysuria.   Skin: Positive for wound.     Past Medical History:   Diagnosis Date    Chronic hypercapnic respiratory failure     Chronic kidney disease     kidney stones    Diabetes mellitus, type 2     Esotropia     GERD (gastroesophageal reflux disease)     Hydrocephalus     Hypertension     Morbid obesity     Obesity hypoventilation syndrome     Paralysis     Scoliosis     Spinal bifida, closed      Past Surgical History:   Procedure Laterality Date    BRAIN SURGERY       shunt revision    CHOLECYSTECTOMY      CYSTOSCOPY W/ LASER LITHOTRIPSY      REPAIR OF INCARCERATED UMBILICAL HERNIA N/A 3/8/2019    Procedure: REPAIR, HERNIA, UMBILICAL, INCARCERATED, AGE 5 YEARS OR OLDER;  Surgeon: Ziyad Hancock MD;  Location: [facility] OR;   Service: General;  Laterality: N/A;    SPINE SURGERY       Objective:   There were no vitals filed for this visit.  There is no height or weight on file to calculate BMI.  Physical Exam  Vitals and nursing note reviewed.   Constitutional:       Appearance: She is well-developed.   HENT:      Head: Normocephalic and atraumatic.   Eyes:      General: No scleral icterus.     Conjunctiva/sclera: Conjunctivae normal.   Pulmonary:      Effort: Pulmonary effort is normal. No respiratory distress.   Musculoskeletal:         General: No deformity. Normal range of motion.      Cervical back: Normal range of motion and neck supple.   Skin:     Coloration: Skin is not pale.      Findings: No rash.   Neurological:      Mental Status: She is alert and oriented to person, place, and time.   Psychiatric:         Behavior: Behavior normal.         Thought Content: Thought content normal.         Judgment: Judgment normal.       Assessment:     1. Wound infection    2. Continuous opioid dependence- contract 3/21/19      Plan:   Wound infection  -     clindamycin (CLEOCIN) 300 MG capsule; Take 2 capsules (600 mg total) by mouth every 8 (eight) hours. for 7 days  Dispense: 42 capsule; Refill: 0  Discussed importance of close f/u with wound care. Advised close f/u with my office if failure to improve or worsening of symptoms.  Continuous opioid dependence- contract 3/21/19  -     HYDROcodone-acetaminophen (NORCO) 7.5-325 mg per tablet; Take 1 tablet by mouth every 6 (six) hours as needed for Pain. Do not fill until 4/24/22  Dispense: 120 tablet; Refill: 0  -     HYDROcodone-acetaminophen (NORCO) 7.5-325 mg per tablet; Take 1 tablet by mouth every 6 (six) hours as needed for Pain. Do not fill until 3/24/22  Dispense: 120 tablet; Refill: 0  -     HYDROcodone-acetaminophen (NORCO) 7.5-325 mg per tablet; Take 1 tablet by mouth every 6 (six) hours as needed for Pain.  Dispense: 120 tablet; Refill: 0    Other orders  -     furosemide (LASIX)  20 MG tablet; Take 1 tablet (20 mg total) by mouth once daily.  Dispense: 30 tablet; Refill: 11            Total time spent of Less than 30 minutes minutes on the day of the visit.This includes face to face time and preparing to see the patient, obtaining and reviewing separately obtained history, documenting clinical information in the electronic or other health record, independently interpreting results and communicating results to the patient/family/caregiver, or care coordinator.    Jose De Jesus Curran MD  02/24/2022    Portions of this note have been dictated with DILLON Spear

## 2022-03-10 ENCOUNTER — OFFICE VISIT (OUTPATIENT)
Dept: WOUND CARE | Facility: HOSPITAL | Age: 47
End: 2022-03-10
Attending: FAMILY MEDICINE
Payer: MEDICARE

## 2022-03-10 VITALS
RESPIRATION RATE: 17 BRPM | TEMPERATURE: 99 F | HEART RATE: 110 BPM | SYSTOLIC BLOOD PRESSURE: 124 MMHG | DIASTOLIC BLOOD PRESSURE: 74 MMHG

## 2022-03-10 DIAGNOSIS — L89.313 STAGE III PRESSURE ULCER OF RIGHT BUTTOCK: ICD-10-CM

## 2022-03-10 DIAGNOSIS — S21.201D OPEN WOUND OF RIGHT SIDE OF BACK, SUBSEQUENT ENCOUNTER: ICD-10-CM

## 2022-03-10 DIAGNOSIS — S31.000A OPEN WOUND OF LOWER BACK: ICD-10-CM

## 2022-03-10 DIAGNOSIS — S31.819D OPEN WOUND OF RIGHT BUTTOCK WITH COMPLICATION, SUBSEQUENT ENCOUNTER: Primary | ICD-10-CM

## 2022-03-10 PROCEDURE — 11042 PR DEBRIDEMENT, SKIN, SUB-Q TISSUE,=<20 SQ CM: ICD-10-PCS | Mod: 59,,, | Performed by: FAMILY MEDICINE

## 2022-03-10 PROCEDURE — 99499 UNLISTED E&M SERVICE: CPT | Mod: ,,, | Performed by: FAMILY MEDICINE

## 2022-03-10 PROCEDURE — 11042 DBRDMT SUBQ TIS 1ST 20SQCM/<: CPT | Performed by: FAMILY MEDICINE

## 2022-03-10 PROCEDURE — 11042 DBRDMT SUBQ TIS 1ST 20SQCM/<: CPT | Mod: 59,,, | Performed by: FAMILY MEDICINE

## 2022-03-10 PROCEDURE — 11043 PR DEBRIDEMENT, SKIN, SUB-Q TISSUE,MUSCLE,=<20 SQ CM: ICD-10-PCS | Mod: ,,, | Performed by: FAMILY MEDICINE

## 2022-03-10 PROCEDURE — 11043 DBRDMT MUSC&/FSCA 1ST 20/<: CPT | Mod: ,,, | Performed by: FAMILY MEDICINE

## 2022-03-10 PROCEDURE — 99499 NO LOS: ICD-10-PCS | Mod: ,,, | Performed by: FAMILY MEDICINE

## 2022-03-10 PROCEDURE — 11043 DBRDMT MUSC&/FSCA 1ST 20/<: CPT | Performed by: FAMILY MEDICINE

## 2022-03-10 NOTE — PROGRESS NOTES
Wound Care and Hyperbaric Medicine                Progress Note    Subjective:       Patient ID: Briana Uriarte is a 46 y.o. female.    Chief Complaint: No chief complaint on file.    HPI  Pt seen with parents in clinic for a FU visit for buttock and low back wounds. Pt parents would like to start the wound vac, and are ok with HH coming to the house, despite previous objections. She has no new complaints. They tried the triad, and are not sure that it is having any benefit on the buttock rash, but will continue to give it more time to help.   Review of Systems   Skin: Positive for wound.        Left buttock and low back open wounds   All other systems reviewed and are negative.        Objective:        Physical Exam  Vitals and nursing note reviewed. Exam conducted with a chaperone present.   Constitutional:       General: She is not in acute distress.     Appearance: Normal appearance. She is not ill-appearing, toxic-appearing or diaphoretic.   Skin:     General: Skin is warm and dry.      Coloration: Skin is not jaundiced or pale.      Findings: Lesion present. No bruising, erythema or rash.      Comments: Low back and buttock wounds, see wound care assessmenrt documentation in chart review scanned under the media tab   Neurological:      General: No focal deficit present.      Mental Status: She is alert and oriented to person, place, and time.   Psychiatric:         Mood and Affect: Mood normal.         Behavior: Behavior normal.         Thought Content: Thought content normal.         Judgment: Judgment normal.         There were no vitals filed for this visit.    Assessment:           ICD-10-CM ICD-9-CM   1. Open wound of right buttock with complication, subsequent encounter  S31.819D V58.89     877.1   2. Stage III pressure ulcer of right buttock  L89.313 707.05     707.23   3. Open wound of right side of back, subsequent encounter  S21.201D V58.89     876.0   4. Open wound of lower back   S31.000A 876.0                Plan:                  Diagnoses and all orders for this visit:    Open wound of right buttock with complication, subsequent encounter    Stage III pressure ulcer of right buttock    Open wound of right side of back, subsequent encounter    Open wound of lower back      See wound care instructions which are provided seperately

## 2022-03-10 NOTE — PATIENT INSTRUCTIONS
Much of the documentation for this visit was completed in the Wound Docs system.  Please see the attached documentation for further details about the patient's care. Scanned under the Media tab.

## 2022-03-11 ENCOUNTER — PATIENT MESSAGE (OUTPATIENT)
Dept: FAMILY MEDICINE | Facility: CLINIC | Age: 47
End: 2022-03-11
Payer: MEDICARE

## 2022-03-15 PROCEDURE — G0180 MD CERTIFICATION HHA PATIENT: HCPCS | Mod: ,,, | Performed by: FAMILY MEDICINE

## 2022-03-15 PROCEDURE — G0180 PR HOME HEALTH MD CERTIFICATION: ICD-10-PCS | Mod: ,,, | Performed by: FAMILY MEDICINE

## 2022-03-18 ENCOUNTER — EXTERNAL HOME HEALTH (OUTPATIENT)
Dept: HOME HEALTH SERVICES | Facility: HOSPITAL | Age: 47
End: 2022-03-18
Payer: MEDICARE

## 2022-03-21 ENCOUNTER — PATIENT MESSAGE (OUTPATIENT)
Dept: ADMINISTRATIVE | Facility: HOSPITAL | Age: 47
End: 2022-03-21
Payer: MEDICARE

## 2022-03-22 ENCOUNTER — DOCUMENT SCAN (OUTPATIENT)
Dept: HOME HEALTH SERVICES | Facility: HOSPITAL | Age: 47
End: 2022-03-22
Payer: MEDICARE

## 2022-03-24 ENCOUNTER — OFFICE VISIT (OUTPATIENT)
Dept: WOUND CARE | Facility: HOSPITAL | Age: 47
End: 2022-03-24
Attending: FAMILY MEDICINE
Payer: MEDICARE

## 2022-03-24 ENCOUNTER — LAB VISIT (OUTPATIENT)
Dept: LAB | Facility: HOSPITAL | Age: 47
End: 2022-03-24
Attending: FAMILY MEDICINE
Payer: MEDICARE

## 2022-03-24 VITALS
SYSTOLIC BLOOD PRESSURE: 129 MMHG | RESPIRATION RATE: 18 BRPM | TEMPERATURE: 99 F | HEART RATE: 94 BPM | DIASTOLIC BLOOD PRESSURE: 81 MMHG

## 2022-03-24 DIAGNOSIS — S21.201D OPEN WOUND OF RIGHT SIDE OF BACK, SUBSEQUENT ENCOUNTER: ICD-10-CM

## 2022-03-24 DIAGNOSIS — S31.819D OPEN WOUND OF RIGHT BUTTOCK WITH COMPLICATION, SUBSEQUENT ENCOUNTER: Primary | ICD-10-CM

## 2022-03-24 DIAGNOSIS — L89.314 PRESSURE ULCER OF RIGHT BUTTOCK, STAGE 4: ICD-10-CM

## 2022-03-24 DIAGNOSIS — L89.323 STAGE III PRESSURE ULCER OF LEFT BUTTOCK: ICD-10-CM

## 2022-03-24 DIAGNOSIS — D50.0 IRON DEFICIENCY ANEMIA DUE TO CHRONIC BLOOD LOSS: ICD-10-CM

## 2022-03-24 DIAGNOSIS — L08.9 WOUND INFECTION: ICD-10-CM

## 2022-03-24 DIAGNOSIS — T14.8XXA WOUND INFECTION: ICD-10-CM

## 2022-03-24 LAB
BASOPHILS # BLD AUTO: 0.07 K/UL (ref 0–0.2)
BASOPHILS NFR BLD: 0.7 % (ref 0–1.9)
DIFFERENTIAL METHOD: ABNORMAL
EOSINOPHIL # BLD AUTO: 0.3 K/UL (ref 0–0.5)
EOSINOPHIL NFR BLD: 3.3 % (ref 0–8)
ERYTHROCYTE [DISTWIDTH] IN BLOOD BY AUTOMATED COUNT: ABNORMAL % (ref 11.5–14.5)
FERRITIN SERPL-MCNC: 671 NG/ML (ref 20–300)
HCT VFR BLD AUTO: 34.2 % (ref 37–48.5)
HGB BLD-MCNC: 9.4 G/DL (ref 12–16)
IMM GRANULOCYTES # BLD AUTO: 0.04 K/UL (ref 0–0.04)
IMM GRANULOCYTES NFR BLD AUTO: 0.4 % (ref 0–0.5)
LYMPHOCYTES # BLD AUTO: 1.7 K/UL (ref 1–4.8)
LYMPHOCYTES NFR BLD: 17.8 % (ref 18–48)
MCH RBC QN AUTO: 21.4 PG (ref 27–31)
MCHC RBC AUTO-ENTMCNC: 27.5 G/DL (ref 32–36)
MCV RBC AUTO: 78 FL (ref 82–98)
MONOCYTES # BLD AUTO: 0.6 K/UL (ref 0.3–1)
MONOCYTES NFR BLD: 5.7 % (ref 4–15)
NEUTROPHILS # BLD AUTO: 7 K/UL (ref 1.8–7.7)
NEUTROPHILS NFR BLD: 72.1 % (ref 38–73)
NRBC BLD-RTO: 0 /100 WBC
PLATELET # BLD AUTO: 539 K/UL (ref 150–450)
PMV BLD AUTO: 9.2 FL (ref 9.2–12.9)
RBC # BLD AUTO: 4.39 M/UL (ref 4–5.4)
WBC # BLD AUTO: 9.69 K/UL (ref 3.9–12.7)

## 2022-03-24 PROCEDURE — 87176 TISSUE HOMOGENIZATION CULTR: CPT | Performed by: FAMILY MEDICINE

## 2022-03-24 PROCEDURE — 11044 PR DEBRIDEMENT, SKIN, SUB-Q TISSUE,MUSCLE,BONE,=<20 SQ CM: ICD-10-PCS | Mod: S$PBB,,, | Performed by: FAMILY MEDICINE

## 2022-03-24 PROCEDURE — 99213 OFFICE O/P EST LOW 20 MIN: CPT | Mod: 25,S$PBB,, | Performed by: FAMILY MEDICINE

## 2022-03-24 PROCEDURE — 36415 COLL VENOUS BLD VENIPUNCTURE: CPT | Mod: PO | Performed by: INTERNAL MEDICINE

## 2022-03-24 PROCEDURE — 11044 DBRDMT BONE 1ST 20 SQ CM/<: CPT | Performed by: FAMILY MEDICINE

## 2022-03-24 PROCEDURE — 82728 ASSAY OF FERRITIN: CPT | Performed by: INTERNAL MEDICINE

## 2022-03-24 PROCEDURE — 87186 SC STD MICRODIL/AGAR DIL: CPT | Performed by: FAMILY MEDICINE

## 2022-03-24 PROCEDURE — 87070 CULTURE OTHR SPECIMN AEROBIC: CPT | Performed by: FAMILY MEDICINE

## 2022-03-24 PROCEDURE — 87077 CULTURE AEROBIC IDENTIFY: CPT | Performed by: FAMILY MEDICINE

## 2022-03-24 PROCEDURE — 99213 PR OFFICE/OUTPT VISIT, EST, LEVL III, 20-29 MIN: ICD-10-PCS | Mod: 25,S$PBB,, | Performed by: FAMILY MEDICINE

## 2022-03-24 PROCEDURE — 85025 COMPLETE CBC W/AUTO DIFF WBC: CPT | Performed by: INTERNAL MEDICINE

## 2022-03-24 PROCEDURE — 11044 DBRDMT BONE 1ST 20 SQ CM/<: CPT | Mod: S$PBB,,, | Performed by: FAMILY MEDICINE

## 2022-03-24 RX ORDER — CLINDAMYCIN HYDROCHLORIDE 300 MG/1
300 CAPSULE ORAL 3 TIMES DAILY
Qty: 30 CAPSULE | Refills: 0 | Status: SHIPPED | OUTPATIENT
Start: 2022-03-24 | End: 2022-04-03

## 2022-03-24 NOTE — PROGRESS NOTES
Wound Care and Hyperbaric Medicine                Progress Note    Subjective:       Patient ID: Briana Uriarte is a 46 y.o. female.    Chief Complaint: Wound Care    HPI  Pt seen in clinic for a FU visit for multiple open wounds. Pt has been having fevers lately, and she had blood drawn today. Pt father concerned that infection was not completely cleared. Pt did not have the fever when she was on clindamycin, but now out of the medicine. No other complaints today.  Review of Systems   Skin: Positive for rash and wound.        BL buttock open wounds, sacrum wound and erythematous rash on back   All other systems reviewed and are negative.        Objective:        Physical Exam  Vitals and nursing note reviewed.   Constitutional:       General: She is not in acute distress.     Appearance: Normal appearance. She is not ill-appearing, toxic-appearing or diaphoretic.   Skin:     General: Skin is warm and dry.      Coloration: Skin is not jaundiced or pale.      Findings: Lesion present. No bruising, erythema or rash.      Comments: Open wounds to the BL buttock and sacrum, see wound care assessment documentation in chart review scanned under the media tab   Neurological:      General: No focal deficit present.      Mental Status: She is alert and oriented to person, place, and time.   Psychiatric:         Mood and Affect: Mood normal.         Behavior: Behavior normal.         Thought Content: Thought content normal.         Judgment: Judgment normal.         Vitals:    03/24/22 1333   BP: 129/81   Pulse: 94   Resp: 18   Temp: 98.9 °F (37.2 °C)       Assessment:           ICD-10-CM ICD-9-CM   1. Open wound of right buttock with complication, subsequent encounter  S31.819D V58.89     877.1   2. Pressure ulcer of right buttock, stage 4  L89.314 707.05     707.24   3. Open wound of right side of back, subsequent encounter  S21.201D V58.89     876.0   4. Stage III pressure ulcer of left buttock   L89.323 707.05     707.23   5. Wound infection  T14.8XXA 958.3    L08.9                 Plan:                  Briana was seen today for wound care.    Diagnoses and all orders for this visit:    Open wound of right buttock with complication, subsequent encounter    Pressure ulcer of right buttock, stage 4    Open wound of right side of back, subsequent encounter    Stage III pressure ulcer of left buttock    Wound infection    Other orders  -     clindamycin (CLEOCIN) 300 MG capsule; Take 1 capsule (300 mg total) by mouth 3 (three) times daily. for 10 days      NPWT is medically necessary for this patient at this time to achieve acceleration of granulation tissue and wound closure. It is my clinical judgement that this cannot be achieved using topical dressing or medications.  Please see wound care instructions which have been provided separately.            Do Not Resuscitate (DNR)/Health Care Proxy (HCP)

## 2022-03-25 ENCOUNTER — OFFICE VISIT (OUTPATIENT)
Dept: HEMATOLOGY/ONCOLOGY | Facility: CLINIC | Age: 47
End: 2022-03-25
Payer: MEDICARE

## 2022-03-25 VITALS
DIASTOLIC BLOOD PRESSURE: 63 MMHG | HEART RATE: 105 BPM | TEMPERATURE: 97 F | SYSTOLIC BLOOD PRESSURE: 128 MMHG | OXYGEN SATURATION: 99 % | RESPIRATION RATE: 16 BRPM

## 2022-03-25 DIAGNOSIS — D50.0 IRON DEFICIENCY ANEMIA DUE TO CHRONIC BLOOD LOSS: Primary | ICD-10-CM

## 2022-03-25 PROCEDURE — 99214 OFFICE O/P EST MOD 30 MIN: CPT | Mod: PBBFAC,PO | Performed by: INTERNAL MEDICINE

## 2022-03-25 PROCEDURE — 99999 PR PBB SHADOW E&M-EST. PATIENT-LVL IV: CPT | Mod: PBBFAC,,, | Performed by: INTERNAL MEDICINE

## 2022-03-25 PROCEDURE — 99999 PR PBB SHADOW E&M-EST. PATIENT-LVL IV: ICD-10-PCS | Mod: PBBFAC,,, | Performed by: INTERNAL MEDICINE

## 2022-03-25 PROCEDURE — 99213 OFFICE O/P EST LOW 20 MIN: CPT | Mod: S$PBB,,, | Performed by: INTERNAL MEDICINE

## 2022-03-25 PROCEDURE — 99213 PR OFFICE/OUTPT VISIT, EST, LEVL III, 20-29 MIN: ICD-10-PCS | Mod: S$PBB,,, | Performed by: INTERNAL MEDICINE

## 2022-03-25 NOTE — PROGRESS NOTES
Service Date:  3/25/22    Chief Complaint: Anemia    Briana Uriarte is a 46 y.o. female with anemia.  Patient is doing well.  She has no complaints to me.  Her family is present with her.  Patient is paraplegic from spina bifida.    Review of Systems   Constitutional: Negative.    HENT: Negative.    Eyes: Negative.    Respiratory: Negative.    Cardiovascular: Negative.    Gastrointestinal: Negative.    Endocrine: Negative.    Genitourinary: Negative.    Neurological: Negative.    Hematological: Negative.    Psychiatric/Behavioral: Negative.         Current Outpatient Medications   Medication Instructions    apixaban (ELIQUIS) 2.5 mg, Oral, 2 times daily    ascorbic acid (vitamin C) (VITAMIN C) 500 mg, Oral, Daily    clindamycin (CLEOCIN) 300 mg, Oral, 3 times daily    furosemide (LASIX) 20 mg, Oral, Daily    HYDROcodone-acetaminophen (NORCO) 7.5-325 mg per tablet 1 tablet, Oral, Every 6 hours PRN, Do not fill until 4/24/22    HYDROcodone-acetaminophen (NORCO) 7.5-325 mg per tablet 1 tablet, Oral, Every 6 hours PRN, Do not fill until 3/24/22    HYDROcodone-acetaminophen (NORCO) 7.5-325 mg per tablet 1 tablet, Oral, Every 6 hours PRN    metoprolol succinate (TOPROL-XL) 50 MG 24 hr tablet TAKE 1 TABLET(50 MG) BY MOUTH EVERY DAY    multivitamin with minerals tablet 1 tablet, Daily    nystatin (MYCOSTATIN) cream Topical (Top), 2 times daily    silver sulfADIAZINE 1% (SILVADENE) 1 % cream Topical (Top), 2 times daily        Past Medical History:   Diagnosis Date    Chronic hypercapnic respiratory failure     Chronic kidney disease     kidney stones    Diabetes mellitus, type 2     Esotropia     GERD (gastroesophageal reflux disease)     Hydrocephalus     Hypertension     Morbid obesity     Obesity hypoventilation syndrome     Paralysis     Scoliosis     Spinal bifida, closed         Past Surgical History:   Procedure Laterality Date    BRAIN SURGERY       shunt revision     CHOLECYSTECTOMY      CYSTOSCOPY W/ LASER LITHOTRIPSY      REPAIR OF INCARCERATED UMBILICAL HERNIA N/A 3/8/2019    Procedure: REPAIR, HERNIA, UMBILICAL, INCARCERATED, AGE 5 YEARS OR OLDER;  Surgeon: Ziyad Hancock MD;  Location: [facility];  Service: General;  Laterality: N/A;    SPINE SURGERY          Family History   Problem Relation Age of Onset    Cancer Mother     Glaucoma Father     Heart disease Maternal Grandmother     Cancer Maternal Grandmother     Glaucoma Paternal Grandfather     Psoriasis Maternal Uncle     Melanoma Neg Hx     Lupus Neg Hx     Eczema Neg Hx        Social History     Tobacco Use    Smoking status: Never Smoker    Smokeless tobacco: Never Used   Substance Use Topics    Alcohol use: No    Drug use: Yes     Types: Hydrocodone         Vitals:    03/25/22 1340   BP: 128/63   Pulse: 105   Resp: 16   Temp: 97.1 °F (36.2 °C)        Physical Exam:  /63 (BP Location: Right arm, Patient Position: Sitting)   Pulse 105   Temp 97.1 °F (36.2 °C) (Temporal)   Resp 16   SpO2 99%     Physical Exam  Constitutional:       Appearance: Normal appearance.   HENT:      Head: Normocephalic and atraumatic.      Nose: Nose normal.      Mouth/Throat:      Mouth: Mucous membranes are moist.      Pharynx: Oropharynx is clear.   Eyes:      Conjunctiva/sclera: Conjunctivae normal.   Cardiovascular:      Rate and Rhythm: Normal rate and regular rhythm.   Pulmonary:      Effort: Pulmonary effort is normal.   Abdominal:      General: There is no distension.   Musculoskeletal:         General: Normal range of motion.      Cervical back: Normal range of motion and neck supple.   Skin:     General: Skin is warm and dry.   Neurological:      Mental Status: She is alert and oriented to person, place, and time. Mental status is at baseline.   Psychiatric:         Mood and Affect: Mood normal.          Labs:  Lab Results   Component Value Date    WBC 9.69 03/24/2022    RBC 4.39 03/24/2022    HGB 9.4 (L)  03/24/2022    HCT 34.2 (L) 03/24/2022    MCV 78 (L) 03/24/2022    MCH 21.4 (L) 03/24/2022    MCHC 27.5 (L) 03/24/2022    RDW SEE COMMENT 03/24/2022     (H) 03/24/2022    MPV 9.2 03/24/2022    GRAN 7.0 03/24/2022    GRAN 72.1 03/24/2022    LYMPH 1.7 03/24/2022    LYMPH 17.8 (L) 03/24/2022    MONO 0.6 03/24/2022    MONO 5.7 03/24/2022    EOS 0.3 03/24/2022    BASO 0.07 03/24/2022    EOSINOPHIL 3.3 03/24/2022    BASOPHIL 0.7 03/24/2022     Sodium   Date Value Ref Range Status   01/06/2022 138 136 - 145 mmol/L Final     Potassium   Date Value Ref Range Status   01/06/2022 4.1 3.5 - 5.1 mmol/L Final     Chloride   Date Value Ref Range Status   01/06/2022 104 95 - 110 mmol/L Final     CO2   Date Value Ref Range Status   01/06/2022 25 23 - 29 mmol/L Final     Glucose   Date Value Ref Range Status   01/06/2022 87 70 - 110 mg/dL Final     BUN   Date Value Ref Range Status   01/06/2022 10 6 - 20 mg/dL Final     Creatinine   Date Value Ref Range Status   01/06/2022 0.6 0.5 - 1.4 mg/dL Final     Calcium   Date Value Ref Range Status   01/06/2022 9.4 8.7 - 10.5 mg/dL Final     Total Protein   Date Value Ref Range Status   01/06/2022 8.2 6.0 - 8.4 g/dL Final     Albumin   Date Value Ref Range Status   01/06/2022 2.6 (L) 3.5 - 5.2 g/dL Final     Total Bilirubin   Date Value Ref Range Status   01/06/2022 0.2 0.1 - 1.0 mg/dL Final     Comment:     For infants and newborns, interpretation of results should be based  on gestational age, weight and in agreement with clinical  observations.    Premature Infant recommended reference ranges:  Up to 24 hours.............<8.0 mg/dL  Up to 48 hours............<12.0 mg/dL  3-5 days..................<15.0 mg/dL  6-29 days.................<15.0 mg/dL       Alkaline Phosphatase   Date Value Ref Range Status   01/06/2022 87 55 - 135 U/L Final     AST   Date Value Ref Range Status   01/06/2022 12 10 - 40 U/L Final     ALT   Date Value Ref Range Status   01/06/2022 6 (L) 10 - 44 U/L Final      Anion Gap   Date Value Ref Range Status   01/06/2022 9 8 - 16 mmol/L Final     eGFR if    Date Value Ref Range Status   01/06/2022 >60.0 >60 mL/min/1.73 m^2 Final     eGFR if non    Date Value Ref Range Status   01/06/2022 >60.0 >60 mL/min/1.73 m^2 Final     Comment:     Calculation used to obtain the estimated glomerular filtration  rate (eGFR) is the CKD-EPI equation.          A/P:    Iron deficiency anemia  -suspect due to chronic blood loss  -given 2 doses of Injectafer.  She responded well with her ferritin going up.  She is still microcytic and still anemic.  However with her elevated ferritin, I do not want to give her any further iron at this time.  Her ferritin could be elevated due to a chronic abscess that she has, with a wound VAC currently on it.  -recheck blood counts in 2 months with return appointment    Spina bifida  -patient prefers virtual visits as a result  -patient is paraplegic  -will limit access to treatment      Aurash Khoobehi, MD  Hematology and Oncology

## 2022-03-29 LAB — BACTERIA SPEC AEROBE CULT: ABNORMAL

## 2022-04-01 ENCOUNTER — DOCUMENT SCAN (OUTPATIENT)
Dept: HOME HEALTH SERVICES | Facility: HOSPITAL | Age: 47
End: 2022-04-01
Payer: MEDICARE

## 2022-04-07 ENCOUNTER — OFFICE VISIT (OUTPATIENT)
Dept: WOUND CARE | Facility: HOSPITAL | Age: 47
End: 2022-04-07
Attending: FAMILY MEDICINE
Payer: MEDICARE

## 2022-04-07 VITALS
SYSTOLIC BLOOD PRESSURE: 123 MMHG | DIASTOLIC BLOOD PRESSURE: 81 MMHG | RESPIRATION RATE: 18 BRPM | TEMPERATURE: 98 F | HEART RATE: 100 BPM

## 2022-04-07 DIAGNOSIS — S31.000A OPEN WOUND OF LOWER BACK: ICD-10-CM

## 2022-04-07 DIAGNOSIS — S31.819D OPEN WOUND OF RIGHT BUTTOCK WITH COMPLICATION, SUBSEQUENT ENCOUNTER: Primary | ICD-10-CM

## 2022-04-07 DIAGNOSIS — S21.201D OPEN WOUND OF RIGHT SIDE OF BACK, SUBSEQUENT ENCOUNTER: ICD-10-CM

## 2022-04-07 DIAGNOSIS — L89.314 PRESSURE ULCER OF RIGHT BUTTOCK, STAGE 4: ICD-10-CM

## 2022-04-07 PROCEDURE — 11043 PR DEBRIDEMENT, SKIN, SUB-Q TISSUE,MUSCLE,=<20 SQ CM: ICD-10-PCS | Mod: ,,, | Performed by: FAMILY MEDICINE

## 2022-04-07 PROCEDURE — 99499 NO LOS: ICD-10-PCS | Mod: ,,, | Performed by: FAMILY MEDICINE

## 2022-04-07 PROCEDURE — 11043 DBRDMT MUSC&/FSCA 1ST 20/<: CPT | Mod: ,,, | Performed by: FAMILY MEDICINE

## 2022-04-07 PROCEDURE — 99499 UNLISTED E&M SERVICE: CPT | Mod: ,,, | Performed by: FAMILY MEDICINE

## 2022-04-07 PROCEDURE — 11043 DBRDMT MUSC&/FSCA 1ST 20/<: CPT | Performed by: FAMILY MEDICINE

## 2022-04-07 NOTE — PROGRESS NOTES
Wound Care and Hyperbaric Medicine                Progress Note    Subjective:       Patient ID: Briana Uriarte is a 46 y.o. female.    Chief Complaint: No chief complaint on file.    HPI  Pt seen in clinic for a FU visit for right buttock and back wounds. Pt is not able to get a seal on the vac with multiple wounds and would like to tackle one wound at a time. They have no new complaints today.  Review of Systems   Skin: Positive for wound.        Right buttock and right back open wounds   All other systems reviewed and are negative.        Objective:        Physical Exam  Vitals and nursing note reviewed.   Constitutional:       General: She is not in acute distress.     Appearance: Normal appearance. She is not ill-appearing, toxic-appearing or diaphoretic.   Skin:     General: Skin is warm and dry.      Coloration: Skin is not jaundiced or pale.      Findings: Lesion and rash present. No bruising or erythema.      Comments: Right back and right buttock open wounds, see wound care assessment documentation in chart review scanned in chart review under media tab   Neurological:      General: No focal deficit present.      Mental Status: She is alert and oriented to person, place, and time.   Psychiatric:         Mood and Affect: Mood normal.         Behavior: Behavior normal.         Thought Content: Thought content normal.         Judgment: Judgment normal.         There were no vitals filed for this visit.    Assessment:           ICD-10-CM ICD-9-CM   1. Open wound of right buttock with complication, subsequent encounter  S31.819D V58.89     877.1   2. Pressure ulcer of right buttock, stage 4  L89.314 707.05     707.24   3. Open wound of right side of back, subsequent encounter  S21.201D V58.89     876.0   4. Open wound of lower back  S31.000A 876.0                Plan:                  Diagnoses and all orders for this visit:    Open wound of right buttock with complication, subsequent  encounter    Pressure ulcer of right buttock, stage 4    Open wound of right side of back, subsequent encounter    Open wound of lower back      See wound care instructions provided separately

## 2022-04-20 ENCOUNTER — DOCUMENT SCAN (OUTPATIENT)
Dept: HOME HEALTH SERVICES | Facility: HOSPITAL | Age: 47
End: 2022-04-20
Payer: MEDICARE

## 2022-04-21 ENCOUNTER — OFFICE VISIT (OUTPATIENT)
Dept: WOUND CARE | Facility: HOSPITAL | Age: 47
End: 2022-04-21
Attending: FAMILY MEDICINE
Payer: MEDICARE

## 2022-04-21 VITALS
DIASTOLIC BLOOD PRESSURE: 107 MMHG | SYSTOLIC BLOOD PRESSURE: 148 MMHG | HEART RATE: 95 BPM | TEMPERATURE: 98 F | RESPIRATION RATE: 18 BRPM

## 2022-04-21 DIAGNOSIS — S21.201D OPEN WOUND OF RIGHT SIDE OF BACK, SUBSEQUENT ENCOUNTER: ICD-10-CM

## 2022-04-21 DIAGNOSIS — L89.323 STAGE III PRESSURE ULCER OF LEFT BUTTOCK: ICD-10-CM

## 2022-04-21 DIAGNOSIS — S31.819D OPEN WOUND OF RIGHT BUTTOCK WITH COMPLICATION, SUBSEQUENT ENCOUNTER: ICD-10-CM

## 2022-04-21 DIAGNOSIS — L89.133 PRESSURE ULCER OF RIGHT LOWER BACK, STAGE 3: ICD-10-CM

## 2022-04-21 DIAGNOSIS — L89.314 PRESSURE ULCER OF RIGHT BUTTOCK, STAGE 4: Primary | ICD-10-CM

## 2022-04-21 PROCEDURE — 11043 PR DEBRIDEMENT, SKIN, SUB-Q TISSUE,MUSCLE,=<20 SQ CM: ICD-10-PCS | Mod: ,,, | Performed by: FAMILY MEDICINE

## 2022-04-21 PROCEDURE — 11043 DBRDMT MUSC&/FSCA 1ST 20/<: CPT | Performed by: FAMILY MEDICINE

## 2022-04-21 PROCEDURE — 99499 NO LOS: ICD-10-PCS | Mod: ,,, | Performed by: FAMILY MEDICINE

## 2022-04-21 PROCEDURE — 99499 UNLISTED E&M SERVICE: CPT | Mod: ,,, | Performed by: FAMILY MEDICINE

## 2022-04-21 PROCEDURE — 11043 DBRDMT MUSC&/FSCA 1ST 20/<: CPT | Mod: ,,, | Performed by: FAMILY MEDICINE

## 2022-04-21 NOTE — PROGRESS NOTES
Wound Care and Hyperbaric Medicine                Progress Note    Subjective:       Patient ID: Briana Uriarte is a 46 y.o. female.    Chief Complaint: No chief complaint on file.    HPI  Pt seen in clinic for a FU visit for multiple buttock and sacrum wounds. Pt caregivers requested monthly visits as they cannot get in weekly as it is very difficult. Pt wounds are showing some improvement, but the skin around the wound is extremely red. Pt has seen a dermatologist who is treating the skin, but it appears to be getting worse. They have no other problems today.  Review of Systems   Skin: Positive for wound.        Multiple sacral and buttock wounds   All other systems reviewed and are negative.        Objective:        Physical Exam  Vitals and nursing note reviewed. Exam conducted with a chaperone present.   Constitutional:       General: She is not in acute distress.     Appearance: Normal appearance. She is not ill-appearing, toxic-appearing or diaphoretic.   Skin:     General: Skin is warm and dry.      Coloration: Skin is not jaundiced or pale.      Findings: Lesion present. No bruising, erythema or rash.      Comments: Multiple open wounds to the buttock and sacrum, see wound care assessment documentation in chart review scanned under the media tab   Neurological:      General: No focal deficit present.      Mental Status: She is alert and oriented to person, place, and time.   Psychiatric:         Mood and Affect: Mood normal.         Behavior: Behavior normal.         Thought Content: Thought content normal.         Judgment: Judgment normal.         There were no vitals filed for this visit.    Assessment:           ICD-10-CM ICD-9-CM   1. Pressure ulcer of right buttock, stage 4  L89.314 707.05     707.24   2. Open wound of right side of back, subsequent encounter  S21.201D V58.89     876.0   3. Open wound of right buttock with complication, subsequent encounter  S31.819D V58.89      877.1   4. Pressure ulcer of right lower back, stage 3  L89.133 707.03     707.23   5. Stage III pressure ulcer of left buttock  L89.323 707.05     707.23                Plan:                  Diagnoses and all orders for this visit:    Pressure ulcer of right buttock, stage 4    Open wound of right side of back, subsequent encounter    Open wound of right buttock with complication, subsequent encounter    Pressure ulcer of right lower back, stage 3    Stage III pressure ulcer of left buttock      See wound care instructions provided separately

## 2022-05-04 ENCOUNTER — PATIENT OUTREACH (OUTPATIENT)
Dept: ADMINISTRATIVE | Facility: OTHER | Age: 47
End: 2022-05-04
Payer: MEDICARE

## 2022-05-04 DIAGNOSIS — Z12.11 ENCOUNTER FOR FIT (FECAL IMMUNOCHEMICAL TEST) SCREENING: Primary | ICD-10-CM

## 2022-05-04 NOTE — PROGRESS NOTES
Chart was reviewed for overdue [facility]active [facility] Encounters (INDU)  topics  Updates were requested from care everywhere  Health Maintenance has been updated  LINKS immunization registry triggered

## 2022-05-09 ENCOUNTER — PATIENT MESSAGE (OUTPATIENT)
Dept: UROLOGY | Facility: CLINIC | Age: 47
End: 2022-05-09
Payer: MEDICARE

## 2022-05-10 ENCOUNTER — DOCUMENT SCAN (OUTPATIENT)
Dept: HOME HEALTH SERVICES | Facility: HOSPITAL | Age: 47
End: 2022-05-10
Payer: MEDICARE

## 2022-05-12 ENCOUNTER — PATIENT OUTREACH (OUTPATIENT)
Dept: ADMINISTRATIVE | Facility: HOSPITAL | Age: 47
End: 2022-05-12
Payer: MEDICARE

## 2022-05-14 PROCEDURE — G0179 PR HOME HEALTH MD RECERTIFICATION: ICD-10-PCS | Mod: ,,, | Performed by: FAMILY MEDICINE

## 2022-05-14 PROCEDURE — G0179 MD RECERTIFICATION HHA PT: HCPCS | Mod: ,,, | Performed by: FAMILY MEDICINE

## 2022-05-18 ENCOUNTER — OFFICE VISIT (OUTPATIENT)
Dept: PULMONOLOGY | Facility: CLINIC | Age: 47
End: 2022-05-18
Payer: MEDICARE

## 2022-05-18 VITALS
HEIGHT: 61 IN | DIASTOLIC BLOOD PRESSURE: 75 MMHG | SYSTOLIC BLOOD PRESSURE: 125 MMHG | WEIGHT: 293 LBS | OXYGEN SATURATION: 97 % | HEART RATE: 102 BPM | BODY MASS INDEX: 55.32 KG/M2

## 2022-05-18 DIAGNOSIS — E66.01 MORBID OBESITY WITH BMI OF 50.0-59.9, ADULT: ICD-10-CM

## 2022-05-18 DIAGNOSIS — Q05.9 SPINA BIFIDA, UNSPECIFIED HYDROCEPHALUS PRESENCE, UNSPECIFIED SPINAL REGION: ICD-10-CM

## 2022-05-18 DIAGNOSIS — J96.12 CHRONIC RESPIRATORY FAILURE WITH HYPOXIA AND HYPERCAPNIA: ICD-10-CM

## 2022-05-18 DIAGNOSIS — J96.11 CHRONIC RESPIRATORY FAILURE WITH HYPOXIA AND HYPERCAPNIA: ICD-10-CM

## 2022-05-18 DIAGNOSIS — L89.152 PRESSURE INJURY OF SACRAL REGION, STAGE 2: ICD-10-CM

## 2022-05-18 DIAGNOSIS — E66.2 OBESITY HYPOVENTILATION SYNDROME: Primary | ICD-10-CM

## 2022-05-18 DIAGNOSIS — L89.46: ICD-10-CM

## 2022-05-18 PROCEDURE — 99214 OFFICE O/P EST MOD 30 MIN: CPT | Mod: S$GLB,,, | Performed by: INTERNAL MEDICINE

## 2022-05-18 PROCEDURE — 99214 PR OFFICE/OUTPT VISIT, EST, LEVL IV, 30-39 MIN: ICD-10-PCS | Mod: S$GLB,,, | Performed by: INTERNAL MEDICINE

## 2022-05-18 RX ORDER — MONTELUKAST SODIUM 10 MG/1
10 TABLET ORAL DAILY
COMMUNITY
Start: 2022-05-07 | End: 2022-08-31

## 2022-05-18 RX ORDER — OMEPRAZOLE 40 MG/1
40 CAPSULE, DELAYED RELEASE ORAL DAILY
COMMUNITY
Start: 2022-05-07 | End: 2022-08-31

## 2022-05-18 RX ORDER — ALBUTEROL SULFATE 90 UG/1
AEROSOL, METERED RESPIRATORY (INHALATION)
COMMUNITY
Start: 2022-05-07 | End: 2022-08-31

## 2022-05-18 RX ORDER — LEVOFLOXACIN 500 MG/1
500 TABLET, FILM COATED ORAL DAILY
COMMUNITY
Start: 2022-05-07 | End: 2022-05-31 | Stop reason: ALTCHOICE

## 2022-05-18 RX ORDER — IPRATROPIUM BROMIDE 42 UG/1
SPRAY, METERED NASAL
COMMUNITY
Start: 2022-05-07 | End: 2022-08-31

## 2022-05-18 NOTE — PROGRESS NOTES
SUBJECTIVE:    Patient ID: Briana Uriarte is a 46 y.o. female.    Chief Complaint: Establish Care (Pickwickian syndrome)    HPI patient is a 46-year-old female with paraplegia secondary to spina bifida who has been followed by Dr. Mcknight for hypercapnic hypoxemic respiratory failure secondary to obesity hypoventilation syndrome.  The patient sleeps on her trilogy ventilator every night.  She does have portable oxygen which she uses when she feels like it.  She is saturating well here.  Her parents do not know how much she weighs.  But they think she looks about the same.  The patient is morbidly obese.  The last weight in the computer is 310 lb.  The patient does not have obstructive lung disease.  Past Medical History:   Diagnosis Date    Chronic hypercapnic respiratory failure     Chronic kidney disease     kidney stones    Diabetes mellitus, type 2     Esotropia     GERD (gastroesophageal reflux disease)     Hydrocephalus     Hypertension     Morbid obesity     Obesity hypoventilation syndrome     Paralysis     Scoliosis     Spinal bifida, closed      Past Surgical History:   Procedure Laterality Date    BRAIN SURGERY       shunt revision    CHOLECYSTECTOMY      CYSTOSCOPY W/ LASER LITHOTRIPSY      REPAIR OF INCARCERATED UMBILICAL HERNIA N/A 3/8/2019    Procedure: REPAIR, HERNIA, UMBILICAL, INCARCERATED, AGE 5 YEARS OR OLDER;  Surgeon: Ziyad Hancock MD;  Location: [facility];  Service: General;  Laterality: N/A;    SPINE SURGERY       Family History   Problem Relation Age of Onset    Cancer Mother     Glaucoma Father     Heart disease Maternal Grandmother     Cancer Maternal Grandmother     Glaucoma Paternal Grandfather     Psoriasis Maternal Uncle     Melanoma Neg Hx     Lupus Neg Hx     Eczema Neg Hx         Social History:   Marital Status: Single  Occupation: Data Unavailable  Alcohol History:  reports no history of alcohol use.  Tobacco History:  reports that she has  never smoked. She has never used smokeless tobacco.  Drug History:  reports current drug use. Drug: Hydrocodone.    Review of patient's allergies indicates:   Allergen Reactions    Cefepime      HIVES    Latex Swelling       Current Outpatient Medications   Medication Sig Dispense Refill    albuterol (PROVENTIL/VENTOLIN HFA) 90 mcg/actuation inhaler SMARTSI-2 Puff(s) By Mouth Every 4-6 Hours PRN      ascorbic acid, vitamin C, (VITAMIN C) 500 MG tablet Take 500 mg by mouth once daily.      furosemide (LASIX) 20 MG tablet Take 1 tablet (20 mg total) by mouth once daily. 30 tablet 11    HYDROcodone-acetaminophen (NORCO) 7.5-325 mg per tablet Take 1 tablet by mouth every 6 (six) hours as needed for Pain. Do not fill until 22 120 tablet 0    HYDROcodone-acetaminophen (NORCO) 7.5-325 mg per tablet Take 1 tablet by mouth every 6 (six) hours as needed for Pain. Do not fill until 3/24/22 120 tablet 0    HYDROcodone-acetaminophen (NORCO) 7.5-325 mg per tablet Take 1 tablet by mouth every 6 (six) hours as needed for Pain. 120 tablet 0    ipratropium (ATROVENT) 42 mcg (0.06 %) nasal spray SMARTSI-2 Spray(s) Both Nares 4 Times Daily      levoFLOXacin (LEVAQUIN) 500 MG tablet Take 500 mg by mouth once daily.      metoprolol succinate (TOPROL-XL) 50 MG 24 hr tablet TAKE 1 TABLET(50 MG) BY MOUTH EVERY DAY 90 tablet 3    montelukast (SINGULAIR) 10 mg tablet Take 10 mg by mouth once daily.      multivitamin with minerals tablet Take 1 tablet by mouth once daily.      omeprazole (PRILOSEC) 40 MG capsule Take 40 mg by mouth once daily.      silver sulfADIAZINE 1% (SILVADENE) 1 % cream Apply topically 2 (two) times daily. 1000 g 3    apixaban (ELIQUIS) 2.5 mg Tab Take 2.5 mg by mouth 2 (two) times daily.      nystatin (MYCOSTATIN) cream Apply topically 2 (two) times daily. 90 g 3     No current facility-administered medications for this visit.     Facility-Administered Medications Ordered in Other Visits  "  Medication Dose Route Frequency Provider Last Rate Last Admin    sodium chloride 0.9% flush 10 mL  10 mL Intravenous PRN Bozean Acosta MD        sodium chloride 0.9% flush 10 mL  10 mL Intravenous PRN Bozena Acosta MD        sodium chloride 0.9% flush 10 mL  10 mL Intravenous PRN Bozena Acosta MD        sodium chloride 0.9% flush 10 mL  10 mL Intravenous PRN Bozena Acosta MD           Alpha-1 Antitrypsin:  Last PFT:   Last CT:    Review of Systems  General: Feeling ok  Eyes: Vision is good.  ENT:  No sinusitis or pharyngitis.   Heart:: No chest pain or palpitations.  Lungs: No cough, sputum, or wheezing. She occasionally feels tight in her upper chest  GI: No Nausea, vomiting, constipation, diarrhea, or reflux.  : No dysuria, hesitancy, or nocturia.  Musculoskeletal: No joint pain or myalgias.  Skin: No lesions or rashes.  She has a large decubitus to her thoracic spine and an abscess with a wound VAC to her buttocks  Neuro: No headaches or neuropathy.  Lymph: No edema or adenopathy.  Psych: No anxiety or depression.  Endo: No weight change.    OBJECTIVE:      /75 (BP Location: Left arm, Patient Position: Sitting, BP Method: Large (Manual))   Pulse 102   Ht 5' 1" (1.549 m)   Wt (!) 140.6 kg (310 lb) Comment: pt in wheelchair  SpO2 97%   BMI 58.57 kg/m²     Physical Exam  GENERAL: Midaged obese wheelchair patient in no distress.  HEENT: Pupils equal and reactive. Extraocular movements intact. Nose intact.      Pharynx moist. Malampati 4  NECK: Supple. 23"  HEART: Regular rate and rhythm. No murmur or gallop auscultated.  LUNGS: Clear to auscultation and percussion. Lung excursion symmetrical. No change in fremitus. No adventitial noises.  ABDOMEN: Bowel sounds present.  Very obese.  Non-tender, no masses palpated.  EXTREMITIES:  Her legs are foreshortened.  The right leg is edematous.  The mother states it is been this way her entire life.  :  Suprapubic catheter with yellow " urine.  LYMPHATICS: No adenopathy palpated, edema to right leg  SKIN: Dry, intact, no lesions.  Wound VAC to right buttock.  Stage II sacral decubitus  NEURO:  Cognitively impaired.  Paraplegic.  PSYCH: Appropriate affect.    Assessment:       1. Obesity hypoventilation syndrome    2. Chronic respiratory failure with hypoxia and hypercapnia    3. Spina bifida, unspecified hydrocephalus presence, unspecified spinal region    4. Morbid obesity with BMI of 50.0-59.9, adult    5. Pressure injury of sacral region, stage 2    6. Pressure injury of deep tissue of contiguous region involving right buttock and hip          Plan:       Obesity hypoventilation syndrome    Chronic respiratory failure with hypoxia and hypercapnia    Spina bifida, unspecified hydrocephalus presence, unspecified spinal region    Morbid obesity with BMI of 50.0-59.9, adult    Pressure injury of sacral region, stage 2    Pressure injury of deep tissue of contiguous region involving right buttock and hip         Follow up in about 1 year (around 5/18/2023).    [employer] out of Nicole Meza therapist, [phone], office number [phone] for download of her trilogy  If necessary, we can do her neck is years visit as a virtual visit

## 2022-05-19 ENCOUNTER — OFFICE VISIT (OUTPATIENT)
Dept: WOUND CARE | Facility: HOSPITAL | Age: 47
End: 2022-05-19
Attending: FAMILY MEDICINE
Payer: MEDICARE

## 2022-05-19 VITALS
RESPIRATION RATE: 18 BRPM | TEMPERATURE: 99 F | SYSTOLIC BLOOD PRESSURE: 137 MMHG | DIASTOLIC BLOOD PRESSURE: 84 MMHG | HEART RATE: 92 BPM

## 2022-05-19 DIAGNOSIS — T14.8XXA WOUND INFECTION: ICD-10-CM

## 2022-05-19 DIAGNOSIS — L89.314 PRESSURE ULCER OF RIGHT BUTTOCK, STAGE 4: Primary | ICD-10-CM

## 2022-05-19 DIAGNOSIS — L89.323 STAGE III PRESSURE ULCER OF LEFT BUTTOCK: ICD-10-CM

## 2022-05-19 DIAGNOSIS — L08.9 WOUND INFECTION: ICD-10-CM

## 2022-05-19 DIAGNOSIS — S21.201D OPEN WOUND OF RIGHT SIDE OF BACK, SUBSEQUENT ENCOUNTER: ICD-10-CM

## 2022-05-19 DIAGNOSIS — S31.819D OPEN WOUND OF RIGHT BUTTOCK WITH COMPLICATION, SUBSEQUENT ENCOUNTER: ICD-10-CM

## 2022-05-19 PROCEDURE — 99499 NO LOS: ICD-10-PCS | Mod: ,,, | Performed by: FAMILY MEDICINE

## 2022-05-19 PROCEDURE — 11043 DBRDMT MUSC&/FSCA 1ST 20/<: CPT | Performed by: FAMILY MEDICINE

## 2022-05-19 PROCEDURE — 99499 UNLISTED E&M SERVICE: CPT | Mod: ,,, | Performed by: FAMILY MEDICINE

## 2022-05-19 PROCEDURE — 11043 DBRDMT MUSC&/FSCA 1ST 20/<: CPT | Mod: ,,, | Performed by: FAMILY MEDICINE

## 2022-05-19 PROCEDURE — 11043 PR DEBRIDEMENT, SKIN, SUB-Q TISSUE,MUSCLE,=<20 SQ CM: ICD-10-PCS | Mod: ,,, | Performed by: FAMILY MEDICINE

## 2022-05-19 NOTE — PROGRESS NOTES
Wound Care and Hyperbaric Medicine                Progress Note    Subjective:       Patient ID: Briana Uriarte is a 46 y.o. female.    Chief Complaint: Wound Care    HPI  Pt seen in clinic for a FU visit for multiple open wounds. Pt doing well, waiting for hospital bed, but no new complaints today  Review of Systems   Skin: Positive for wound.        Multiple sacral and buttock wounds   All other systems reviewed and are negative.        Objective:        Physical Exam  Vitals and nursing note reviewed. Exam conducted with a chaperone present.   Constitutional:       General: She is not in acute distress.     Appearance: Normal appearance. She is not ill-appearing, toxic-appearing or diaphoretic.   Skin:     General: Skin is warm and dry.      Coloration: Skin is not jaundiced or pale.      Findings: Lesion present. No bruising, erythema or rash.      Comments: Multiple buttock and sacral wounds, see wound care assessment documentation in chart review scanned under the media tab   Neurological:      General: No focal deficit present.      Mental Status: She is alert and oriented to person, place, and time.   Psychiatric:         Mood and Affect: Mood normal.         Behavior: Behavior normal.         Thought Content: Thought content normal.         Judgment: Judgment normal.         Vitals:    05/19/22 1342   BP: 137/84   Pulse: 92   Resp: 18   Temp: 98.5 °F (36.9 °C)       Assessment:           ICD-10-CM ICD-9-CM   1. Pressure ulcer of right buttock, stage 4  L89.314 707.05     707.24   2. Open wound of right side of back, subsequent encounter  S21.201D V58.89     876.0   3. Open wound of right buttock with complication, subsequent encounter  S31.819D V58.89     877.1   4. Wound infection  T14.8XXA 958.3    L08.9    5. Stage III pressure ulcer of left buttock  L89.323 707.05     707.23                Plan:                  Briana was seen today for wound care.    Diagnoses and all orders for  this visit:    Pressure ulcer of right buttock, stage 4    Open wound of right side of back, subsequent encounter    Open wound of right buttock with complication, subsequent encounter    Wound infection    Stage III pressure ulcer of left buttock        Please see wound care instructions which have been provided separately.

## 2022-05-23 ENCOUNTER — EXTERNAL HOME HEALTH (OUTPATIENT)
Dept: HOME HEALTH SERVICES | Facility: HOSPITAL | Age: 47
End: 2022-05-23
Payer: MEDICARE

## 2022-05-24 ENCOUNTER — PATIENT MESSAGE (OUTPATIENT)
Dept: UROLOGY | Facility: CLINIC | Age: 47
End: 2022-05-24
Payer: MEDICARE

## 2022-05-25 ENCOUNTER — LAB VISIT (OUTPATIENT)
Dept: LAB | Facility: HOSPITAL | Age: 47
End: 2022-05-25
Attending: INTERNAL MEDICINE
Payer: MEDICARE

## 2022-05-25 DIAGNOSIS — D50.0 IRON DEFICIENCY ANEMIA DUE TO CHRONIC BLOOD LOSS: ICD-10-CM

## 2022-05-25 LAB
ALBUMIN SERPL BCP-MCNC: 2.7 G/DL (ref 3.5–5.2)
ALP SERPL-CCNC: 99 U/L (ref 55–135)
ALT SERPL W/O P-5'-P-CCNC: 8 U/L (ref 10–44)
ANION GAP SERPL CALC-SCNC: 14 MMOL/L (ref 8–16)
AST SERPL-CCNC: 16 U/L (ref 10–40)
BASOPHILS # BLD AUTO: 0.06 K/UL (ref 0–0.2)
BASOPHILS NFR BLD: 0.8 % (ref 0–1.9)
BILIRUB SERPL-MCNC: 0.2 MG/DL (ref 0.1–1)
BUN SERPL-MCNC: 10 MG/DL (ref 6–20)
CALCIUM SERPL-MCNC: 9.6 MG/DL (ref 8.7–10.5)
CHLORIDE SERPL-SCNC: 102 MMOL/L (ref 95–110)
CO2 SERPL-SCNC: 22 MMOL/L (ref 23–29)
CREAT SERPL-MCNC: 0.6 MG/DL (ref 0.5–1.4)
DIFFERENTIAL METHOD: ABNORMAL
EOSINOPHIL # BLD AUTO: 0.2 K/UL (ref 0–0.5)
EOSINOPHIL NFR BLD: 2.5 % (ref 0–8)
ERYTHROCYTE [DISTWIDTH] IN BLOOD BY AUTOMATED COUNT: 18.5 % (ref 11.5–14.5)
EST. GFR  (AFRICAN AMERICAN): >60 ML/MIN/1.73 M^2
EST. GFR  (NON AFRICAN AMERICAN): >60 ML/MIN/1.73 M^2
FERRITIN SERPL-MCNC: 289 NG/ML (ref 20–300)
GLUCOSE SERPL-MCNC: 147 MG/DL (ref 70–110)
HCT VFR BLD AUTO: 36.2 % (ref 37–48.5)
HGB BLD-MCNC: 10.5 G/DL (ref 12–16)
IMM GRANULOCYTES # BLD AUTO: 0.02 K/UL (ref 0–0.04)
IMM GRANULOCYTES NFR BLD AUTO: 0.3 % (ref 0–0.5)
IRON SERPL-MCNC: <10 UG/DL (ref 30–160)
LYMPHOCYTES # BLD AUTO: 1.4 K/UL (ref 1–4.8)
LYMPHOCYTES NFR BLD: 19.4 % (ref 18–48)
MCH RBC QN AUTO: 23.9 PG (ref 27–31)
MCHC RBC AUTO-ENTMCNC: 29 G/DL (ref 32–36)
MCV RBC AUTO: 83 FL (ref 82–98)
MONOCYTES # BLD AUTO: 0.4 K/UL (ref 0.3–1)
MONOCYTES NFR BLD: 4.8 % (ref 4–15)
NEUTROPHILS # BLD AUTO: 5.2 K/UL (ref 1.8–7.7)
NEUTROPHILS NFR BLD: 72.2 % (ref 38–73)
NRBC BLD-RTO: 0 /100 WBC
PLATELET # BLD AUTO: 476 K/UL (ref 150–450)
PMV BLD AUTO: 9.7 FL (ref 9.2–12.9)
POTASSIUM SERPL-SCNC: 3.5 MMOL/L (ref 3.5–5.1)
PROT SERPL-MCNC: 8.4 G/DL (ref 6–8.4)
RBC # BLD AUTO: 4.39 M/UL (ref 4–5.4)
SATURATED IRON: ABNORMAL % (ref 20–50)
SODIUM SERPL-SCNC: 138 MMOL/L (ref 136–145)
TOTAL IRON BINDING CAPACITY: 246 UG/DL (ref 250–450)
TRANSFERRIN SERPL-MCNC: 166 MG/DL (ref 200–375)
WBC # BLD AUTO: 7.22 K/UL (ref 3.9–12.7)

## 2022-05-25 PROCEDURE — 80053 COMPREHEN METABOLIC PANEL: CPT | Performed by: INTERNAL MEDICINE

## 2022-05-25 PROCEDURE — 85025 COMPLETE CBC W/AUTO DIFF WBC: CPT | Performed by: INTERNAL MEDICINE

## 2022-05-25 PROCEDURE — 84466 ASSAY OF TRANSFERRIN: CPT | Performed by: INTERNAL MEDICINE

## 2022-05-25 PROCEDURE — 36415 COLL VENOUS BLD VENIPUNCTURE: CPT | Mod: PO | Performed by: INTERNAL MEDICINE

## 2022-05-25 PROCEDURE — 82728 ASSAY OF FERRITIN: CPT | Performed by: INTERNAL MEDICINE

## 2022-05-27 ENCOUNTER — PATIENT MESSAGE (OUTPATIENT)
Dept: UROLOGY | Facility: CLINIC | Age: 47
End: 2022-05-27
Payer: MEDICARE

## 2022-05-27 ENCOUNTER — OFFICE VISIT (OUTPATIENT)
Dept: HEMATOLOGY/ONCOLOGY | Facility: CLINIC | Age: 47
End: 2022-05-27
Payer: MEDICARE

## 2022-05-27 ENCOUNTER — TELEPHONE (OUTPATIENT)
Dept: HEMATOLOGY/ONCOLOGY | Facility: CLINIC | Age: 47
End: 2022-05-27
Payer: MEDICARE

## 2022-05-27 VITALS
TEMPERATURE: 97 F | RESPIRATION RATE: 20 BRPM | DIASTOLIC BLOOD PRESSURE: 75 MMHG | SYSTOLIC BLOOD PRESSURE: 115 MMHG | BODY MASS INDEX: 59.07 KG/M2 | HEART RATE: 102 BPM | HEIGHT: 59 IN | OXYGEN SATURATION: 97 % | WEIGHT: 293 LBS

## 2022-05-27 DIAGNOSIS — Q05.7 SPINA BIFIDA OF LUMBAR REGION, UNSPECIFIED HYDROCEPHALUS PRESENCE: ICD-10-CM

## 2022-05-27 DIAGNOSIS — D50.0 IRON DEFICIENCY ANEMIA DUE TO CHRONIC BLOOD LOSS: Primary | ICD-10-CM

## 2022-05-27 LAB — STFR SERPL-MCNC: 5 MG/L (ref 1.8–4.6)

## 2022-05-27 PROCEDURE — 99999 PR PBB SHADOW E&M-EST. PATIENT-LVL IV: ICD-10-PCS | Mod: PBBFAC,,, | Performed by: INTERNAL MEDICINE

## 2022-05-27 PROCEDURE — 99214 PR OFFICE/OUTPT VISIT, EST, LEVL IV, 30-39 MIN: ICD-10-PCS | Mod: S$PBB,,, | Performed by: INTERNAL MEDICINE

## 2022-05-27 PROCEDURE — 99214 OFFICE O/P EST MOD 30 MIN: CPT | Mod: S$PBB,,, | Performed by: INTERNAL MEDICINE

## 2022-05-27 PROCEDURE — 99214 OFFICE O/P EST MOD 30 MIN: CPT | Mod: PBBFAC,PO | Performed by: INTERNAL MEDICINE

## 2022-05-27 PROCEDURE — 99999 PR PBB SHADOW E&M-EST. PATIENT-LVL IV: CPT | Mod: PBBFAC,,, | Performed by: INTERNAL MEDICINE

## 2022-05-31 ENCOUNTER — OFFICE VISIT (OUTPATIENT)
Dept: FAMILY MEDICINE | Facility: CLINIC | Age: 47
End: 2022-05-31
Payer: MEDICARE

## 2022-05-31 VITALS — DIASTOLIC BLOOD PRESSURE: 72 MMHG | HEART RATE: 96 BPM | SYSTOLIC BLOOD PRESSURE: 118 MMHG | OXYGEN SATURATION: 97 %

## 2022-05-31 DIAGNOSIS — G82.20 PARAPLEGIA: ICD-10-CM

## 2022-05-31 DIAGNOSIS — F11.20 CONTINUOUS OPIOID DEPENDENCE: Chronic | ICD-10-CM

## 2022-05-31 DIAGNOSIS — G91.1 OBSTRUCTIVE HYDROCEPHALUS: ICD-10-CM

## 2022-05-31 DIAGNOSIS — I71.40 ABDOMINAL AORTIC ANEURYSM, WITHOUT RUPTURE: Primary | ICD-10-CM

## 2022-05-31 DIAGNOSIS — Z93.59 SUPRAPUBIC CATHETER: ICD-10-CM

## 2022-05-31 PROCEDURE — 99999 PR PBB SHADOW E&M-EST. PATIENT-LVL III: CPT | Mod: PBBFAC,,, | Performed by: FAMILY MEDICINE

## 2022-05-31 PROCEDURE — 99999 PR PBB SHADOW E&M-EST. PATIENT-LVL III: ICD-10-PCS | Mod: PBBFAC,,, | Performed by: FAMILY MEDICINE

## 2022-05-31 PROCEDURE — 99214 OFFICE O/P EST MOD 30 MIN: CPT | Mod: S$PBB,,, | Performed by: FAMILY MEDICINE

## 2022-05-31 PROCEDURE — 99214 PR OFFICE/OUTPT VISIT, EST, LEVL IV, 30-39 MIN: ICD-10-PCS | Mod: S$PBB,,, | Performed by: FAMILY MEDICINE

## 2022-05-31 PROCEDURE — 99213 OFFICE O/P EST LOW 20 MIN: CPT | Mod: PBBFAC,PO | Performed by: FAMILY MEDICINE

## 2022-05-31 RX ORDER — HYDROCODONE BITARTRATE AND ACETAMINOPHEN 7.5; 325 MG/1; MG/1
1 TABLET ORAL EVERY 6 HOURS PRN
Qty: 120 TABLET | Refills: 0 | Status: SHIPPED | OUTPATIENT
Start: 2022-05-31 | End: 2022-08-31

## 2022-05-31 RX ORDER — HYDROCODONE BITARTRATE AND ACETAMINOPHEN 7.5; 325 MG/1; MG/1
1 TABLET ORAL EVERY 6 HOURS PRN
Qty: 120 TABLET | Refills: 0 | Status: SHIPPED | OUTPATIENT
Start: 2022-05-31 | End: 2022-08-31 | Stop reason: SDUPTHER

## 2022-05-31 RX ORDER — ARGIN/GLUT/CAHMB/COLLAG/MV-MIN 7G-7G-1.5G
POWDER IN PACKET (EA) ORAL
COMMUNITY
End: 2023-08-30

## 2022-05-31 NOTE — PATIENT INSTRUCTIONS
Jasen Warren,     If you are due for any health screening(s) below please notify me so we can arrange them to be ordered and scheduled to maintain your health. Most healthy patients complete it. Don't lose out on improving your health.     Health Maintenance   Topic Date Due    Lipid Panel  12/08/2026    TETANUS VACCINE  04/12/2027    Hepatitis C Screening  Discontinued    Mammogram  Discontinued                      May 31, 2022       Briana Uriarte  [address] [place] [address]           Dear Briana:    Your [facility] care is dedicated to helping you stay healthy with regular scheduled recommended screenings.  Scheduling routine screenings is important to maintaining good health. Our records indicate that you may be overdue for your screening pap smear.  Pap smear screening can help identify patients at risk for developing cervical cancer at an early stage, when it is most likely to be successfully treated.    The current recommendation for Pap smear screening is every 3-5 years.  We encourage you to schedule your appointment with your [facility] health provider.  Many women see a Gynecologist for this screening but some primary care providers also provide Pap screening  If you recently had your pap smear screening performed outside of [facility], please let your Health care team know so that they can update your health record.      If you have questions or want to schedule your screening elsewhere, please call [phone] to speak to a [facility] coordinator.    Sincerely,    Your [facility] Care Team

## 2022-05-31 NOTE — PROGRESS NOTES
Service Date:  22    Chief Complaint: Anemia    Briana Uriarte is a 46 y.o. female with anemia.      Patient is doing well.  She has no complaints to me.  Her family is present with her.  Patient is paraplegic from spina bifida. She is here to follow up on blood work.    Review of Systems   Constitutional: Negative.    HENT: Negative.    Eyes: Negative.    Respiratory: Negative.    Cardiovascular: Negative.    Gastrointestinal: Negative.    Endocrine: Negative.    Genitourinary: Negative.    Neurological: Negative.    Hematological: Negative.    Psychiatric/Behavioral: Negative.         Current Outpatient Medications   Medication Instructions    albuterol (PROVENTIL/VENTOLIN HFA) 90 mcg/actuation inhaler SMARTSI-2 Puff(s) By Mouth Every 4-6 Hours PRN    apixaban (ELIQUIS) 2.5 mg, Oral, 2 times daily    cakuc-vfue-QtVAL-collag-mv-min (SULY, WITH COLLAGEN,) 7-7-1.5 gram PwPk Oral    ascorbic acid (vitamin C) (VITAMIN C) 500 mg, Oral, Daily    furosemide (LASIX) 20 mg, Oral, Daily    HYDROcodone-acetaminophen (NORCO) 7.5-325 mg per tablet 1 tablet, Oral, Every 6 hours PRN, Do not fill until 22    HYDROcodone-acetaminophen (NORCO) 7.5-325 mg per tablet 1 tablet, Oral, Every 6 hours PRN, Do not fill until 22    HYDROcodone-acetaminophen (NORCO) 7.5-325 mg per tablet 1 tablet, Oral, Every 6 hours PRN    ipratropium (ATROVENT) 42 mcg (0.06 %) nasal spray SMARTSI-2 Spray(s) Both Nares 4 Times Daily    metoprolol succinate (TOPROL-XL) 50 MG 24 hr tablet TAKE 1 TABLET(50 MG) BY MOUTH EVERY DAY    montelukast (SINGULAIR) 10 mg, Oral, Daily    multivitamin with minerals tablet 1 tablet, Daily    nystatin (MYCOSTATIN) cream Topical (Top), 2 times daily    omeprazole (PRILOSEC) 40 mg, Oral, Daily    silver sulfADIAZINE 1% (SILVADENE) 1 % cream Topical (Top), 2 times daily        Past Medical History:   Diagnosis Date    Chronic hypercapnic respiratory failure     Chronic kidney  "disease     kidney stones    Diabetes mellitus, type 2     Esotropia     GERD (gastroesophageal reflux disease)     Hydrocephalus     Hypertension     Morbid obesity     Obesity hypoventilation syndrome     Paralysis     Scoliosis     Spinal bifida, closed         Past Surgical History:   Procedure Laterality Date    BRAIN SURGERY       shunt revision    CHOLECYSTECTOMY      CYSTOSCOPY W/ LASER LITHOTRIPSY      REPAIR OF INCARCERATED UMBILICAL HERNIA N/A 3/8/2019    Procedure: REPAIR, HERNIA, UMBILICAL, INCARCERATED, AGE 5 YEARS OR OLDER;  Surgeon: Ziyad Hancock MD;  Location: [facility];  Service: General;  Laterality: N/A;    SPINE SURGERY          Family History   Problem Relation Age of Onset    Cancer Mother     Glaucoma Father     Heart disease Maternal Grandmother     Cancer Maternal Grandmother     Glaucoma Paternal Grandfather     Psoriasis Maternal Uncle     Melanoma Neg Hx     Lupus Neg Hx     Eczema Neg Hx        Social History     Tobacco Use    Smoking status: Never Smoker    Smokeless tobacco: Never Used   Substance Use Topics    Alcohol use: No    Drug use: Yes     Types: Hydrocodone         Vitals:    05/27/22 1334   BP: 115/75   Pulse: 102   Resp: 20   Temp: 97.3 °F (36.3 °C)        Physical Exam:  /75 (BP Location: Right arm, Patient Position: Sitting, BP Method: Large (Automatic)) Comment (BP Location): forearm  Pulse 102   Temp 97.3 °F (36.3 °C) (Temporal)   Resp 20   Ht 4' 11" (1.499 m)   Wt 136.1 kg (300 lb)   SpO2 97%   BMI 60.59 kg/m²     Physical Exam  Constitutional:       Appearance: Normal appearance.   HENT:      Head: Normocephalic and atraumatic.      Nose: Nose normal.      Mouth/Throat:      Mouth: Mucous membranes are moist.      Pharynx: Oropharynx is clear.   Eyes:      Conjunctiva/sclera: Conjunctivae normal.   Cardiovascular:      Rate and Rhythm: Normal rate and regular rhythm.   Pulmonary:      Effort: Pulmonary effort is normal. "   Abdominal:      General: There is no distension.   Musculoskeletal:         General: Normal range of motion.      Cervical back: Normal range of motion and neck supple.   Skin:     General: Skin is warm and dry.   Neurological:      Mental Status: She is alert and oriented to person, place, and time. Mental status is at baseline.   Psychiatric:         Mood and Affect: Mood normal.          Labs:  Lab Results   Component Value Date    WBC 7.22 05/25/2022    RBC 4.39 05/25/2022    HGB 10.5 (L) 05/25/2022    HCT 36.2 (L) 05/25/2022    MCV 83 05/25/2022    MCH 23.9 (L) 05/25/2022    MCHC 29.0 (L) 05/25/2022    RDW 18.5 (H) 05/25/2022     (H) 05/25/2022    MPV 9.7 05/25/2022    GRAN 5.2 05/25/2022    GRAN 72.2 05/25/2022    LYMPH 1.4 05/25/2022    LYMPH 19.4 05/25/2022    MONO 0.4 05/25/2022    MONO 4.8 05/25/2022    EOS 0.2 05/25/2022    BASO 0.06 05/25/2022    EOSINOPHIL 2.5 05/25/2022    BASOPHIL 0.8 05/25/2022     Sodium   Date Value Ref Range Status   05/25/2022 138 136 - 145 mmol/L Final     Potassium   Date Value Ref Range Status   05/25/2022 3.5 3.5 - 5.1 mmol/L Final     Chloride   Date Value Ref Range Status   05/25/2022 102 95 - 110 mmol/L Final     CO2   Date Value Ref Range Status   05/25/2022 22 (L) 23 - 29 mmol/L Final     Glucose   Date Value Ref Range Status   05/25/2022 147 (H) 70 - 110 mg/dL Final     BUN   Date Value Ref Range Status   05/25/2022 10 6 - 20 mg/dL Final     Creatinine   Date Value Ref Range Status   05/25/2022 0.6 0.5 - 1.4 mg/dL Final     Calcium   Date Value Ref Range Status   05/25/2022 9.6 8.7 - 10.5 mg/dL Final     Total Protein   Date Value Ref Range Status   05/25/2022 8.4 6.0 - 8.4 g/dL Final     Albumin   Date Value Ref Range Status   05/25/2022 2.7 (L) 3.5 - 5.2 g/dL Final     Total Bilirubin   Date Value Ref Range Status   05/25/2022 0.2 0.1 - 1.0 mg/dL Final     Comment:     For infants and newborns, interpretation of results should be based  on gestational age,  weight and in agreement with clinical  observations.    Premature Infant recommended reference ranges:  Up to 24 hours.............<8.0 mg/dL  Up to 48 hours............<12.0 mg/dL  3-5 days..................<15.0 mg/dL  6-29 days.................<15.0 mg/dL       Alkaline Phosphatase   Date Value Ref Range Status   05/25/2022 99 55 - 135 U/L Final     AST   Date Value Ref Range Status   05/25/2022 16 10 - 40 U/L Final     ALT   Date Value Ref Range Status   05/25/2022 8 (L) 10 - 44 U/L Final     Anion Gap   Date Value Ref Range Status   05/25/2022 14 8 - 16 mmol/L Final     eGFR if    Date Value Ref Range Status   05/25/2022 >60.0 >60 mL/min/1.73 m^2 Final     eGFR if non    Date Value Ref Range Status   05/25/2022 >60.0 >60 mL/min/1.73 m^2 Final     Comment:     Calculation used to obtain the estimated glomerular filtration  rate (eGFR) is the CKD-EPI equation.          A/P:    Iron deficiency anemia  -suspect due to chronic blood loss  -given 2 doses of Injectafer.  Anemia continues to improved.  -I do feel she also has a component of chronic imflammation  -RTC in 6 months to recheck levels    Spina bifida  -patient prefers virtual visits as a result  -patient is paraplegic  -will limit access to treatment      Aurash Khoobehi, MD  Hematology and Oncology

## 2022-06-01 PROBLEM — I71.40 ABDOMINAL AORTIC ANEURYSM, WITHOUT RUPTURE: Status: ACTIVE | Noted: 2022-06-01

## 2022-06-01 NOTE — PROGRESS NOTES
Subjective:   Patient ID: Briana Uriarte is a 46 y.o. female     Chief Complaint:Medication Refill      Here for chekcup and med refill. No new comlaints    Review of Systems   Respiratory: Negative for shortness of breath.    Cardiovascular: Negative for chest pain.   Gastrointestinal: Negative for abdominal pain.   Genitourinary: Negative for dysuria.     Past Medical History:   Diagnosis Date    Chronic hypercapnic respiratory failure     Chronic kidney disease     kidney stones    Diabetes mellitus, type 2     Esotropia     GERD (gastroesophageal reflux disease)     Hydrocephalus     Hypertension     Morbid obesity     Obesity hypoventilation syndrome     Paralysis     Scoliosis     Spinal bifida, closed      Past Surgical History:   Procedure Laterality Date    BRAIN SURGERY       shunt revision    CHOLECYSTECTOMY      CYSTOSCOPY W/ LASER LITHOTRIPSY      REPAIR OF INCARCERATED UMBILICAL HERNIA N/A 3/8/2019    Procedure: REPAIR, HERNIA, UMBILICAL, INCARCERATED, AGE 5 YEARS OR OLDER;  Surgeon: Ziyad Hancock MD;  Location: [facility];  Service: General;  Laterality: N/A;    SPINE SURGERY       Objective:     Vitals:    05/31/22 1347   BP: 118/72   Pulse: 96     There is no height or weight on file to calculate BMI.  Physical Exam  Vitals and nursing note reviewed.   Constitutional:       Appearance: She is well-developed.   HENT:      Head: Normocephalic and atraumatic.   Eyes:      General: No scleral icterus.     Conjunctiva/sclera: Conjunctivae normal.   Cardiovascular:      Heart sounds: No murmur heard.  Pulmonary:      Effort: Pulmonary effort is normal. No respiratory distress.   Musculoskeletal:         General: No deformity. Normal range of motion.      Cervical back: Normal range of motion and neck supple.   Skin:     Coloration: Skin is not pale.      Findings: No rash.   Neurological:      Mental Status: She is alert and oriented to person, place, and time.    Psychiatric:         Behavior: Behavior normal.         Thought Content: Thought content normal.         Judgment: Judgment normal.       Assessment:     1. Abdominal aortic aneurysm, without rupture    2. Continuous opioid dependence- contract 3/21/19    3. Paraplegia    4. Suprapubic catheter    5. Obstructive hydrocephalus      Plan:   Abdominal aortic aneurysm, without rupture  BP control and on statin    Continuous opioid dependence- contract 3/21/19  -     HYDROcodone-acetaminophen (NORCO) 7.5-325 mg per tablet; Take 1 tablet by mouth every 6 (six) hours as needed for Pain. Do not fill until 7/30/22  Dispense: 120 tablet; Refill: 0  -     HYDROcodone-acetaminophen (NORCO) 7.5-325 mg per tablet; Take 1 tablet by mouth every 6 (six) hours as needed for Pain. Do not fill until 6/30/22  Dispense: 120 tablet; Refill: 0  -     HYDROcodone-acetaminophen (NORCO) 7.5-325 mg per tablet; Take 1 tablet by mouth every 6 (six) hours as needed for Pain.  Dispense: 120 tablet; Refill: 0    Paraplegia  Chronic, birth defect  Suprapubic catheter  Stable, follows with urology  Obstructive hydrocephalus  stable          Total time spent of Greater than 30 minutes minutes on the day of the visit.This includes face to face time and preparing to see the patient, obtaining and reviewing separately obtained history, documenting clinical information in the electronic or other health record, independently interpreting results and communicating results to the patient/family/caregiver, or care coordinator.    Jose De Jesus Curran MD  06/01/2022    Portions of this note have been dictated with DILLON Spear

## 2022-06-14 ENCOUNTER — DOCUMENT SCAN (OUTPATIENT)
Dept: HOME HEALTH SERVICES | Facility: HOSPITAL | Age: 47
End: 2022-06-14
Payer: MEDICARE

## 2022-06-22 ENCOUNTER — LAB VISIT (OUTPATIENT)
Dept: LAB | Facility: HOSPITAL | Age: 47
End: 2022-06-22
Attending: NURSE PRACTITIONER
Payer: MEDICARE

## 2022-06-22 DIAGNOSIS — L89.314 STAGE IV PRESSURE ULCER OF RIGHT BUTTOCK: Primary | ICD-10-CM

## 2022-06-22 LAB
ALBUMIN SERPL BCP-MCNC: 2.8 G/DL (ref 3.5–5.2)
ALP SERPL-CCNC: 120 U/L (ref 55–135)
ALT SERPL W/O P-5'-P-CCNC: 13 U/L (ref 10–44)
ANION GAP SERPL CALC-SCNC: 11 MMOL/L (ref 8–16)
AST SERPL-CCNC: 22 U/L (ref 10–40)
BASOPHILS # BLD AUTO: 0.08 K/UL (ref 0–0.2)
BASOPHILS NFR BLD: 1 % (ref 0–1.9)
BILIRUB SERPL-MCNC: 0.2 MG/DL (ref 0.1–1)
BUN SERPL-MCNC: 12 MG/DL (ref 6–20)
CALCIUM SERPL-MCNC: 10 MG/DL (ref 8.7–10.5)
CHLORIDE SERPL-SCNC: 103 MMOL/L (ref 95–110)
CO2 SERPL-SCNC: 24 MMOL/L (ref 23–29)
CREAT SERPL-MCNC: 0.7 MG/DL (ref 0.5–1.4)
CRP SERPL-MCNC: 78.1 MG/L (ref 0–8.2)
DIFFERENTIAL METHOD: ABNORMAL
EOSINOPHIL # BLD AUTO: 0.3 K/UL (ref 0–0.5)
EOSINOPHIL NFR BLD: 3.7 % (ref 0–8)
ERYTHROCYTE [DISTWIDTH] IN BLOOD BY AUTOMATED COUNT: 18.7 % (ref 11.5–14.5)
ERYTHROCYTE [SEDIMENTATION RATE] IN BLOOD BY WESTERGREN METHOD: 112 MM/HR (ref 0–20)
EST. GFR  (AFRICAN AMERICAN): >60 ML/MIN/1.73 M^2
EST. GFR  (NON AFRICAN AMERICAN): >60 ML/MIN/1.73 M^2
GLUCOSE SERPL-MCNC: 93 MG/DL (ref 70–110)
HCT VFR BLD AUTO: 38.1 % (ref 37–48.5)
HGB BLD-MCNC: 11.4 G/DL (ref 12–16)
IMM GRANULOCYTES # BLD AUTO: 0.04 K/UL (ref 0–0.04)
IMM GRANULOCYTES NFR BLD AUTO: 0.5 % (ref 0–0.5)
LYMPHOCYTES # BLD AUTO: 1.7 K/UL (ref 1–4.8)
LYMPHOCYTES NFR BLD: 21.6 % (ref 18–48)
MCH RBC QN AUTO: 23.6 PG (ref 27–31)
MCHC RBC AUTO-ENTMCNC: 29.9 G/DL (ref 32–36)
MCV RBC AUTO: 79 FL (ref 82–98)
MONOCYTES # BLD AUTO: 0.5 K/UL (ref 0.3–1)
MONOCYTES NFR BLD: 6.2 % (ref 4–15)
NEUTROPHILS # BLD AUTO: 5.3 K/UL (ref 1.8–7.7)
NEUTROPHILS NFR BLD: 67 % (ref 38–73)
NRBC BLD-RTO: 0 /100 WBC
PLATELET # BLD AUTO: 499 K/UL (ref 150–450)
PMV BLD AUTO: 9.4 FL (ref 9.2–12.9)
POTASSIUM SERPL-SCNC: 4.1 MMOL/L (ref 3.5–5.1)
PREALB SERPL-MCNC: 20 MG/DL (ref 20–43)
PROT SERPL-MCNC: 9.2 G/DL (ref 6–8.4)
RBC # BLD AUTO: 4.84 M/UL (ref 4–5.4)
SODIUM SERPL-SCNC: 138 MMOL/L (ref 136–145)
WBC # BLD AUTO: 7.88 K/UL (ref 3.9–12.7)

## 2022-06-22 PROCEDURE — 86140 C-REACTIVE PROTEIN: CPT | Performed by: NURSE PRACTITIONER

## 2022-06-22 PROCEDURE — 85651 RBC SED RATE NONAUTOMATED: CPT | Mod: PO | Performed by: NURSE PRACTITIONER

## 2022-06-22 PROCEDURE — 85025 COMPLETE CBC W/AUTO DIFF WBC: CPT | Performed by: NURSE PRACTITIONER

## 2022-06-22 PROCEDURE — 80053 COMPREHEN METABOLIC PANEL: CPT | Performed by: NURSE PRACTITIONER

## 2022-06-22 PROCEDURE — 84134 ASSAY OF PREALBUMIN: CPT | Performed by: NURSE PRACTITIONER

## 2022-06-25 ENCOUNTER — DOCUMENT SCAN (OUTPATIENT)
Dept: HOME HEALTH SERVICES | Facility: HOSPITAL | Age: 47
End: 2022-06-25
Payer: MEDICARE

## 2022-06-27 NOTE — PROGRESS NOTES
[facility] Note    PCP: Jose De Jesus Curran MD    Chief Complaint: neurogenic bladder    SUBJECTIVE:       History of Present Illness:  Briana Uriarte is a 46 y.o. female who presents to clinic for neurogenic bladder. She is Established  to our clinic.     Doing well from a urologic standpoint.   Mcdonald being changed every 3 weeks. No issues with exchanges.  Has been battling a sacral decubitus ulcer for which she is following with wound care.     Had a CT in september which showed a blown out non-function left kidney. No hydro in the right however small volume of renal stones.     6/9/21  Previous patient of Dr. Smith, last seen March 2020.  Patient has a hx of spina bifida, hydrocephalus, paraplegia and neurogenic bladder. She is currently managed with an SPT. Changed by her mother every 3 weeks.   She has a multi-cystic dystrophic nonfunctional left kidney. She does have some large right sided renal stones that appear to be intraparenchymal as of CT from July 2020.    Not having issues with frequent symptomatic UTIs.   Has had SPT in place for 12 years.   Had a PCNL on the left side >10 years ago  No gross hematuria.     Recently admitted to hospital with respiratory issues.     Last urine culture: multiple documented UTIs, however unclear if these were symptomatic     Lab Results   Component Value Date    CREATININE 0.7 06/22/2022     Family  hx: no bladder or kidney cancer   Hx of spina bifida, paraplegia, HTN, DM, obesity, Pickwickian syndrome    Past medical, family, and social history reviewed as documented in chart with pertinent positive medical, family, and social history detailed in HPI.    Review of patient's allergies indicates:   Allergen Reactions    Cefepime      HIVES    Latex Swelling       Past Medical History:   Diagnosis Date    Chronic hypercapnic respiratory failure     Chronic kidney disease     kidney stones    Diabetes mellitus, type 2     Esotropia   "   GERD (gastroesophageal reflux disease)     Hydrocephalus     Hypertension     Morbid obesity     Obesity hypoventilation syndrome     Paralysis     Scoliosis     Spinal bifida, closed      Past Surgical History:   Procedure Laterality Date    BRAIN SURGERY       shunt revision    CHOLECYSTECTOMY      CYSTOSCOPY W/ LASER LITHOTRIPSY      REPAIR OF INCARCERATED UMBILICAL HERNIA N/A 3/8/2019    Procedure: REPAIR, HERNIA, UMBILICAL, INCARCERATED, AGE 5 YEARS OR OLDER;  Surgeon: Ziyad Hancock MD;  Location: [facility];  Service: General;  Laterality: N/A;    SPINE SURGERY       Family History   Problem Relation Age of Onset    Cancer Mother     Glaucoma Father     Heart disease Maternal Grandmother     Cancer Maternal Grandmother     Glaucoma Paternal Grandfather     Psoriasis Maternal Uncle     Melanoma Neg Hx     Lupus Neg Hx     Eczema Neg Hx      Social History     Tobacco Use    Smoking status: Never Smoker    Smokeless tobacco: Never Used   Substance Use Topics    Alcohol use: No    Drug use: Yes     Types: Hydrocodone        Review of Systems   Unable to perform ROS    OBJECTIVE:     Anticoagulation:  2.5 mg Eliquis     Estimated body mass index is 60.6 kg/m² as calculated from the following:    Height as of this encounter: 4' 11" (1.499 m).    Weight as of this encounter: 136.1 kg (300 lb 0.7 oz).    Vital Signs (Most Recent)  Pulse: 92 (06/28/22 1428)  Resp: 18 (06/28/22 1428)  BP: 126/80 (06/28/22 1428)    Physical Exam  Constitutional:       General: She is not in acute distress.     Appearance: She is obese. She is not ill-appearing, toxic-appearing or diaphoretic.      Comments: In wheelchair   HENT:      Head: Normocephalic and atraumatic.   Cardiovascular:      Rate and Rhythm: Normal rate and regular rhythm.   Pulmonary:      Effort: Pulmonary effort is normal. No respiratory distress.   Genitourinary:     Comments: Urine draining clear yellow   Skin:     General: Skin is " warm and dry.      Coloration: Skin is not jaundiced.   Neurological:      Mental Status: She is alert. Mental status is at baseline.         BMP  Lab Results   Component Value Date     06/22/2022    K 4.1 06/22/2022     06/22/2022    CO2 24 06/22/2022    BUN 12 06/22/2022    CREATININE 0.7 06/22/2022    CALCIUM 10.0 06/22/2022    ANIONGAP 11 06/22/2022    ESTGFRAFRICA >60.0 06/22/2022    EGFRNONAA >60.0 06/22/2022       Lab Results   Component Value Date    WBC 7.88 06/22/2022    HGB 11.4 (L) 06/22/2022    HCT 38.1 06/22/2022    MCV 79 (L) 06/22/2022     (H) 06/22/2022       Imaging:  Per HPI    ASSESSMENT     1. Neurogenic bladder    2. Spina bifida manifesta    3. Paraplegia    4. Chiari malformation type II    5. Obstructive hydrocephalus    6. Morbid obesity with BMI of 50.0-59.9, adult    7. Stage III pressure ulcer of sacral region    8. Pickwickian syndrome    9. Physical debility        PLAN:     - Family is pleased with SPT management and not having issues at this time   - Continue with q3 week SPT changes at home  - We discussed that she has a non-functioning left kidney however she is currently asymptomatic from this therefore no current intervention is required   - She has intraparenchymal right renal stones, will continue to monitor   - She is currently not having problems with symptomatic UTIs, last documented culture April 2021  - Follow up in 1 year or sooner if issues arise     Siobhan Phillips MD

## 2022-06-28 ENCOUNTER — OFFICE VISIT (OUTPATIENT)
Dept: UROLOGY | Facility: CLINIC | Age: 47
End: 2022-06-28
Payer: MEDICARE

## 2022-06-28 VITALS
RESPIRATION RATE: 18 BRPM | HEIGHT: 59 IN | SYSTOLIC BLOOD PRESSURE: 126 MMHG | HEART RATE: 92 BPM | BODY MASS INDEX: 59.07 KG/M2 | DIASTOLIC BLOOD PRESSURE: 80 MMHG | WEIGHT: 293 LBS

## 2022-06-28 DIAGNOSIS — L89.153 STAGE III PRESSURE ULCER OF SACRAL REGION: ICD-10-CM

## 2022-06-28 DIAGNOSIS — G91.1 OBSTRUCTIVE HYDROCEPHALUS: ICD-10-CM

## 2022-06-28 DIAGNOSIS — R53.81 PHYSICAL DEBILITY: ICD-10-CM

## 2022-06-28 DIAGNOSIS — E66.01 MORBID OBESITY WITH BMI OF 50.0-59.9, ADULT: ICD-10-CM

## 2022-06-28 DIAGNOSIS — G82.20 PARAPLEGIA: ICD-10-CM

## 2022-06-28 DIAGNOSIS — Q05.9 SPINA BIFIDA MANIFESTA: ICD-10-CM

## 2022-06-28 DIAGNOSIS — E66.2 PICKWICKIAN SYNDROME: ICD-10-CM

## 2022-06-28 DIAGNOSIS — Q07.01 CHIARI MALFORMATION TYPE II: ICD-10-CM

## 2022-06-28 DIAGNOSIS — N31.9 NEUROGENIC BLADDER: Primary | ICD-10-CM

## 2022-06-28 PROCEDURE — 99214 PR OFFICE/OUTPT VISIT, EST, LEVL IV, 30-39 MIN: ICD-10-PCS | Mod: S$PBB,,, | Performed by: STUDENT IN AN ORGANIZED HEALTH CARE EDUCATION/TRAINING PROGRAM

## 2022-06-28 PROCEDURE — 99214 OFFICE O/P EST MOD 30 MIN: CPT | Mod: S$PBB,,, | Performed by: STUDENT IN AN ORGANIZED HEALTH CARE EDUCATION/TRAINING PROGRAM

## 2022-06-28 PROCEDURE — 99213 OFFICE O/P EST LOW 20 MIN: CPT | Mod: PBBFAC,PN | Performed by: STUDENT IN AN ORGANIZED HEALTH CARE EDUCATION/TRAINING PROGRAM

## 2022-06-28 PROCEDURE — 99999 PR PBB SHADOW E&M-EST. PATIENT-LVL III: CPT | Mod: PBBFAC,,, | Performed by: STUDENT IN AN ORGANIZED HEALTH CARE EDUCATION/TRAINING PROGRAM

## 2022-06-28 PROCEDURE — 99999 PR PBB SHADOW E&M-EST. PATIENT-LVL III: ICD-10-PCS | Mod: PBBFAC,,, | Performed by: STUDENT IN AN ORGANIZED HEALTH CARE EDUCATION/TRAINING PROGRAM

## 2022-06-28 RX ORDER — CEFTRIAXONE 2 G/1
INJECTION, POWDER, FOR SOLUTION INTRAMUSCULAR; INTRAVENOUS
COMMUNITY
Start: 2022-06-27 | End: 2022-08-31

## 2022-07-05 ENCOUNTER — LAB VISIT (OUTPATIENT)
Dept: LAB | Facility: HOSPITAL | Age: 47
End: 2022-07-05
Attending: NURSE PRACTITIONER
Payer: MEDICARE

## 2022-07-05 DIAGNOSIS — L89.314 STAGE IV PRESSURE ULCER OF RIGHT BUTTOCK: Primary | ICD-10-CM

## 2022-07-05 LAB
ALBUMIN SERPL BCP-MCNC: 3 G/DL (ref 3.5–5.2)
ALP SERPL-CCNC: 106 U/L (ref 55–135)
ALT SERPL W/O P-5'-P-CCNC: 11 U/L (ref 10–44)
ANION GAP SERPL CALC-SCNC: 12 MMOL/L (ref 8–16)
AST SERPL-CCNC: 21 U/L (ref 10–40)
BASOPHILS # BLD AUTO: 0.1 K/UL (ref 0–0.2)
BASOPHILS NFR BLD: 1.3 % (ref 0–1.9)
BILIRUB SERPL-MCNC: 0.3 MG/DL (ref 0.1–1)
BUN SERPL-MCNC: 14 MG/DL (ref 6–20)
CALCIUM SERPL-MCNC: 10 MG/DL (ref 8.7–10.5)
CHLORIDE SERPL-SCNC: 102 MMOL/L (ref 95–110)
CO2 SERPL-SCNC: 24 MMOL/L (ref 23–29)
CREAT SERPL-MCNC: 0.7 MG/DL (ref 0.5–1.4)
CRP SERPL-MCNC: 53.9 MG/L (ref 0–8.2)
DIFFERENTIAL METHOD: ABNORMAL
EOSINOPHIL # BLD AUTO: 0.3 K/UL (ref 0–0.5)
EOSINOPHIL NFR BLD: 3.8 % (ref 0–8)
ERYTHROCYTE [DISTWIDTH] IN BLOOD BY AUTOMATED COUNT: 19.3 % (ref 11.5–14.5)
ERYTHROCYTE [SEDIMENTATION RATE] IN BLOOD BY WESTERGREN METHOD: 95 MM/HR (ref 0–20)
EST. GFR  (AFRICAN AMERICAN): >60 ML/MIN/1.73 M^2
EST. GFR  (NON AFRICAN AMERICAN): >60 ML/MIN/1.73 M^2
GLUCOSE SERPL-MCNC: 69 MG/DL (ref 70–110)
HCT VFR BLD AUTO: 38.3 % (ref 37–48.5)
HGB BLD-MCNC: 11.2 G/DL (ref 12–16)
IMM GRANULOCYTES # BLD AUTO: 0.06 K/UL (ref 0–0.04)
IMM GRANULOCYTES NFR BLD AUTO: 0.8 % (ref 0–0.5)
LYMPHOCYTES # BLD AUTO: 2.2 K/UL (ref 1–4.8)
LYMPHOCYTES NFR BLD: 27.2 % (ref 18–48)
MCH RBC QN AUTO: 23 PG (ref 27–31)
MCHC RBC AUTO-ENTMCNC: 29.2 G/DL (ref 32–36)
MCV RBC AUTO: 79 FL (ref 82–98)
MONOCYTES # BLD AUTO: 0.4 K/UL (ref 0.3–1)
MONOCYTES NFR BLD: 5.2 % (ref 4–15)
NEUTROPHILS # BLD AUTO: 4.9 K/UL (ref 1.8–7.7)
NEUTROPHILS NFR BLD: 61.7 % (ref 38–73)
NRBC BLD-RTO: 0 /100 WBC
PLATELET # BLD AUTO: 474 K/UL (ref 150–450)
PMV BLD AUTO: 9.7 FL (ref 9.2–12.9)
POTASSIUM SERPL-SCNC: 3.9 MMOL/L (ref 3.5–5.1)
PROT SERPL-MCNC: 9.2 G/DL (ref 6–8.4)
RBC # BLD AUTO: 4.87 M/UL (ref 4–5.4)
SODIUM SERPL-SCNC: 138 MMOL/L (ref 136–145)
WBC # BLD AUTO: 7.94 K/UL (ref 3.9–12.7)

## 2022-07-05 PROCEDURE — 85651 RBC SED RATE NONAUTOMATED: CPT | Mod: PO | Performed by: NURSE PRACTITIONER

## 2022-07-05 PROCEDURE — 86140 C-REACTIVE PROTEIN: CPT | Performed by: NURSE PRACTITIONER

## 2022-07-05 PROCEDURE — 85025 COMPLETE CBC W/AUTO DIFF WBC: CPT | Performed by: NURSE PRACTITIONER

## 2022-07-05 PROCEDURE — 80053 COMPREHEN METABOLIC PANEL: CPT | Performed by: NURSE PRACTITIONER

## 2022-07-05 PROCEDURE — 84134 ASSAY OF PREALBUMIN: CPT | Performed by: NURSE PRACTITIONER

## 2022-07-06 LAB — PREALB SERPL-MCNC: 18 MG/DL (ref 20–43)

## 2022-07-12 ENCOUNTER — LAB VISIT (OUTPATIENT)
Dept: LAB | Facility: HOSPITAL | Age: 47
End: 2022-07-12
Attending: NURSE PRACTITIONER
Payer: MEDICARE

## 2022-07-12 DIAGNOSIS — L89.314 STAGE IV PRESSURE ULCER OF RIGHT BUTTOCK: Primary | ICD-10-CM

## 2022-07-12 LAB
ALBUMIN SERPL BCP-MCNC: 3 G/DL (ref 3.5–5.2)
ALP SERPL-CCNC: 100 U/L (ref 55–135)
ALT SERPL W/O P-5'-P-CCNC: 13 U/L (ref 10–44)
ANION GAP SERPL CALC-SCNC: 13 MMOL/L (ref 8–16)
AST SERPL-CCNC: 28 U/L (ref 10–40)
BASOPHILS # BLD AUTO: 0.1 K/UL (ref 0–0.2)
BASOPHILS NFR BLD: 1.4 % (ref 0–1.9)
BILIRUB SERPL-MCNC: 0.3 MG/DL (ref 0.1–1)
BUN SERPL-MCNC: 13 MG/DL (ref 6–20)
CALCIUM SERPL-MCNC: 9.8 MG/DL (ref 8.7–10.5)
CHLORIDE SERPL-SCNC: 103 MMOL/L (ref 95–110)
CO2 SERPL-SCNC: 22 MMOL/L (ref 23–29)
CREAT SERPL-MCNC: 0.7 MG/DL (ref 0.5–1.4)
CRP SERPL-MCNC: 66.3 MG/L (ref 0–8.2)
DIFFERENTIAL METHOD: ABNORMAL
EOSINOPHIL # BLD AUTO: 0.2 K/UL (ref 0–0.5)
EOSINOPHIL NFR BLD: 2.2 % (ref 0–8)
ERYTHROCYTE [DISTWIDTH] IN BLOOD BY AUTOMATED COUNT: 20.1 % (ref 11.5–14.5)
ERYTHROCYTE [SEDIMENTATION RATE] IN BLOOD BY PHOTOMETRIC METHOD: >120 MM/HR (ref 0–36)
EST. GFR  (AFRICAN AMERICAN): >60 ML/MIN/1.73 M^2
EST. GFR  (NON AFRICAN AMERICAN): >60 ML/MIN/1.73 M^2
GLUCOSE SERPL-MCNC: 66 MG/DL (ref 70–110)
HCT VFR BLD AUTO: 38.4 % (ref 37–48.5)
HGB BLD-MCNC: 10.9 G/DL (ref 12–16)
IMM GRANULOCYTES # BLD AUTO: 0.05 K/UL (ref 0–0.04)
IMM GRANULOCYTES NFR BLD AUTO: 0.7 % (ref 0–0.5)
LYMPHOCYTES # BLD AUTO: 1.7 K/UL (ref 1–4.8)
LYMPHOCYTES NFR BLD: 23.2 % (ref 18–48)
MCH RBC QN AUTO: 23.2 PG (ref 27–31)
MCHC RBC AUTO-ENTMCNC: 28.4 G/DL (ref 32–36)
MCV RBC AUTO: 82 FL (ref 82–98)
MONOCYTES # BLD AUTO: 0.5 K/UL (ref 0.3–1)
MONOCYTES NFR BLD: 6.6 % (ref 4–15)
NEUTROPHILS # BLD AUTO: 4.9 K/UL (ref 1.8–7.7)
NEUTROPHILS NFR BLD: 65.9 % (ref 38–73)
NRBC BLD-RTO: 0 /100 WBC
PLATELET # BLD AUTO: 442 K/UL (ref 150–450)
PMV BLD AUTO: 9.8 FL (ref 9.2–12.9)
POTASSIUM SERPL-SCNC: 4.4 MMOL/L (ref 3.5–5.1)
PROT SERPL-MCNC: 8.8 G/DL (ref 6–8.4)
RBC # BLD AUTO: 4.69 M/UL (ref 4–5.4)
SODIUM SERPL-SCNC: 138 MMOL/L (ref 136–145)
WBC # BLD AUTO: 7.37 K/UL (ref 3.9–12.7)

## 2022-07-12 PROCEDURE — 36415 COLL VENOUS BLD VENIPUNCTURE: CPT | Performed by: NURSE PRACTITIONER

## 2022-07-12 PROCEDURE — 85652 RBC SED RATE AUTOMATED: CPT | Performed by: NURSE PRACTITIONER

## 2022-07-12 PROCEDURE — 80053 COMPREHEN METABOLIC PANEL: CPT | Performed by: NURSE PRACTITIONER

## 2022-07-12 PROCEDURE — 86140 C-REACTIVE PROTEIN: CPT | Performed by: NURSE PRACTITIONER

## 2022-07-12 PROCEDURE — 85025 COMPLETE CBC W/AUTO DIFF WBC: CPT | Performed by: NURSE PRACTITIONER

## 2022-07-18 ENCOUNTER — LAB VISIT (OUTPATIENT)
Dept: LAB | Facility: HOSPITAL | Age: 47
End: 2022-07-18
Attending: NURSE PRACTITIONER
Payer: MEDICARE

## 2022-07-18 DIAGNOSIS — L89.314 STAGE IV PRESSURE ULCER OF RIGHT BUTTOCK: Primary | ICD-10-CM

## 2022-07-18 LAB
ALBUMIN SERPL BCP-MCNC: 2.9 G/DL (ref 3.5–5.2)
ALP SERPL-CCNC: 106 U/L (ref 55–135)
ALT SERPL W/O P-5'-P-CCNC: 18 U/L (ref 10–44)
ANION GAP SERPL CALC-SCNC: 11 MMOL/L (ref 8–16)
AST SERPL-CCNC: 27 U/L (ref 10–40)
BASOPHILS # BLD AUTO: 0.09 K/UL (ref 0–0.2)
BASOPHILS NFR BLD: 1.1 % (ref 0–1.9)
BILIRUB SERPL-MCNC: 0.3 MG/DL (ref 0.1–1)
BUN SERPL-MCNC: 12 MG/DL (ref 6–20)
CALCIUM SERPL-MCNC: 9.9 MG/DL (ref 8.7–10.5)
CHLORIDE SERPL-SCNC: 103 MMOL/L (ref 95–110)
CO2 SERPL-SCNC: 22 MMOL/L (ref 23–29)
CREAT SERPL-MCNC: 0.7 MG/DL (ref 0.5–1.4)
CRP SERPL-MCNC: 79.5 MG/L (ref 0–8.2)
DIFFERENTIAL METHOD: ABNORMAL
EOSINOPHIL # BLD AUTO: 0.3 K/UL (ref 0–0.5)
EOSINOPHIL NFR BLD: 3.3 % (ref 0–8)
ERYTHROCYTE [DISTWIDTH] IN BLOOD BY AUTOMATED COUNT: 18.9 % (ref 11.5–14.5)
ERYTHROCYTE [SEDIMENTATION RATE] IN BLOOD BY WESTERGREN METHOD: 97 MM/HR (ref 0–20)
EST. GFR  (AFRICAN AMERICAN): >60 ML/MIN/1.73 M^2
EST. GFR  (NON AFRICAN AMERICAN): >60 ML/MIN/1.73 M^2
GLUCOSE SERPL-MCNC: 80 MG/DL (ref 70–110)
HCT VFR BLD AUTO: 36.2 % (ref 37–48.5)
HGB BLD-MCNC: 10.8 G/DL (ref 12–16)
IMM GRANULOCYTES # BLD AUTO: 0.04 K/UL (ref 0–0.04)
IMM GRANULOCYTES NFR BLD AUTO: 0.5 % (ref 0–0.5)
LYMPHOCYTES # BLD AUTO: 2 K/UL (ref 1–4.8)
LYMPHOCYTES NFR BLD: 23.2 % (ref 18–48)
MCH RBC QN AUTO: 23.6 PG (ref 27–31)
MCHC RBC AUTO-ENTMCNC: 29.8 G/DL (ref 32–36)
MCV RBC AUTO: 79 FL (ref 82–98)
MONOCYTES # BLD AUTO: 0.6 K/UL (ref 0.3–1)
MONOCYTES NFR BLD: 6.4 % (ref 4–15)
NEUTROPHILS # BLD AUTO: 5.6 K/UL (ref 1.8–7.7)
NEUTROPHILS NFR BLD: 65.5 % (ref 38–73)
NRBC BLD-RTO: 0 /100 WBC
PLATELET # BLD AUTO: 432 K/UL (ref 150–450)
PMV BLD AUTO: 9.8 FL (ref 9.2–12.9)
POTASSIUM SERPL-SCNC: 4 MMOL/L (ref 3.5–5.1)
PROT SERPL-MCNC: 8.6 G/DL (ref 6–8.4)
RBC # BLD AUTO: 4.57 M/UL (ref 4–5.4)
SODIUM SERPL-SCNC: 136 MMOL/L (ref 136–145)
WBC # BLD AUTO: 8.57 K/UL (ref 3.9–12.7)

## 2022-07-18 PROCEDURE — 85651 RBC SED RATE NONAUTOMATED: CPT | Mod: PO | Performed by: NURSE PRACTITIONER

## 2022-07-18 PROCEDURE — 85025 COMPLETE CBC W/AUTO DIFF WBC: CPT | Performed by: NURSE PRACTITIONER

## 2022-07-18 PROCEDURE — 80053 COMPREHEN METABOLIC PANEL: CPT | Performed by: NURSE PRACTITIONER

## 2022-07-18 PROCEDURE — 86140 C-REACTIVE PROTEIN: CPT | Performed by: NURSE PRACTITIONER

## 2022-07-22 ENCOUNTER — PATIENT MESSAGE (OUTPATIENT)
Dept: DERMATOLOGY | Facility: CLINIC | Age: 47
End: 2022-07-22
Payer: MEDICARE

## 2022-07-27 ENCOUNTER — DOCUMENT SCAN (OUTPATIENT)
Dept: HOME HEALTH SERVICES | Facility: HOSPITAL | Age: 47
End: 2022-07-27
Payer: MEDICARE

## 2022-07-30 ENCOUNTER — DOCUMENT SCAN (OUTPATIENT)
Dept: HOME HEALTH SERVICES | Facility: HOSPITAL | Age: 47
End: 2022-07-30
Payer: MEDICARE

## 2022-08-05 ENCOUNTER — DOCUMENT SCAN (OUTPATIENT)
Dept: HOME HEALTH SERVICES | Facility: HOSPITAL | Age: 47
End: 2022-08-05
Payer: MEDICARE

## 2022-08-09 ENCOUNTER — PATIENT OUTREACH (OUTPATIENT)
Dept: ADMINISTRATIVE | Facility: HOSPITAL | Age: 47
End: 2022-08-09
Payer: MEDICARE

## 2022-08-09 ENCOUNTER — PATIENT MESSAGE (OUTPATIENT)
Dept: ADMINISTRATIVE | Facility: HOSPITAL | Age: 47
End: 2022-08-09
Payer: MEDICARE

## 2022-08-15 ENCOUNTER — LAB VISIT (OUTPATIENT)
Dept: LAB | Facility: HOSPITAL | Age: 47
End: 2022-08-15
Attending: NURSE PRACTITIONER
Payer: MEDICARE

## 2022-08-15 DIAGNOSIS — L89.314 STAGE IV PRESSURE ULCER OF RIGHT BUTTOCK: Primary | ICD-10-CM

## 2022-08-15 LAB
ALBUMIN SERPL BCP-MCNC: 3.1 G/DL (ref 3.5–5.2)
ALP SERPL-CCNC: 111 U/L (ref 55–135)
ALT SERPL W/O P-5'-P-CCNC: 13 U/L (ref 10–44)
ANION GAP SERPL CALC-SCNC: 12 MMOL/L (ref 8–16)
AST SERPL-CCNC: 27 U/L (ref 10–40)
BASOPHILS # BLD AUTO: 0.05 K/UL (ref 0–0.2)
BASOPHILS NFR BLD: 0.6 % (ref 0–1.9)
BILIRUB SERPL-MCNC: 0.4 MG/DL (ref 0.1–1)
BUN SERPL-MCNC: 15 MG/DL (ref 6–20)
CALCIUM SERPL-MCNC: 10 MG/DL (ref 8.7–10.5)
CHLORIDE SERPL-SCNC: 106 MMOL/L (ref 95–110)
CO2 SERPL-SCNC: 21 MMOL/L (ref 23–29)
CREAT SERPL-MCNC: 0.7 MG/DL (ref 0.5–1.4)
CRP SERPL-MCNC: 67.6 MG/L (ref 0–8.2)
DIFFERENTIAL METHOD: ABNORMAL
EOSINOPHIL # BLD AUTO: 0.2 K/UL (ref 0–0.5)
EOSINOPHIL NFR BLD: 2 % (ref 0–8)
ERYTHROCYTE [DISTWIDTH] IN BLOOD BY AUTOMATED COUNT: 18.7 % (ref 11.5–14.5)
ERYTHROCYTE [SEDIMENTATION RATE] IN BLOOD BY WESTERGREN METHOD: 123 MM/HR (ref 0–20)
EST. GFR  (NO RACE VARIABLE): >60 ML/MIN/1.73 M^2
GLUCOSE SERPL-MCNC: 77 MG/DL (ref 70–110)
HCT VFR BLD AUTO: 34.9 % (ref 37–48.5)
HGB BLD-MCNC: 10.9 G/DL (ref 12–16)
IMM GRANULOCYTES # BLD AUTO: 0.03 K/UL (ref 0–0.04)
IMM GRANULOCYTES NFR BLD AUTO: 0.3 % (ref 0–0.5)
LYMPHOCYTES # BLD AUTO: 1.7 K/UL (ref 1–4.8)
LYMPHOCYTES NFR BLD: 20 % (ref 18–48)
MCH RBC QN AUTO: 24 PG (ref 27–31)
MCHC RBC AUTO-ENTMCNC: 31.2 G/DL (ref 32–36)
MCV RBC AUTO: 77 FL (ref 82–98)
MONOCYTES # BLD AUTO: 0.5 K/UL (ref 0.3–1)
MONOCYTES NFR BLD: 5.5 % (ref 4–15)
NEUTROPHILS # BLD AUTO: 6.1 K/UL (ref 1.8–7.7)
NEUTROPHILS NFR BLD: 71.6 % (ref 38–73)
NRBC BLD-RTO: 0 /100 WBC
PLATELET # BLD AUTO: 434 K/UL (ref 150–450)
PMV BLD AUTO: 10 FL (ref 9.2–12.9)
POTASSIUM SERPL-SCNC: 4.1 MMOL/L (ref 3.5–5.1)
PROT SERPL-MCNC: 8.9 G/DL (ref 6–8.4)
RBC # BLD AUTO: 4.55 M/UL (ref 4–5.4)
SODIUM SERPL-SCNC: 139 MMOL/L (ref 136–145)
WBC # BLD AUTO: 8.58 K/UL (ref 3.9–12.7)

## 2022-08-15 PROCEDURE — 85025 COMPLETE CBC W/AUTO DIFF WBC: CPT | Performed by: NURSE PRACTITIONER

## 2022-08-15 PROCEDURE — 86140 C-REACTIVE PROTEIN: CPT | Performed by: NURSE PRACTITIONER

## 2022-08-15 PROCEDURE — 80053 COMPREHEN METABOLIC PANEL: CPT | Performed by: NURSE PRACTITIONER

## 2022-08-15 PROCEDURE — 85651 RBC SED RATE NONAUTOMATED: CPT | Mod: PO | Performed by: NURSE PRACTITIONER

## 2022-08-15 PROCEDURE — 84134 ASSAY OF PREALBUMIN: CPT | Performed by: NURSE PRACTITIONER

## 2022-08-16 LAB — PREALB SERPL-MCNC: 19 MG/DL (ref 20–43)

## 2022-08-30 ENCOUNTER — PATIENT MESSAGE (OUTPATIENT)
Dept: FAMILY MEDICINE | Facility: CLINIC | Age: 47
End: 2022-08-30
Payer: MEDICARE

## 2022-08-30 ENCOUNTER — TELEPHONE (OUTPATIENT)
Dept: PULMONOLOGY | Facility: CLINIC | Age: 47
End: 2022-08-30

## 2022-08-30 NOTE — TELEPHONE ENCOUNTER
The patient's trilogy download shows that she used it 23/30 days and of the days she used to she used it more than 4 hours 47% of the time.  On average, on the days she used it ,she used it for 4.6 hours.

## 2022-08-31 ENCOUNTER — OFFICE VISIT (OUTPATIENT)
Dept: FAMILY MEDICINE | Facility: CLINIC | Age: 47
End: 2022-08-31
Payer: MEDICARE

## 2022-08-31 DIAGNOSIS — F11.20 CONTINUOUS OPIOID DEPENDENCE: Chronic | ICD-10-CM

## 2022-08-31 DIAGNOSIS — B37.31 VAGINAL CANDIDIASIS: Primary | ICD-10-CM

## 2022-08-31 PROCEDURE — 99214 PR OFFICE/OUTPT VISIT, EST, LEVL IV, 30-39 MIN: ICD-10-PCS | Mod: 95,,, | Performed by: FAMILY MEDICINE

## 2022-08-31 PROCEDURE — 99214 OFFICE O/P EST MOD 30 MIN: CPT | Mod: 95,,, | Performed by: FAMILY MEDICINE

## 2022-08-31 RX ORDER — HYDROCODONE BITARTRATE AND ACETAMINOPHEN 7.5; 325 MG/1; MG/1
1 TABLET ORAL EVERY 6 HOURS PRN
Qty: 120 TABLET | Refills: 0 | Status: SHIPPED | OUTPATIENT
Start: 2022-08-31 | End: 2022-11-14 | Stop reason: SDUPTHER

## 2022-08-31 RX ORDER — FLUCONAZOLE 150 MG/1
150 TABLET ORAL
Qty: 2 TABLET | Refills: 0 | Status: SHIPPED | OUTPATIENT
Start: 2022-08-31 | End: 2022-09-04

## 2022-09-08 NOTE — PROGRESS NOTES
The patient location is:  [place]  The chief complaint leading to consultation is:  checkup  Visit type: Virtual visit with synchronous audio and video  Total time spent with patient:  Less than 30 minutes  Each patient to whom he or she provides medical services by telemedicine is:  (1) informed of the relationship between the physician and patient and the respective role of any other health care provider with respect to management of the patient; and (2) notified that he or she may decline to receive medical services by telemedicine and may withdraw from such care at any time. Vital signs recorded were provided by the patient.    Notes:  See below    Subjective:   Patient ID: Briana Uriarte is a 46 y.o. female     Chief Complaint: checkup    Here for checkup and med refill. Family notes candidal infection. Has had these previously    Review of Systems   Constitutional:  Negative for chills and fever.   HENT:  Negative for sore throat and trouble swallowing.    Respiratory:  Negative for cough and shortness of breath.    Cardiovascular:  Negative for chest pain and leg swelling.   Gastrointestinal:  Negative for abdominal distention and abdominal pain.   Genitourinary:  Negative for dysuria and flank pain.   Musculoskeletal:  Negative for arthralgias and back pain.   Skin:  Negative for color change and pallor.   Neurological:  Negative for weakness and headaches.   Psychiatric/Behavioral:  Negative for agitation and confusion.    Past Medical History:   Diagnosis Date    Chronic hypercapnic respiratory failure     Chronic kidney disease     kidney stones    Diabetes mellitus, type 2     Esotropia     GERD (gastroesophageal reflux disease)     Hydrocephalus     Hypertension     Morbid obesity     Obesity hypoventilation syndrome     Paralysis     Scoliosis     Spinal bifida, closed      Past Surgical History:   Procedure Laterality Date    BRAIN SURGERY       shunt revision    CHOLECYSTECTOMY       CYSTOSCOPY W/ LASER LITHOTRIPSY      REPAIR OF INCARCERATED UMBILICAL HERNIA N/A 3/8/2019    Procedure: REPAIR, HERNIA, UMBILICAL, INCARCERATED, AGE 5 YEARS OR OLDER;  Surgeon: Ziyad Hancock MD;  Location: [facility];  Service: General;  Laterality: N/A;    SPINE SURGERY       Objective:   There were no vitals filed for this visit.  There is no height or weight on file to calculate BMI.  Physical Exam  Vitals and nursing note reviewed.   Constitutional:       Appearance: She is well-developed.   HENT:      Head: Normocephalic and atraumatic.   Eyes:      General: No scleral icterus.     Conjunctiva/sclera: Conjunctivae normal.   Cardiovascular:      Heart sounds: No murmur heard.  Pulmonary:      Effort: Pulmonary effort is normal. No respiratory distress.   Musculoskeletal:         General: No deformity. Normal range of motion.      Cervical back: Normal range of motion and neck supple.   Skin:     Coloration: Skin is not pale.      Findings: No rash.   Neurological:      Mental Status: She is alert and oriented to person, place, and time.   Psychiatric:         Behavior: Behavior normal.         Thought Content: Thought content normal.         Judgment: Judgment normal.     Assessment:     1. Vaginal candidiasis    2. Continuous opioid dependence- contract 3/21/19      Plan:   Vaginal candidiasis  -     fluconazole (DIFLUCAN) 150 MG Tab; Take 1 tablet (150 mg total) by mouth every 48 hours. for 4 days  Dispense: 2 tablet; Refill: 0  Advised close f/u with GYN if failure to improve or recurs.  Continuous opioid dependence- contract 3/21/19  -     HYDROcodone-acetaminophen (NORCO) 7.5-325 mg per tablet; Take 1 tablet by mouth every 6 (six) hours as needed for Pain. Do not fill until 10/30/22  Dispense: 120 tablet; Refill: 0  -     HYDROcodone-acetaminophen (NORCO) 7.5-325 mg per tablet; Take 1 tablet by mouth every 6 (six) hours as needed for Pain. Do not fill until 9/30/22  Dispense: 120 tablet; Refill: 0  -      HYDROcodone-acetaminophen (NORCO) 7.5-325 mg per tablet; Take 1 tablet by mouth every 6 (six) hours as needed for Pain.  Dispense: 120 tablet; Refill: 0            Total time spent of Less than 30 minutes minutes on the day of the visit.This includes face to face time and preparing to see the patient, obtaining and reviewing separately obtained history, documenting clinical information in the electronic or other health record, independently interpreting results and communicating results to the patient/family/caregiver, or care coordinator.    Established patient with me has been instructed that must see me at least 1 time yearly for refills of medications. Seeing other providers in this clinic is fine but expectation is to see me yearly.    Jose De Jesus Curran MD  09/08/2022    Portions of this note have been dictated with DILLON Spear

## 2022-09-14 ENCOUNTER — LAB VISIT (OUTPATIENT)
Dept: LAB | Facility: HOSPITAL | Age: 47
End: 2022-09-14
Attending: NURSE PRACTITIONER
Payer: MEDICARE

## 2022-09-14 DIAGNOSIS — L89.314 STAGE IV PRESSURE ULCER OF RIGHT BUTTOCK: Primary | ICD-10-CM

## 2022-09-14 LAB
ALBUMIN SERPL BCP-MCNC: 3 G/DL (ref 3.5–5.2)
ALP SERPL-CCNC: 91 U/L (ref 55–135)
ALT SERPL W/O P-5'-P-CCNC: 8 U/L (ref 10–44)
ANION GAP SERPL CALC-SCNC: 8 MMOL/L (ref 8–16)
AST SERPL-CCNC: 16 U/L (ref 10–40)
BASOPHILS # BLD AUTO: 0.04 K/UL (ref 0–0.2)
BASOPHILS NFR BLD: 0.5 % (ref 0–1.9)
BILIRUB SERPL-MCNC: 0.2 MG/DL (ref 0.1–1)
BUN SERPL-MCNC: 11 MG/DL (ref 6–20)
CALCIUM SERPL-MCNC: 8.9 MG/DL (ref 8.7–10.5)
CHLORIDE SERPL-SCNC: 108 MMOL/L (ref 95–110)
CO2 SERPL-SCNC: 25 MMOL/L (ref 23–29)
CREAT SERPL-MCNC: 0.7 MG/DL (ref 0.5–1.4)
CRP SERPL-MCNC: 85.7 MG/L (ref 0–8.2)
DIFFERENTIAL METHOD: ABNORMAL
EOSINOPHIL # BLD AUTO: 0.3 K/UL (ref 0–0.5)
EOSINOPHIL NFR BLD: 3.3 % (ref 0–8)
ERYTHROCYTE [DISTWIDTH] IN BLOOD BY AUTOMATED COUNT: 17.9 % (ref 11.5–14.5)
ERYTHROCYTE [SEDIMENTATION RATE] IN BLOOD BY WESTERGREN METHOD: 80 MM/HR (ref 0–20)
EST. GFR  (NO RACE VARIABLE): >60 ML/MIN/1.73 M^2
GLUCOSE SERPL-MCNC: 92 MG/DL (ref 70–110)
HCT VFR BLD AUTO: 35.7 % (ref 37–48.5)
HGB BLD-MCNC: 10.7 G/DL (ref 12–16)
IMM GRANULOCYTES # BLD AUTO: 0.03 K/UL (ref 0–0.04)
IMM GRANULOCYTES NFR BLD AUTO: 0.4 % (ref 0–0.5)
LYMPHOCYTES # BLD AUTO: 2 K/UL (ref 1–4.8)
LYMPHOCYTES NFR BLD: 23.4 % (ref 18–48)
MCH RBC QN AUTO: 23.2 PG (ref 27–31)
MCHC RBC AUTO-ENTMCNC: 30 G/DL (ref 32–36)
MCV RBC AUTO: 77 FL (ref 82–98)
MONOCYTES # BLD AUTO: 0.5 K/UL (ref 0.3–1)
MONOCYTES NFR BLD: 6.2 % (ref 4–15)
NEUTROPHILS # BLD AUTO: 5.5 K/UL (ref 1.8–7.7)
NEUTROPHILS NFR BLD: 66.2 % (ref 38–73)
NRBC BLD-RTO: 0 /100 WBC
PLATELET # BLD AUTO: 435 K/UL (ref 150–450)
PMV BLD AUTO: 9.8 FL (ref 9.2–12.9)
POTASSIUM SERPL-SCNC: 4 MMOL/L (ref 3.5–5.1)
PROT SERPL-MCNC: 7.8 G/DL (ref 6–8.4)
RBC # BLD AUTO: 4.61 M/UL (ref 4–5.4)
SODIUM SERPL-SCNC: 141 MMOL/L (ref 136–145)
WBC # BLD AUTO: 8.37 K/UL (ref 3.9–12.7)

## 2022-09-14 PROCEDURE — 84134 ASSAY OF PREALBUMIN: CPT | Performed by: NURSE PRACTITIONER

## 2022-09-14 PROCEDURE — 85025 COMPLETE CBC W/AUTO DIFF WBC: CPT | Performed by: NURSE PRACTITIONER

## 2022-09-14 PROCEDURE — 85651 RBC SED RATE NONAUTOMATED: CPT | Mod: PO | Performed by: NURSE PRACTITIONER

## 2022-09-14 PROCEDURE — 80053 COMPREHEN METABOLIC PANEL: CPT | Performed by: NURSE PRACTITIONER

## 2022-09-14 PROCEDURE — 86140 C-REACTIVE PROTEIN: CPT | Performed by: NURSE PRACTITIONER

## 2022-09-15 LAB — PREALB SERPL-MCNC: <3 MG/DL (ref 20–43)

## 2022-10-11 ENCOUNTER — TELEPHONE (OUTPATIENT)
Dept: PULMONOLOGY | Facility: CLINIC | Age: 47
End: 2022-10-11

## 2022-10-11 NOTE — TELEPHONE ENCOUNTER
Spoke with the patient's mother about her apparent poor compliance with her ventilator.  The mother madai is a patient is placed on it every night and wears it all night until the next morning.  I have asked her to reach out to the [employer] so that we can try to get a better download that more accurately reflects her compliance.

## 2022-10-12 ENCOUNTER — LAB VISIT (OUTPATIENT)
Dept: LAB | Facility: HOSPITAL | Age: 47
End: 2022-10-12
Attending: NURSE PRACTITIONER
Payer: MEDICARE

## 2022-10-12 DIAGNOSIS — L89.314 STAGE IV PRESSURE ULCER OF RIGHT BUTTOCK: Primary | ICD-10-CM

## 2022-10-12 LAB
ALBUMIN SERPL BCP-MCNC: 2.6 G/DL (ref 3.5–5.2)
ALP SERPL-CCNC: 119 U/L (ref 55–135)
ALT SERPL W/O P-5'-P-CCNC: 11 U/L (ref 10–44)
ANION GAP SERPL CALC-SCNC: 10 MMOL/L (ref 8–16)
AST SERPL-CCNC: 24 U/L (ref 10–40)
BASOPHILS # BLD AUTO: 0.08 K/UL (ref 0–0.2)
BASOPHILS NFR BLD: 0.8 % (ref 0–1.9)
BILIRUB SERPL-MCNC: 0.3 MG/DL (ref 0.1–1)
BUN SERPL-MCNC: 9 MG/DL (ref 6–20)
CALCIUM SERPL-MCNC: 9.9 MG/DL (ref 8.7–10.5)
CHLORIDE SERPL-SCNC: 105 MMOL/L (ref 95–110)
CO2 SERPL-SCNC: 24 MMOL/L (ref 23–29)
CREAT SERPL-MCNC: 0.7 MG/DL (ref 0.5–1.4)
CRP SERPL-MCNC: 112.3 MG/L (ref 0–8.2)
DIFFERENTIAL METHOD: ABNORMAL
EOSINOPHIL # BLD AUTO: 0.3 K/UL (ref 0–0.5)
EOSINOPHIL NFR BLD: 3.3 % (ref 0–8)
ERYTHROCYTE [DISTWIDTH] IN BLOOD BY AUTOMATED COUNT: 17.6 % (ref 11.5–14.5)
ERYTHROCYTE [SEDIMENTATION RATE] IN BLOOD BY PHOTOMETRIC METHOD: >120 MM/HR (ref 0–36)
EST. GFR  (NO RACE VARIABLE): >60 ML/MIN/1.73 M^2
GLUCOSE SERPL-MCNC: 77 MG/DL (ref 70–110)
HCT VFR BLD AUTO: 36.9 % (ref 37–48.5)
HGB BLD-MCNC: 10.5 G/DL (ref 12–16)
IMM GRANULOCYTES # BLD AUTO: 0.04 K/UL (ref 0–0.04)
IMM GRANULOCYTES NFR BLD AUTO: 0.4 % (ref 0–0.5)
LYMPHOCYTES # BLD AUTO: 1.6 K/UL (ref 1–4.8)
LYMPHOCYTES NFR BLD: 15.6 % (ref 18–48)
MCH RBC QN AUTO: 23 PG (ref 27–31)
MCHC RBC AUTO-ENTMCNC: 28.5 G/DL (ref 32–36)
MCV RBC AUTO: 81 FL (ref 82–98)
MONOCYTES # BLD AUTO: 0.7 K/UL (ref 0.3–1)
MONOCYTES NFR BLD: 6.7 % (ref 4–15)
NEUTROPHILS # BLD AUTO: 7.4 K/UL (ref 1.8–7.7)
NEUTROPHILS NFR BLD: 73.2 % (ref 38–73)
NRBC BLD-RTO: 0 /100 WBC
PLATELET # BLD AUTO: 514 K/UL (ref 150–450)
PMV BLD AUTO: 9.7 FL (ref 9.2–12.9)
POTASSIUM SERPL-SCNC: 4 MMOL/L (ref 3.5–5.1)
PROT SERPL-MCNC: 8.6 G/DL (ref 6–8.4)
RBC # BLD AUTO: 4.57 M/UL (ref 4–5.4)
SODIUM SERPL-SCNC: 139 MMOL/L (ref 136–145)
WBC # BLD AUTO: 10.15 K/UL (ref 3.9–12.7)

## 2022-10-12 PROCEDURE — 85025 COMPLETE CBC W/AUTO DIFF WBC: CPT | Performed by: NURSE PRACTITIONER

## 2022-10-12 PROCEDURE — 80053 COMPREHEN METABOLIC PANEL: CPT | Performed by: NURSE PRACTITIONER

## 2022-10-12 PROCEDURE — 85652 RBC SED RATE AUTOMATED: CPT | Performed by: NURSE PRACTITIONER

## 2022-10-12 PROCEDURE — 84134 ASSAY OF PREALBUMIN: CPT | Performed by: NURSE PRACTITIONER

## 2022-10-12 PROCEDURE — 86140 C-REACTIVE PROTEIN: CPT | Performed by: NURSE PRACTITIONER

## 2022-10-13 ENCOUNTER — PATIENT MESSAGE (OUTPATIENT)
Dept: PULMONOLOGY | Facility: CLINIC | Age: 47
End: 2022-10-13

## 2022-10-13 LAB — PREALB SERPL-MCNC: 14 MG/DL (ref 20–43)

## 2022-11-14 ENCOUNTER — OFFICE VISIT (OUTPATIENT)
Dept: FAMILY MEDICINE | Facility: CLINIC | Age: 47
End: 2022-11-14
Payer: MEDICARE

## 2022-11-14 VITALS
WEIGHT: 257 LBS | HEIGHT: 59 IN | HEART RATE: 78 BPM | DIASTOLIC BLOOD PRESSURE: 74 MMHG | RESPIRATION RATE: 18 BRPM | TEMPERATURE: 98 F | BODY MASS INDEX: 51.81 KG/M2 | OXYGEN SATURATION: 98 % | SYSTOLIC BLOOD PRESSURE: 136 MMHG

## 2022-11-14 DIAGNOSIS — F11.20 CONTINUOUS OPIOID DEPENDENCE: Chronic | ICD-10-CM

## 2022-11-14 PROCEDURE — 99214 OFFICE O/P EST MOD 30 MIN: CPT | Mod: PBBFAC,PO | Performed by: FAMILY MEDICINE

## 2022-11-14 PROCEDURE — 99999 PR PBB SHADOW E&M-EST. PATIENT-LVL IV: CPT | Mod: PBBFAC,,, | Performed by: FAMILY MEDICINE

## 2022-11-14 PROCEDURE — 99214 PR OFFICE/OUTPT VISIT, EST, LEVL IV, 30-39 MIN: ICD-10-PCS | Mod: S$PBB,,, | Performed by: FAMILY MEDICINE

## 2022-11-14 PROCEDURE — 99214 OFFICE O/P EST MOD 30 MIN: CPT | Mod: S$PBB,,, | Performed by: FAMILY MEDICINE

## 2022-11-14 PROCEDURE — 99999 PR PBB SHADOW E&M-EST. PATIENT-LVL IV: ICD-10-PCS | Mod: PBBFAC,,, | Performed by: FAMILY MEDICINE

## 2022-11-14 RX ORDER — HYDROCODONE BITARTRATE AND ACETAMINOPHEN 7.5; 325 MG/1; MG/1
1 TABLET ORAL EVERY 6 HOURS PRN
Qty: 120 TABLET | Refills: 0 | Status: SHIPPED | OUTPATIENT
Start: 2022-11-14 | End: 2023-02-22 | Stop reason: SDUPTHER

## 2022-11-14 RX ORDER — HYDROCODONE BITARTRATE AND ACETAMINOPHEN 7.5; 325 MG/1; MG/1
1 TABLET ORAL EVERY 6 HOURS PRN
Qty: 120 TABLET | Refills: 0 | Status: SHIPPED | OUTPATIENT
Start: 2022-11-14 | End: 2023-06-12 | Stop reason: SDUPTHER

## 2022-11-14 NOTE — PATIENT INSTRUCTIONS
Jasen Warren,     If you are due for any health screening(s) below please notify me so we can arrange them to be ordered and scheduled to maintain your health. Most healthy patients complete it. Don't lose out on improving your health.     Tests to Keep You Healthy    Colon Cancer Screening: ORDERED  Cervical Cancer Screening: DUE  Last Blood Pressure <= 139/89 (11/14/2022): Yes                         November 14, 2022       Briana Uriarte  [address] [place] [address]         Dear Briana:    Your [facility] Team is dedicated to helping you stay healthy with regularly scheduled recommended screenings.  Scheduling routine screenings is important to maintaining good health. Our records indicate that you may be overdue for your screening pap smear. A pap smear screening can help identify patients at risk for developing cervical cancer at an early stage, when it is most likely to be successfully treated.    We encourage you to schedule your appointment with your [facility] provider or some primary care providers also perform this screening.    If you have completed or scheduled your pap smear screening outside of [facility], please notify your primary care team so we can update your health record.      If you have questions or would like to schedule your screening, please contact your primary care clinic.    Sincerely,    Jose De Jesus Curran MD and your [facility]

## 2022-11-18 NOTE — PROGRESS NOTES
Subjective:   Patient ID: Briana Uriarte is a 47 y.o. female     Chief Complaint:Follow-up (3 month)      Here for checkup. Doing well    Follow-up  Pertinent negatives include no abdominal pain, chest pain, fever, headaches, numbness or weakness.   Back Pain  This is a chronic problem. The current episode started more than 1 year ago. The problem occurs daily. The problem is unchanged. The pain is present in the lumbar spine. The quality of the pain is described as aching. The pain does not radiate. The pain is severe. The pain is The same all the time. The symptoms are aggravated by bending, lying down, sitting and twisting. Stiffness is present All day. Pertinent negatives include no abdominal pain, bladder incontinence, bowel incontinence, chest pain, dysuria, fever, headaches, leg pain, numbness, paresis, paresthesias, pelvic pain, perianal numbness, tingling, weakness or weight loss. The treatment provided significant relief.   Review of Systems   Constitutional:  Negative for fever and weight loss.   Respiratory:  Negative for shortness of breath.    Cardiovascular:  Negative for chest pain.   Gastrointestinal:  Negative for abdominal pain and bowel incontinence.   Genitourinary:  Negative for bladder incontinence, dysuria, hematuria and pelvic pain.   Musculoskeletal:  Positive for back pain.   Neurological:  Negative for tingling, weakness, numbness, headaches and paresthesias.   Past Medical History:   Diagnosis Date    Chronic hypercapnic respiratory failure     Chronic kidney disease     kidney stones    Diabetes mellitus, type 2     Esotropia     GERD (gastroesophageal reflux disease)     Hydrocephalus     Hypertension     Morbid obesity     Obesity hypoventilation syndrome     Paralysis     Scoliosis     Spinal bifida, closed      Past Surgical History:   Procedure Laterality Date    BRAIN SURGERY       shunt revision    CHOLECYSTECTOMY      CYSTOSCOPY W/ LASER LITHOTRIPSY      REPAIR OF  INCARCERATED UMBILICAL HERNIA N/A 3/8/2019    Procedure: REPAIR, HERNIA, UMBILICAL, INCARCERATED, AGE 5 YEARS OR OLDER;  Surgeon: Ziyad Hancock MD;  [facility];  Service: General;  Laterality: N/A;    SPINE SURGERY       Objective:     Vitals:    11/14/22 0926   BP: 136/74   Pulse: 78   Resp: 18   Temp: 98.3 °F (36.8 °C)     Body mass index is 51.91 kg/m².  Physical Exam  Vitals and nursing note reviewed.   Constitutional:       Appearance: She is well-developed.   HENT:      Head: Normocephalic and atraumatic.   Eyes:      General: No scleral icterus.     Conjunctiva/sclera: Conjunctivae normal.   Cardiovascular:      Heart sounds: No murmur heard.  Pulmonary:      Effort: Pulmonary effort is normal. No respiratory distress.   Musculoskeletal:         General: No deformity. Normal range of motion.      Cervical back: Normal range of motion and neck supple.   Skin:     Coloration: Skin is not pale.      Findings: No rash.   Neurological:      Mental Status: She is alert and oriented to person, place, and time.   Psychiatric:         Behavior: Behavior normal.         Thought Content: Thought content normal.         Judgment: Judgment normal.     Assessment:     1. Continuous opioid dependence- contract 3/21/19      Plan:   Continuous opioid dependence- contract 3/21/19  -     HYDROcodone-acetaminophen (NORCO) 7.5-325 mg per tablet; Take 1 tablet by mouth every 6 (six) hours as needed for Pain. Do not fill until 1/14/23  Dispense: 120 tablet; Refill: 0  -     HYDROcodone-acetaminophen (NORCO) 7.5-325 mg per tablet; Take 1 tablet by mouth every 6 (six) hours as needed for Pain. Do not fill until 12/14/22  Dispense: 120 tablet; Refill: 0  -     HYDROcodone-acetaminophen (NORCO) 7.5-325 mg per tablet; Take 1 tablet by mouth every 6 (six) hours as needed for Pain.  Dispense: 120 tablet; Refill: 0  Counseled at length risks. Understands an accepts risks          Total time spent of Greater than 30 minutes  minutes on the day of the visit.This includes face to face time and preparing to see the patient, obtaining and reviewing separately obtained history, documenting clinical information in the electronic or other health record, independently interpreting results and communicating results to the patient/family/caregiver, or care coordinator.    Established patient with me has been instructed that must see me at least 1 time yearly (every 365 days) for refills of medications. Seeing other providers in this clinic is fine but expectation is to see me yearly.    Jose De Jesus Curran MD  11/18/2022    Portions of this note have been dictated with DILLON Spear

## 2022-11-21 ENCOUNTER — PATIENT MESSAGE (OUTPATIENT)
Dept: ADMINISTRATIVE | Facility: HOSPITAL | Age: 47
End: 2022-11-21
Payer: MEDICARE

## 2022-11-30 ENCOUNTER — LAB VISIT (OUTPATIENT)
Dept: LAB | Facility: HOSPITAL | Age: 47
End: 2022-11-30
Attending: INTERNAL MEDICINE
Payer: MEDICARE

## 2022-11-30 DIAGNOSIS — D50.0 IRON DEFICIENCY ANEMIA DUE TO CHRONIC BLOOD LOSS: ICD-10-CM

## 2022-11-30 LAB
BASOPHILS # BLD AUTO: 0.06 K/UL (ref 0–0.2)
BASOPHILS NFR BLD: 0.8 % (ref 0–1.9)
DIFFERENTIAL METHOD: ABNORMAL
EOSINOPHIL # BLD AUTO: 0.3 K/UL (ref 0–0.5)
EOSINOPHIL NFR BLD: 3.3 % (ref 0–8)
ERYTHROCYTE [DISTWIDTH] IN BLOOD BY AUTOMATED COUNT: 19.6 % (ref 11.5–14.5)
FERRITIN SERPL-MCNC: 108 NG/ML (ref 20–300)
HCT VFR BLD AUTO: 37.7 % (ref 37–48.5)
HGB BLD-MCNC: 11.1 G/DL (ref 12–16)
IMM GRANULOCYTES # BLD AUTO: 0.02 K/UL (ref 0–0.04)
IMM GRANULOCYTES NFR BLD AUTO: 0.3 % (ref 0–0.5)
LYMPHOCYTES # BLD AUTO: 1.7 K/UL (ref 1–4.8)
LYMPHOCYTES NFR BLD: 21.7 % (ref 18–48)
MCH RBC QN AUTO: 23.9 PG (ref 27–31)
MCHC RBC AUTO-ENTMCNC: 29.4 G/DL (ref 32–36)
MCV RBC AUTO: 81 FL (ref 82–98)
MONOCYTES # BLD AUTO: 0.5 K/UL (ref 0.3–1)
MONOCYTES NFR BLD: 6.4 % (ref 4–15)
NEUTROPHILS # BLD AUTO: 5.3 K/UL (ref 1.8–7.7)
NEUTROPHILS NFR BLD: 67.5 % (ref 38–73)
NRBC BLD-RTO: 0 /100 WBC
PLATELET # BLD AUTO: 410 K/UL (ref 150–450)
PMV BLD AUTO: 10.7 FL (ref 9.2–12.9)
RBC # BLD AUTO: 4.65 M/UL (ref 4–5.4)
WBC # BLD AUTO: 7.91 K/UL (ref 3.9–12.7)

## 2022-11-30 PROCEDURE — 82728 ASSAY OF FERRITIN: CPT | Performed by: INTERNAL MEDICINE

## 2022-11-30 PROCEDURE — 36415 COLL VENOUS BLD VENIPUNCTURE: CPT | Mod: PO | Performed by: INTERNAL MEDICINE

## 2022-11-30 PROCEDURE — 85025 COMPLETE CBC W/AUTO DIFF WBC: CPT | Performed by: INTERNAL MEDICINE

## 2022-12-06 ENCOUNTER — PATIENT MESSAGE (OUTPATIENT)
Dept: ADMINISTRATIVE | Facility: HOSPITAL | Age: 47
End: 2022-12-06
Payer: MEDICARE

## 2022-12-06 ENCOUNTER — OFFICE VISIT (OUTPATIENT)
Dept: HEMATOLOGY/ONCOLOGY | Facility: CLINIC | Age: 47
End: 2022-12-06
Payer: MEDICARE

## 2022-12-06 VITALS
BODY MASS INDEX: 51.81 KG/M2 | SYSTOLIC BLOOD PRESSURE: 144 MMHG | WEIGHT: 257 LBS | DIASTOLIC BLOOD PRESSURE: 72 MMHG | RESPIRATION RATE: 18 BRPM | HEIGHT: 59 IN | HEART RATE: 83 BPM | OXYGEN SATURATION: 98 % | TEMPERATURE: 97 F

## 2022-12-06 DIAGNOSIS — D50.0 IRON DEFICIENCY ANEMIA DUE TO CHRONIC BLOOD LOSS: Primary | ICD-10-CM

## 2022-12-06 PROCEDURE — 99999 PR PBB SHADOW E&M-EST. PATIENT-LVL V: CPT | Mod: PBBFAC,,, | Performed by: INTERNAL MEDICINE

## 2022-12-06 PROCEDURE — 99214 OFFICE O/P EST MOD 30 MIN: CPT | Mod: S$PBB,,, | Performed by: INTERNAL MEDICINE

## 2022-12-06 PROCEDURE — 99215 OFFICE O/P EST HI 40 MIN: CPT | Mod: PBBFAC,PO | Performed by: INTERNAL MEDICINE

## 2022-12-06 PROCEDURE — 99999 PR PBB SHADOW E&M-EST. PATIENT-LVL V: ICD-10-PCS | Mod: PBBFAC,,, | Performed by: INTERNAL MEDICINE

## 2022-12-06 PROCEDURE — 99214 PR OFFICE/OUTPT VISIT, EST, LEVL IV, 30-39 MIN: ICD-10-PCS | Mod: S$PBB,,, | Performed by: INTERNAL MEDICINE

## 2022-12-06 NOTE — PROGRESS NOTES
Service Date:  12/6/22    Chief Complaint: Anemia    Briana Uriarte is a 47 y.o. female with anemia.      Patient is paraplegic from spina bifida.  Family does not report any issues with the patient.  She is been doing well overall.  She has a sacral ulcer that she is dealing with.    Review of Systems   Constitutional: Negative.    HENT: Negative.     Eyes: Negative.    Respiratory: Negative.     Cardiovascular: Negative.    Gastrointestinal: Negative.    Endocrine: Negative.    Genitourinary: Negative.    Neurological: Negative.    Hematological: Negative.    Psychiatric/Behavioral: Negative.        Current Outpatient Medications   Medication Instructions    rwwdm-nphu-SvOJS-collag-mv-min (SULY, WITH COLLAGEN,) 7-7-1.5 gram PwPk Oral    ascorbic acid (vitamin C) (VITAMIN C) 500 mg, Oral, Daily    furosemide (LASIX) 20 mg, Oral, Daily    HYDROcodone-acetaminophen (NORCO) 7.5-325 mg per tablet 1 tablet, Oral, Every 6 hours PRN, Do not fill until 1/14/23    HYDROcodone-acetaminophen (NORCO) 7.5-325 mg per tablet 1 tablet, Oral, Every 6 hours PRN, Do not fill until 12/14/22    HYDROcodone-acetaminophen (NORCO) 7.5-325 mg per tablet 1 tablet, Oral, Every 6 hours PRN    metoprolol succinate (TOPROL-XL) 50 mg, Oral, Daily    multivitamin with minerals tablet 1 tablet, Daily        Past Medical History:   Diagnosis Date    Chronic hypercapnic respiratory failure     Chronic kidney disease     kidney stones    Diabetes mellitus, type 2     Esotropia     GERD (gastroesophageal reflux disease)     Hydrocephalus     Hypertension     Morbid obesity     Obesity hypoventilation syndrome     Paralysis     Scoliosis     Spinal bifida, closed         Past Surgical History:   Procedure Laterality Date    BRAIN SURGERY       shunt revision    CHOLECYSTECTOMY      CYSTOSCOPY W/ LASER LITHOTRIPSY      REPAIR OF INCARCERATED UMBILICAL HERNIA N/A 3/8/2019    Procedure: REPAIR, HERNIA, UMBILICAL, INCARCERATED, AGE 5 YEARS OR  "OLDER;  Surgeon: Ziyad Hancock MD;  Location: [facility];  Service: General;  Laterality: N/A;    SPINE SURGERY          Family History   Problem Relation Age of Onset    Cancer Mother     Glaucoma Father     Heart disease Maternal Grandmother     Cancer Maternal Grandmother     Glaucoma Paternal Grandfather     Psoriasis Maternal Uncle     Melanoma Neg Hx     Lupus Neg Hx     Eczema Neg Hx        Social History     Tobacco Use    Smoking status: Never    Smokeless tobacco: Never   Substance Use Topics    Alcohol use: No    Drug use: Yes     Types: Hydrocodone         Vitals:    12/06/22 1016   BP: (!) 144/72   Pulse: 83   Resp: 18   Temp: 97.4 °F (36.3 °C)        Physical Exam:  BP (!) 144/72 (BP Location: Left arm, Patient Position: Sitting, BP Method: Medium (Automatic))   Pulse 83   Temp 97.4 °F (36.3 °C) (Temporal)   Resp 18   Ht 4' 11" (1.499 m)   Wt 116.6 kg (257 lb)   SpO2 98%   BMI 51.91 kg/m²     Physical Exam  Constitutional:       Appearance: Normal appearance.   HENT:      Head: Normocephalic and atraumatic.      Nose: Nose normal.      Mouth/Throat:      Mouth: Mucous membranes are moist.      Pharynx: Oropharynx is clear.   Eyes:      Conjunctiva/sclera: Conjunctivae normal.   Cardiovascular:      Rate and Rhythm: Normal rate and regular rhythm.   Pulmonary:      Effort: Pulmonary effort is normal.   Abdominal:      General: There is no distension.   Musculoskeletal:         General: Normal range of motion.      Cervical back: Normal range of motion and neck supple.   Skin:     General: Skin is warm and dry.   Neurological:      Mental Status: She is alert and oriented to person, place, and time. Mental status is at baseline.   Psychiatric:         Mood and Affect: Mood normal.        Labs:  Lab Results   Component Value Date    WBC 7.91 11/30/2022    RBC 4.65 11/30/2022    HGB 11.1 (L) 11/30/2022    HCT 37.7 11/30/2022    MCV 81 (L) 11/30/2022    MCH 23.9 (L) 11/30/2022    MCHC 29.4 (L) " 11/30/2022    RDW 19.6 (H) 11/30/2022     11/30/2022    MPV 10.7 11/30/2022    GRAN 5.3 11/30/2022    GRAN 67.5 11/30/2022    LYMPH 1.7 11/30/2022    LYMPH 21.7 11/30/2022    MONO 0.5 11/30/2022    MONO 6.4 11/30/2022    EOS 0.3 11/30/2022    BASO 0.06 11/30/2022    EOSINOPHIL 3.3 11/30/2022    BASOPHIL 0.8 11/30/2022     Sodium   Date Value Ref Range Status   10/12/2022 139 136 - 145 mmol/L Final     Potassium   Date Value Ref Range Status   10/12/2022 4.0 3.5 - 5.1 mmol/L Final     Chloride   Date Value Ref Range Status   10/12/2022 105 95 - 110 mmol/L Final     CO2   Date Value Ref Range Status   10/12/2022 24 23 - 29 mmol/L Final     Glucose   Date Value Ref Range Status   10/12/2022 77 70 - 110 mg/dL Final     BUN   Date Value Ref Range Status   10/12/2022 9 6 - 20 mg/dL Final     Creatinine   Date Value Ref Range Status   10/12/2022 0.7 0.5 - 1.4 mg/dL Final     Calcium   Date Value Ref Range Status   10/12/2022 9.9 8.7 - 10.5 mg/dL Final     Total Protein   Date Value Ref Range Status   10/12/2022 8.6 (H) 6.0 - 8.4 g/dL Final     Albumin   Date Value Ref Range Status   10/12/2022 2.6 (L) 3.5 - 5.2 g/dL Final     Total Bilirubin   Date Value Ref Range Status   10/12/2022 0.3 0.1 - 1.0 mg/dL Final     Comment:     For infants and newborns, interpretation of results should be based  on gestational age, weight and in agreement with clinical  observations.    Premature Infant recommended reference ranges:  Up to 24 hours.............<8.0 mg/dL  Up to 48 hours............<12.0 mg/dL  3-5 days..................<15.0 mg/dL  6-29 days.................<15.0 mg/dL       Alkaline Phosphatase   Date Value Ref Range Status   10/12/2022 119 55 - 135 U/L Final     AST   Date Value Ref Range Status   10/12/2022 24 10 - 40 U/L Final     ALT   Date Value Ref Range Status   10/12/2022 11 10 - 44 U/L Final     Anion Gap   Date Value Ref Range Status   10/12/2022 10 8 - 16 mmol/L Final     eGFR if    Date  Value Ref Range Status   07/18/2022 >60.0 >60 mL/min/1.73 m^2 Final     eGFR if non    Date Value Ref Range Status   07/18/2022 >60.0 >60 mL/min/1.73 m^2 Final     Comment:     Calculation used to obtain the estimated glomerular filtration  rate (eGFR) is the CKD-EPI equation.          A/P:    Iron deficiency anemia  -suspect due to chronic blood loss and chronic inflammation   -improved with 2 doses of Injectafer but iron level is now following again.  Still okay.    -repeat in 1 year with virtual visit.    Spina bifida  -patient prefers virtual visits as a result  -patient is paraplegic  -will limit access to treatment      Aurash Khoobehi, MD  Hematology and Oncology

## 2022-12-15 ENCOUNTER — PATIENT OUTREACH (OUTPATIENT)
Dept: ADMINISTRATIVE | Facility: HOSPITAL | Age: 47
End: 2022-12-15
Payer: MEDICARE

## 2023-01-12 ENCOUNTER — PATIENT MESSAGE (OUTPATIENT)
Dept: FAMILY MEDICINE | Facility: CLINIC | Age: 48
End: 2023-01-12
Payer: MEDICARE

## 2023-01-17 ENCOUNTER — TELEPHONE (OUTPATIENT)
Dept: PULMONOLOGY | Facility: CLINIC | Age: 48
End: 2023-01-17

## 2023-01-17 NOTE — TELEPHONE ENCOUNTER
Download from the patient's trilogy E Vo shows good compliance each night with a mean average of use of 9.6 hours nightly and 100% compliance.

## 2023-01-23 ENCOUNTER — TELEPHONE (OUTPATIENT)
Dept: FAMILY MEDICINE | Facility: CLINIC | Age: 48
End: 2023-01-23
Payer: MEDICARE

## 2023-01-23 NOTE — TELEPHONE ENCOUNTER
Spoke with patient mother Manuela.  Patient is overdue for pap smear.   She would not schedule an appointment. She said Dr Curran would not want me to call about personal information. She told me thank you for calling and hung up

## 2023-01-27 ENCOUNTER — LAB VISIT (OUTPATIENT)
Dept: LAB | Facility: HOSPITAL | Age: 48
End: 2023-01-27
Payer: MEDICARE

## 2023-01-27 DIAGNOSIS — S33.101A: Primary | ICD-10-CM

## 2023-01-27 DIAGNOSIS — S31.000A: Primary | ICD-10-CM

## 2023-01-27 LAB
ALBUMIN SERPL BCP-MCNC: 3.2 G/DL (ref 3.5–5.2)
ALP SERPL-CCNC: 112 U/L (ref 55–135)
ALT SERPL W/O P-5'-P-CCNC: 12 U/L (ref 10–44)
ANION GAP SERPL CALC-SCNC: 12 MMOL/L (ref 8–16)
AST SERPL-CCNC: 25 U/L (ref 10–40)
BASOPHILS # BLD AUTO: 0.09 K/UL (ref 0–0.2)
BASOPHILS NFR BLD: 1.3 % (ref 0–1.9)
BILIRUB SERPL-MCNC: 0.4 MG/DL (ref 0.1–1)
BUN SERPL-MCNC: 15 MG/DL (ref 6–20)
CALCIUM SERPL-MCNC: 9 MG/DL (ref 8.7–10.5)
CHLORIDE SERPL-SCNC: 105 MMOL/L (ref 95–110)
CO2 SERPL-SCNC: 22 MMOL/L (ref 23–29)
CREAT SERPL-MCNC: 0.8 MG/DL (ref 0.5–1.4)
CRP SERPL-MCNC: 47.5 MG/L (ref 0–8.2)
DIFFERENTIAL METHOD: ABNORMAL
EOSINOPHIL # BLD AUTO: 0.2 K/UL (ref 0–0.5)
EOSINOPHIL NFR BLD: 3.1 % (ref 0–8)
ERYTHROCYTE [DISTWIDTH] IN BLOOD BY AUTOMATED COUNT: 18.8 % (ref 11.5–14.5)
ERYTHROCYTE [SEDIMENTATION RATE] IN BLOOD BY WESTERGREN METHOD: 88 MM/HR (ref 0–20)
EST. GFR  (NO RACE VARIABLE): >60 ML/MIN/1.73 M^2
GLUCOSE SERPL-MCNC: 80 MG/DL (ref 70–110)
HCT VFR BLD AUTO: 35.9 % (ref 37–48.5)
HGB BLD-MCNC: 11.4 G/DL (ref 12–16)
IMM GRANULOCYTES # BLD AUTO: 0.02 K/UL (ref 0–0.04)
IMM GRANULOCYTES NFR BLD AUTO: 0.3 % (ref 0–0.5)
LYMPHOCYTES # BLD AUTO: 1.8 K/UL (ref 1–4.8)
LYMPHOCYTES NFR BLD: 24.7 % (ref 18–48)
MCH RBC QN AUTO: 24.6 PG (ref 27–31)
MCHC RBC AUTO-ENTMCNC: 31.8 G/DL (ref 32–36)
MCV RBC AUTO: 77 FL (ref 82–98)
MONOCYTES # BLD AUTO: 0.4 K/UL (ref 0.3–1)
MONOCYTES NFR BLD: 5.5 % (ref 4–15)
NEUTROPHILS # BLD AUTO: 4.6 K/UL (ref 1.8–7.7)
NEUTROPHILS NFR BLD: 65.1 % (ref 38–73)
NRBC BLD-RTO: 0 /100 WBC
PLATELET # BLD AUTO: 438 K/UL (ref 150–450)
PMV BLD AUTO: 10.1 FL (ref 9.2–12.9)
POTASSIUM SERPL-SCNC: 4.5 MMOL/L (ref 3.5–5.1)
PROT SERPL-MCNC: 8.5 G/DL (ref 6–8.4)
RBC # BLD AUTO: 4.64 M/UL (ref 4–5.4)
SODIUM SERPL-SCNC: 139 MMOL/L (ref 136–145)
WBC # BLD AUTO: 7.09 K/UL (ref 3.9–12.7)

## 2023-01-27 PROCEDURE — 86140 C-REACTIVE PROTEIN: CPT

## 2023-01-27 PROCEDURE — 84134 ASSAY OF PREALBUMIN: CPT

## 2023-01-27 PROCEDURE — 85025 COMPLETE CBC W/AUTO DIFF WBC: CPT

## 2023-01-27 PROCEDURE — 85651 RBC SED RATE NONAUTOMATED: CPT | Mod: PO

## 2023-01-27 PROCEDURE — 80053 COMPREHEN METABOLIC PANEL: CPT

## 2023-01-30 LAB — PREALB SERPL-MCNC: 19 MG/DL (ref 20–43)

## 2023-01-30 NOTE — TELEPHONE ENCOUNTER
----- Message from Leyla Spring sent at 8/17/2018  1:27 PM CDT -----  Contact: Patient's dad, Qamar  Type: Needs Medical Advice    Who Called:  Patient's dad  Symptoms (please be specific):    How long has patient had these symptoms:    Pharmacy name and phone #:    Best Call Back Number:   Additional Information: The infectious doctor sent her recommendations over to Dr. Smith and they have not heard back from anyone regarding this.  Please call to discuss.  
Returned call and spoke to patient's dad. Informed that Dr Lee sent message straight to Dr Smith and he looked over recommendation to decide next course. Dad would like to speak with the MD directly to discuss. Message given to MD. He verbally understood.  
No

## 2023-02-09 ENCOUNTER — TELEPHONE (OUTPATIENT)
Dept: FAMILY MEDICINE | Facility: CLINIC | Age: 48
End: 2023-02-09
Payer: MEDICARE

## 2023-02-09 ENCOUNTER — PATIENT MESSAGE (OUTPATIENT)
Dept: FAMILY MEDICINE | Facility: CLINIC | Age: 48
End: 2023-02-09
Payer: MEDICARE

## 2023-02-22 ENCOUNTER — OFFICE VISIT (OUTPATIENT)
Dept: FAMILY MEDICINE | Facility: CLINIC | Age: 48
End: 2023-02-22
Payer: MEDICARE

## 2023-02-22 DIAGNOSIS — I87.2 PERIPHERAL VENOUS INSUFFICIENCY: Primary | ICD-10-CM

## 2023-02-22 DIAGNOSIS — F11.20 CONTINUOUS OPIOID DEPENDENCE: Chronic | ICD-10-CM

## 2023-02-22 PROCEDURE — 99214 PR OFFICE/OUTPT VISIT, EST, LEVL IV, 30-39 MIN: ICD-10-PCS | Mod: 95,,, | Performed by: FAMILY MEDICINE

## 2023-02-22 PROCEDURE — 99214 OFFICE O/P EST MOD 30 MIN: CPT | Mod: 95,,, | Performed by: FAMILY MEDICINE

## 2023-02-22 RX ORDER — HYDROCODONE BITARTRATE AND ACETAMINOPHEN 7.5; 325 MG/1; MG/1
1 TABLET ORAL EVERY 6 HOURS PRN
Qty: 120 TABLET | Refills: 0 | Status: SHIPPED | OUTPATIENT
Start: 2023-02-22 | End: 2023-05-10 | Stop reason: SDUPTHER

## 2023-02-22 RX ORDER — FUROSEMIDE 20 MG/1
20 TABLET ORAL DAILY
Qty: 90 TABLET | Refills: 3 | Status: SHIPPED | OUTPATIENT
Start: 2023-02-22 | End: 2023-05-30

## 2023-02-22 RX ORDER — HYDROCODONE BITARTRATE AND ACETAMINOPHEN 7.5; 325 MG/1; MG/1
1 TABLET ORAL EVERY 6 HOURS PRN
Qty: 120 TABLET | Refills: 0 | Status: SHIPPED | OUTPATIENT
Start: 2023-02-22 | End: 2023-06-12 | Stop reason: SDUPTHER

## 2023-02-22 NOTE — PROGRESS NOTES
The patient location is:  [place]  The chief complaint leading to consultation is: Checkup  Visit type: Virtual visit with synchronous audio and video  Total time spent with patient:  Less than 30 minutes  Each patient to whom he or she provides medical services by telemedicine is:  (1) informed of the relationship between the physician and patient and the respective role of any other health care provider with respect to management of the patient; and (2) notified that he or she may decline to receive medical services by telemedicine and may withdraw from such care at any time. Vital signs recorded were provided by the patient.    Notes:  See below    Subjective:   Patient ID: Briana Uriarte is a 47 y.o. female     Chief Complaint: Checkup    Here for checkup and med refill    Review of Systems   Respiratory:  Negative for shortness of breath.    Cardiovascular:  Negative for chest pain.   Gastrointestinal:  Negative for abdominal pain.   Genitourinary:  Negative for dysuria.   Past Medical History:   Diagnosis Date    Chronic hypercapnic respiratory failure     Chronic kidney disease     kidney stones    Diabetes mellitus, type 2     Esotropia     GERD (gastroesophageal reflux disease)     Hydrocephalus     Hypertension     Morbid obesity     Obesity hypoventilation syndrome     Paralysis     Scoliosis     Spinal bifida, closed      Past Surgical History:   Procedure Laterality Date    BRAIN SURGERY       shunt revision    CHOLECYSTECTOMY      CYSTOSCOPY W/ LASER LITHOTRIPSY      REPAIR OF INCARCERATED UMBILICAL HERNIA N/A 3/8/2019    Procedure: REPAIR, HERNIA, UMBILICAL, INCARCERATED, AGE 5 YEARS OR OLDER;  Surgeon: Ziyad Hancock MD;  Location: [facility];  Service: General;  Laterality: N/A;    SPINE SURGERY       Objective:   There were no vitals filed for this visit.  There is no height or weight on file to calculate BMI.  Physical Exam  Vitals and nursing note reviewed.   Constitutional:        Appearance: She is well-developed.   HENT:      Head: Normocephalic and atraumatic.   Eyes:      General: No scleral icterus.     Conjunctiva/sclera: Conjunctivae normal.   Pulmonary:      Effort: Pulmonary effort is normal. No respiratory distress.   Musculoskeletal:         General: No deformity. Normal range of motion.      Cervical back: Normal range of motion and neck supple.   Skin:     Coloration: Skin is not pale.      Findings: No rash.   Neurological:      Mental Status: She is alert and oriented to person, place, and time.   Psychiatric:         Behavior: Behavior normal.         Thought Content: Thought content normal.         Judgment: Judgment normal.     Assessment:     1. Peripheral venous insufficiency    2. Continuous opioid dependence- contract 3/21/19      Plan:   Peripheral venous insufficiency  -     furosemide (LASIX) 20 MG tablet; Take 1 tablet (20 mg total) by mouth once daily.  Dispense: 90 tablet; Refill: 3    Continuous opioid dependence- contract 3/21/19  -     HYDROcodone-acetaminophen (NORCO) 7.5-325 mg per tablet; Take 1 tablet by mouth every 6 (six) hours as needed for Pain. Do not fill until 4/9/23  Dispense: 120 tablet; Refill: 0  -     HYDROcodone-acetaminophen (NORCO) 7.5-325 mg per tablet; Take 1 tablet by mouth every 6 (six) hours as needed for Pain. Do not fill until 3/9/23  Dispense: 120 tablet; Refill: 0            Total time spent of Less than 30 minutes minutes on the day of the visit.This includes face to face time and preparing to see the patient, obtaining and reviewing separately obtained history, documenting clinical information in the electronic or other health record, independently interpreting results and communicating results to the patient/family/caregiver, or care coordinator.    Established patient with me has been instructed that must see me at least 1 time yearly (every 365 days) for refills of medications. Seeing other providers in this clinic is fine but  expectation is to see me yearly.    Jose De Jesus Curran MD  02/22/2023    Portions of this note have been dictated with M Jeanne.

## 2023-04-11 ENCOUNTER — PATIENT MESSAGE (OUTPATIENT)
Dept: ADMINISTRATIVE | Facility: HOSPITAL | Age: 48
End: 2023-04-11
Payer: MEDICARE

## 2023-05-09 NOTE — PROGRESS NOTES
[facility] Note    PCP: Jose De Jesus Curran MD    Chief Complaint: neurogenic bladder    SUBJECTIVE:       History of Present Illness:  Briana Uriarte is a 47 y.o. female who presents to clinic for neurogenic bladder. She is Established  to our clinic.     No recent imaging  GFR 1/27/23: >60  No documented UTIs    She has continued issues with sacral decubitus ulcer with open wound. She has a second open wound near the perineum. Mom states that this drains clear fluid that she is concerned may be urine. Cultures from this have grown Staph, proteus, E coli.   She was following with wound care, has not seen ID.   No issues with SPT. Drains clear, but darker/orange in the AM. Has some sediment.     6/28/22  Doing well from a urologic standpoint.   Mcdonald being changed every 3 weeks. No issues with exchanges.  Has been battling a sacral decubitus ulcer for which she is following with wound care.     Had a CT in september which showed a blown out non-function left kidney. No hydro in the right however small volume of renal stones.     6/9/21  Previous patient of Dr. Smith, last seen March 2020.  Patient has a hx of spina bifida, hydrocephalus, paraplegia and neurogenic bladder. She is currently managed with an SPT. Changed by her mother every 3 weeks.   She has a multi-cystic dystrophic nonfunctional left kidney. She does have some large right sided renal stones that appear to be intraparenchymal as of CT from July 2020.    Not having issues with frequent symptomatic UTIs.   Has had SPT in place for 12 years.   Had a PCNL on the left side >10 years ago  No gross hematuria.     Recently admitted to hospital with respiratory issues.     Last urine culture: multiple documented UTIs, however unclear if these were symptomatic     Lab Results   Component Value Date    CREATININE 0.8 01/27/2023     Family  hx: no bladder or kidney cancer   Hx of spina bifida, paraplegia, HTN, DM, obesity,  "Pickwickian syndrome    Past medical, family, and social history reviewed as documented in chart with pertinent positive medical, family, and social history detailed in HPI.    Review of patient's allergies indicates:   Allergen Reactions    Cefepime      HIVES    Latex Swelling       Past Medical History:   Diagnosis Date    Chronic hypercapnic respiratory failure     Chronic kidney disease     kidney stones    Diabetes mellitus, type 2     Esotropia     GERD (gastroesophageal reflux disease)     Hydrocephalus     Hypertension     Morbid obesity     Obesity hypoventilation syndrome     Paralysis     Scoliosis     Spinal bifida, closed      Past Surgical History:   Procedure Laterality Date    BRAIN SURGERY       shunt revision    CHOLECYSTECTOMY      CYSTOSCOPY W/ LASER LITHOTRIPSY      REPAIR OF INCARCERATED UMBILICAL HERNIA N/A 3/8/2019    Procedure: REPAIR, HERNIA, UMBILICAL, INCARCERATED, AGE 5 YEARS OR OLDER;  Surgeon: Ziyad Hancock MD;  Location: [facility] OR;  Service: General;  Laterality: N/A;    SPINE SURGERY       Family History   Problem Relation Age of Onset    Cancer Mother     Glaucoma Father     Heart disease Maternal Grandmother     Cancer Maternal Grandmother     Glaucoma Paternal Grandfather     Psoriasis Maternal Uncle     Melanoma Neg Hx     Lupus Neg Hx     Eczema Neg Hx      Social History     Tobacco Use    Smoking status: Never    Smokeless tobacco: Never   Substance Use Topics    Alcohol use: No    Drug use: Yes     Types: Hydrocodone        Review of Systems   Unable to perform ROS  OBJECTIVE:     Anticoagulation:  2.5 mg Eliquis     Estimated body mass index is 57.56 kg/m² as calculated from the following:    Height as of this encounter: 4' 11" (1.499 m).    Weight as of this encounter: 129.3 kg (285 lb).    Vital Signs (Most Recent)       Physical Exam  Constitutional:       General: She is not in acute distress.     Appearance: She is obese. She is not ill-appearing, toxic-appearing " or diaphoretic.      Comments: In wheelchair   HENT:      Head: Normocephalic and atraumatic.   Cardiovascular:      Rate and Rhythm: Normal rate and regular rhythm.   Pulmonary:      Effort: Pulmonary effort is normal. No respiratory distress.   Genitourinary:     Comments: Urine draining clear yellow   Skin:     General: Skin is warm and dry.      Coloration: Skin is not jaundiced.   Neurological:      Mental Status: She is alert. Mental status is at baseline.       BMP  Lab Results   Component Value Date     01/27/2023    K 4.5 01/27/2023     01/27/2023    CO2 22 (L) 01/27/2023    BUN 15 01/27/2023    CREATININE 0.8 01/27/2023    CALCIUM 9.0 01/27/2023    ANIONGAP 12 01/27/2023    ESTGFRAFRICA >60.0 07/18/2022    EGFRNONAA >60.0 07/18/2022       Lab Results   Component Value Date    WBC 7.09 01/27/2023    HGB 11.4 (L) 01/27/2023    HCT 35.9 (L) 01/27/2023    MCV 77 (L) 01/27/2023     01/27/2023       Imaging:  Per HPI    ASSESSMENT     1. Neurogenic bladder    2. Suprapubic catheter    3. Spina bifida manifesta    4. Hydronephrosis, unspecified hydronephrosis type    5. Paraplegia    6. Obstructive hydrocephalus    7. Intracranial hypertension    8. Chiari malformation type II    9. Peripheral venous insufficiency    10. Essential hypertension    11. Morbid obesity with BMI of 50.0-59.9, adult    12. Stage III pressure ulcer of sacral region    13. Stage III pressure ulcer of left buttock    14. Pickwickian syndrome    15. Obesity hypoventilation syndrome    16. Neuromuscular dysfunction of bladder, unspecified      PLAN:     - Family is pleased with SPT management and not having issues at this time   - Continue with q3 week SPT changes at home  - We discussed that she has a non-functioning left kidney however she is currently asymptomatic from this therefore no current intervention is required   - She has intraparenchymal right renal stones, will continue to monitor   - She is currently not  having problems with symptomatic UTIs, last documented culture April 2021  - Parents are concerned about her sacral wound and drainage. This has not been healing. Will get updated imaging as she has not had any in over 1.5 years. CT Urogram and CT cystogram to evaluate the bladder.   - If this is all negative, then would consider ID consult for her non-healing sacral wound. It may need some debridement based on the pictures she showed me.       Siobhan Phillips MD

## 2023-05-10 ENCOUNTER — OFFICE VISIT (OUTPATIENT)
Dept: UROLOGY | Facility: CLINIC | Age: 48
End: 2023-05-10
Payer: MEDICARE

## 2023-05-10 VITALS — HEIGHT: 59 IN | BODY MASS INDEX: 57.45 KG/M2 | WEIGHT: 285 LBS

## 2023-05-10 DIAGNOSIS — I87.2 PERIPHERAL VENOUS INSUFFICIENCY: ICD-10-CM

## 2023-05-10 DIAGNOSIS — Z93.59 SUPRAPUBIC CATHETER: ICD-10-CM

## 2023-05-10 DIAGNOSIS — G82.20 PARAPLEGIA: ICD-10-CM

## 2023-05-10 DIAGNOSIS — F11.20 CONTINUOUS OPIOID DEPENDENCE: Chronic | ICD-10-CM

## 2023-05-10 DIAGNOSIS — I10 ESSENTIAL HYPERTENSION: ICD-10-CM

## 2023-05-10 DIAGNOSIS — E66.01 MORBID OBESITY WITH BMI OF 50.0-59.9, ADULT: ICD-10-CM

## 2023-05-10 DIAGNOSIS — G93.2 INTRACRANIAL HYPERTENSION: ICD-10-CM

## 2023-05-10 DIAGNOSIS — L89.323 STAGE III PRESSURE ULCER OF LEFT BUTTOCK: ICD-10-CM

## 2023-05-10 DIAGNOSIS — Q05.9 SPINA BIFIDA MANIFESTA: ICD-10-CM

## 2023-05-10 DIAGNOSIS — N31.9 NEUROMUSCULAR DYSFUNCTION OF BLADDER, UNSPECIFIED: ICD-10-CM

## 2023-05-10 DIAGNOSIS — N31.9 NEUROGENIC BLADDER: Primary | ICD-10-CM

## 2023-05-10 DIAGNOSIS — Q07.01 CHIARI MALFORMATION TYPE II: ICD-10-CM

## 2023-05-10 DIAGNOSIS — E66.2 OBESITY HYPOVENTILATION SYNDROME: ICD-10-CM

## 2023-05-10 DIAGNOSIS — G91.1 OBSTRUCTIVE HYDROCEPHALUS: ICD-10-CM

## 2023-05-10 DIAGNOSIS — E66.2 PICKWICKIAN SYNDROME: ICD-10-CM

## 2023-05-10 DIAGNOSIS — L89.153 STAGE III PRESSURE ULCER OF SACRAL REGION: ICD-10-CM

## 2023-05-10 DIAGNOSIS — N13.30 HYDRONEPHROSIS, UNSPECIFIED HYDRONEPHROSIS TYPE: ICD-10-CM

## 2023-05-10 PROCEDURE — 99212 OFFICE O/P EST SF 10 MIN: CPT | Mod: PBBFAC,PO | Performed by: STUDENT IN AN ORGANIZED HEALTH CARE EDUCATION/TRAINING PROGRAM

## 2023-05-10 PROCEDURE — 99214 OFFICE O/P EST MOD 30 MIN: CPT | Mod: S$PBB,,, | Performed by: STUDENT IN AN ORGANIZED HEALTH CARE EDUCATION/TRAINING PROGRAM

## 2023-05-10 PROCEDURE — 99214 PR OFFICE/OUTPT VISIT, EST, LEVL IV, 30-39 MIN: ICD-10-PCS | Mod: S$PBB,,, | Performed by: STUDENT IN AN ORGANIZED HEALTH CARE EDUCATION/TRAINING PROGRAM

## 2023-05-10 PROCEDURE — 99999 PR PBB SHADOW E&M-EST. PATIENT-LVL II: ICD-10-PCS | Mod: PBBFAC,,, | Performed by: STUDENT IN AN ORGANIZED HEALTH CARE EDUCATION/TRAINING PROGRAM

## 2023-05-10 PROCEDURE — 99999 PR PBB SHADOW E&M-EST. PATIENT-LVL II: CPT | Mod: PBBFAC,,, | Performed by: STUDENT IN AN ORGANIZED HEALTH CARE EDUCATION/TRAINING PROGRAM

## 2023-05-10 RX ORDER — SILVER SULFADIAZINE 10 G/1000G
CREAM TOPICAL
COMMUNITY
Start: 2023-03-11

## 2023-05-10 RX ORDER — HYDROCODONE BITARTRATE AND ACETAMINOPHEN 7.5; 325 MG/1; MG/1
1 TABLET ORAL EVERY 6 HOURS PRN
Qty: 120 TABLET | Refills: 0 | Status: SHIPPED | OUTPATIENT
Start: 2023-05-10 | End: 2023-06-12 | Stop reason: SDUPTHER

## 2023-05-10 NOTE — TELEPHONE ENCOUNTER
No care due was identified.  [facility] Embedded Care Due Messages. Reference number: [id number].   5/10/2023 9:11:39 AM CDT

## 2023-05-16 ENCOUNTER — HOSPITAL ENCOUNTER (OUTPATIENT)
Dept: RADIOLOGY | Facility: HOSPITAL | Age: 48
Discharge: HOME | End: 2023-05-16
Attending: STUDENT IN AN ORGANIZED HEALTH CARE EDUCATION/TRAINING PROGRAM
Payer: MEDICARE

## 2023-05-16 DIAGNOSIS — N31.9 NEUROMUSCULAR DYSFUNCTION OF BLADDER, UNSPECIFIED: ICD-10-CM

## 2023-05-16 DIAGNOSIS — N13.30 HYDRONEPHROSIS, UNSPECIFIED HYDRONEPHROSIS TYPE: ICD-10-CM

## 2023-05-16 LAB
CREAT SERPL-MCNC: 0.6 MG/DL (ref 0.5–1.4)
SAMPLE: NORMAL

## 2023-05-16 PROCEDURE — 74178 CT UROGRAM ABD PELVIS W WO: ICD-10-PCS | Mod: 26,,, | Performed by: RADIOLOGY

## 2023-05-16 PROCEDURE — 25500020 PHARM REV CODE 255

## 2023-05-16 PROCEDURE — 72194 CT CYSTOGRAPHY W/O CONTRAST: ICD-10-PCS | Mod: 26,,, | Performed by: RADIOLOGY

## 2023-05-16 PROCEDURE — 74178 CT ABD&PLV WO CNTR FLWD CNTR: CPT | Mod: 26,,, | Performed by: RADIOLOGY

## 2023-05-16 PROCEDURE — 72194 CT PELVIS W/O & W/DYE: CPT | Mod: 26,,, | Performed by: RADIOLOGY

## 2023-05-16 PROCEDURE — 72194 CT PELVIS W/O & W/DYE: CPT | Mod: TC

## 2023-05-16 PROCEDURE — 74178 CT ABD&PLV WO CNTR FLWD CNTR: CPT | Mod: TC

## 2023-05-16 RX ADMIN — IOHEXOL 20 ML: 300 INJECTION, SOLUTION INTRAVENOUS at 03:05

## 2023-05-18 ENCOUNTER — OFFICE VISIT (OUTPATIENT)
Dept: PULMONOLOGY | Facility: CLINIC | Age: 48
End: 2023-05-18
Payer: MEDICARE

## 2023-05-18 VITALS
DIASTOLIC BLOOD PRESSURE: 87 MMHG | OXYGEN SATURATION: 98 % | BODY MASS INDEX: 57.45 KG/M2 | HEIGHT: 59 IN | HEART RATE: 87 BPM | WEIGHT: 285 LBS | SYSTOLIC BLOOD PRESSURE: 140 MMHG

## 2023-05-18 DIAGNOSIS — L02.91 ABSCESS: ICD-10-CM

## 2023-05-18 DIAGNOSIS — E66.2 OBESITY HYPOVENTILATION SYNDROME: Primary | ICD-10-CM

## 2023-05-18 DIAGNOSIS — J96.11 CHRONIC RESPIRATORY FAILURE WITH HYPOXIA AND HYPERCAPNIA: ICD-10-CM

## 2023-05-18 DIAGNOSIS — K61.2 ABSCESS OF ANAL AND RECTAL REGIONS: ICD-10-CM

## 2023-05-18 DIAGNOSIS — Q05.9 SPINA BIFIDA, UNSPECIFIED HYDROCEPHALUS PRESENCE, UNSPECIFIED SPINAL REGION: ICD-10-CM

## 2023-05-18 DIAGNOSIS — J96.12 CHRONIC RESPIRATORY FAILURE WITH HYPOXIA AND HYPERCAPNIA: ICD-10-CM

## 2023-05-18 PROCEDURE — 99214 PR OFFICE/OUTPT VISIT, EST, LEVL IV, 30-39 MIN: ICD-10-PCS | Mod: S$GLB,,, | Performed by: INTERNAL MEDICINE

## 2023-05-18 PROCEDURE — 99214 OFFICE O/P EST MOD 30 MIN: CPT | Mod: S$GLB,,, | Performed by: INTERNAL MEDICINE

## 2023-05-18 NOTE — PROGRESS NOTES
SUBJECTIVE:    Patient ID: Briana Uriarte is a 47 y.o. female.    Chief Complaint: Follow-up    HPI The patient returns with her parents.  She is leaning in her wheelchair.  She had recent CTs of her ureters and genital system which points out a couple of the abscesses in her buttocks.  Her mother showed me a picture of her lower back and buttocks which are denuded of skin with 2 large decubiti with obvious undermining.  The mother states these have  been present for over a year.  She was seen by Dr. Ngo last year who did not offer any assistance.  We will get Dr. Reyez to see her.  The patient is wearing her oxygen faithfully.  The patient is sitting up to sleep with her ventilator.  Somedays she has a lot of gas in her stomach.  Past Medical History:   Diagnosis Date    Chronic hypercapnic respiratory failure     Chronic kidney disease     kidney stones    Diabetes mellitus, type 2     Esotropia     GERD (gastroesophageal reflux disease)     Hydrocephalus     Hypertension     Morbid obesity     Obesity hypoventilation syndrome     Paralysis     Scoliosis     Spinal bifida, closed      Past Surgical History:   Procedure Laterality Date    BRAIN SURGERY       shunt revision    CHOLECYSTECTOMY      CYSTOSCOPY W/ LASER LITHOTRIPSY      REPAIR OF INCARCERATED UMBILICAL HERNIA N/A 3/8/2019    Procedure: REPAIR, HERNIA, UMBILICAL, INCARCERATED, AGE 5 YEARS OR OLDER;  Surgeon: Ziyad Hancock MD;  Location: [facility];  Service: General;  Laterality: N/A;    SPINE SURGERY       Family History   Problem Relation Age of Onset    Cancer Mother     Glaucoma Father     Heart disease Maternal Grandmother     Cancer Maternal Grandmother     Glaucoma Paternal Grandfather     Psoriasis Maternal Uncle     Melanoma Neg Hx     Lupus Neg Hx     Eczema Neg Hx         Social History:   Marital Status: Single  Occupation: Data Unavailable  Alcohol History:  reports no history of alcohol use.  Tobacco History:  reports  that she has never smoked. She has never used smokeless tobacco.  Drug History:  reports current drug use. Drug: Hydrocodone.    Review of patient's allergies indicates:   Allergen Reactions    Cefepime      HIVES    Latex Swelling       Current Outpatient Medications   Medication Sig Dispense Refill    xwziu-rlpe-MhOAB-collag-mv-min (SULY, WITH COLLAGEN,) 7-7-1.5 gram PwPk Take by mouth.      ascorbic acid, vitamin C, (VITAMIN C) 500 MG tablet Take 500 mg by mouth once daily.      furosemide (LASIX) 20 MG tablet Take 1 tablet (20 mg total) by mouth once daily. 90 tablet 3    HYDROcodone-acetaminophen (NORCO) 7.5-325 mg per tablet Take 1 tablet by mouth every 6 (six) hours as needed for Pain. Do not fill until 12/14/22 120 tablet 0    HYDROcodone-acetaminophen (NORCO) 7.5-325 mg per tablet Take 1 tablet by mouth every 6 (six) hours as needed for Pain. Do not fill until 3/9/23 120 tablet 0    HYDROcodone-acetaminophen (NORCO) 7.5-325 mg per tablet Take 1 tablet by mouth every 6 (six) hours as needed for Pain. Do not fill until 4/9/23 120 tablet 0    metoprolol succinate (TOPROL-XL) 50 MG 24 hr tablet Take 1 tablet (50 mg total) by mouth once daily. 90 tablet 3    multivitamin with minerals tablet Take 1 tablet by mouth once daily.      silver sulfADIAZINE 1% (SILVADENE) 1 % cream Apply topically.       No current facility-administered medications for this visit.     Facility-Administered Medications Ordered in Other Visits   Medication Dose Route Frequency Provider Last Rate Last Admin    sodium chloride 0.9% flush 10 mL  10 mL Intravenous PRN Bozena Acosta MD        sodium chloride 0.9% flush 10 mL  10 mL Intravenous PRN Bozena Acosta MD        sodium chloride 0.9% flush 10 mL  10 mL Intravenous PRN Bozena Acosta MD        sodium chloride 0.9% flush 10 mL  10 mL Intravenous PRN Bozena Acosta MD           Alpha-1 Antitrypsin:  Last PFT:   Last CT:    Review of Systems  General: Feeling ok  Eyes: Vision is  "good.  ENT:  No sinusitis or pharyngitis.   Heart:: No chest pain or palpitations.  Lungs: No cough, sputum, or wheezing. She occasionally feels tight in her upper chest  GI: No Nausea, vomiting, constipation, diarrhea, or reflux.  : No dysuria, hesitancy, or nocturia.  Musculoskeletal: No joint pain or myalgias.  Skin:  She has multiple decubiti I across her buttocks and lower back.  She actually sits on her lower back.  There are 2 large holes present which the mother states are very deep and she is not sure how far they undermine.  Neuro: No headaches or neuropathy.  Lymph: No edema or adenopathy.  Psych: No anxiety or depression.  Endo: No weight change.    OBJECTIVE:      BP (!) 140/87 (BP Location: Left arm, Patient Position: Sitting, BP Method: Medium (Automatic)) Comment (BP Method): WRIST  Pulse 87   Ht 4' 11" (1.499 m)   Wt 129.3 kg (285 lb) Comment: per pts mom (pt in wheelchair)  SpO2 98% Comment: 2LPM PD  BMI 57.56 kg/m²     Physical Exam  GENERAL: Midaged obese wheelchair patient in no distress.  HEENT: Pupils equal and reactive. Extraocular movements intact. Nose intact.      Pharynx moist. Malampati 4  NECK: Supple. 23"  HEART: Regular rate and rhythm. No murmur or gallop auscultated.  LUNGS:  There is some crackles in the right base.  Lung excursion symmetrical. No change in fremitus  ABDOMEN: Bowel sounds present.  Very obese.  Non-tender, no masses palpated.  EXTREMITIES:  Her legs are foreshortened.  The right leg is edematous.  The mother states it is been this way her entire life.  :  Suprapubic catheter with yellow urine.  LYMPHATICS: No adenopathy palpated, edema to right leg  SKIN: Dry, intact, no lesions.  Wound VAC to right buttock.  Stage II sacral decubitus  NEURO:  Cognitively impaired.  Paraplegic.  PSYCH: Appropriate affect.    Assessment:       1. Obesity hypoventilation syndrome    2. Abscess    3. Spina bifida, unspecified hydrocephalus presence, unspecified spinal region  "   4. Chronic respiratory failure with hypoxia and hypercapnia    5. Abscess of anal and rectal regions          Plan:       Obesity hypoventilation syndrome    Abscess  -     Ambulatory referral/consult to General Surgery; Future; Expected date: 05/25/2023    Spina bifida, unspecified hydrocephalus presence, unspecified spinal region    Chronic respiratory failure with hypoxia and hypercapnia    Abscess of anal and rectal regions         Follow up in about 1 year (around 5/18/2024).    [email or web address] out of [place] Nicole therapist, [phone], office number [phone] for download of her trilogy  Try sleeping on her sides.  Okay to tighten the mask up a little more.  Pressure relief is necessary for her decubiti I would heel.  Weight loss would be very helpful.  Refer to Dr. Worrell to drain abscesses

## 2023-05-19 ENCOUNTER — PATIENT MESSAGE (OUTPATIENT)
Dept: UROLOGY | Facility: CLINIC | Age: 48
End: 2023-05-19
Payer: MEDICARE

## 2023-05-23 ENCOUNTER — TELEPHONE (OUTPATIENT)
Dept: UROLOGY | Facility: CLINIC | Age: 48
End: 2023-05-23
Payer: MEDICARE

## 2023-05-23 DIAGNOSIS — N31.9 NEUROGENIC BLADDER: Primary | ICD-10-CM

## 2023-05-23 DIAGNOSIS — N22 CALCULUS OF URINARY TRACT IN DISEASES CLASSIFIED ELSEWHERE: Primary | ICD-10-CM

## 2023-05-23 DIAGNOSIS — N31.9 NEUROMUSCULAR DYSFUNCTION OF BLADDER, UNSPECIFIED: ICD-10-CM

## 2023-05-23 NOTE — TELEPHONE ENCOUNTER
Spoke with patient's mother, CT results given with advisement. CT orders placed and scheduled. Nurse visit scheduled for cytology. Will have to discuss with provider to override to make sooner appt to discuss scans. She verbally understood.

## 2023-05-29 ENCOUNTER — PES CALL (OUTPATIENT)
Dept: ADMINISTRATIVE | Facility: CLINIC | Age: 48
End: 2023-05-29
Payer: MEDICARE

## 2023-05-30 ENCOUNTER — OFFICE VISIT (OUTPATIENT)
Dept: FAMILY MEDICINE | Facility: CLINIC | Age: 48
End: 2023-05-30
Payer: MEDICARE

## 2023-05-30 VITALS
OXYGEN SATURATION: 96 % | SYSTOLIC BLOOD PRESSURE: 140 MMHG | TEMPERATURE: 99 F | BODY MASS INDEX: 57.56 KG/M2 | HEIGHT: 59 IN | DIASTOLIC BLOOD PRESSURE: 86 MMHG | HEART RATE: 96 BPM

## 2023-05-30 DIAGNOSIS — Q05.9 SPINA BIFIDA MANIFESTA: ICD-10-CM

## 2023-05-30 DIAGNOSIS — L89.44 PRESSURE INJURY OF CONTIGUOUS REGION INVOLVING BACK AND LEFT BUTTOCK, STAGE 4: Primary | ICD-10-CM

## 2023-05-30 DIAGNOSIS — I87.2 PERIPHERAL VENOUS INSUFFICIENCY: ICD-10-CM

## 2023-05-30 DIAGNOSIS — G82.20 PARAPLEGIA: ICD-10-CM

## 2023-05-30 DIAGNOSIS — R93.89 ABNORMAL CT SCAN: ICD-10-CM

## 2023-05-30 DIAGNOSIS — F11.20 CONTINUOUS OPIOID DEPENDENCE: Chronic | ICD-10-CM

## 2023-05-30 DIAGNOSIS — I10 ESSENTIAL HYPERTENSION: ICD-10-CM

## 2023-05-30 DIAGNOSIS — L89.159 PRESSURE INJURY OF SKIN OF SACRAL REGION, UNSPECIFIED INJURY STAGE: ICD-10-CM

## 2023-05-30 PROCEDURE — 99214 OFFICE O/P EST MOD 30 MIN: CPT | Mod: PBBFAC,PO | Performed by: PHYSICIAN ASSISTANT

## 2023-05-30 PROCEDURE — 99999 PR PBB SHADOW E&M-EST. PATIENT-LVL IV: ICD-10-PCS | Mod: PBBFAC,,, | Performed by: PHYSICIAN ASSISTANT

## 2023-05-30 PROCEDURE — 99999 PR PBB SHADOW E&M-EST. PATIENT-LVL IV: CPT | Mod: PBBFAC,,, | Performed by: PHYSICIAN ASSISTANT

## 2023-05-30 PROCEDURE — 99214 PR OFFICE/OUTPT VISIT, EST, LEVL IV, 30-39 MIN: ICD-10-PCS | Mod: S$PBB,,, | Performed by: PHYSICIAN ASSISTANT

## 2023-05-30 PROCEDURE — 99214 OFFICE O/P EST MOD 30 MIN: CPT | Mod: S$PBB,,, | Performed by: PHYSICIAN ASSISTANT

## 2023-05-30 RX ORDER — FUROSEMIDE 20 MG/1
TABLET ORAL
Qty: 90 TABLET | Refills: 3
Start: 2023-05-30 | End: 2024-03-11 | Stop reason: SDUPTHER

## 2023-05-30 NOTE — PROGRESS NOTES
Subjective:       Patient ID: Briana Uriarte is a 47 y.o. female.    Chief Complaint: follow up   Patient is new to me.    She presents today accompanied by her mother who is full historian.  Patient has spina bifida and is paraplegic.  She is confined to wheelchair.  Family uses in the care lift to help with transfers.  She has chronic sacral decubitus ulcers.  She is followed by [facility] for wound care.  She recently had CT scan done per Urology in this mentioned air pockets in the subcutaneous tissue of the buttocks.  Her pulmonologist made note of this abnormality on CT scan and referred her to General surgery for evaluation.  Regarding chronic opioid use this is managed by her primary care physician.   is reviewed and prescriptions are without evidence of misuse or divergence.  Blood pressure is 140/86 today.  Patient's mother states that she takes medications as prescribed.  She is currently taking Lasix 20 mg daily and mother request increase in the dose or frequency of the medication to help with swelling.  She reports adequate fluid intake and urine output has been unchanged.    Review of Systems   Constitutional:  Negative for activity change and unexpected weight change.   HENT:  Negative for hearing loss, rhinorrhea and trouble swallowing.    Eyes:  Negative for discharge and visual disturbance.   Respiratory:  Negative for chest tightness and wheezing.    Cardiovascular:  Positive for leg swelling. Negative for chest pain and palpitations.   Gastrointestinal:  Negative for blood in stool, constipation, diarrhea and vomiting.   Endocrine: Negative for polydipsia and polyuria.   Genitourinary:  Negative for difficulty urinating, dysuria, hematuria and menstrual problem.   Musculoskeletal:  Negative for arthralgias, joint swelling and neck pain.   Neurological:  Negative for weakness and headaches.   Psychiatric/Behavioral:  Negative for confusion and dysphoric mood.      Objective:     "  Physical Exam  Constitutional:       General: She is not in acute distress.     Appearance: She is well-developed.   HENT:      Head: Normocephalic and atraumatic.   Cardiovascular:      Rate and Rhythm: Normal rate and regular rhythm.      Heart sounds: Normal heart sounds.   Pulmonary:      Effort: Pulmonary effort is normal.      Breath sounds: Normal breath sounds.   Musculoskeletal:      Right lower leg: Edema present.      Left lower leg: Edema present.   Skin:     General: Skin is warm and dry.      Comments: Dry scaling skin to BLE  Sacral wound Photos reviewed and there is no significant drainage or erythema surrounding the wounds which appear to be dry   Neurological:      Mental Status: She is alert.   Psychiatric:         Behavior: Behavior is cooperative.       Assessment:       1. Pressure injury of contiguous region involving back and left buttock, stage 4    2. Pressure injury of skin of sacral region, unspecified injury stage    3. Abnormal CT scan    4. Continuous opioid dependence- contract 3/21/19    5. Spina bifida manifesta    6. Paraplegia    7. Essential hypertension    8. Peripheral venous insufficiency        Plan:       Diagnoses and all orders for this visit:    Pressure injury of of contiguous regions involving back and left buttock, stage 4   -Abnormal CT scan  -     Ambulatory referral/consult to General Surgery; Future  Continue with wound care  Continuous opioid dependence- contract 3/21/19  Continue per PCP   reviewed  Consider urine toxin & update pain contract next visit   Spina bifida manifesta, stable    Paraplegia, stable    Essential hypertension  Continue current medication   Increase lasix to 20 mg and 40 mg qod       Follow up for gen surg- petito , pcp 3 mo .           Documentation entered by me for this encounter may have been done in part using speech-recognition technology. Although I have made an effort to ensure accuracy, "sound like" errors may exist and should " be interpreted in context.   I spent a total of 39 minutes on the day of the visit.This includes face to face time and non-face to face time preparing to see the patient (eg, review of tests), obtaining and/or reviewing separately obtained history, documenting clinical information in the electronic or other health record, independently interpreting results and communicating results to the patient/family/caregiver, or care coordinator.

## 2023-06-05 ENCOUNTER — PATIENT MESSAGE (OUTPATIENT)
Dept: ADMINISTRATIVE | Facility: HOSPITAL | Age: 48
End: 2023-06-05
Payer: MEDICARE

## 2023-06-10 ENCOUNTER — PATIENT MESSAGE (OUTPATIENT)
Dept: FAMILY MEDICINE | Facility: CLINIC | Age: 48
End: 2023-06-10
Payer: MEDICARE

## 2023-06-10 DIAGNOSIS — F11.20 CONTINUOUS OPIOID DEPENDENCE: Chronic | ICD-10-CM

## 2023-06-12 ENCOUNTER — PATIENT MESSAGE (OUTPATIENT)
Dept: FAMILY MEDICINE | Facility: CLINIC | Age: 48
End: 2023-06-12
Payer: MEDICARE

## 2023-06-12 RX ORDER — HYDROCODONE BITARTRATE AND ACETAMINOPHEN 7.5; 325 MG/1; MG/1
1 TABLET ORAL EVERY 6 HOURS PRN
Qty: 120 TABLET | Refills: 0 | Status: SHIPPED | OUTPATIENT
Start: 2023-06-12 | End: 2023-08-30 | Stop reason: SDUPTHER

## 2023-06-15 ENCOUNTER — OFFICE VISIT (OUTPATIENT)
Dept: SURGERY | Facility: CLINIC | Age: 48
End: 2023-06-15
Payer: MEDICARE

## 2023-06-15 VITALS — TEMPERATURE: 98 F | HEART RATE: 82 BPM | SYSTOLIC BLOOD PRESSURE: 145 MMHG | DIASTOLIC BLOOD PRESSURE: 81 MMHG

## 2023-06-15 DIAGNOSIS — R93.89 ABNORMAL CT SCAN: ICD-10-CM

## 2023-06-15 DIAGNOSIS — L89.159 PRESSURE INJURY OF SKIN OF SACRAL REGION, UNSPECIFIED INJURY STAGE: ICD-10-CM

## 2023-06-15 PROCEDURE — 99213 OFFICE O/P EST LOW 20 MIN: CPT | Mod: PBBFAC,PN | Performed by: SURGERY

## 2023-06-15 PROCEDURE — 99024 POSTOP FOLLOW-UP VISIT: CPT | Mod: POP,,, | Performed by: SURGERY

## 2023-06-15 PROCEDURE — 99999 PR PBB SHADOW E&M-EST. PATIENT-LVL III: CPT | Mod: PBBFAC,,, | Performed by: SURGERY

## 2023-06-15 NOTE — PROGRESS NOTES
Subjective:       Patient ID: Briana Uriarte is a 49 y.o. female.    Chief Complaint: Other and Consult      HPI:  Consult for pressure sacral pressure wound. Air seen in the subq verna sacral tissue on recent CT. She has spina bifida and is paraplegic. Has chronic pressure wounds. Wounds seen and treated by wound care.      Past Medical History:   Diagnosis Date    Chronic hypercapnic respiratory failure     Chronic kidney disease     kidney stones    Diabetes mellitus, type 2     Encounter for blood transfusion     Esotropia     GERD (gastroesophageal reflux disease)     Hydrocephalus     Hypertension     Morbid obesity     Obesity hypoventilation syndrome     On home oxygen therapy     Paralysis     Psoriasis     Scoliosis     Spinal bifida, closed      Past Surgical History:   Procedure Laterality Date    BRAIN SURGERY       shunt revision    CHOLECYSTECTOMY      CYSTOSCOPY W/ LASER LITHOTRIPSY      CYSTOURETEROSCOPY, WITH HOLMIUM LASER LITHOTRIPSY OF URETERAL CALCULUS AND STENT INSERTION Right 9/15/2023    Procedure: CYSTOURETEROSCOPY, WITH HOLMIUM LASER LITHOTRIPSY OF URETERAL CALCULUS AND STENT INSERTION;  Surgeon: Siobhan Phillips MD;  Location: [facility];  Service: Urology;  Laterality: Right;    REPAIR OF INCARCERATED UMBILICAL HERNIA N/A 3/8/2019    Procedure: REPAIR, HERNIA, UMBILICAL, INCARCERATED, AGE 5 YEARS OR OLDER;  Surgeon: Ziyad Hancock MD;  Location: [facility] OR;  Service: General;  Laterality: N/A;    SPINE SURGERY       Review of patient's allergies indicates:   Allergen Reactions    Latex Swelling    Adhesive Blisters     tegaderm    Cefepime      HIVES     Medication List with Changes/Refills   New Medications    CATHETER 20 FR MISC    1 Application by Misc.(Non-Drug; Combo Route) route once daily.   Current Medications    MULTIVITAMIN WITH MINERALS TABLET    Take 1 tablet by mouth once daily.   Changed and/or Refilled Medications    Modified Medication Previous Medication     FUROSEMIDE (LASIX) 20 MG TABLET furosemide (LASIX) 20 MG tablet       Take 1 tablet (20 mg total) by mouth daily as needed.    Take 1 tablet (20 mg total) by mouth daily as needed.    HYDROCODONE-ACETAMINOPHEN (NORCO) 5-325 MG PER TABLET HYDROcodone-acetaminophen (NORCO) 5-325 mg per tablet       Take 1 tablet by mouth every 8 (eight) hours as needed for Pain. Do not fill until 5/20/25    Take 1 tablet by mouth every 8 (eight) hours as needed for Pain. Do not fill until 2/20/25    HYDROCODONE-ACETAMINOPHEN (NORCO) 5-325 MG PER TABLET HYDROcodone-acetaminophen (NORCO) 5-325 mg per tablet       Take 1 tablet by mouth every 8 (eight) hours as needed for Pain. Do not fill until 4/20/25    Take 1 tablet by mouth every 8 (eight) hours as needed for Pain. Do not fill until 1/20/25    HYDROCODONE-ACETAMINOPHEN (NORCO) 5-325 MG PER TABLET HYDROcodone-acetaminophen (NORCO) 5-325 mg per tablet       Take 1 tablet by mouth every 8 (eight) hours as needed for Pain. Do not fill unti 3/20/25    Take 1 tablet by mouth every 8 (eight) hours as needed for Pain. Do not fill unti 12/20/24    METOPROLOL SUCCINATE (TOPROL-XL) 50 MG 24 HR TABLET metoprolol succinate (TOPROL-XL) 50 MG 24 hr tablet       Take 1 tablet (50 mg total) by mouth once daily.    TAKE 1 TABLET(50 MG) BY MOUTH EVERY DAY    SILVER SULFADIAZINE 1% (SILVADENE) 1 % CREAM silver sulfADIAZINE 1% (SILVADENE) 1 % cream       Apply topically 2 (two) times daily.    Apply topically 2 (two) times daily.   Discontinued Medications    IKSVD-NIHH-HXVCO-COLLAG-MV-MIN (SULY, WITH COLLAGEN,) 7-7-1.5 GRAM PWPK    Take by mouth.    ASCORBIC ACID, VITAMIN C, (VITAMIN C) 500 MG TABLET    Take 500 mg by mouth once daily.    FUROSEMIDE (LASIX) 20 MG TABLET    Alternate 20 mg and 40 mg po qd qod    HYDROCODONE-ACETAMINOPHEN (NORCO) 7.5-325 MG PER TABLET    Take 1 tablet by mouth every 6 (six) hours as needed for Pain. Do not fill until 8/12/23    HYDROCODONE-ACETAMINOPHEN (NORCO) 7.5-325  MG PER TABLET    Take 1 tablet by mouth every 6 (six) hours as needed for Pain. Do not fill until 7/12/23    HYDROCODONE-ACETAMINOPHEN (NORCO) 7.5-325 MG PER TABLET    Take 1 tablet by mouth every 6 (six) hours as needed for Pain.    METOPROLOL SUCCINATE (TOPROL-XL) 50 MG 24 HR TABLET    Take 1 tablet (50 mg total) by mouth once daily.    SILVER SULFADIAZINE 1% (SILVADENE) 1 % CREAM    Apply topically.     Family History   Problem Relation Name Age of Onset    Cancer Mother      Glaucoma Father      Psoriasis Maternal Uncle      Macular degeneration Maternal Grandmother      Heart disease Maternal Grandmother      Cancer Maternal Grandmother      Glaucoma Paternal Grandfather      Melanoma Neg Hx      Lupus Neg Hx      Eczema Neg Hx       Social History     Socioeconomic History    Marital status: Single   Tobacco Use    Smoking status: Never    Smokeless tobacco: Never   Substance and Sexual Activity    Alcohol use: No    Drug use: Yes     Types: Hydrocodone    Sexual activity: Not Currently     Social Drivers of Health     Financial Resource Strain: Patient Declined (3/14/2025)    Overall Financial Resource Strain (CARDIA)     Difficulty of Paying Living Expenses: Patient declined   Food Insecurity: No Food Insecurity (3/14/2025)    Hunger Vital Sign     Worried About Running Out of Food in the Last Year: Never true     Ran Out of Food in the Last Year: Never true   Transportation Needs: Unknown (3/14/2025)    PRAPARE - Transportation     Lack of Transportation (Medical): No     Lack of Transportation (Non-Medical): Patient declined   Physical Activity: Unknown (3/14/2025)    Exercise Vital Sign     Days of Exercise per Week: 0 days     Minutes of Exercise per Session: Patient declined   Stress: Patient Declined (3/14/2025)    [employer] - Occupational Stress Questionnaire     Feeling of Stress : Patient declined   Housing Stability: Unknown (3/14/2025)    Housing Stability Vital Sign      Unable to Pay for Housing in the Last Year: Patient declined     Number of Times Moved in the Last Year: 0     Homeless in the Last Year: No         Review of Systems    Objective:      Physical Exam    Assessment/Plan:   Pressure injury of skin of sacral region, unspecified injury stage  -     Ambulatory referral/consult to General Surgery    Abnormal CT scan  -     Ambulatory referral/consult to General Surgery      No new area of induration. The subq air most likely coming form the large open wound. Continue current care.

## 2023-08-04 NOTE — TELEPHONE ENCOUNTER
Last OV 7-9-2019    Patient requests refill on   calcipotriene (DOVONOX) 0.005 % cream    Please advise   Hemostasis: Drysol

## 2023-08-21 ENCOUNTER — CLINICAL SUPPORT (OUTPATIENT)
Dept: UROLOGY | Facility: CLINIC | Age: 48
End: 2023-08-21
Payer: MEDICARE

## 2023-08-21 ENCOUNTER — HOSPITAL ENCOUNTER (OUTPATIENT)
Dept: RADIOLOGY | Facility: HOSPITAL | Age: 48
Discharge: HOME | End: 2023-08-21
Attending: STUDENT IN AN ORGANIZED HEALTH CARE EDUCATION/TRAINING PROGRAM
Payer: MEDICARE

## 2023-08-21 DIAGNOSIS — N22 CALCULUS OF URINARY TRACT IN DISEASES CLASSIFIED ELSEWHERE: ICD-10-CM

## 2023-08-21 DIAGNOSIS — N31.9 NEUROMUSCULAR DYSFUNCTION OF BLADDER, UNSPECIFIED: ICD-10-CM

## 2023-08-21 LAB
CREAT SERPL-MCNC: 1 MG/DL (ref 0.5–1.4)
SAMPLE: NORMAL

## 2023-08-21 PROCEDURE — 74178 CT UROGRAM ABD PELVIS W WO: ICD-10-PCS | Mod: 26,,, | Performed by: RADIOLOGY

## 2023-08-21 PROCEDURE — 72194 CT CYSTOGRAPHY W/O CONTRAST: ICD-10-PCS | Mod: 26,59,, | Performed by: RADIOLOGY

## 2023-08-21 PROCEDURE — 88112 CYTOPATH CELL ENHANCE TECH: CPT | Mod: 26,,, | Performed by: PATHOLOGY

## 2023-08-21 PROCEDURE — 88112 PR  CYTOPATH, CELL ENHANCE TECH: ICD-10-PCS | Mod: 26,,, | Performed by: PATHOLOGY

## 2023-08-21 PROCEDURE — 72194 CT PELVIS W/O & W/DYE: CPT | Mod: TC,59

## 2023-08-21 PROCEDURE — 72194 CT PELVIS W/O & W/DYE: CPT | Mod: 26,59,, | Performed by: RADIOLOGY

## 2023-08-21 PROCEDURE — 88112 CYTOPATH CELL ENHANCE TECH: CPT | Performed by: PATHOLOGY

## 2023-08-21 PROCEDURE — 99499 UNLISTED E&M SERVICE: CPT | Mod: ,,, | Performed by: STUDENT IN AN ORGANIZED HEALTH CARE EDUCATION/TRAINING PROGRAM

## 2023-08-21 PROCEDURE — 74178 CT ABD&PLV WO CNTR FLWD CNTR: CPT | Mod: 26,,, | Performed by: RADIOLOGY

## 2023-08-21 PROCEDURE — 25500020 PHARM REV CODE 255

## 2023-08-21 PROCEDURE — 99499 NO LOS: ICD-10-PCS | Mod: ,,, | Performed by: STUDENT IN AN ORGANIZED HEALTH CARE EDUCATION/TRAINING PROGRAM

## 2023-08-21 PROCEDURE — 74178 CT ABD&PLV WO CNTR FLWD CNTR: CPT | Mod: TC

## 2023-08-21 RX ADMIN — IOHEXOL 125 ML: 350 INJECTION, SOLUTION INTRAVENOUS at 10:08

## 2023-08-21 NOTE — PROGRESS NOTES
Patient arrived to clinic to give urine sample from S/P tube. Tubing clamped, 20 ml yellow urine collected from catheter, specimen prepared for lab .

## 2023-08-22 LAB
FINAL PATHOLOGIC DIAGNOSIS: NORMAL
Lab: NORMAL

## 2023-08-29 NOTE — PROGRESS NOTES
[facility] Note    PCP: Jose De Jesus Curran MD    Chief Complaint: neurogenic bladder    SUBJECTIVE:       History of Present Illness:  Briana Uriarte is a 47 y.o. female who presents to clinic for neurogenic bladder. She is Established  to our clinic.     Her CT Urogram showed a concerning filling defect in the lower pole of the right kidney, 2.3 cm. No signs of extravasation on this or cystogram.     Cytology is negative.     She is doing well today.     5/10/23  No recent imaging  GFR 1/27/23: >60  No documented UTIs    She has continued issues with sacral decubitus ulcer with open wound. She has a second open wound near the perineum. Mom states that this drains clear fluid that she is concerned may be urine. Cultures from this have grown Staph, proteus, E coli.   She was following with wound care, has not seen ID.   No issues with SPT. Drains clear, but darker/orange in the AM. Has some sediment.     6/28/22  Doing well from a urologic standpoint.   Mcdonald being changed every 3 weeks. No issues with exchanges.  Has been battling a sacral decubitus ulcer for which she is following with wound care.     Had a CT in september which showed a blown out non-function left kidney. No hydro in the right however small volume of renal stones.     6/9/21  Previous patient of Dr. Smith, last seen March 2020.  Patient has a hx of spina bifida, hydrocephalus, paraplegia and neurogenic bladder. She is currently managed with an SPT. Changed by her mother every 3 weeks.   She has a multi-cystic dystrophic nonfunctional left kidney. She does have some large right sided renal stones that appear to be intraparenchymal as of CT from July 2020.    Not having issues with frequent symptomatic UTIs.   Has had SPT in place for 12 years.   Had a PCNL on the left side >10 years ago  No gross hematuria.     Recently admitted to hospital with respiratory issues.     Last urine culture: multiple documented  "UTIs, however unclear if these were symptomatic     Lab Results   Component Value Date    CREATININE 0.8 01/27/2023     Family  hx: no bladder or kidney cancer   Hx of spina bifida, paraplegia, HTN, DM, obesity, Pickwickian syndrome    Past medical, family, and social history reviewed as documented in chart with pertinent positive medical, family, and social history detailed in HPI.    Review of patient's allergies indicates:   Allergen Reactions    Cefepime      HIVES    Latex Swelling       Past Medical History:   Diagnosis Date    Chronic hypercapnic respiratory failure     Chronic kidney disease     kidney stones    Diabetes mellitus, type 2     Esotropia     GERD (gastroesophageal reflux disease)     Hydrocephalus     Hypertension     Morbid obesity     Obesity hypoventilation syndrome     Paralysis     Scoliosis     Spinal bifida, closed      Past Surgical History:   Procedure Laterality Date    BRAIN SURGERY       shunt revision    CHOLECYSTECTOMY      CYSTOSCOPY W/ LASER LITHOTRIPSY      REPAIR OF INCARCERATED UMBILICAL HERNIA N/A 3/8/2019    Procedure: REPAIR, HERNIA, UMBILICAL, INCARCERATED, AGE 5 YEARS OR OLDER;  Surgeon: Ziyad Hancock MD;  [facility]: [facility];  Service: General;  Laterality: N/A;    SPINE SURGERY       Family History   Problem Relation Age of Onset    Cancer Mother     Glaucoma Father     Heart disease Maternal Grandmother     Cancer Maternal Grandmother     Glaucoma Paternal Grandfather     Psoriasis Maternal Uncle     Melanoma Neg Hx     Lupus Neg Hx     Eczema Neg Hx      Social History     Tobacco Use    Smoking status: Never    Smokeless tobacco: Never   Substance Use Topics    Alcohol use: No    Drug use: Yes     Types: Hydrocodone        Review of Systems   Unable to perform ROS    OBJECTIVE:     Anticoagulation:  2.5 mg Eliquis     Estimated body mass index is 57.56 kg/m² as calculated from the following:    Height as of this encounter: 4' 11" (1.499 m).    Weight as of " this encounter: 129.3 kg (285 lb).    Vital Signs (Most Recent)       Physical Exam  Constitutional:       General: She is not in acute distress.     Appearance: She is obese. She is not ill-appearing, toxic-appearing or diaphoretic.      Comments: In wheelchair   HENT:      Head: Normocephalic and atraumatic.   Cardiovascular:      Rate and Rhythm: Normal rate and regular rhythm.   Pulmonary:      Effort: Pulmonary effort is normal. No respiratory distress.   Genitourinary:     Comments: Urine draining clear yellow   Skin:     General: Skin is warm and dry.      Coloration: Skin is not jaundiced.   Neurological:      Mental Status: She is alert. Mental status is at baseline.         BMP  Lab Results   Component Value Date     01/27/2023    K 4.5 01/27/2023     01/27/2023    CO2 22 (L) 01/27/2023    BUN 15 01/27/2023    CREATININE 0.8 01/27/2023    CALCIUM 9.0 01/27/2023    ANIONGAP 12 01/27/2023    ESTGFRAFRICA >60.0 07/18/2022    EGFRNONAA >60.0 07/18/2022       Lab Results   Component Value Date    WBC 7.09 01/27/2023    HGB 11.4 (L) 01/27/2023    HCT 35.9 (L) 01/27/2023    MCV 77 (L) 01/27/2023     01/27/2023       Imaging:  Per HPI    ASSESSMENT     1. Neurogenic bladder    2. Spina bifida manifesta    3. Pickwickian syndrome    4. Chronic respiratory failure with hypoxia and hypercapnia    5. Morbid obesity with BMI of 50.0-59.9, adult        PLAN:     - Imaging reviewed with patient and parents  - Will need ureteroscopy to evaluate filling defect of the left kidney as this is concerning for a potential malignancy   - Urine today sent for culture  - Her SPT will be changed next week  - Scheduled for procedure 9/15/23    Siobhan Phillips MD

## 2023-08-29 NOTE — H&P (VIEW-ONLY)
[facility] Note    PCP: Jose De Jesus Curran MD    Chief Complaint: neurogenic bladder    SUBJECTIVE:       History of Present Illness:  Briana Uriarte is a 47 y.o. female who presents to clinic for neurogenic bladder. She is Established  to our clinic.     Her CT Urogram showed a concerning filling defect in the lower pole of the right kidney, 2.3 cm. No signs of extravasation on this or cystogram.     Cytology is negative.     She is doing well today.     5/10/23  No recent imaging  GFR 1/27/23: >60  No documented UTIs    She has continued issues with sacral decubitus ulcer with open wound. She has a second open wound near the perineum. Mom states that this drains clear fluid that she is concerned may be urine. Cultures from this have grown Staph, proteus, E coli.   She was following with wound care, has not seen ID.   No issues with SPT. Drains clear, but darker/orange in the AM. Has some sediment.     6/28/22  Doing well from a urologic standpoint.   Mcdonald being changed every 3 weeks. No issues with exchanges.  Has been battling a sacral decubitus ulcer for which she is following with wound care.     Had a CT in september which showed a blown out non-function left kidney. No hydro in the right however small volume of renal stones.     6/9/21  Previous patient of Dr. Smith, last seen March 2020.  Patient has a hx of spina bifida, hydrocephalus, paraplegia and neurogenic bladder. She is currently managed with an SPT. Changed by her mother every 3 weeks.   She has a multi-cystic dystrophic nonfunctional left kidney. She does have some large right sided renal stones that appear to be intraparenchymal as of CT from July 2020.    Not having issues with frequent symptomatic UTIs.   Has had SPT in place for 12 years.   Had a PCNL on the left side >10 years ago  No gross hematuria.     Recently admitted to hospital with respiratory issues.     Last urine culture: multiple documented  "UTIs, however unclear if these were symptomatic     Lab Results   Component Value Date    CREATININE 0.8 01/27/2023     Family  hx: no bladder or kidney cancer   Hx of spina bifida, paraplegia, HTN, DM, obesity, Pickwickian syndrome    Past medical, family, and social history reviewed as documented in chart with pertinent positive medical, family, and social history detailed in HPI.    Review of patient's allergies indicates:   Allergen Reactions    Cefepime      HIVES    Latex Swelling       Past Medical History:   Diagnosis Date    Chronic hypercapnic respiratory failure     Chronic kidney disease     kidney stones    Diabetes mellitus, type 2     Esotropia     GERD (gastroesophageal reflux disease)     Hydrocephalus     Hypertension     Morbid obesity     Obesity hypoventilation syndrome     Paralysis     Scoliosis     Spinal bifida, closed      Past Surgical History:   Procedure Laterality Date    BRAIN SURGERY       shunt revision    CHOLECYSTECTOMY      CYSTOSCOPY W/ LASER LITHOTRIPSY      REPAIR OF INCARCERATED UMBILICAL HERNIA N/A 3/8/2019    Procedure: REPAIR, HERNIA, UMBILICAL, INCARCERATED, AGE 5 YEARS OR OLDER;  Surgeon: Ziyad Hancock MD;  Location: [facility];  Service: General;  Laterality: N/A;    SPINE SURGERY       Family History   Problem Relation Age of Onset    Cancer Mother     Glaucoma Father     Heart disease Maternal Grandmother     Cancer Maternal Grandmother     Glaucoma Paternal Grandfather     Psoriasis Maternal Uncle     Melanoma Neg Hx     Lupus Neg Hx     Eczema Neg Hx      Social History     Tobacco Use    Smoking status: Never    Smokeless tobacco: Never   Substance Use Topics    Alcohol use: No    Drug use: Yes     Types: Hydrocodone        Review of Systems   Unable to perform ROS    OBJECTIVE:     Anticoagulation:  2.5 mg Eliquis     Estimated body mass index is 57.56 kg/m² as calculated from the following:    Height as of this encounter: 4' 11" (1.499 m).    Weight as of " this encounter: 129.3 kg (285 lb).    Vital Signs (Most Recent)       Physical Exam  Constitutional:       General: She is not in acute distress.     Appearance: She is obese. She is not ill-appearing, toxic-appearing or diaphoretic.      Comments: In wheelchair   HENT:      Head: Normocephalic and atraumatic.   Cardiovascular:      Rate and Rhythm: Normal rate and regular rhythm.   Pulmonary:      Effort: Pulmonary effort is normal. No respiratory distress.   Genitourinary:     Comments: Urine draining clear yellow   Skin:     General: Skin is warm and dry.      Coloration: Skin is not jaundiced.   Neurological:      Mental Status: She is alert. Mental status is at baseline.         BMP  Lab Results   Component Value Date     01/27/2023    K 4.5 01/27/2023     01/27/2023    CO2 22 (L) 01/27/2023    BUN 15 01/27/2023    CREATININE 0.8 01/27/2023    CALCIUM 9.0 01/27/2023    ANIONGAP 12 01/27/2023    ESTGFRAFRICA >60.0 07/18/2022    EGFRNONAA >60.0 07/18/2022       Lab Results   Component Value Date    WBC 7.09 01/27/2023    HGB 11.4 (L) 01/27/2023    HCT 35.9 (L) 01/27/2023    MCV 77 (L) 01/27/2023     01/27/2023       Imaging:  Per HPI    ASSESSMENT     1. Neurogenic bladder    2. Spina bifida manifesta    3. Pickwickian syndrome    4. Chronic respiratory failure with hypoxia and hypercapnia    5. Morbid obesity with BMI of 50.0-59.9, adult        PLAN:     - Imaging reviewed with patient and parents  - Will need ureteroscopy to evaluate filling defect of the left kidney as this is concerning for a potential malignancy   - Urine today sent for culture  - Her SPT will be changed next week  - Scheduled for procedure 9/15/23    Siobhan Phillips MD

## 2023-08-30 ENCOUNTER — OFFICE VISIT (OUTPATIENT)
Dept: FAMILY MEDICINE | Facility: CLINIC | Age: 48
End: 2023-08-30
Payer: MEDICARE

## 2023-08-30 ENCOUNTER — OFFICE VISIT (OUTPATIENT)
Dept: UROLOGY | Facility: CLINIC | Age: 48
End: 2023-08-30
Payer: MEDICARE

## 2023-08-30 VITALS — BODY MASS INDEX: 57.45 KG/M2 | HEIGHT: 59 IN | WEIGHT: 285 LBS

## 2023-08-30 DIAGNOSIS — E66.01 MORBID OBESITY WITH BMI OF 50.0-59.9, ADULT: ICD-10-CM

## 2023-08-30 DIAGNOSIS — E66.2 PICKWICKIAN SYNDROME: ICD-10-CM

## 2023-08-30 DIAGNOSIS — F11.20 CONTINUOUS OPIOID DEPENDENCE: Chronic | ICD-10-CM

## 2023-08-30 DIAGNOSIS — Q05.9 SPINA BIFIDA MANIFESTA: ICD-10-CM

## 2023-08-30 DIAGNOSIS — N31.9 NEUROGENIC BLADDER: Primary | ICD-10-CM

## 2023-08-30 DIAGNOSIS — J96.12 CHRONIC RESPIRATORY FAILURE WITH HYPOXIA AND HYPERCAPNIA: ICD-10-CM

## 2023-08-30 DIAGNOSIS — J96.11 CHRONIC RESPIRATORY FAILURE WITH HYPOXIA AND HYPERCAPNIA: ICD-10-CM

## 2023-08-30 PROCEDURE — 87086 URINE CULTURE/COLONY COUNT: CPT | Performed by: STUDENT IN AN ORGANIZED HEALTH CARE EDUCATION/TRAINING PROGRAM

## 2023-08-30 PROCEDURE — 99214 OFFICE O/P EST MOD 30 MIN: CPT | Mod: 95,,, | Performed by: FAMILY MEDICINE

## 2023-08-30 PROCEDURE — 87186 SC STD MICRODIL/AGAR DIL: CPT | Performed by: STUDENT IN AN ORGANIZED HEALTH CARE EDUCATION/TRAINING PROGRAM

## 2023-08-30 PROCEDURE — 99214 PR OFFICE/OUTPT VISIT, EST, LEVL IV, 30-39 MIN: ICD-10-PCS | Mod: 95,,, | Performed by: FAMILY MEDICINE

## 2023-08-30 PROCEDURE — 99214 OFFICE O/P EST MOD 30 MIN: CPT | Mod: S$PBB,,, | Performed by: STUDENT IN AN ORGANIZED HEALTH CARE EDUCATION/TRAINING PROGRAM

## 2023-08-30 PROCEDURE — 87088 URINE BACTERIA CULTURE: CPT | Performed by: STUDENT IN AN ORGANIZED HEALTH CARE EDUCATION/TRAINING PROGRAM

## 2023-08-30 PROCEDURE — 87077 CULTURE AEROBIC IDENTIFY: CPT | Performed by: STUDENT IN AN ORGANIZED HEALTH CARE EDUCATION/TRAINING PROGRAM

## 2023-08-30 PROCEDURE — 99999 PR PBB SHADOW E&M-EST. PATIENT-LVL III: CPT | Mod: PBBFAC,,, | Performed by: STUDENT IN AN ORGANIZED HEALTH CARE EDUCATION/TRAINING PROGRAM

## 2023-08-30 PROCEDURE — 99999 PR PBB SHADOW E&M-EST. PATIENT-LVL III: ICD-10-PCS | Mod: PBBFAC,,, | Performed by: STUDENT IN AN ORGANIZED HEALTH CARE EDUCATION/TRAINING PROGRAM

## 2023-08-30 PROCEDURE — 99214 PR OFFICE/OUTPT VISIT, EST, LEVL IV, 30-39 MIN: ICD-10-PCS | Mod: S$PBB,,, | Performed by: STUDENT IN AN ORGANIZED HEALTH CARE EDUCATION/TRAINING PROGRAM

## 2023-08-30 PROCEDURE — 99213 OFFICE O/P EST LOW 20 MIN: CPT | Mod: PBBFAC,PO | Performed by: STUDENT IN AN ORGANIZED HEALTH CARE EDUCATION/TRAINING PROGRAM

## 2023-08-30 RX ORDER — HYDROCODONE BITARTRATE AND ACETAMINOPHEN 7.5; 325 MG/1; MG/1
1 TABLET ORAL EVERY 6 HOURS PRN
Qty: 120 TABLET | Refills: 0 | Status: SHIPPED | OUTPATIENT
Start: 2023-08-30 | End: 2023-12-06 | Stop reason: SDUPTHER

## 2023-08-30 RX ORDER — CIPROFLOXACIN 2 MG/ML
400 INJECTION, SOLUTION INTRAVENOUS
Status: CANCELLED | OUTPATIENT
Start: 2023-08-30

## 2023-08-30 NOTE — PROGRESS NOTES
The patient location is:  [place]  The chief complaint leading to consultation is: Checkup  Visit type: Virtual visit with synchronous audio and video  Total time spent with patient:  Less than 30 minutes  Each patient to whom he or she provides medical services by telemedicine is:  (1) informed of the relationship between the physician and patient and the respective role of any other health care provider with respect to management of the patient; and (2) notified that he or she may decline to receive medical services by telemedicine and may withdraw from such care at any time. Vital signs recorded were provided by the patient.    Notes:  See below    Subjective:   Patient ID: Briana Uriarte is a 47 y.o. female     Chief Complaint: Checkup    Here for checkup      Review of Systems   Respiratory:  Negative for shortness of breath.    Cardiovascular:  Negative for chest pain.   Gastrointestinal:  Negative for abdominal pain.   Genitourinary:  Negative for dysuria.     Past Medical History:   Diagnosis Date    Chronic hypercapnic respiratory failure     Chronic kidney disease     kidney stones    Diabetes mellitus, type 2     Esotropia     GERD (gastroesophageal reflux disease)     Hydrocephalus     Hypertension     Morbid obesity     Obesity hypoventilation syndrome     Paralysis     Scoliosis     Spinal bifida, closed      Past Surgical History:   Procedure Laterality Date    BRAIN SURGERY       shunt revision    CHOLECYSTECTOMY      CYSTOSCOPY W/ LASER LITHOTRIPSY      REPAIR OF INCARCERATED UMBILICAL HERNIA N/A 3/8/2019    Procedure: REPAIR, HERNIA, UMBILICAL, INCARCERATED, AGE 5 YEARS OR OLDER;  Surgeon: Ziyad Hancock MD;  Location: [facility];  Service: General;  Laterality: N/A;    SPINE SURGERY       Objective:   There were no vitals filed for this visit.  There is no height or weight on file to calculate BMI.  Physical Exam  Vitals and nursing note reviewed.   Constitutional:       Appearance:  She is well-developed.   HENT:      Head: Normocephalic and atraumatic.   Eyes:      General: No scleral icterus.     Conjunctiva/sclera: Conjunctivae normal.   Pulmonary:      Effort: Pulmonary effort is normal. No respiratory distress.   Musculoskeletal:         General: No deformity. Normal range of motion.      Cervical back: Normal range of motion and neck supple.   Skin:     Coloration: Skin is not pale.      Findings: No rash.   Neurological:      Mental Status: She is alert and oriented to person, place, and time.   Psychiatric:         Behavior: Behavior normal.         Thought Content: Thought content normal.         Judgment: Judgment normal.       Assessment:     1. Continuous opioid dependence- contract 3/21/19      Plan:   Continuous opioid dependence- contract 3/21/19  -     HYDROcodone-acetaminophen (NORCO) 7.5-325 mg per tablet; Take 1 tablet by mouth every 6 (six) hours as needed for Pain. Do not fill until 11/12/23  Dispense: 120 tablet; Refill: 0  -     HYDROcodone-acetaminophen (NORCO) 7.5-325 mg per tablet; Take 1 tablet by mouth every 6 (six) hours as needed for Pain. Do not fill until 10/12/23  Dispense: 120 tablet; Refill: 0  -     HYDROcodone-acetaminophen (NORCO) 7.5-325 mg per tablet; Take 1 tablet by mouth every 6 (six) hours as needed for Pain. Do not fill until 9/12/23  Dispense: 120 tablet; Refill: 0            Total time spent of Less than 30 minutes minutes on the day of the visit.This includes face to face time and preparing to see the patient, obtaining and reviewing separately obtained history, documenting clinical information in the electronic or other health record, independently interpreting results and communicating results to the patient/family/caregiver, or care coordinator.    Established patient with me has been instructed that must see me at least 1 time yearly (every 365 days) for refills of medications. Seeing other providers in this clinic is fine but expectation is  to see me yearly.    Jose De Jesus Curran MD  08/30/2023    Portions of this note have been dictated with M Jeanne.

## 2023-08-30 NOTE — PROGRESS NOTES
Electronically signed by: Siobhan Phillips MD on 08/30/23 1411 Status: Active   Ordering user: Siobhan Phillips MD 08/30/23 1411 Authorized by: Siobhan Phillips MD   Ordering mode: Standard   Frequency:  08/30/23 -     Diagnoses   Neurogenic bladder [N31.9]   Questionnaire    Question Answer   Procedure Ureteroscopy with Biopsy/Fulguration Comment - 9/15, right   Facility Name: [place]   Modified from: Procedure Order to Urology [[id number]   Outpatient hospital ,Please order BMP,CBC , UA, Urine culture, EKG if above 40 years old , Ancef 2 Gram( cipro 400 mg IV for PCN allergy)   ,Iv start, NPO, General anesthesia. Ureteroscopy with biopsy fulguration [id number] and [id number]

## 2023-08-31 ENCOUNTER — PATIENT MESSAGE (OUTPATIENT)
Dept: FAMILY MEDICINE | Facility: CLINIC | Age: 48
End: 2023-08-31
Payer: MEDICARE

## 2023-09-01 LAB — BACTERIA UR CULT: ABNORMAL

## 2023-09-02 RX ORDER — CIPROFLOXACIN 500 MG/1
500 TABLET ORAL EVERY 12 HOURS
Qty: 20 TABLET | Refills: 0 | Status: SHIPPED | OUTPATIENT
Start: 2023-09-02 | End: 2023-09-12

## 2023-09-05 ENCOUNTER — PATIENT MESSAGE (OUTPATIENT)
Dept: ADMINISTRATIVE | Facility: HOSPITAL | Age: 48
End: 2023-09-05
Payer: MEDICARE

## 2023-09-05 ENCOUNTER — TELEPHONE (OUTPATIENT)
Dept: UROLOGY | Facility: CLINIC | Age: 48
End: 2023-09-05
Payer: MEDICARE

## 2023-09-05 NOTE — TELEPHONE ENCOUNTER
----- Message from Fidel Arizmendi sent at 9/5/2023  7:59 AM CDT -----  Contact: pt at  [phone]  Type: Needs Medical Advice  Who Called:  pt  Best Call Back Number: [phone]  Additional Information: pt is calling the office returning a call back to the office.

## 2023-09-05 NOTE — TELEPHONE ENCOUNTER
Patient mother returned call, result given with advisement, she stated she has picked up the med, she verbally understood.

## 2023-09-13 NOTE — PRE-PROCEDURE INSTRUCTIONS
Phone pre-op was done.  Patient's mother  verbalized understanding of instructions and education.

## 2023-09-14 ENCOUNTER — ANESTHESIA EVENT (OUTPATIENT)
Dept: SURGERY | Facility: HOSPITAL | Age: 48
End: 2023-09-14
Payer: MEDICARE

## 2023-09-15 ENCOUNTER — ANESTHESIA (OUTPATIENT)
Dept: SURGERY | Facility: HOSPITAL | Age: 48
End: 2023-09-15
Payer: MEDICARE

## 2023-09-15 ENCOUNTER — HOSPITAL ENCOUNTER (OUTPATIENT)
Facility: HOSPITAL | Age: 48
Discharge: HOME | End: 2023-09-15
Attending: STUDENT IN AN ORGANIZED HEALTH CARE EDUCATION/TRAINING PROGRAM | Admitting: STUDENT IN AN ORGANIZED HEALTH CARE EDUCATION/TRAINING PROGRAM
Payer: MEDICARE

## 2023-09-15 DIAGNOSIS — Z01.818 PREOP TESTING: ICD-10-CM

## 2023-09-15 DIAGNOSIS — N31.9 NEUROGENIC BLADDER: Primary | ICD-10-CM

## 2023-09-15 DIAGNOSIS — Z01.818 PRE-OP TESTING: ICD-10-CM

## 2023-09-15 LAB
ANION GAP SERPL CALC-SCNC: 11 MMOL/L (ref 8–16)
B-HCG UR QL: NEGATIVE
BASOPHILS # BLD AUTO: 0.09 K/UL (ref 0–0.2)
BASOPHILS NFR BLD: 1.2 % (ref 0–1.9)
BUN SERPL-MCNC: 15 MG/DL (ref 6–20)
CALCIUM SERPL-MCNC: 9.4 MG/DL (ref 8.7–10.5)
CHLORIDE SERPL-SCNC: 107 MMOL/L (ref 95–110)
CO2 SERPL-SCNC: 23 MMOL/L (ref 23–29)
CREAT SERPL-MCNC: 0.7 MG/DL (ref 0.5–1.4)
CTP QC/QA: YES
DIFFERENTIAL METHOD: ABNORMAL
EOSINOPHIL # BLD AUTO: 0.3 K/UL (ref 0–0.5)
EOSINOPHIL NFR BLD: 3.5 % (ref 0–8)
ERYTHROCYTE [DISTWIDTH] IN BLOOD BY AUTOMATED COUNT: 18.8 % (ref 11.5–14.5)
EST. GFR  (NO RACE VARIABLE): >60 ML/MIN/1.73 M^2
GLUCOSE SERPL-MCNC: 87 MG/DL (ref 70–110)
HCT VFR BLD AUTO: 37.3 % (ref 37–48.5)
HGB BLD-MCNC: 11.2 G/DL (ref 12–16)
IMM GRANULOCYTES # BLD AUTO: 0.04 K/UL (ref 0–0.04)
IMM GRANULOCYTES NFR BLD AUTO: 0.5 % (ref 0–0.5)
LYMPHOCYTES # BLD AUTO: 1.8 K/UL (ref 1–4.8)
LYMPHOCYTES NFR BLD: 23.3 % (ref 18–48)
MCH RBC QN AUTO: 23.6 PG (ref 27–31)
MCHC RBC AUTO-ENTMCNC: 30 G/DL (ref 32–36)
MCV RBC AUTO: 79 FL (ref 82–98)
MONOCYTES # BLD AUTO: 0.4 K/UL (ref 0.3–1)
MONOCYTES NFR BLD: 5.5 % (ref 4–15)
NEUTROPHILS # BLD AUTO: 5 K/UL (ref 1.8–7.7)
NEUTROPHILS NFR BLD: 66 % (ref 38–73)
NRBC BLD-RTO: 0 /100 WBC
PLATELET # BLD AUTO: 337 K/UL (ref 150–450)
PMV BLD AUTO: 9.9 FL (ref 9.2–12.9)
POTASSIUM SERPL-SCNC: 3.7 MMOL/L (ref 3.5–5.1)
RBC # BLD AUTO: 4.74 M/UL (ref 4–5.4)
SODIUM SERPL-SCNC: 141 MMOL/L (ref 136–145)
WBC # BLD AUTO: 7.61 K/UL (ref 3.9–12.7)

## 2023-09-15 PROCEDURE — 74420 PR  X-RAY RETROGRADE PYELOGRAM: ICD-10-PCS | Mod: 26,,, | Performed by: STUDENT IN AN ORGANIZED HEALTH CARE EDUCATION/TRAINING PROGRAM

## 2023-09-15 PROCEDURE — 87086 URINE CULTURE/COLONY COUNT: CPT | Performed by: STUDENT IN AN ORGANIZED HEALTH CARE EDUCATION/TRAINING PROGRAM

## 2023-09-15 PROCEDURE — 93010 ELECTROCARDIOGRAM REPORT: CPT | Mod: ,,, | Performed by: GENERAL PRACTICE

## 2023-09-15 PROCEDURE — D9220A PRA ANESTHESIA: ICD-10-PCS | Mod: ANES,,, | Performed by: ANESTHESIOLOGY

## 2023-09-15 PROCEDURE — 80048 BASIC METABOLIC PNL TOTAL CA: CPT | Performed by: STUDENT IN AN ORGANIZED HEALTH CARE EDUCATION/TRAINING PROGRAM

## 2023-09-15 PROCEDURE — 36415 COLL VENOUS BLD VENIPUNCTURE: CPT | Performed by: STUDENT IN AN ORGANIZED HEALTH CARE EDUCATION/TRAINING PROGRAM

## 2023-09-15 PROCEDURE — 63600175 PHARM REV CODE 636 W HCPCS: Performed by: STUDENT IN AN ORGANIZED HEALTH CARE EDUCATION/TRAINING PROGRAM

## 2023-09-15 PROCEDURE — C2617 STENT, NON-COR, TEM W/O DEL: HCPCS | Performed by: STUDENT IN AN ORGANIZED HEALTH CARE EDUCATION/TRAINING PROGRAM

## 2023-09-15 PROCEDURE — 63600175 PHARM REV CODE 636 W HCPCS: Performed by: NURSE ANESTHETIST, CERTIFIED REGISTERED

## 2023-09-15 PROCEDURE — C1769 GUIDE WIRE: HCPCS | Performed by: STUDENT IN AN ORGANIZED HEALTH CARE EDUCATION/TRAINING PROGRAM

## 2023-09-15 PROCEDURE — D9220A PRA ANESTHESIA: Mod: CRNA,,, | Performed by: NURSE ANESTHETIST, CERTIFIED REGISTERED

## 2023-09-15 PROCEDURE — 36000707: Performed by: STUDENT IN AN ORGANIZED HEALTH CARE EDUCATION/TRAINING PROGRAM

## 2023-09-15 PROCEDURE — 81025 URINE PREGNANCY TEST: CPT | Performed by: ANESTHESIOLOGY

## 2023-09-15 PROCEDURE — 74420 UROGRAPHY RTRGR +-KUB: CPT | Mod: 26,,, | Performed by: STUDENT IN AN ORGANIZED HEALTH CARE EDUCATION/TRAINING PROGRAM

## 2023-09-15 PROCEDURE — D9220A PRA ANESTHESIA: Mod: ANES,,, | Performed by: ANESTHESIOLOGY

## 2023-09-15 PROCEDURE — 37000009 HC ANESTHESIA EA ADD 15 MINS: Performed by: STUDENT IN AN ORGANIZED HEALTH CARE EDUCATION/TRAINING PROGRAM

## 2023-09-15 PROCEDURE — 93005 ELECTROCARDIOGRAM TRACING: CPT

## 2023-09-15 PROCEDURE — 36000706: Performed by: STUDENT IN AN ORGANIZED HEALTH CARE EDUCATION/TRAINING PROGRAM

## 2023-09-15 PROCEDURE — 71000033 HC RECOVERY, INTIAL HOUR: Performed by: STUDENT IN AN ORGANIZED HEALTH CARE EDUCATION/TRAINING PROGRAM

## 2023-09-15 PROCEDURE — C1747 OPTIME ENDOSCOPE, SINGLE, URINARY TRACT: HCPCS | Performed by: STUDENT IN AN ORGANIZED HEALTH CARE EDUCATION/TRAINING PROGRAM

## 2023-09-15 PROCEDURE — 93010 EKG 12-LEAD: ICD-10-PCS | Mod: ,,, | Performed by: GENERAL PRACTICE

## 2023-09-15 PROCEDURE — 27201423 OPTIME MED/SURG SUP & DEVICES STERILE SUPPLY: Performed by: STUDENT IN AN ORGANIZED HEALTH CARE EDUCATION/TRAINING PROGRAM

## 2023-09-15 PROCEDURE — 82365 CALCULUS SPECTROSCOPY: CPT | Performed by: STUDENT IN AN ORGANIZED HEALTH CARE EDUCATION/TRAINING PROGRAM

## 2023-09-15 PROCEDURE — 52356 PR CYSTO/URETERO W/LITHOTRIPSY: ICD-10-PCS | Mod: RT,,, | Performed by: STUDENT IN AN ORGANIZED HEALTH CARE EDUCATION/TRAINING PROGRAM

## 2023-09-15 PROCEDURE — 37000008 HC ANESTHESIA 1ST 15 MINUTES: Performed by: STUDENT IN AN ORGANIZED HEALTH CARE EDUCATION/TRAINING PROGRAM

## 2023-09-15 PROCEDURE — C1894 INTRO/SHEATH, NON-LASER: HCPCS | Performed by: STUDENT IN AN ORGANIZED HEALTH CARE EDUCATION/TRAINING PROGRAM

## 2023-09-15 PROCEDURE — 25000003 PHARM REV CODE 250: Performed by: NURSE ANESTHETIST, CERTIFIED REGISTERED

## 2023-09-15 PROCEDURE — 25000003 PHARM REV CODE 250: Performed by: ANESTHESIOLOGY

## 2023-09-15 PROCEDURE — 25500020 PHARM REV CODE 255: Performed by: STUDENT IN AN ORGANIZED HEALTH CARE EDUCATION/TRAINING PROGRAM

## 2023-09-15 PROCEDURE — D9220A PRA ANESTHESIA: ICD-10-PCS | Mod: CRNA,,, | Performed by: NURSE ANESTHETIST, CERTIFIED REGISTERED

## 2023-09-15 PROCEDURE — 52356 CYSTO/URETERO W/LITHOTRIPSY: CPT | Mod: RT,,, | Performed by: STUDENT IN AN ORGANIZED HEALTH CARE EDUCATION/TRAINING PROGRAM

## 2023-09-15 PROCEDURE — 85025 COMPLETE CBC W/AUTO DIFF WBC: CPT | Performed by: STUDENT IN AN ORGANIZED HEALTH CARE EDUCATION/TRAINING PROGRAM

## 2023-09-15 PROCEDURE — 71000015 HC POSTOP RECOV 1ST HR: Performed by: STUDENT IN AN ORGANIZED HEALTH CARE EDUCATION/TRAINING PROGRAM

## 2023-09-15 DEVICE — STENT SET URETERAL 6FR 22CM: Type: IMPLANTABLE DEVICE | Site: URETER | Status: FUNCTIONAL

## 2023-09-15 RX ORDER — ONDANSETRON HYDROCHLORIDE 2 MG/ML
INJECTION, SOLUTION INTRAMUSCULAR; INTRAVENOUS
Status: DISCONTINUED | OUTPATIENT
Start: 2023-09-15 | End: 2023-09-15

## 2023-09-15 RX ORDER — SCOLOPAMINE TRANSDERMAL SYSTEM 1 MG/1
1 PATCH, EXTENDED RELEASE TRANSDERMAL
Status: DISCONTINUED | OUTPATIENT
Start: 2023-09-15 | End: 2023-09-15 | Stop reason: HOSPADM

## 2023-09-15 RX ORDER — MIDAZOLAM HYDROCHLORIDE 1 MG/ML
INJECTION INTRAMUSCULAR; INTRAVENOUS
Status: DISCONTINUED | OUTPATIENT
Start: 2023-09-15 | End: 2023-09-15

## 2023-09-15 RX ORDER — HYDROMORPHONE HYDROCHLORIDE 2 MG/ML
0.2 INJECTION, SOLUTION INTRAMUSCULAR; INTRAVENOUS; SUBCUTANEOUS EVERY 5 MIN PRN
Status: DISCONTINUED | OUTPATIENT
Start: 2023-09-15 | End: 2023-09-15 | Stop reason: HOSPADM

## 2023-09-15 RX ORDER — PROPOFOL 10 MG/ML
VIAL (ML) INTRAVENOUS
Status: DISCONTINUED | OUTPATIENT
Start: 2023-09-15 | End: 2023-09-15

## 2023-09-15 RX ORDER — CIPROFLOXACIN 500 MG/1
500 TABLET ORAL EVERY 12 HOURS
Qty: 10 TABLET | Refills: 0 | Status: SHIPPED | OUTPATIENT
Start: 2023-09-15 | End: 2023-09-20

## 2023-09-15 RX ORDER — LIDOCAINE HYDROCHLORIDE 20 MG/ML
INJECTION INTRAVENOUS
Status: DISCONTINUED | OUTPATIENT
Start: 2023-09-15 | End: 2023-09-15

## 2023-09-15 RX ORDER — FENTANYL CITRATE 50 UG/ML
25 INJECTION, SOLUTION INTRAMUSCULAR; INTRAVENOUS EVERY 5 MIN PRN
Status: DISCONTINUED | OUTPATIENT
Start: 2023-09-15 | End: 2023-09-15 | Stop reason: HOSPADM

## 2023-09-15 RX ORDER — ROCURONIUM BROMIDE 10 MG/ML
INJECTION, SOLUTION INTRAVENOUS
Status: DISCONTINUED | OUTPATIENT
Start: 2023-09-15 | End: 2023-09-15

## 2023-09-15 RX ORDER — PHENYLEPHRINE HYDROCHLORIDE 10 MG/ML
INJECTION INTRAVENOUS
Status: DISCONTINUED | OUTPATIENT
Start: 2023-09-15 | End: 2023-09-15

## 2023-09-15 RX ORDER — CIPROFLOXACIN 2 MG/ML
400 INJECTION, SOLUTION INTRAVENOUS
Status: COMPLETED | OUTPATIENT
Start: 2023-09-15 | End: 2023-09-15

## 2023-09-15 RX ORDER — OXYCODONE HYDROCHLORIDE 5 MG/1
5 TABLET ORAL
Status: DISCONTINUED | OUTPATIENT
Start: 2023-09-15 | End: 2023-09-15 | Stop reason: HOSPADM

## 2023-09-15 RX ORDER — HYDROCODONE BITARTRATE AND ACETAMINOPHEN 5; 325 MG/1; MG/1
1 TABLET ORAL EVERY 4 HOURS PRN
Status: DISCONTINUED | OUTPATIENT
Start: 2023-09-15 | End: 2023-09-15 | Stop reason: HOSPADM

## 2023-09-15 RX ORDER — LIDOCAINE HYDROCHLORIDE 10 MG/ML
1 INJECTION, SOLUTION EPIDURAL; INFILTRATION; INTRACAUDAL; PERINEURAL ONCE
Status: DISCONTINUED | OUTPATIENT
Start: 2023-09-15 | End: 2023-09-15 | Stop reason: HOSPADM

## 2023-09-15 RX ORDER — DEXAMETHASONE SODIUM PHOSPHATE 4 MG/ML
INJECTION, SOLUTION INTRA-ARTICULAR; INTRALESIONAL; INTRAMUSCULAR; INTRAVENOUS; SOFT TISSUE
Status: DISCONTINUED | OUTPATIENT
Start: 2023-09-15 | End: 2023-09-15

## 2023-09-15 RX ORDER — SUCCINYLCHOLINE CHLORIDE 20 MG/ML
INJECTION INTRAMUSCULAR; INTRAVENOUS
Status: DISCONTINUED | OUTPATIENT
Start: 2023-09-15 | End: 2023-09-15

## 2023-09-15 RX ORDER — FENTANYL CITRATE 50 UG/ML
INJECTION, SOLUTION INTRAMUSCULAR; INTRAVENOUS
Status: DISCONTINUED | OUTPATIENT
Start: 2023-09-15 | End: 2023-09-15

## 2023-09-15 RX ORDER — EPHEDRINE SULFATE 50 MG/ML
INJECTION, SOLUTION INTRAVENOUS
Status: DISCONTINUED | OUTPATIENT
Start: 2023-09-15 | End: 2023-09-15

## 2023-09-15 RX ORDER — PHENAZOPYRIDINE HYDROCHLORIDE 100 MG/1
100 TABLET, FILM COATED ORAL 3 TIMES DAILY PRN
Qty: 20 TABLET | Refills: 0 | Status: SHIPPED | OUTPATIENT
Start: 2023-09-15 | End: 2023-09-25

## 2023-09-15 RX ADMIN — OXYCODONE HYDROCHLORIDE 5 MG: 5 TABLET ORAL at 11:09

## 2023-09-15 RX ADMIN — MIDAZOLAM HYDROCHLORIDE 1 MG: 1 INJECTION, SOLUTION INTRAMUSCULAR; INTRAVENOUS at 09:09

## 2023-09-15 RX ADMIN — FENTANYL CITRATE 50 MCG: 50 INJECTION, SOLUTION INTRAMUSCULAR; INTRAVENOUS at 09:09

## 2023-09-15 RX ADMIN — FENTANYL CITRATE 50 MCG: 50 INJECTION, SOLUTION INTRAMUSCULAR; INTRAVENOUS at 10:09

## 2023-09-15 RX ADMIN — PROPOFOL 150 MG: 10 INJECTION, EMULSION INTRAVENOUS at 09:09

## 2023-09-15 RX ADMIN — PROPOFOL 50 MG: 10 INJECTION, EMULSION INTRAVENOUS at 10:09

## 2023-09-15 RX ADMIN — SCOPALAMINE 1 PATCH: 1 PATCH, EXTENDED RELEASE TRANSDERMAL at 08:09

## 2023-09-15 RX ADMIN — DEXAMETHASONE SODIUM PHOSPHATE 4 MG: 4 INJECTION, SOLUTION INTRA-ARTICULAR; INTRALESIONAL; INTRAMUSCULAR; INTRAVENOUS; SOFT TISSUE at 10:09

## 2023-09-15 RX ADMIN — SUCCINYLCHOLINE CHLORIDE 140 MG: 20 INJECTION, SOLUTION INTRAMUSCULAR; INTRAVENOUS at 09:09

## 2023-09-15 RX ADMIN — ROCURONIUM BROMIDE 5 MG: 10 INJECTION, SOLUTION INTRAVENOUS at 09:09

## 2023-09-15 RX ADMIN — ONDANSETRON 4 MG: 2 INJECTION INTRAMUSCULAR; INTRAVENOUS at 10:09

## 2023-09-15 RX ADMIN — PHENYLEPHRINE HYDROCHLORIDE 50 MCG: 10 INJECTION INTRAVENOUS at 10:09

## 2023-09-15 RX ADMIN — EPHEDRINE SULFATE 25 MG: 50 INJECTION, SOLUTION INTRAMUSCULAR; INTRAVENOUS; SUBCUTANEOUS at 10:09

## 2023-09-15 RX ADMIN — GLYCOPYRROLATE 0.2 MG: 0.2 INJECTION, SOLUTION INTRAMUSCULAR; INTRAVITREAL at 09:09

## 2023-09-15 RX ADMIN — SODIUM CHLORIDE, SODIUM GLUCONATE, SODIUM ACETATE, POTASSIUM CHLORIDE AND MAGNESIUM CHLORIDE: 526; 502; 368; 37; 30 INJECTION, SOLUTION INTRAVENOUS at 08:09

## 2023-09-15 RX ADMIN — LIDOCAINE HYDROCHLORIDE 100 MG: 20 INJECTION, SOLUTION INTRAVENOUS at 09:09

## 2023-09-15 RX ADMIN — CIPROFLOXACIN 400 MG: 2 INJECTION INTRAVENOUS at 10:09

## 2023-09-15 RX ADMIN — PHENYLEPHRINE HYDROCHLORIDE 100 MCG: 10 INJECTION INTRAVENOUS at 10:09

## 2023-09-15 NOTE — ANESTHESIA PREPROCEDURE EVALUATION
09/15/2023  Briana Uriarte is a 47 y.o., female.    Pre-op Assessment    I have reviewed the Patient Summary Reports.    I have reviewed the Nursing Notes. I have reviewed the NPO Status.   I have reviewed the Medications.     Review of Systems  Anesthesia Hx:  No problems with previous Anesthesia    Social:  Non-Smoker    Cardiovascular:   Hypertension ECG has been reviewed.   Normal sinus rhythm  Normal ECG  When compared with ECG of 25-JUN-2018 10:19,  No significant change was found   Pulmonary:   COPD, mild Asthma Pickwickian syndrome   Renal/:   Chronic Renal Disease, CRI    Hepatic/GI:   GERD    Neurological:  Nervous System Malformations, Hydrocephalus, Spina Bifida Chiari malformation type II  Paralysis     Endocrine:   Diabetes, poorly controlled, type 2  Morbid Obesity / BMI > 40      Physical Exam  General:  Morbid Obesity      Airway/Jaw/Neck:  Airway Findings: Mouth Opening: Normal   Tongue: Normal   General Airway Assessment: Adult Mallampati: II  Improves to I with phonation.  Jaw/Neck Findings:  Neck Findings:  Girth Increased     Eyes/Ears/Nose:  EYES/EARS/NOSE FINDINGS: Normal   Dental:  DENTAL FINDINGS: Normal   Chest/Lungs:  Chest/Lungs Findings: Clear to auscultation, Normal Respiratory Rate      Heart/Vascular:  Heart Findings: Rate: Normal  Rhythm: Regular Rhythm        Mental Status:  Mental Status Findings:  Cooperative, Alert and Oriented         Anesthesia Plan  Type of Anesthesia, risks & benefits discussed:  Anesthesia Type:  general    Patient's Preference:   Plan Factors:          Intra-op Monitoring Plan: standard ASA monitors  Intra-op Monitoring Plan Comments:   Post Op Pain Control Plan: IV/PO Opioids PRN and multimodal analgesia  Post Op Pain Control Plan Comments:     Induction:   IV and Inhalation  Beta Blocker:  Patient is on a Beta-Blocker and has received  one dose within the past 24 hours (No further documentation required).       Informed Consent: Informed consent signed with the Patient representative and all parties understand the risks and agree with anesthesia plan.  All questions answered.  Anesthesia consent signed with patient representative.  ASA Score: 4     Day of Surgery Review of History & Physical: I have interviewed and examined the patient. I have reviewed the patient's H&P dated:  There are no significant changes.            Ready For Surgery From Anesthesia Perspective.           Physical Exam  General: Morbid Obesity    Airway:  Mallampati: II / I  Mouth Opening: Normal  Tongue: Normal  Neck: Girth Increased    Chest/Lungs:  Clear to auscultation, Normal Respiratory Rate    Heart:  Rate: Normal  Rhythm: Regular Rhythm          Anesthesia Plan  Type of Anesthesia, risks & benefits discussed:    Anesthesia Type: general  Intra-op Monitoring Plan: standard ASA monitors  Post Op Pain Control Plan: IV/PO Opioids PRN and multimodal analgesia  Induction:  IV and Inhalation  Informed Consent: Informed consent signed with the Patient representative and all parties understand the risks and agree with anesthesia plan.  All questions answered.   ASA Score: 4  Day of Surgery Review of History & Physical: I have interviewed and examined the patient. I have reviewed the patient's H&P dated:     Ready For Surgery From Anesthesia Perspective.       .

## 2023-09-15 NOTE — ANESTHESIA POSTPROCEDURE EVALUATION
Anesthesia Post Evaluation    Patient: Briana Uriarte    Procedure(s) Performed: Procedure(s) (LRB):  CYSTOURETEROSCOPY, WITH HOLMIUM LASER LITHOTRIPSY OF URETERAL CALCULUS AND STENT INSERTION (Right)    Final Anesthesia Type: general      Patient location during evaluation: PACU  Patient participation: Yes- Able to Participate  Level of consciousness: sedated and awake  Post-procedure vital signs: reviewed and stable  Pain management: adequate  Airway patency: patent    PONV status at discharge: No PONV  Anesthetic complications: no      Cardiovascular status: hypertensive and blood pressure returned to baseline  Respiratory status: spontaneous ventilation  Hydration status: euvolemic  Follow-up not needed.          Vitals Value Taken Time   /75 09/15/23 1228   Temp 36.7 °C (98.1 °F) 09/15/23 1205   Pulse 75 09/15/23 1228   Resp 16 09/15/23 1228   SpO2 99 % 09/15/23 1228         Event Time   Out of Recovery 12:13:00         Pain/Barbara Score: Pain Rating Prior to Med Admin: 3 (9/15/2023 11:41 AM)  Pain Rating Post Med Admin: 0 (9/15/2023 11:45 AM)  Barbara Score: 10 (9/15/2023 12:18 PM)

## 2023-09-15 NOTE — DISCHARGE INSTRUCTIONS
Post op instructions for prevention of DVT  What is deep vein thrombosis?  Deep vein thrombosis (DVT) is the medical term for blood clots in the deep veins of the leg.  These blood clots can be dangerous.  A DVT can block a blood vessel and keep blood from getting where it needs to go.  Another problem is that the clot can travel to other parts of the body such as the lungs.  A clot that travels to the lungs is called a pulmonary embolus (PE) and can cause serious problems with breathing which can lead to death.  Am I at risk for DVT/PE?  If you are not very active, you are at risk of DVT.  Anyone confined to bed, sitting for long periods of time, recovering from surgery, etc. increases the risk of DVT.  Other risk factors are cancer diagnosis, certain medications, estrogen replacement in any form,older age, obesity, pregnancy, smoking, history of clotting disorders, and dehydration.  How will I know if I have a DVT?  Swelling in the lower leg  Pain  Warmth, redness, hardness or bulging of the vein  If you have any of these symptoms, call your doctors office right away.  Some people will not have any symptoms until the clot moves to the lungs.  What are the symptoms of a PE?  Panting, shortness of breath, or trouble breathing  Sharp, knife-like chest pain when you breathe  Coughing or coughing up blood  Rapid heartbeat  If you have any of these symptoms or get worse quickly, call 911 for emergency treatment.  How can I prevent a DVT?  Avoid long periods of inactivity and dont cross your legs--get up and walk around every hour or so.  Stay active--walking after surgery is highly encouraged.  This means you should get out of the house and walk in the neighborhood.  Going up and down stairs will not impair healing (unless advised against such activity by your doctor).    Drink plenty of noncaffeinated, nonalcoholic fluids each day to prevent dehydration.  Wear special support stockings, if they have been advised by  "your doctor.  If you travel, stop at least once an hour and walk around.  Avoid smoking (assistance with stopping is available through your healthcare provider)  Always notify your doctor if you are not able to follow the post operative instructions that are given to you at the time of discharge.  It may be necessary to prescribe one of the medications available to prevent DVT. Discharge Instructions: After Your Surgery/Procedure  Youve just had surgery. During surgery you were given medicine called anesthesia to keep you relaxed and free of pain. After surgery you may have some pain or nausea. This is common. Here are some tips for feeling better and getting well after surgery.     Stay on schedule with your medication.   Going home  Your doctor or nurse will show you how to take care of yourself when you go home. He or she will also answer your questions. Have an adult family member or friend drive you home.      For your safety we recommend these precaution for the first 24 hours after your procedure:  Do not drive or use heavy equipment.  Do not make important decisions or sign legal papers.  Do not drink alcohol.  Have someone stay with you, if needed. He or she can watch for problems and help keep you safe.  Your concentration, balance, coordination, and judgement may be impaired for many hours after anesthesia.  Use caution when ambulating or standing up.     You may feel weak and "washed out" after anesthesia and surgery.      Subtle residual effects of general anesthesia or sedation with regional / local anesthesia can last more than 24 hours.  Rest for the remainder of the day or longer if your Doctor/Surgeon has advised you to do so.  Although you may feel normal within the first 24 hours, your reflexes and mental ability may be impaired without you realizing it.  You may feel dizzy, lightheaded or sleepy for 24 hours or longer.      Be sure to go to all follow-up visits with your doctor. And rest after " your surgery for as long as your doctor tells you to.  Coping with pain  If you have pain after surgery, pain medicine will help you feel better. Take it as told, before pain becomes severe. Also, ask your doctor or pharmacist about other ways to control pain. This might be with heat, ice, or relaxation. And follow any other instructions your surgeon or nurse gives you.  Tips for taking pain medicine  To get the best relief possible, remember these points:  Pain medicines can upset your stomach. Taking them with a little food may help.  Most pain relievers taken by mouth need at least 20 to 30 minutes to start to work.  Taking medicine on a schedule can help you remember to take it. Try to time your medicine so that you can take it before starting an activity. This might be before you get dressed, go for a walk, or sit down for dinner.  Constipation is a common side effect of pain medicines. Call your doctor before taking any medicines such as laxatives or stool softeners to help ease constipation. Also ask if you should skip any foods. Drinking lots of fluids and eating foods such as fruits and vegetables that are high in fiber can also help. Remember, do not take laxatives unless your surgeon has prescribed them.  Drinking alcohol and taking pain medicine can cause dizziness and slow your breathing. It can even be deadly. Do not drink alcohol while taking pain medicine.  Pain medicine can make you react more slowly to things. Do not drive or run machinery while taking pain medicine.  Your health care provider may tell you to take acetaminophen to help ease your pain. Ask him or her how much you are supposed to take each day. Acetaminophen or other pain relievers may interact with your prescription medicines or other over-the-counter (OTC) drugs. Some prescription medicines have acetaminophen and other ingredients. Using both prescription and OTC acetaminophen for pain can cause you to overdose. Read the labels on  your OTC medicines with care. This will help you to clearly know the list of ingredients, how much to take, and any warnings. It may also help you not take too much acetaminophen. If you have questions or do not understand the information, ask your pharmacist or health care provider to explain it to you before you take the OTC medicine.  Managing nausea  Some people have an upset stomach after surgery. This is often because of anesthesia, pain, or pain medicine, or the stress of surgery. These tips will help you handle nausea and eat healthy foods as you get better. If you were on a special food plan before surgery, ask your doctor if you should follow it while you get better. These tips may help:  Do not push yourself to eat. Your body will tell you when to eat and how much.  Start off with clear liquids and soup. They are easier to digest.  Next try semi-solid foods, such as mashed potatoes, applesauce, and gelatin, as you feel ready.  Slowly move to solid foods. Dont eat fatty, rich, or spicy foods at first.  Do not force yourself to have 3 large meals a day. Instead eat smaller amounts more often.  Take pain medicines with a small amount of solid food, such as crackers or toast, to avoid nausea.     Call your surgeon if  You still have pain an hour after taking medicine. The medicine may not be strong enough.  You feel too sleepy, dizzy, or groggy. The medicine may be too strong.  You have side effects like nausea, vomiting, or skin changes, such as rash, itching, or hives.       If you have obstructive sleep apnea  You were given anesthesia medicine during surgery to keep you comfortable and free of pain. After surgery, you may have more apnea spells because of this medicine and other medicines you were given. The spells may last longer than usual.   At home:  Keep using the continuous positive airway pressure (CPAP) device when you sleep. Unless your health care provider tells you not to, use it when you  sleep, day or night. CPAP is a common device used to treat obstructive sleep apnea.  Talk with your provider before taking any pain medicine, muscle relaxants, or sedatives. Your provider will tell you about the possible dangers of taking these medicines.  © 2031-7773 [employer]. [address] [place] [address]. All rights reserved. This information is not intended as a substitute for professional medical care. Always follow your healthcare professional's instructions.   General Information:    1.  Do not drink alcoholic beverages including beer for 24 hours or as long as you are on pain medication..  2.  Do not drive a motor vehicle, operate machinery or power tools, or signs legal papers for 24 hours or as long as you are on pain medication.   3.  You may experience light-headedness, dizziness, and sleepiness following surgery. Please do not stay alone. A responsible adult should be with you for this 24 hour period.  4.  Go home and rest.    5. Progress slowly to a normal diet unless instructed.  Otherwise, begin with liquids such as soft drinks, then soup and crackers working up to solid foods. Drink plenty of nonalcoholic fluids.  6.  Certain anesthetics and pain medications produce nausea and vomiting in certain       individuals. If nausea becomes a problem at home, call you doctor.    7. A nurse will be calling you sometime after surgery. Do not be alarmed. This is our way of finding out how you are doing.    8. Several times every hour while you are awake, take 2-3 deep breaths and cough. If you had stomach surgery hold a pillow or rolled towel firmly against your stomach before you cough. This will help with any pain the cough might cause.  9. Several times every hour while you are awake, pump and flex your feet 5-6 times and do foot circles. This will help prevent blood clots.    10.Call your doctor for severe pain, bleeding, fever, or signs or symptoms of infection (pain,  swelling, redness, foul odor, drainage).       Using Opioids for Pain Management     Your doctor has given instructions for you to take an opioid.  This is a drug for bad pain.  It helps control pain without causing bleeding and kidney problems.  Common opioid names are morphine, hydromorphone, oxycodone, and methadone. These drugs are called narcotics.    There are several safety concerns you need to know.     It is against the law to give or sell this drug to another person.  You must keep this medicine safely locked.    You may have side effects from taking this medication.  These include nausea, itching, sweating, sleepiness, a change in your ability to breathe, and depression.  Do not take alcohol or sleeping pills opioids.    Long-term opoid use may no longer giver you relief from pain.  It can cause you stomach pain, mental anxiety, and headaches.  Long-term opoid use can potentially lead to unlawful street drug abuse and reduce your ability to stay employed.    Your body may become opioid tolerant if you need to take more to get relief.    You must stop taking opioids if you begin having more pain as a result of the medicine.    Opioid withdrawal occurs when you have to stop taking the drug.  It can cause you to have nausea, vomiting, diarrhea, stomach pain, anxiety, and dilated pupils in your eyes. This condition means you are opioid dependent.    Addiction is a drug induced brain disease. It means there are changes in how your brain is working.  Children, teens, and young adults under 25 years old are more likely to get addicted to opioids.      Addiction can happen with repeated opioid use.  It does not happen with short-term use of two weeks or less.       For more information, please speak with your doctor or pharmacist.       Using an Incentive Spirometer    An incentive spirometer is a device that helps you do deep breathing exercises. These exercises expand your lungs, aid in circulation, and help  prevent pneumonia. Deep breathing exercises also help you breathe better and improve the function of your lungs by:  Keeping your lungs clear  Strengthening your breathing muscles  Helping prevent respiratory complications or problems  The incentive spirometer gives you a way to take an active part in recover. A nurse or therapist will teach you breathing exercises. To do these exercises, you will breathe in through your mouth and not your nose. The incentive spirometer only works correctly if you breathe in through your mouth.  Steps to clear lungs  Step 1. Exhale normally. Then, inhale normally.  Relax and breathe out.  Step 2. Place your lips tightly around the mouthpiece.  Make sure the device is upright and not tilted.  Step 3. Inhale as much air as you can through the mouthpiece (don't breath through your nose).  Inhale slowly and deeply.  Hold your breath long enough to keep the balls or disk raised for at least 3 to 5 seconds, or as instructed by your healthcare provider.  Some spirometers have an indicator to let you know that you are breathing in too fast. If the indicator goes off, breathe in more slowly.  Step 4. Repeat the exercise regularly.  Do this exercise every hour while you're awake, or as instructed by your healthcare provider.  If you were taught deep breathing and coughing exercises, do them regularly as instructed by your healthcare provider.      We hope your stay was comfortable as you heal now, mend and rest.    For we have enjoyed taking care of you by giving your our best.    And as you get better, by regaining your health and strength;   We count it as a privilege to have served you and hope your time at [facility] was well spent.      Thank  You!!!

## 2023-09-15 NOTE — DISCHARGE SUMMARY
[facility]  Discharge Note  Short Stay    Admit Date: 9/15/2023    Discharge Date and Time: 09/15/2023 11:27 AM      Attending Physician: Siobhan Phillips MD     Discharge Provider: Siobhan Phillips    Diagnoses:  Active Hospital Problems    Diagnosis  POA    *Neurogenic bladder [N31.9]  Yes      Resolved Hospital Problems   No resolved problems to display.       Discharged Condition: good    Hospital Course: Patient was admitted for outpatient procedure and tolerated the procedure well with no complications. The patient was discharged home in good condition on the same day.       Final Diagnoses: Same as principal problem.    Disposition: Home or Self Care    Follow up/Patient Instructions:    Medications:  Reconciled Home Medications:   Current Discharge Medication List        START taking these medications    Details   ciprofloxacin HCl (CIPRO) 500 MG tablet Take 1 tablet (500 mg total) by mouth every 12 (twelve) hours. for 5 days  Qty: 10 tablet, Refills: 0      phenazopyridine (PYRIDIUM) 100 MG tablet Take 1 tablet (100 mg total) by mouth 3 (three) times daily as needed for Pain.  Qty: 20 tablet, Refills: 0           CONTINUE these medications which have NOT CHANGED    Details   furosemide (LASIX) 20 MG tablet Alternate 20 mg and 40 mg po qd qod  Qty: 90 tablet, Refills: 3    Associated Diagnoses: Peripheral venous insufficiency      !! HYDROcodone-acetaminophen (NORCO) 7.5-325 mg per tablet Take 1 tablet by mouth every 6 (six) hours as needed for Pain. Do not fill until 11/12/23  Qty: 120 tablet, Refills: 0    Comments: Quantity prescribed more than 7 day supply? Yes, quantity medically necessary  Associated Diagnoses: Continuous opioid dependence      metoprolol succinate (TOPROL-XL) 50 MG 24 hr tablet Take 1 tablet (50 mg total) by mouth once daily.  Qty: 90 tablet, Refills: 3      multivitamin with minerals tablet Take 1 tablet by mouth once daily.      silver sulfADIAZINE 1% (SILVADENE) 1 %  cream Apply topically.      !! HYDROcodone-acetaminophen (NORCO) 7.5-325 mg per tablet Take 1 tablet by mouth every 6 (six) hours as needed for Pain. Do not fill until 10/12/23  Qty: 120 tablet, Refills: 0    Comments: Quantity prescribed more than 7 day supply? Yes, quantity medically necessary  Associated Diagnoses: Continuous opioid dependence      !! HYDROcodone-acetaminophen (NORCO) 7.5-325 mg per tablet Take 1 tablet by mouth every 6 (six) hours as needed for Pain. Do not fill until 9/12/23  Qty: 120 tablet, Refills: 0    Comments: Quantity prescribed more than 7 day supply? Yes, quantity medically necessary  Associated Diagnoses: Continuous opioid dependence       !! - Potential duplicate medications found. Please discuss with provider.        Discharge Procedure Orders   US Retroperitoneal Complete   Standing Status: Future Standing Exp. Date: 09/15/24     Order Specific Question Answer Comments   May the Radiologist modify the order per protocol to meet the clinical needs of the patient? Yes    Release to patient Immediate      Diet general      Follow-up Information       Siobhan Phillips MD Follow up in 3 month(s).    Specialty: Urology  Why: stone follow up  Contact information:  [address] [place] [address]  [address]  [facility] [address] [phone]                               Siobhan Phillips MD  Urology Department

## 2023-09-15 NOTE — OP NOTE
[facility]  Urology Department  Operative Note    Date: 09/15/2023    Pre-Op Diagnosis:   Right lower pole filling defect   Right renal stones  Patient Active Problem List    Diagnosis Date Noted    Abdominal aortic aneurysm, without rupture 06/01/2022    Pressure ulcer of right buttock, stage 4 04/07/2022    Wound infection 03/24/2022    Open wound of right buttock with complication 02/03/2022    Open wound of right side of back 02/03/2022    Iron deficiency anemia due to chronic blood loss 01/21/2022    Candidal dermatitis 01/06/2022    Pressure ulcer of sacral region, stage 2 01/06/2022    Other retroperitoneal abscess 10/07/2021    Proteus infection 08/26/2021    Stage III pressure ulcer of left buttock 08/23/2021    Pressure ulcer of right lower back, stage 3 08/12/2021    Stage III pressure ulcer of sacral region 08/12/2021    Open wound of lower back 08/10/2021    Requires assistance with activities of daily living (ADL) 06/28/2021    Neurogenic bladder 06/09/2021    Hyponatremia 05/17/2021    Vaginal discharge 04/10/2021    Scoliosis 04/09/2021    Chronic respiratory failure with hypoxia and hypercapnia 04/07/2021    Obesity hypoventilation syndrome 04/07/2021    Inverse psoriasis 04/06/2021    Chronic back pain 04/17/2019    Internal fixation device (pin, mindi, or screw) mechanical complication-break in spinal fusion rods 04/17/2019    Candidal intertrigo 04/17/2019    Pressure injury of contiguous region involving back and left buttock, stage 4 04/17/2019    Essential hypertension 08/24/2018    Hypomagnesemia 07/01/2018    Catheter-associated urinary tract infection 06/27/2018    History of small bowel obstruction s/p exp lab for umbilical hernia incarceration 3/19 06/25/2018    Dilutional hyponatremia 06/25/2018    Skin bulla 02/13/2017    Physical debility 06/14/2016    Continuous opioid dependence- contract 3/21/19 03/22/2016    Spina bifida of lumbar region     Suprapubic  catheter     Iron deficiency anemia 05/08/2015    Chiari malformation type II 01/09/2015    Intracranial hypertension 10/20/2014    Obstructive hydrocephalus 10/19/2014    Morbid obesity with BMI of 50.0-59.9, adult 11/09/2013    Paraplegia 11/09/2013    Pickwickian syndrome 11/09/2013    Peripheral venous insufficiency 10/15/2013    Spina bifida manifesta 09/30/2013    Skin ulcer of sacrum, limited to breakdown of skin 03/21/2013     Post-Op Diagnosis: same    Procedure(s) Performed:   1.  Right ureteroscopy  2.  Cystoscopy  3.  Right retrograde pyelogram   4.  Laser lithotripsy  5.  Stone basket extraction   5.  Right retrograde pyelogram  6.  Right ureteral stent placement   7.  Fluoro < 1 h    Specimen(s):   - urine culture from right kidney  - stone for analysis     Staff Surgeon: Siobhan Phillips MD    Anesthesia: General endotracheal anesthesia    Indications: Briana Uriarte is a 47 y.o. female with a history of gross hematuria with a right lower pole filling defect noted on CT urogram.  She presents today for ureteroscopy.    Findings:   - no tumors or concerning lesions within the bladder  - right ureter widely patent and dilated   - no tumors within the right collecting system, at the area filling defect in the right lower pole there was a large amount of stone debris, no significant stone fragments  - a few passes of the basket were used to grab as many stone fragments as possible however there is still significant amount of very small stone debris left in the kidney      Estimated Blood Loss: min    Drains: 6 Fr x 22 cm JJ ureteral stent with strings    Procedure in detail:  After informed consent was obtained, the patient was brought the the cystoscopy suite and placed in the supine position.  SCDs were applied and working.  Anesthesia was administered.  The patient was then placed in the dorsal lithotomy position and prepped and draped in the usual sterile fashion.      A rigid cystoscope in  a 22 Fr sheath was introduced into the patient's urethra.  This passed easily.  The entire urethra was visualized which showed no strictures or masses.  Formal cystoscopy was performed which revealed no masses or lesions suspicious for malignancy, no bladder stones, no bladder diverticula, moderate trabeculations.  The ureteral orifices were visualized in the normal anatomic position bilaterally.     A motion wire was passed up the right ureteral orifice and up into the kidney.  This passed easily and placement was confirmed using fluoro.  The cystoscope was removed keeping the wire in place.     A semirigid ureteroscope was passed into the patient's bladder alongside the wire under direct vision.  It was then passed through the right ureteral orifice alongside the wire.  The ureteroscope was inserted up to the UPJ and the ureter was cleared.  A super stiff wire was inserted through the ureteroscope into the renal pelvis.  The ureteroscope was removed, leaving both wires in place.      A 10/12 ureteral access sheath was then passed over the super stiff wire under fluoroscopic guidance.  Subsequently, the flexible ureteroscope was passed into the patient's ureter through the access sheath under direct vision. Flexible pyeloscopy was performed systematically.  Findings as above.     The stone was further fragmented/ dusted using the laser.  The laser fiber was removed and an encompass basket was introduced through the ureteroscope.  Stone fragments were removed and collected for specimen analysis.  Pyeloscopy was again performed. All significant fragments were removed.  A retrograde pyelogram was performed through the ureteroscope.  There were no filling defects noted and the renal pelvis was opacified.  The ureteroscope was removed keeping the motion wire in place.  The entire course of the ureter was visualized as the ureteroscope and access sheath were simultaneously removed.  There were no significant ureteral  fragments left behind.     A 6 Fr x 22 cm JJ ureteral stent with strings was passed over the wire and up into the renal pelvis using fluoro.  When the coil appeared to be in good position in the kidney, the wire was removed under continuous fluoro.  Good coils were seen in the kidney and the bladder using fluoro.      The patient tolerated the procedure well and was transferred to the recovery room in stable condition.      Disposition:  The patient will follow up in 3 months with a renal ultrasound.  She may remove her stent on Monday, 9/18.    Siobhan Phillips MD

## 2023-09-15 NOTE — OR NURSING
"Triston lift used to place patient in bed, portable chest xray, labs and ekg completed. Patient required multiple attempts at IV start. Ok per Dr. Ag to use a 24g for surgery. New 24g inititated. Unable to turn the patient for wound photos, due to the multiple IV attempts. Patient with spina bifida and developmentally delayed. Mother has photos of current sacral and back decubiti. Patient has "holes to her sacrum and back.  Encouraged to upload to epic.  "

## 2023-09-15 NOTE — TRANSFER OF CARE
"Anesthesia Transfer of Care Note    Patient: Briana Uriarte    Procedure(s) Performed: Procedure(s) (LRB):  CYSTOURETEROSCOPY, WITH HOLMIUM LASER LITHOTRIPSY OF URETERAL CALCULUS AND STENT INSERTION (Right)    Patient location: PACU    Anesthesia Type: general    Transport from OR: Transported from OR on 2-3 L/min O2 by NC with adequate spontaneous ventilation    Post pain: adequate analgesia    Post assessment: no apparent anesthetic complications and tolerated procedure well    Post vital signs: stable    Level of consciousness: sedated and responds to stimulation    Nausea/Vomiting: no nausea/vomiting    Complications: none    Transfer of care protocol was followed      Last vitals:   Visit Vitals  BP (!) 142/73 (BP Location: Right arm, Patient Position: Lying)   Pulse 79   Temp 36.8 °C (98.2 °F) (Oral)   Resp 19   Ht 4' 10" (1.473 m)   Wt 129.3 kg (285 lb)   LMP 06/01/2023   SpO2 100%   Breastfeeding No   BMI 59.57 kg/m²     "

## 2023-09-15 NOTE — PLAN OF CARE
Patient and parents given discharge instructions. Instructed to follow up with Dr. Phillips in 3 months and get renal ultrasound before appt.  Prescription for Cipro and pyridium sent to pharmacy.  Educated on stent care/urine color/hydration and when to notify MD. Verbalized understanding. Peripheral IVs removed with catheter intact. . All belongings given back to patient. Patient transferred to car via wheelchair  and left with parents to home.

## 2023-09-15 NOTE — ANESTHESIA PROCEDURE NOTES
Intubation    Date/Time: 9/15/2023 9:59 AM    Performed by: Benny Cowart CRNA  Authorized by: Carlitos Ag MD    Intubation:     Induction:  Intravenous    Intubated:  Postinduction    Mask Ventilation:  Easy mask    Attempts:  1    Attempted By:  CRNA    Method of Intubation:  Video laryngoscopy    Blade:  Burt 3    Laryngeal View Grade: Grade I - full view of cords      Difficult Airway Encountered?: No      Complications:  None    Airway Device:  Oral endotracheal tube    Airway Device Size:  7.0    Style/Cuff Inflation:  Cuffed (inflated to minimal occlusive pressure)    Tube secured:  22    Secured at:  The lips    Placement Verified By:  Capnometry    Complicating Factors:  None    Findings Post-Intubation:  BS equal bilateral and atraumatic/condition of teeth unchanged

## 2023-09-18 VITALS
SYSTOLIC BLOOD PRESSURE: 147 MMHG | BODY MASS INDEX: 59.82 KG/M2 | TEMPERATURE: 98 F | HEIGHT: 58 IN | OXYGEN SATURATION: 99 % | HEART RATE: 75 BPM | DIASTOLIC BLOOD PRESSURE: 75 MMHG | RESPIRATION RATE: 16 BRPM | WEIGHT: 285 LBS

## 2023-09-18 LAB — BACTERIA UR CULT: NO GROWTH

## 2023-09-18 RX ORDER — METOPROLOL SUCCINATE 50 MG/1
50 TABLET, EXTENDED RELEASE ORAL
Qty: 90 TABLET | Refills: 3 | Status: SHIPPED | OUTPATIENT
Start: 2023-09-18

## 2023-09-18 NOTE — TELEPHONE ENCOUNTER
No care due was identified.  [facility] Embedded Care Due Messages. Reference number: [id number].   9/18/2023 5:45:13 AM CDT

## 2023-09-18 NOTE — TELEPHONE ENCOUNTER
Refill Routing Note   Medication(s) are not appropriate for processing by [facility] for the following reason(s):      Required vitals abnormal    [facility] action(s):  Defer Care Due:  None identified            Appointments  past 12m or future 3m with PCP    Date Provider   Last Visit   8/30/2023 Jose De Jesus Curran MD   Next Visit   12/6/2023 Jose De Jesus Curran MD   ED visits in past 90 days: 0        Note composed:4:33 PM 09/18/2023

## 2023-09-18 NOTE — PROGRESS NOTES
Mother states they were pleased with care.  Concerned that alicia lift didn't move under the bed well.  Stated they were pleased with staff. Stated they understood all discharge instructions.

## 2023-09-25 LAB
COMPN STONE: NORMAL
LABORATORY COMMENT REPORT: NORMAL
SPECIMEN SOURCE: NORMAL
STONE ANALYSIS IR-IMP: NORMAL

## 2023-10-20 ENCOUNTER — PATIENT MESSAGE (OUTPATIENT)
Dept: ADMINISTRATIVE | Facility: HOSPITAL | Age: 48
End: 2023-10-20
Payer: MEDICARE

## 2023-10-26 ENCOUNTER — TELEPHONE (OUTPATIENT)
Dept: FAMILY MEDICINE | Facility: CLINIC | Age: 48
End: 2023-10-26
Payer: MEDICARE

## 2023-10-27 ENCOUNTER — TELEPHONE (OUTPATIENT)
Dept: FAMILY MEDICINE | Facility: CLINIC | Age: 48
End: 2023-10-27
Payer: MEDICARE

## 2023-11-02 ENCOUNTER — TELEPHONE (OUTPATIENT)
Dept: FAMILY MEDICINE | Facility: CLINIC | Age: 48
End: 2023-11-02
Payer: MEDICARE

## 2023-11-02 NOTE — TELEPHONE ENCOUNTER
----- Message from Judie S Jameson sent at 11/2/2023  3:30 PM CDT -----  Contact: Ms. Joshua Phone# [phone], fax# [phone]  Ms. Almazan from [facility] said that she have faxed over a Catheter from on this morning for you to sign and fax back to her and she would like to know if you have received it?     Ms. Almazan said that she would like for you to fax the form back over to her because the patient is out of Catheter.

## 2023-11-02 NOTE — TELEPHONE ENCOUNTER
Spoke with [facility], we received form, advised Dr. Curran is out of clinic until tomorrow.

## 2023-11-03 ENCOUNTER — PATIENT MESSAGE (OUTPATIENT)
Dept: FAMILY MEDICINE | Facility: CLINIC | Age: 48
End: 2023-11-03
Payer: MEDICARE

## 2023-12-01 ENCOUNTER — LAB VISIT (OUTPATIENT)
Dept: LAB | Facility: HOSPITAL | Age: 48
End: 2023-12-01
Attending: INTERNAL MEDICINE
Payer: MEDICARE

## 2023-12-01 DIAGNOSIS — D50.0 IRON DEFICIENCY ANEMIA DUE TO CHRONIC BLOOD LOSS: ICD-10-CM

## 2023-12-01 LAB
BASOPHILS # BLD AUTO: 0.08 K/UL (ref 0–0.2)
BASOPHILS NFR BLD: 1 % (ref 0–1.9)
DIFFERENTIAL METHOD: ABNORMAL
EOSINOPHIL # BLD AUTO: 0.3 K/UL (ref 0–0.5)
EOSINOPHIL NFR BLD: 4.1 % (ref 0–8)
ERYTHROCYTE [DISTWIDTH] IN BLOOD BY AUTOMATED COUNT: 17.2 % (ref 11.5–14.5)
FERRITIN SERPL-MCNC: 67 NG/ML (ref 20–300)
HCT VFR BLD AUTO: 36.6 % (ref 37–48.5)
HGB BLD-MCNC: 11 G/DL (ref 12–16)
IMM GRANULOCYTES # BLD AUTO: 0.06 K/UL (ref 0–0.04)
IMM GRANULOCYTES NFR BLD AUTO: 0.7 % (ref 0–0.5)
LYMPHOCYTES # BLD AUTO: 1.6 K/UL (ref 1–4.8)
LYMPHOCYTES NFR BLD: 19.3 % (ref 18–48)
MCH RBC QN AUTO: 23.9 PG (ref 27–31)
MCHC RBC AUTO-ENTMCNC: 30.1 G/DL (ref 32–36)
MCV RBC AUTO: 80 FL (ref 82–98)
MONOCYTES # BLD AUTO: 0.5 K/UL (ref 0.3–1)
MONOCYTES NFR BLD: 6.2 % (ref 4–15)
NEUTROPHILS # BLD AUTO: 5.6 K/UL (ref 1.8–7.7)
NEUTROPHILS NFR BLD: 68.7 % (ref 38–73)
NRBC BLD-RTO: 0 /100 WBC
PLATELET # BLD AUTO: 389 K/UL (ref 150–450)
PMV BLD AUTO: 10.9 FL (ref 9.2–12.9)
RBC # BLD AUTO: 4.6 M/UL (ref 4–5.4)
WBC # BLD AUTO: 8.2 K/UL (ref 3.9–12.7)

## 2023-12-01 PROCEDURE — 82728 ASSAY OF FERRITIN: CPT | Performed by: INTERNAL MEDICINE

## 2023-12-01 PROCEDURE — 85025 COMPLETE CBC W/AUTO DIFF WBC: CPT | Performed by: INTERNAL MEDICINE

## 2023-12-01 PROCEDURE — 36415 COLL VENOUS BLD VENIPUNCTURE: CPT | Mod: PO | Performed by: INTERNAL MEDICINE

## 2023-12-05 ENCOUNTER — OFFICE VISIT (OUTPATIENT)
Dept: HEMATOLOGY/ONCOLOGY | Facility: CLINIC | Age: 48
End: 2023-12-05
Payer: MEDICARE

## 2023-12-05 ENCOUNTER — PATIENT MESSAGE (OUTPATIENT)
Dept: HEMATOLOGY/ONCOLOGY | Facility: CLINIC | Age: 48
End: 2023-12-05

## 2023-12-05 DIAGNOSIS — D50.0 IRON DEFICIENCY ANEMIA DUE TO CHRONIC BLOOD LOSS: Primary | ICD-10-CM

## 2023-12-05 DIAGNOSIS — Q05.7 SPINA BIFIDA OF LUMBAR REGION, UNSPECIFIED HYDROCEPHALUS PRESENCE: ICD-10-CM

## 2023-12-05 PROCEDURE — 99214 OFFICE O/P EST MOD 30 MIN: CPT | Mod: 95,,, | Performed by: INTERNAL MEDICINE

## 2023-12-05 PROCEDURE — 99214 PR OFFICE/OUTPT VISIT, EST, LEVL IV, 30-39 MIN: ICD-10-PCS | Mod: 95,,, | Performed by: INTERNAL MEDICINE

## 2023-12-05 NOTE — PROGRESS NOTES
The patient location is: home  Visit type: Virtual visit with synchronous audio and video  Face-to-face or time spent with patient on the encounter: 25 min  Total time spent on and for  this encounter which includes non face-to-face time preparing to see patient, review of tests, obtaining and or reviewing separately obtained records documenting clinical information in the electronic or other health records, independently interpreting results which is not separately reported ,and communicating results to the patient/family/caregiver and in care coordination and treatment planning/communicating with pharmacy for prescriptions/addressing social needs/arranging follow-up and or referrals : 25 min     Each patient I provide medical services by telemedicine is:  (1) informed of the relationship between the physician and patient and the respective role of any other health care provider with respect to management of the patient; and (2) notified that he or she may decline to receive medical services by telemedicine and may withdraw from such care at any time.  This is a video visit therefore some elements of the physical exam such as vital signs, heart sounds are breath sounds are not included and may be included if found in recent clinic notes of other providers assessing same patient. Any symptoms or signs that were visualized were stated by the patient may be included in this note.    Service Date:  12/5/23    Chief Complaint: Anemia    Briana Uriarte is a 48 y.o. female with anemia.      Patient is paraplegic from spina bifida.  I see the patient yearly.  She is doing well.  Family does not report any new issues.  She follows with Dr. Phillips for hematuria.    Review of Systems   Constitutional: Negative.    HENT: Negative.     Eyes: Negative.    Respiratory: Negative.     Cardiovascular: Negative.    Gastrointestinal: Negative.    Endocrine: Negative.    Genitourinary: Negative.    Neurological: Negative.     Hematological: Negative.    Psychiatric/Behavioral: Negative.          Current Outpatient Medications   Medication Instructions    furosemide (LASIX) 20 MG tablet Alternate 20 mg and 40 mg po qd qod    HYDROcodone-acetaminophen (NORCO) 7.5-325 mg per tablet 1 tablet, Oral, Every 6 hours PRN, Do not fill until 11/12/23    HYDROcodone-acetaminophen (NORCO) 7.5-325 mg per tablet 1 tablet, Oral, Every 6 hours PRN, Do not fill until 10/12/23    HYDROcodone-acetaminophen (NORCO) 7.5-325 mg per tablet 1 tablet, Oral, Every 6 hours PRN, Do not fill until 9/12/23    metoprolol succinate (TOPROL-XL) 50 mg, Oral    multivitamin with minerals tablet 1 tablet, Daily    silver sulfADIAZINE 1% (SILVADENE) 1 % cream Topical (Top)        Past Medical History:   Diagnosis Date    Chronic hypercapnic respiratory failure     Chronic kidney disease     kidney stones    Diabetes mellitus, type 2     Encounter for blood transfusion     Esotropia     GERD (gastroesophageal reflux disease)     Hydrocephalus     Hypertension     Morbid obesity     Obesity hypoventilation syndrome     On home oxygen therapy     Paralysis     Psoriasis     Scoliosis     Spinal bifida, closed         Past Surgical History:   Procedure Laterality Date    BRAIN SURGERY       shunt revision    CHOLECYSTECTOMY      CYSTOSCOPY W/ LASER LITHOTRIPSY      CYSTOURETEROSCOPY, WITH HOLMIUM LASER LITHOTRIPSY OF URETERAL CALCULUS AND STENT INSERTION Right 9/15/2023    Procedure: CYSTOURETEROSCOPY, WITH HOLMIUM LASER LITHOTRIPSY OF URETERAL CALCULUS AND STENT INSERTION;  Surgeon: Siobhan Phillips MD;  Location: [facility] OR;  Service: Urology;  Laterality: Right;    REPAIR OF INCARCERATED UMBILICAL HERNIA N/A 3/8/2019    Procedure: REPAIR, HERNIA, UMBILICAL, INCARCERATED, AGE 5 YEARS OR OLDER;  Surgeon: Ziyad Hancock MD;  Location: [facility] OR;  Service: General;  Laterality: N/A;    SPINE SURGERY          Family History   Problem Relation Age of Onset    Cancer Mother      Glaucoma Father     Heart disease Maternal Grandmother     Cancer Maternal Grandmother     Glaucoma Paternal Grandfather     Psoriasis Maternal Uncle     Melanoma Neg Hx     Lupus Neg Hx     Eczema Neg Hx        Social History     Tobacco Use    Smoking status: Never    Smokeless tobacco: Never   Substance Use Topics    Alcohol use: No    Drug use: Yes     Types: Hydrocodone         There were no vitals filed for this visit.       Physical Exam:  There were no vitals taken for this visit.    Physical Exam  Constitutional:       Appearance: Normal appearance.   HENT:      Head: Normocephalic and atraumatic.      Nose: Nose normal.      Mouth/Throat:      Mouth: Mucous membranes are moist.      Pharynx: Oropharynx is clear.   Eyes:      Conjunctiva/sclera: Conjunctivae normal.   Cardiovascular:      Rate and Rhythm: Normal rate and regular rhythm.   Pulmonary:      Effort: Pulmonary effort is normal.   Abdominal:      General: There is no distension.   Musculoskeletal:         General: Normal range of motion.      Cervical back: Normal range of motion and neck supple.   Skin:     General: Skin is warm and dry.   Neurological:      Mental Status: She is alert and oriented to person, place, and time. Mental status is at baseline.   Psychiatric:         Mood and Affect: Mood normal.          Labs:  Lab Results   Component Value Date    WBC 8.20 12/01/2023    RBC 4.60 12/01/2023    HGB 11.0 (L) 12/01/2023    HCT 36.6 (L) 12/01/2023    MCV 80 (L) 12/01/2023    MCH 23.9 (L) 12/01/2023    MCHC 30.1 (L) 12/01/2023    RDW 17.2 (H) 12/01/2023     12/01/2023    MPV 10.9 12/01/2023    GRAN 5.6 12/01/2023    GRAN 68.7 12/01/2023    LYMPH 1.6 12/01/2023    LYMPH 19.3 12/01/2023    MONO 0.5 12/01/2023    MONO 6.2 12/01/2023    EOS 0.3 12/01/2023    BASO 0.08 12/01/2023    EOSINOPHIL 4.1 12/01/2023    BASOPHIL 1.0 12/01/2023     Sodium   Date Value Ref Range Status   09/15/2023 141 136 - 145 mmol/L Final     Potassium    Date Value Ref Range Status   09/15/2023 3.7 3.5 - 5.1 mmol/L Final     Chloride   Date Value Ref Range Status   09/15/2023 107 95 - 110 mmol/L Final     CO2   Date Value Ref Range Status   09/15/2023 23 23 - 29 mmol/L Final     Glucose   Date Value Ref Range Status   09/15/2023 87 70 - 110 mg/dL Final     BUN   Date Value Ref Range Status   09/15/2023 15 6 - 20 mg/dL Final     Creatinine   Date Value Ref Range Status   09/15/2023 0.7 0.5 - 1.4 mg/dL Final     Calcium   Date Value Ref Range Status   09/15/2023 9.4 8.7 - 10.5 mg/dL Final     Total Protein   Date Value Ref Range Status   01/27/2023 8.5 (H) 6.0 - 8.4 g/dL Final     Albumin   Date Value Ref Range Status   01/27/2023 3.2 (L) 3.5 - 5.2 g/dL Final     Total Bilirubin   Date Value Ref Range Status   01/27/2023 0.4 0.1 - 1.0 mg/dL Final     Comment:     For infants and newborns, interpretation of results should be based  on gestational age, weight and in agreement with clinical  observations.    Premature Infant recommended reference ranges:  Up to 24 hours.............<8.0 mg/dL  Up to 48 hours............<12.0 mg/dL  3-5 days..................<15.0 mg/dL  6-29 days.................<15.0 mg/dL       Alkaline Phosphatase   Date Value Ref Range Status   01/27/2023 112 55 - 135 U/L Final     AST   Date Value Ref Range Status   01/27/2023 25 10 - 40 U/L Final     ALT   Date Value Ref Range Status   01/27/2023 12 10 - 44 U/L Final     Anion Gap   Date Value Ref Range Status   09/15/2023 11 8 - 16 mmol/L Final     eGFR if    Date Value Ref Range Status   07/18/2022 >60.0 >60 mL/min/1.73 m^2 Final     eGFR if non    Date Value Ref Range Status   07/18/2022 >60.0 >60 mL/min/1.73 m^2 Final     Comment:     Calculation used to obtain the estimated glomerular filtration  rate (eGFR) is the CKD-EPI equation.          A/P:    Iron deficiency anemia  -suspect due to chronic blood loss and chronic inflammation   -has received 2 doses of  Injectafer in the past  -now doing well  -iron levels and hemoglobin stable   -continue to monitor yearly    Spina bifida  -patient prefers virtual visits as a result  -patient is paraplegic  -will limit access to treatment      Aurash Khoobehi, MD  Hematology and Oncology

## 2023-12-06 ENCOUNTER — OFFICE VISIT (OUTPATIENT)
Dept: FAMILY MEDICINE | Facility: CLINIC | Age: 48
End: 2023-12-06
Payer: MEDICARE

## 2023-12-06 VITALS
TEMPERATURE: 99 F | DIASTOLIC BLOOD PRESSURE: 82 MMHG | RESPIRATION RATE: 18 BRPM | HEIGHT: 58 IN | SYSTOLIC BLOOD PRESSURE: 142 MMHG | WEIGHT: 262.38 LBS | OXYGEN SATURATION: 98 % | BODY MASS INDEX: 55.07 KG/M2 | HEART RATE: 71 BPM

## 2023-12-06 DIAGNOSIS — F11.20 CONTINUOUS OPIOID DEPENDENCE: Chronic | ICD-10-CM

## 2023-12-06 PROBLEM — I71.40 ABDOMINAL AORTIC ANEURYSM WITHOUT RUPTURE: Status: RESOLVED | Noted: 2022-06-01 | Resolved: 2023-12-06

## 2023-12-06 PROCEDURE — 99214 OFFICE O/P EST MOD 30 MIN: CPT | Mod: PBBFAC,PO | Performed by: FAMILY MEDICINE

## 2023-12-06 PROCEDURE — 99999 PR PBB SHADOW E&M-EST. PATIENT-LVL IV: CPT | Mod: PBBFAC,,, | Performed by: FAMILY MEDICINE

## 2023-12-06 PROCEDURE — 99214 PR OFFICE/OUTPT VISIT, EST, LEVL IV, 30-39 MIN: ICD-10-PCS | Mod: S$PBB,,, | Performed by: FAMILY MEDICINE

## 2023-12-06 PROCEDURE — 99214 OFFICE O/P EST MOD 30 MIN: CPT | Mod: S$PBB,,, | Performed by: FAMILY MEDICINE

## 2023-12-06 PROCEDURE — 99999 PR PBB SHADOW E&M-EST. PATIENT-LVL IV: ICD-10-PCS | Mod: PBBFAC,,, | Performed by: FAMILY MEDICINE

## 2023-12-06 RX ORDER — HYDROCODONE BITARTRATE AND ACETAMINOPHEN 7.5; 325 MG/1; MG/1
1 TABLET ORAL EVERY 6 HOURS PRN
Qty: 120 TABLET | Refills: 0 | Status: SHIPPED | OUTPATIENT
Start: 2023-12-06 | End: 2024-03-06

## 2023-12-06 NOTE — PATIENT INSTRUCTIONS
"[place],     If you are due for any health screening(s) below please notify me so we can arrange them to be ordered and scheduled to maintain your health. Most healthy patients complete it. Don't lose out on improving your health.     Tests to Keep You Healthy    Cervical Cancer Screening: DUE  Last Blood Pressure <= 139/89 (12/6/2023): NO              Dear Ms. Uriarte:    Your [facility] is dedicated to helping you stay healthy with regularly scheduled recommended screenings.  Scheduling routine screenings is important to maintaining good health. Our records indicate that you may be overdue for your screening pap smear. A pap smear screening can help identify patients at risk for developing cervical cancer at an early stage, when it is most likely to be successfully treated.    We encourage you to schedule your appointment with your [facility] provider or some primary care providers also perform this screening.    If you have completed or scheduled your pap smear screening outside of [facility], please notify your primary care team so we can update your health record.      If you have questions or would like to schedule your screening, please contact your primary care clinic.    Sincerely,    Your [facility]             Patient Education       Checking Your Blood Pressure at Home   The Basics   Written by the doctors and editors at [facility]   How is blood pressure measured? -- Blood pressure is usually measured with a device that goes around your upper arm. This is often done in a doctor's office. But some people also check their blood pressure themselves, at home or at work.  Blood pressure is explained with 2 numbers. For instance, your blood pressure might be "140 over 90." The first (top) number is the pressure inside your arteries when your heart is isaac. The second (bottom) number is the pressure inside your arteries when your heart is relaxed. The table shows how " "doctors and nurses define high and normal blood pressure (table 1).  If your blood pressure gets too high, it puts you at risk for heart attack, stroke, and kidney disease. High blood pressure does not usually cause symptoms. But it can be serious.  What is a home blood pressure meter? -- A home blood pressure meter (or "monitor") is a device you can use to check your blood pressure yourself. It has a cuff that goes around your upper arm (figure 1). Some devices have a cuff that goes around your wrist instead. But doctors aren't sure if these work as well. The meter also has a small screen, or dial, that shows your blood pressure numbers.  There are also special meters you can wear for a day or 2. These are different because they automatically check your blood pressure throughout the day and night, even while you are sleeping. If your doctor thinks you should use one of these devices, they will talk to you about how to wear it.  Why do I need to check my blood pressure at home? -- If your doctor knows or suspects that you have high blood pressure, they might want you to check it at home. There are a few reasons for this. Your doctor might want to look at:  Whether your blood pressure measures the same at home as it did in the doctor's office  How well your blood pressure medicines are working  Changes in your blood pressure, for example, if it goes up and down  People who check their own blood pressure at home usually do better at keeping it low.  How do I choose a home blood pressure meter? -- When choosing a home blood pressure meter, you will probably want to think about:  Cost - Some devices cost more than others. You should also check to see if your insurance will help pay for your device.  Size - It's important to make sure the cuff fits your arm comfortably. Your doctor or nurse can help you with this.  How easy it is to use - You should make sure you understand how to use the device. You also need to be able " to read the numbers on the screen.  You do not need a prescription to buy a home blood pressure meter. You can buy them at most pharmacies or over the internet. Your doctor or nurse can help you choose the right device for you.  How do I check my blood pressure at home? -- Once you have a home blood pressure meter, your doctor or nurse should check it to make sure it fits you and works correctly.  When it's time to check your blood pressure:  Go to the bathroom and empty your bladder first. Having a full bladder can temporarily increase your blood pressure, making the results inaccurate.  Sit in a chair with your feet flat on the ground.  Try to breathe normally and stay calm.  Attach the cuff to your arm. Place the cuff directly on your skin, not over your clothing. The cuff should be tight enough to not slip down, but not uncomfortably tight.  Sit and relax for about 3 to 5 minutes with the cuff on.  Follow the directions that came with your device to start measuring your blood pressure. This might involve squeezing the bulb at the end of the tube to inflate the cuff (fill it with air). With some monitors, you just need to press a button to inflate the cuff. When the cuff fills with air, it feels like someone is squeezing your arm, but it should not hurt. Then you will slowly deflate the cuff (let the air out of it), or it will deflate by itself. The screen or dial will show your blood pressure numbers.  Stay seated and relax for 1 minute, then measure your blood pressure again.  How often should I check my blood pressure? -- It depends. Different people need to follow different schedules. Your doctor or nurse will tell you how often to check your blood pressure, and when. Some people need to check their blood pressure twice a day, in the morning and evening.  Your doctor or nurse will probably tell you to keep track of your blood pressure for at least a few days (table 2). Then they will look at the numbers. The  "reason for this is that it's normal for your blood pressure to change a bit from day to day. For example, the numbers might change depending on whether you recently had caffeine, just exercised, or feel stressed. Checking your blood pressure over several days - or longer - will give your doctor or nurse a better idea of what is average for you.  How should I keep track of my blood pressure? -- Some blood pressure meters will record your numbers for you, or send them to your computer or smartphone. If yours does not do this, you will need to write them down. Your doctor or nurse can help you figure out the best way to keep track of the numbers.  What if my blood pressure is high? -- Your doctor or nurse will tell you what to do if your blood pressure is high when you check it at home. If you get a number that is higher than normal, measure it again to see if it is still high. If it is very high (above a certain number, which your doctor or nurse will tell you to watch out for), you should call your doctor right away.  If your blood pressure is only a little high, your doctor or nurse might tell you to keep checking it for a few more days or weeks, and then call if it does not go back down. Then they can help you decide what to do next.  All topics are updated as new evidence becomes available and our peer review process is complete.  This topic retrieved from [employer] on: Sep 21, 2021.  Topic 774714 Version 4.0  Release: 29.4.2 - C29.263  © 2021 UpToDate, Inc. and/or its affiliates. All rights reserved.  table 1: Definition of normal and high blood pressure  Level  Top number  Bottom number    High 130 or above 80 or above   Elevated 120 to 129 79 or below   Normal 119 or below 79 or below   These definitions are from the American College of Cardiology/American Heart Association. Other expert groups might use slightly different definitions.  "Elevated blood pressure" is a term doctor or nurses use as a warning. It " means you do not yet have high blood pressure, but your blood pressure is not as low as it should be for good health.  Graphic 95422 Version 6.0  figure 1: Using a home blood pressure meter     This is an example of a person using a home blood pressure meter.  Graphic 086093 Version 1.0    table 2: 7-day diary for checking blood pressure at home  Day 1  Day 2  Day 3  Day 4  Day 5  Day 6  Day 7    Morning  1st read Morning  1st read Morning  1st read Morning  1st read Morning  1st read Morning  1st read Morning  1st read   Systolic: __________ Systolic: __________ Systolic: __________ Systolic: __________ Systolic: __________ Systolic: __________ Systolic: __________   Diastolic: __________ Diastolic: __________ Diastolic: __________ Diastolic: __________ Diastolic: __________ Diastolic: __________ Diastolic: __________   Pulse: __________ Pulse: __________ Pulse: __________ Pulse: __________ Pulse: __________ Pulse: __________ Pulse: __________   Morning  2nd read Morning  2nd read Morning  2nd read Morning  2nd read Morning  2nd read Morning  2nd read Morning  2nd read   Systolic: __________ Systolic: __________ Systolic: __________ Systolic: __________ Systolic: __________ Systolic: __________ Systolic: __________   Diastolic: __________ Diastolic: __________ Diastolic: __________ Diastolic: __________ Diastolic: __________ Diastolic: __________ Diastolic: __________   Pulse: __________ Pulse: __________ Pulse: __________ Pulse: __________ Pulse: __________ Pulse: __________ Pulse: __________   Evening  1st read Evening  1st read Evening  1st read Evening  1st read Evening  1st read Evening  1st read Evening  1st read   Systolic: __________ Systolic: __________ Systolic: __________ Systolic: __________ Systolic: __________ Systolic: __________ Systolic: __________   Diastolic: __________ Diastolic: __________ Diastolic: __________ Diastolic: __________ Diastolic: __________ Diastolic: __________ Diastolic:  __________   Pulse: __________ Pulse: __________ Pulse: __________ Pulse: __________ Pulse: __________ Pulse: __________ Pulse: __________   Evening  2nd read Evening  2nd read Evening  2nd read Evening  2nd read Evening  2nd read Evening  2nd read Evening  2nd read   Systolic: __________ Systolic: __________ Systolic: __________ Systolic: __________ Systolic: __________ Systolic: __________ Systolic: __________   Diastolic: __________ Diastolic: __________ Diastolic: __________ Diastolic: __________ Diastolic: __________ Diastolic: __________ Diastolic: __________   Pulse: __________ Pulse: __________ Pulse: __________ Pulse: __________ Pulse: __________ Pulse: __________ Pulse: __________   Notes    Notes    Notes    Notes    Notes    Notes    Notes      ____________________ ____________________ ____________________ ____________________ ____________________ ____________________ ____________________   ____________________ ____________________ ____________________ ____________________ ____________________ ____________________ ____________________   ____________________ ____________________ ____________________ ____________________ ____________________ ____________________ ____________________   Patient name: ______________________________     Patient ID: ________________________________    Primary care provider: _______________________    Average BP: _______________________________    [facility] 963694 Version 1.0  Consumer Information Use and Disclaimer   This information is not specific medical advice and does not replace information you receive from your health care provider. This is only a brief summary of general information. It does NOT include all information about conditions, illnesses, injuries, tests, procedures, treatments, therapies, discharge instructions or life-style choices that may apply to you. You must talk with your health care provider for complete information about your health and treatment  options. This information should not be used to decide whether or not to accept your health care provider's advice, instructions or recommendations. Only your health care provider has the knowledge and training to provide advice that is right for you. The use of this information is governed by the [employer] End User License Agreement, available at https://www.[employer]/en/solutions/"[employer]"/about/juanjo.The use of [employer] content is governed by the [employer] Terms of Use. ©2021 [employer] All rights reserved.  Copyright   © 2021 UpToDate, Inc. and/or its affiliates. All rights reserved.

## 2023-12-06 NOTE — PROGRESS NOTES
Subjective:   Patient ID: Briana Uriarte is a 48 y.o. female     Chief Complaint:Follow-up (3 month f/u)      Here for checkup    Follow-up  Pertinent negatives include no abdominal pain or chest pain.     Review of Systems   Respiratory:  Negative for shortness of breath.    Cardiovascular:  Negative for chest pain.   Gastrointestinal:  Negative for abdominal pain.   Genitourinary:  Negative for dysuria.     Past Medical History:   Diagnosis Date    Chronic hypercapnic respiratory failure     Chronic kidney disease     kidney stones    Diabetes mellitus, type 2     Encounter for blood transfusion     Esotropia     GERD (gastroesophageal reflux disease)     Hydrocephalus     Hypertension     Morbid obesity     Obesity hypoventilation syndrome     On home oxygen therapy     Paralysis     Psoriasis     Scoliosis     Spinal bifida, closed      Past Surgical History:   Procedure Laterality Date    BRAIN SURGERY       shunt revision    CHOLECYSTECTOMY      CYSTOSCOPY W/ LASER LITHOTRIPSY      CYSTOURETEROSCOPY, WITH HOLMIUM LASER LITHOTRIPSY OF URETERAL CALCULUS AND STENT INSERTION Right 9/15/2023    Procedure: CYSTOURETEROSCOPY, WITH HOLMIUM LASER LITHOTRIPSY OF URETERAL CALCULUS AND STENT INSERTION;  Surgeon: Siobhan Phillips MD;  Location: [facility];  Service: Urology;  Laterality: Right;    REPAIR OF INCARCERATED UMBILICAL HERNIA N/A 3/8/2019    Procedure: REPAIR, HERNIA, UMBILICAL, INCARCERATED, AGE 5 YEARS OR OLDER;  Surgeon: Ziyad Hancock MD;  Location: [facility];  Service: General;  Laterality: N/A;    SPINE SURGERY       Objective:     Vitals:    12/06/23 1056   BP: (!) 142/82   Pulse: 71   Resp: 18   Temp: 98.5 °F (36.9 °C)     Body mass index is 54.83 kg/m².  Physical Exam  Vitals and nursing note reviewed.   Constitutional:       Appearance: She is well-developed.   HENT:      Head: Normocephalic and atraumatic.   Eyes:      General: No scleral icterus.     Conjunctiva/sclera: Conjunctivae  normal.   Cardiovascular:      Heart sounds: No murmur heard.  Pulmonary:      Effort: Pulmonary effort is normal. No respiratory distress.   Musculoskeletal:         General: No deformity. Normal range of motion.      Cervical back: Normal range of motion and neck supple.   Skin:     Coloration: Skin is not pale.      Findings: No rash.   Neurological:      Mental Status: She is alert and oriented to person, place, and time.   Psychiatric:         Behavior: Behavior normal.         Thought Content: Thought content normal.         Judgment: Judgment normal.       Assessment:     1. Continuous opioid dependence- contract 3/21/19      Plan:   Continuous opioid dependence- contract 3/21/19  -     HYDROcodone-acetaminophen (NORCO) 7.5-325 mg per tablet; Take 1 tablet by mouth every 6 (six) hours as needed for Pain.  Dispense: 120 tablet; Refill: 0  -     HYDROcodone-acetaminophen (NORCO) 7.5-325 mg per tablet; Take 1 tablet by mouth every 6 (six) hours as needed for Pain. Do not fill until 1/11/24  Dispense: 120 tablet; Refill: 0  -     HYDROcodone-acetaminophen (NORCO) 7.5-325 mg per tablet; Take 1 tablet by mouth every 6 (six) hours as needed for Pain. Do not fill until 2/11/24  Dispense: 120 tablet; Refill: 0            Total time spent of Less than 30 minutes minutes on the day of the visit.This includes face to face time and preparing to see the patient, obtaining and reviewing separately obtained history, documenting clinical information in the electronic or other health record, independently interpreting results and communicating results to the patient/family/caregiver, or care coordinator.    Established patient with me has been instructed that must see me at least 1 time yearly (every 365 days) for refills of medications. Seeing other providers in this clinic is fine but expectation is to see me yearly.    Jose De Jesus Curran MD  12/06/2023    Portions of this note have been dictated with DILLON Spear

## 2023-12-12 ENCOUNTER — PATIENT MESSAGE (OUTPATIENT)
Dept: FAMILY MEDICINE | Facility: CLINIC | Age: 48
End: 2023-12-12
Payer: MEDICARE

## 2023-12-12 DIAGNOSIS — F11.20 CONTINUOUS OPIOID DEPENDENCE: Chronic | ICD-10-CM

## 2023-12-12 RX ORDER — HYDROCODONE BITARTRATE AND ACETAMINOPHEN 5; 325 MG/1; MG/1
1 TABLET ORAL EVERY 6 HOURS PRN
Qty: 120 TABLET | Refills: 0 | Status: SHIPPED | OUTPATIENT
Start: 2023-12-12 | End: 2024-01-10 | Stop reason: SDUPTHER

## 2023-12-12 NOTE — TELEPHONE ENCOUNTER
No care due was identified.  [facility] Embedded Care Due Messages. Reference number: [id number].   12/12/2023 10:51:13 AM CST

## 2023-12-12 NOTE — TELEPHONE ENCOUNTER
Please see portal message.   The Norco 7.5 mg is not available    Please change to Norco 5 mg and send prescription to [facility]   Medication pended if OK

## 2023-12-19 ENCOUNTER — HOSPITAL ENCOUNTER (OUTPATIENT)
Dept: RADIOLOGY | Facility: HOSPITAL | Age: 48
Discharge: HOME | End: 2023-12-19
Attending: STUDENT IN AN ORGANIZED HEALTH CARE EDUCATION/TRAINING PROGRAM
Payer: MEDICARE

## 2023-12-19 ENCOUNTER — PATIENT MESSAGE (OUTPATIENT)
Dept: UROLOGY | Facility: CLINIC | Age: 48
End: 2023-12-19
Payer: MEDICARE

## 2023-12-19 DIAGNOSIS — N31.9 NEUROGENIC BLADDER: ICD-10-CM

## 2023-12-19 PROCEDURE — 76770 US EXAM ABDO BACK WALL COMP: CPT | Mod: TC

## 2023-12-19 PROCEDURE — 76770 US RETROPERITONEAL COMPLETE: ICD-10-PCS | Mod: 26,,, | Performed by: RADIOLOGY

## 2023-12-19 PROCEDURE — 76770 US EXAM ABDO BACK WALL COMP: CPT | Mod: 26,,, | Performed by: RADIOLOGY

## 2023-12-20 ENCOUNTER — PATIENT MESSAGE (OUTPATIENT)
Dept: ADMINISTRATIVE | Facility: HOSPITAL | Age: 48
End: 2023-12-20
Payer: MEDICARE

## 2023-12-21 NOTE — PROGRESS NOTES
[facility] Note    PCP: Jose De Jesus Curran MD    Chief Complaint: neurogenic bladder (VV)    SUBJECTIVE:       History of Present Illness:  Briana Uriarte is a 48 y.o. female who presents to clinic for neurogenic bladder. She is Established  to our clinic.     She underwent right ureteroscopy on 9/15/23.   - no tumors or concerning lesions within the bladder  - right ureter widely patent and dilated   - no tumors within the right collecting system, at the area filling defect in the right lower pole there was a large amount of stone debris, no significant stone fragments  - a few passes of the basket were used to grab as many stone fragments as possible however there is still significant amount of very small stone debris left in the kidney    Stone analysis: 90% Calcium phosphate (apatite). 10% Magnesium ammonium   phosphate (struvite).     Renal US 12/19/23:   Right kidney: It is poorly seen due to overlying bowel gas. There are mildly dilated calices seen.  No beto hydronephrosis. No renal stone.  Left kidney: No solid renal parenchyma is identified with severe hydronephrosis noted.    Occasionally has mild hematuria in the am.  No pain.   No fevers.     8/30/23  Her CT Urogram showed a concerning filling defect in the lower pole of the right kidney, 2.3 cm. No signs of extravasation on this or cystogram.     Cytology is negative.     She is doing well today.     5/10/23  No recent imaging  GFR 1/27/23: >60  No documented UTIs    She has continued issues with sacral decubitus ulcer with open wound. She has a second open wound near the perineum. Mom states that this drains clear fluid that she is concerned may be urine. Cultures from this have grown Staph, proteus, E coli.   She was following with wound care, has not seen ID.   No issues with SPT. Drains clear, but darker/orange in the AM. Has some sediment.     6/28/22  Doing well from a urologic standpoint.   Mcdonald being changed  every 3 weeks. No issues with exchanges.  Has been battling a sacral decubitus ulcer for which she is following with wound care.     Had a CT in september which showed a blown out non-function left kidney. No hydro in the right however small volume of renal stones.     6/9/21  Previous patient of Dr. Smith, last seen March 2020.  Patient has a hx of spina bifida, hydrocephalus, paraplegia and neurogenic bladder. She is currently managed with an SPT. Changed by her mother every 3 weeks.   She has a multi-cystic dystrophic nonfunctional left kidney. She does have some large right sided renal stones that appear to be intraparenchymal as of CT from July 2020.    Not having issues with frequent symptomatic UTIs.   Has had SPT in place for 12 years.   Had a PCNL on the left side >10 years ago  No gross hematuria.     Recently admitted to hospital with respiratory issues.     Last urine culture: multiple documented UTIs, however unclear if these were symptomatic     Lab Results   Component Value Date    CREATININE 0.7 09/15/2023     Family  hx: no bladder or kidney cancer   Hx of spina bifida, paraplegia, HTN, DM, obesity, Pickwickian syndrome    Past medical, family, and social history reviewed as documented in chart with pertinent positive medical, family, and social history detailed in HPI.    Review of patient's allergies indicates:   Allergen Reactions    Latex Swelling    Adhesive Blisters     tegaderm    Cefepime      HIVES       Past Medical History:   Diagnosis Date    Chronic hypercapnic respiratory failure     Chronic kidney disease     kidney stones    Diabetes mellitus, type 2     Encounter for blood transfusion     Esotropia     GERD (gastroesophageal reflux disease)     Hydrocephalus     Hypertension     Morbid obesity     Obesity hypoventilation syndrome     On home oxygen therapy     Paralysis     Psoriasis     Scoliosis     Spinal bifida, closed      Past Surgical History:   Procedure Laterality Date  "   BRAIN SURGERY       shunt revision    CHOLECYSTECTOMY      CYSTOSCOPY W/ LASER LITHOTRIPSY      CYSTOURETEROSCOPY, WITH HOLMIUM LASER LITHOTRIPSY OF URETERAL CALCULUS AND STENT INSERTION Right 9/15/2023    Procedure: CYSTOURETEROSCOPY, WITH HOLMIUM LASER LITHOTRIPSY OF URETERAL CALCULUS AND STENT INSERTION;  Surgeon: Siobhan Phillips MD;  [facility] OR;  Service: Urology;  Laterality: Right;    REPAIR OF INCARCERATED UMBILICAL HERNIA N/A 3/8/2019    Procedure: REPAIR, HERNIA, UMBILICAL, INCARCERATED, AGE 5 YEARS OR OLDER;  Surgeon: Ziyad Hancock MD;  Location: [facility] OR;  Service: General;  Laterality: N/A;    SPINE SURGERY       Family History   Problem Relation Age of Onset    Cancer Mother     Glaucoma Father     Heart disease Maternal Grandmother     Cancer Maternal Grandmother     Glaucoma Paternal Grandfather     Psoriasis Maternal Uncle     Melanoma Neg Hx     Lupus Neg Hx     Eczema Neg Hx      Social History     Tobacco Use    Smoking status: Never    Smokeless tobacco: Never   Substance Use Topics    Alcohol use: No    Drug use: Yes     Types: Hydrocodone        Review of Systems   Unable to perform ROS    OBJECTIVE:     Anticoagulation:  2.5 mg Eliquis     Estimated body mass index is 54.83 kg/m² as calculated from the following:    Height as of 12/6/23: 4' 10" (1.473 m).    Weight as of 12/6/23: 119 kg (262 lb 5.6 oz).    Vital Signs (Most Recent)       Physical Exam  Constitutional:       General: She is not in acute distress.     Appearance: She is obese. She is not ill-appearing, toxic-appearing or diaphoretic.      Comments: In wheelchair   HENT:      Head: Normocephalic and atraumatic.   Cardiovascular:      Rate and Rhythm: Normal rate and regular rhythm.   Pulmonary:      Effort: Pulmonary effort is normal. No respiratory distress.   Skin:     General: Skin is warm and dry.      Coloration: Skin is not jaundiced.   Neurological:      Mental Status: She is alert. Mental status is at " baseline.         BMP  Lab Results   Component Value Date     09/15/2023    K 3.7 09/15/2023     09/15/2023    CO2 23 09/15/2023    BUN 15 09/15/2023    CREATININE 0.7 09/15/2023    CALCIUM 9.4 09/15/2023    ANIONGAP 11 09/15/2023    ESTGFRAFRICA >60.0 07/18/2022    EGFRNONAA >60.0 07/18/2022       Lab Results   Component Value Date    WBC 8.20 12/01/2023    HGB 11.0 (L) 12/01/2023    HCT 36.6 (L) 12/01/2023    MCV 80 (L) 12/01/2023     12/01/2023       Imaging:  Per HPI    ASSESSMENT     1. Calculus of urinary tract in diseases classified elsewhere    2. Spina bifida manifesta    3. Suprapubic catheter    4. Neurogenic bladder      PLAN:     - Doing well with no complaints   - Mild calyceal dilation on right, but no obvious hydro  - Plan to repeat CT in 3 months to assess for hydro and regrowth of stones given there was a struvite component  - Will check BMP at that time  - Will schedule VV to discuss results   - Encouraged fluid intake of at least 70 oz daily       Siobhan Phillips MD

## 2023-12-27 ENCOUNTER — OFFICE VISIT (OUTPATIENT)
Dept: UROLOGY | Facility: CLINIC | Age: 48
End: 2023-12-27
Payer: MEDICARE

## 2023-12-27 ENCOUNTER — TELEPHONE (OUTPATIENT)
Dept: UROLOGY | Facility: CLINIC | Age: 48
End: 2023-12-27

## 2023-12-27 DIAGNOSIS — N22 CALCULUS OF URINARY TRACT IN DISEASES CLASSIFIED ELSEWHERE: Primary | ICD-10-CM

## 2023-12-27 DIAGNOSIS — Q05.9 SPINA BIFIDA MANIFESTA: ICD-10-CM

## 2023-12-27 DIAGNOSIS — Z93.59 SUPRAPUBIC CATHETER: ICD-10-CM

## 2023-12-27 DIAGNOSIS — N31.9 NEUROGENIC BLADDER: ICD-10-CM

## 2023-12-27 PROCEDURE — 99214 PR OFFICE/OUTPT VISIT, EST, LEVL IV, 30-39 MIN: ICD-10-PCS | Mod: 95,,, | Performed by: STUDENT IN AN ORGANIZED HEALTH CARE EDUCATION/TRAINING PROGRAM

## 2023-12-27 PROCEDURE — 99214 OFFICE O/P EST MOD 30 MIN: CPT | Mod: 95,,, | Performed by: STUDENT IN AN ORGANIZED HEALTH CARE EDUCATION/TRAINING PROGRAM

## 2023-12-27 NOTE — TELEPHONE ENCOUNTER
Spoke with patient's mother to scheduled CT and labs, she stated, they thought about it and since she is doing well, they want to schedule at 6 months instead of three. Informed will give message to provider and schedule as requested, she verbally understood.

## 2024-01-05 ENCOUNTER — PATIENT MESSAGE (OUTPATIENT)
Dept: ADMINISTRATIVE | Facility: HOSPITAL | Age: 49
End: 2024-01-05
Payer: MEDICARE

## 2024-01-05 ENCOUNTER — TELEPHONE (OUTPATIENT)
Dept: UROLOGY | Facility: CLINIC | Age: 49
End: 2024-01-05
Payer: MEDICARE

## 2024-01-05 NOTE — TELEPHONE ENCOUNTER
Spoke with patient's mother, and advisement given on the reason for doing the imaging and labs in 3 months. Mother got really upset, she states no one understands what it takes to get her in and out the chair, even the radiology dept have a hard  time finding the [place] lift or can't find it at all making the whole process very frustrating. No one can pick her up, making all appts very difficult for her to attend and she does not know why no  one wants to understand this.  She knows if something is different nothing much will be done about it. Informed will give her message to the provider.

## 2024-01-10 ENCOUNTER — PATIENT MESSAGE (OUTPATIENT)
Dept: FAMILY MEDICINE | Facility: CLINIC | Age: 49
End: 2024-01-10
Payer: MEDICARE

## 2024-01-10 DIAGNOSIS — F11.20 CONTINUOUS OPIOID DEPENDENCE: Chronic | ICD-10-CM

## 2024-01-10 NOTE — TELEPHONE ENCOUNTER
No care due was identified.  Health [facility] Embedded Care Due Messages. Reference number: [id number].   1/10/2024 12:08:09 PM CST

## 2024-01-10 NOTE — TELEPHONE ENCOUNTER
Requesting refill for Hydrocodone      Last visit 12/6/2023  Next visit  3/6/2024  Requesting to be sent 5 mg again 7.5 mg are not available   Medication pended

## 2024-01-12 ENCOUNTER — TELEPHONE (OUTPATIENT)
Dept: FAMILY MEDICINE | Facility: CLINIC | Age: 49
End: 2024-01-12
Payer: MEDICARE

## 2024-01-12 RX ORDER — HYDROCODONE BITARTRATE AND ACETAMINOPHEN 5; 325 MG/1; MG/1
1 TABLET ORAL EVERY 6 HOURS PRN
Qty: 120 TABLET | Refills: 0 | Status: SHIPPED | OUTPATIENT
Start: 2024-01-12 | End: 2024-02-20 | Stop reason: SDUPTHER

## 2024-01-12 NOTE — TELEPHONE ENCOUNTER
Spoke to mother    She is unable to get Norco 7.5 filled   Please send prescription for Norco 5 mg    Her last dose is today

## 2024-01-12 NOTE — TELEPHONE ENCOUNTER
Spoke to mother    The pharmacy cannot get the Norco 7.5 mg   Please send in Norco 5 mg prescription   She is out of medication today  See portal message

## 2024-01-15 ENCOUNTER — PATIENT MESSAGE (OUTPATIENT)
Dept: FAMILY MEDICINE | Facility: CLINIC | Age: 49
End: 2024-01-15
Payer: MEDICARE

## 2024-01-16 NOTE — TELEPHONE ENCOUNTER
See message about Prior Auth needed for patient medication ? Can you sent RX to [facility] to see if the can get this approved

## 2024-01-17 ENCOUNTER — PATIENT MESSAGE (OUTPATIENT)
Dept: FAMILY MEDICINE | Facility: CLINIC | Age: 49
End: 2024-01-17
Payer: MEDICARE

## 2024-02-06 ENCOUNTER — PATIENT MESSAGE (OUTPATIENT)
Dept: ADMINISTRATIVE | Facility: HOSPITAL | Age: 49
End: 2024-02-06
Payer: MEDICARE

## 2024-02-20 DIAGNOSIS — F11.20 CONTINUOUS OPIOID DEPENDENCE: Chronic | ICD-10-CM

## 2024-02-21 ENCOUNTER — TELEPHONE (OUTPATIENT)
Dept: OPHTHALMOLOGY | Facility: CLINIC | Age: 49
End: 2024-02-21
Payer: MEDICARE

## 2024-02-21 RX ORDER — HYDROCODONE BITARTRATE AND ACETAMINOPHEN 5; 325 MG/1; MG/1
1 TABLET ORAL EVERY 6 HOURS PRN
Qty: 120 TABLET | Refills: 0 | Status: SHIPPED | OUTPATIENT
Start: 2024-02-21 | End: 2024-03-06

## 2024-02-21 NOTE — TELEPHONE ENCOUNTER
Spoke to pt mom and scheduled for next avail urgent. Offered sooner avail in [place]. Mom declined  ----- Message from Yane Sands sent at 2/21/2024 10:03 AM CST -----  Contact: pt  Type:  Sooner Appointment Request    Caller is requesting a sooner appointment.  Caller declined first available appointment listed below.  Caller will not accept being placed on the waitlist and is requesting a message be sent to doctor.    Name of Caller:  pt  When is the first available appointment?  may  Symptoms:  white spot on pupil  Would the patient rather a call back or a response via [facility]? call  Best Call Back Number:  [phone]    Additional Information:  pt is calling the office trying to get a sooner appt.Please call back and advise.

## 2024-02-21 NOTE — TELEPHONE ENCOUNTER
No care due was identified.  Health [facility] Embedded Care Due Messages. Reference number: [id number].   2/20/2024 8:33:53 PM CST

## 2024-03-06 ENCOUNTER — OFFICE VISIT (OUTPATIENT)
Dept: OPTOMETRY | Facility: CLINIC | Age: 49
End: 2024-03-06
Payer: MEDICARE

## 2024-03-06 ENCOUNTER — OFFICE VISIT (OUTPATIENT)
Dept: FAMILY MEDICINE | Facility: CLINIC | Age: 49
End: 2024-03-06
Payer: MEDICARE

## 2024-03-06 DIAGNOSIS — H52.7 REFRACTIVE ERROR: ICD-10-CM

## 2024-03-06 DIAGNOSIS — H26.9 CORTICAL CATARACT: Primary | ICD-10-CM

## 2024-03-06 DIAGNOSIS — F11.20 CONTINUOUS OPIOID DEPENDENCE: Chronic | ICD-10-CM

## 2024-03-06 DIAGNOSIS — H04.123 DRY EYE SYNDROME, BILATERAL: ICD-10-CM

## 2024-03-06 PROCEDURE — 99213 OFFICE O/P EST LOW 20 MIN: CPT | Mod: PBBFAC,PO | Performed by: OPTOMETRIST

## 2024-03-06 PROCEDURE — 99214 OFFICE O/P EST MOD 30 MIN: CPT | Mod: 95,,, | Performed by: FAMILY MEDICINE

## 2024-03-06 PROCEDURE — 99999 PR PBB SHADOW E&M-EST. PATIENT-LVL III: CPT | Mod: PBBFAC,,, | Performed by: OPTOMETRIST

## 2024-03-06 PROCEDURE — 99203 OFFICE O/P NEW LOW 30 MIN: CPT | Mod: S$PBB,,, | Performed by: OPTOMETRIST

## 2024-03-06 RX ORDER — HYDROCODONE BITARTRATE AND ACETAMINOPHEN 5; 325 MG/1; MG/1
1 TABLET ORAL EVERY 8 HOURS PRN
Qty: 90 TABLET | Refills: 0 | Status: SHIPPED | OUTPATIENT
Start: 2024-03-06 | End: 2024-06-07 | Stop reason: SDUPTHER

## 2024-03-06 NOTE — PROGRESS NOTES
The patient location is:  [place]  The chief complaint leading to consultation is:  checkup  Visit type: Virtual visit with synchronous audio and video  Total time spent with patient:  Less than 30 minutes  Each patient to whom he or she provides medical services by telemedicine is:  (1) informed of the relationship between the physician and patient and the respective role of any other health care provider with respect to management of the patient; and (2) notified that he or she may decline to receive medical services by telemedicine and may withdraw from such care at any time. Vital signs recorded were provided by the patient.    Notes:  See below    Subjective:   Patient ID: Briana Uriarte is a 48 y.o. female     Chief Complaint: Checkup    Here for checkup and med refill. Doing well tapering down on norco on own      Review of Systems   Respiratory:  Negative for shortness of breath.    Cardiovascular:  Negative for chest pain.   Gastrointestinal:  Negative for abdominal pain.   Genitourinary:  Negative for dysuria.     Past Medical History:   Diagnosis Date    Chronic hypercapnic respiratory failure     Chronic kidney disease     kidney stones    Diabetes mellitus, type 2     Encounter for blood transfusion     Esotropia     GERD (gastroesophageal reflux disease)     Hydrocephalus     Hypertension     Morbid obesity     Obesity hypoventilation syndrome     On home oxygen therapy     Paralysis     Psoriasis     Scoliosis     Spinal bifida, closed      Past Surgical History:   Procedure Laterality Date    BRAIN SURGERY       shunt revision    CHOLECYSTECTOMY      CYSTOSCOPY W/ LASER LITHOTRIPSY      CYSTOURETEROSCOPY, WITH HOLMIUM LASER LITHOTRIPSY OF URETERAL CALCULUS AND STENT INSERTION Right 9/15/2023    Procedure: CYSTOURETEROSCOPY, WITH HOLMIUM LASER LITHOTRIPSY OF URETERAL CALCULUS AND STENT INSERTION;  Surgeon: Siobhan Phillips MD;  Location: [facility];  Service: Urology;  Laterality: Right;     REPAIR OF INCARCERATED UMBILICAL HERNIA N/A 3/8/2019    Procedure: REPAIR, HERNIA, UMBILICAL, INCARCERATED, AGE 5 YEARS OR OLDER;  Surgeon: Ziyad Hancock MD;  Location: [facility];  Service: General;  Laterality: N/A;    SPINE SURGERY       Objective:   There were no vitals filed for this visit.  There is no height or weight on file to calculate BMI.  Physical Exam  Vitals and nursing note reviewed.   Constitutional:       Appearance: She is well-developed.   HENT:      Head: Normocephalic and atraumatic.   Eyes:      General: No scleral icterus.     Conjunctiva/sclera: Conjunctivae normal.   Pulmonary:      Effort: Pulmonary effort is normal. No respiratory distress.   Musculoskeletal:         General: No deformity. Normal range of motion.      Cervical back: Normal range of motion and neck supple.   Skin:     Coloration: Skin is not pale.      Findings: No rash.   Neurological:      Mental Status: She is alert and oriented to person, place, and time.   Psychiatric:         Behavior: Behavior normal.         Thought Content: Thought content normal.         Judgment: Judgment normal.       Assessment:     1. Continuous opioid dependence- contract 3/21/19      Plan:   Continuous opioid dependence- contract 3/21/19  -     HYDROcodone-acetaminophen (NORCO) 5-325 mg per tablet; Take 1 tablet by mouth every 8 (eight) hours as needed for Pain.  Dispense: 90 tablet; Refill: 0  -     HYDROcodone-acetaminophen (NORCO) 5-325 mg per tablet; Take 1 tablet by mouth every 8 (eight) hours as needed for Pain. Do not fill unti 4/6/24  Dispense: 90 tablet; Refill: 0  -     HYDROcodone-acetaminophen (NORCO) 5-325 mg per tablet; Take 1 tablet by mouth every 8 (eight) hours as needed for Pain. Do not fill until 5/6/24  Dispense: 90 tablet; Refill: 0            Total time spent of Less than 30 minutes minutes on the day of the visit.This includes face to face time and preparing to see the patient, obtaining and reviewing separately  obtained history, documenting clinical information in the electronic or other health record, independently interpreting results and communicating results to the patient/family/caregiver, or care coordinator.    Established patient with me has been instructed that must see me at least 1 time yearly (every 365 days) for refills of medications. Seeing other providers in this clinic is fine but expectation is to see me yearly.    Jose De Jesus Curran MD  03/06/2024    Portions of this note have been dictated with DILLON Spear

## 2024-03-06 NOTE — PROGRESS NOTES
"HPI    Pt here today for "cloudy" pupil OD with blurred vision x 1 month.       Denies any headaches, eye pain or pressure in eye.    (-) gtts      Last edited by Caroline Cardozo on 3/6/2024  9:44 AM.            Assessment /Plan     For exam results, see Encounter Report.    Cortical cataract    Dry eye syndrome, bilateral    Refractive error      1. Cortical cataract  Moderate anterior cortical cataract OD, trace cortical cataract OS  Discussed options, pt asymptomatic  Observe     2. Dry eye syndrome, bilateral  SPK OU  Discussed ocular affects of dry eyes. Recommend OTC artificial tears 2-4 times a day in both eyes.     3. Refractive error  Prefers to go back to previous provider at [facility] for comprehensive and updated rx                     "

## 2024-03-06 NOTE — PATIENT INSTRUCTIONS
Jasen Warren,     If you are due for any health screening(s) below please notify me so we can arrange them to be ordered and scheduled to maintain your health. Most healthy patients complete it. Don't lose out on improving your health.     Tests to Keep You Healthy    Cervical Cancer Screening: DUE  Last Blood Pressure <= 139/89 (12/6/2023): NO              Dear Ms. Uriarte:    Your [facility] is dedicated to helping you stay healthy with regularly scheduled recommended screenings.  Scheduling routine screenings is important to maintaining good health. Our records indicate that you may be overdue for your screening pap smear. A pap smear screening can help identify patients at risk for developing cervical cancer at an early stage, when it is most likely to be successfully treated.    We encourage you to schedule your appointment with your womens health provider or some primary care providers also perform this screening.    If you have completed or scheduled your pap smear screening outside of [facility], please notify your primary care team so we can update your health record.      If you have questions or would like to schedule your screening, please contact your primary care clinic.    Sincerely,    Your [facility]

## 2024-03-07 ENCOUNTER — TELEPHONE (OUTPATIENT)
Dept: FAMILY MEDICINE | Facility: CLINIC | Age: 49
End: 2024-03-07
Payer: MEDICARE

## 2024-03-08 ENCOUNTER — TELEPHONE (OUTPATIENT)
Dept: FAMILY MEDICINE | Facility: CLINIC | Age: 49
End: 2024-03-08
Payer: MEDICARE

## 2024-03-10 DIAGNOSIS — I87.2 PERIPHERAL VENOUS INSUFFICIENCY: ICD-10-CM

## 2024-03-10 NOTE — TELEPHONE ENCOUNTER
No care due was identified.  [facility] Embedded Care Due Messages. Reference number: [id number].   3/10/2024 5:48:30 AM CDT

## 2024-03-11 RX ORDER — FUROSEMIDE 20 MG/1
TABLET ORAL
Qty: 90 TABLET | Refills: 3 | OUTPATIENT
Start: 2024-03-11

## 2024-03-11 RX ORDER — FUROSEMIDE 20 MG/1
20 TABLET ORAL DAILY PRN
Qty: 135 TABLET | Refills: 1 | Status: SHIPPED | OUTPATIENT
Start: 2024-03-11 | End: 2024-06-07 | Stop reason: SDUPTHER

## 2024-03-11 NOTE — TELEPHONE ENCOUNTER
Refill Routing Note   Medication(s) are not appropriate for processing by [facility] for the following reason(s):        Clarification of medication (Rx) details  Required vitals abnormal    [facility] action(s):  Defer        Medication Therapy Plan: Furosemide 20 mg discontinued 5/30/23 by Dea Foley PA; dose adjustment. Sent to pharmacy as No Print. Sig/Qty adjusted pending provider approval      Appointments  past 12m or future 3m with PCP    Date Provider   Last Visit   3/6/2024 Jose De Jesus Curran MD   Next Visit   6/7/2024 Jose De Jesus Curran MD   ED visits in past 90 days: 0        Note composed:10:56 AM 03/11/2024

## 2024-04-16 ENCOUNTER — OFFICE VISIT (OUTPATIENT)
Dept: PULMONOLOGY | Facility: CLINIC | Age: 49
End: 2024-04-16
Payer: MEDICARE

## 2024-04-16 VITALS
WEIGHT: 265 LBS | BODY MASS INDEX: 55.39 KG/M2 | OXYGEN SATURATION: 97 % | DIASTOLIC BLOOD PRESSURE: 82 MMHG | SYSTOLIC BLOOD PRESSURE: 140 MMHG | HEART RATE: 87 BPM

## 2024-04-16 DIAGNOSIS — E66.01 MORBID OBESITY WITH BMI OF 50.0-59.9, ADULT: ICD-10-CM

## 2024-04-16 DIAGNOSIS — E66.2 OBESITY HYPOVENTILATION SYNDROME: Primary | ICD-10-CM

## 2024-04-16 DIAGNOSIS — Z93.59 SUPRAPUBIC CATHETER: ICD-10-CM

## 2024-04-16 DIAGNOSIS — J96.12 CHRONIC RESPIRATORY FAILURE WITH HYPOXIA AND HYPERCAPNIA: ICD-10-CM

## 2024-04-16 DIAGNOSIS — J96.11 CHRONIC RESPIRATORY FAILURE WITH HYPOXIA AND HYPERCAPNIA: ICD-10-CM

## 2024-04-16 PROCEDURE — 99214 OFFICE O/P EST MOD 30 MIN: CPT | Mod: S$GLB,,, | Performed by: INTERNAL MEDICINE

## 2024-04-16 NOTE — PROGRESS NOTES
SUBJECTIVE:    Patient ID: Briana Uriarte is a 48 y.o. female.    Chief Complaint: Follow-up (1 year follow up)    HPI The patient returns with her mother.  Her download suggest very poor compliance but the mother insists she wears her ventilator every night with oxygen bled in.  The patient is awake for her visit suggesting she is wearing her ventilator.  She is also wearing oxygen for several hours each day.  Her sats are 97% on room air here in the office.  Told her she does not need to be wearing her oxygen during the day.  Will reassess with an overnight pulse ox to see if we still need to be bleeding oxygen into the ventilator.  Her CO2 was last measured in September where it was normal.  It is been normal for all recorded labs in the last year.  Past Medical History:   Diagnosis Date    Chronic hypercapnic respiratory failure     Chronic kidney disease     kidney stones    Diabetes mellitus, type 2     Encounter for blood transfusion     Esotropia     GERD (gastroesophageal reflux disease)     Hydrocephalus     Hypertension     Morbid obesity     Obesity hypoventilation syndrome     On home oxygen therapy     Paralysis     Psoriasis     Scoliosis     Spinal bifida, closed      Past Surgical History:   Procedure Laterality Date    BRAIN SURGERY       shunt revision    CHOLECYSTECTOMY      CYSTOSCOPY W/ LASER LITHOTRIPSY      CYSTOURETEROSCOPY, WITH HOLMIUM LASER LITHOTRIPSY OF URETERAL CALCULUS AND STENT INSERTION Right 9/15/2023    Procedure: CYSTOURETEROSCOPY, WITH HOLMIUM LASER LITHOTRIPSY OF URETERAL CALCULUS AND STENT INSERTION;  Surgeon: Siobhan Phillips MD;  Location: [facility] OR;  Service: Urology;  Laterality: Right;    REPAIR OF INCARCERATED UMBILICAL HERNIA N/A 3/8/2019    Procedure: REPAIR, HERNIA, UMBILICAL, INCARCERATED, AGE 5 YEARS OR OLDER;  Surgeon: Ziyad Hancock MD;  Location[facility] OR;  Service: General;  Laterality: N/A;    SPINE SURGERY       Family History   Problem  Relation Name Age of Onset    Cancer Mother      Glaucoma Father      Psoriasis Maternal Uncle      Macular degeneration Maternal Grandmother      Heart disease Maternal Grandmother      Cancer Maternal Grandmother      Glaucoma Paternal Grandfather      Melanoma Neg Hx      Lupus Neg Hx      Eczema Neg Hx          Social History:   Marital Status: Single  Occupation: Data Unavailable  Alcohol History:  reports no history of alcohol use.  Tobacco History:  reports that she has never smoked. She has never used smokeless tobacco.  Drug History:  reports current drug use. Drug: Hydrocodone.    Review of patient's allergies indicates:   Allergen Reactions    Latex Swelling    Adhesive Blisters     tegaderm    Cefepime      HIVES       Current Outpatient Medications   Medication Sig Dispense Refill    furosemide (LASIX) 20 MG tablet Take 1 tablet (20 mg total) by mouth daily as needed. 135 tablet 1    HYDROcodone-acetaminophen (NORCO) 5-325 mg per tablet Take 1 tablet by mouth every 8 (eight) hours as needed for Pain. 90 tablet 0    metoprolol succinate (TOPROL-XL) 50 MG 24 hr tablet TAKE 1 TABLET(50 MG) BY MOUTH EVERY DAY 90 tablet 3    multivitamin with minerals tablet Take 1 tablet by mouth once daily.      silver sulfADIAZINE 1% (SILVADENE) 1 % cream Apply topically.      HYDROcodone-acetaminophen (NORCO) 5-325 mg per tablet Take 1 tablet by mouth every 8 (eight) hours as needed for Pain. Do not fill unti 4/6/24 90 tablet 0    HYDROcodone-acetaminophen (NORCO) 5-325 mg per tablet Take 1 tablet by mouth every 8 (eight) hours as needed for Pain. Do not fill until 5/6/24 90 tablet 0     No current facility-administered medications for this visit.     Facility-Administered Medications Ordered in Other Visits   Medication Dose Route Frequency Provider Last Rate Last Admin    sodium chloride 0.9% flush 10 mL  10 mL Intravenous PRN Bozena Acosta MD        sodium chloride 0.9% flush 10 mL  10 mL Intravenous PRN Karla  "MD Bozena        sodium chloride 0.9% flush 10 mL  10 mL Intravenous PRN Bozena Acosta MD        sodium chloride 0.9% flush 10 mL  10 mL Intravenous PRN Bozena Acosta MD           Alpha-1 Antitrypsin:  Last PFT:   Last CT:    Review of Systems  General: Feeling ok  Eyes: Vision is good.  ENT:  No sinusitis or pharyngitis.   Heart:: No chest pain or palpitations.  Lungs: No cough, sputum, or wheezing.   GI: No Nausea, vomiting, constipation, diarrhea, or reflux.  : No dysuria, hesitancy, or nocturia.  Musculoskeletal: No joint pain or myalgias.  Skin:  No new problems  Neuro: No headaches or neuropathy.  Lymph: No edema or adenopathy.  Psych: No anxiety or depression.  Endo: No weight change.    OBJECTIVE:      BP (!) 140/82 (BP Location: Left arm, Patient Position: Sitting, BP Method: Large (Manual))   Pulse 87   Wt 120.2 kg (265 lb)   SpO2 97%   BMI 55.39 kg/m²     Physical Exam  GENERAL: Midaged obese wheelchair patient in no distress.  HEENT: Pupils equal and reactive. Extraocular movements intact. Nose intact.      Pharynx moist. Malampati 4  NECK: Supple. 23"  HEART: Regular rate and rhythm. No murmur or gallop auscultated.  LUNGS:  The lungs are clear to auscultation..  Lung excursion symmetrical. No change in fremitus  ABDOMEN: Bowel sounds present.  Very obese.  Non-tender, no masses palpated.  EXTREMITIES:  Her legs are foreshortened.  Both legs are edematous.    :  Suprapubic catheter with yellow urine.  LYMPHATICS: No adenopathy palpated, edema to both legs  SKIN: Dry, intact, no lesions noted. Skin over legs is very dry.  NEURO:  Cognitively impaired.  Paraplegic.  PSYCH: Appropriate affect.    Assessment:       1. Obesity hypoventilation syndrome    2. Chronic respiratory failure with hypoxia and hypercapnia    3. Morbid obesity with BMI of 50.0-59.9, adult            Plan:          Follow up in about 1 year (around 4/16/2025).    [employer] out of [place] " therapist, [phone], office number [phone] for download of her trilogy  No need to be wearing oxygen during the day.  Will do an overnight pulse ox on the ventilator without oxygen bled into see if we can  her oxygen totally.  Continue wearing ventilator each night.

## 2024-05-08 ENCOUNTER — TELEPHONE (OUTPATIENT)
Dept: PULMONOLOGY | Facility: HOSPITAL | Age: 49
End: 2024-05-08

## 2024-05-08 NOTE — TELEPHONE ENCOUNTER
The patient's overnight pulse ox on her ventilator without oxygen showed that she only dropped below 89% for 17 seconds.  Will discontinue her oxygen.  Tried to reach mother, left message

## 2024-05-09 ENCOUNTER — TELEPHONE (OUTPATIENT)
Dept: PULMONOLOGY | Facility: HOSPITAL | Age: 49
End: 2024-05-09

## 2024-05-09 NOTE — TELEPHONE ENCOUNTER
DC oxygen order sent to [facility] pt does not need it bled in with ventilator.  Pt just needs vent.

## 2024-05-09 NOTE — TELEPHONE ENCOUNTER
"Spoke with the patient's mother about her not requiring any oxygen with her ventilator.  Will call DON[facility] VERA and [facility]rick and have them  the oxygen.  She obviously needs to continue sleeping on her ventilator.  Discussed the need for weight loss in his 4'10"-265 lb wheelchair-bound female.  Her mother says they will try.  "

## 2024-05-10 ENCOUNTER — TELEPHONE (OUTPATIENT)
Dept: PULMONOLOGY | Facility: CLINIC | Age: 49
End: 2024-05-10

## 2024-05-10 NOTE — TELEPHONE ENCOUNTER
Called Qamar Uriarte, patients dad would like to speak to you in reference to patients oxygen, which she is not stopping.  Asking a few questions I cannot answer, told him you were out of the office today but would send her a message to call him.  He verbalized an understanding.  His p# is .

## 2024-05-10 NOTE — TELEPHONE ENCOUNTER
Patient's father is convinced that she will need oxygen in the future and it would be too hard to get it back and would like to keep the oxygen.  He states her older sister has the same degenerative spine disease that she does in his collapsing and requires her oxygen continuously.  Will ask [facility] to allow her to keep her oxygen for her future respiratory embarrassment that her father feels is inevitable.

## 2024-05-13 NOTE — TELEPHONE ENCOUNTER
Per Jamari she spoke with [facility] told then she is to use the Oxygen in the daytime and to discontinue her nightime.JORGE A

## 2024-05-30 NOTE — PROGRESS NOTES
Subjective:   Patient ID: Briana Uriarte is a 44 y.o. female     Chief Complaint:Follow-up (2 months) and Medication Refill      PATIENT'S FAMILY ENDORSE THE PATIENT HAVING DIARRHEA WHICH HAS BEEN DAILY.  DENIES ANY FEVERS.  STOOL WAS LOOSE.  PATIENT THE PAST IT IS HIS HEAD ISSUES WITH CONSTIPATION AND NOW IS HAVING ISSUES THE DIARRHEA.  SYMPTOMS ARE MODERATE SEVERITY.  FAMILY HAS NOT BEEN GIVING PATIENT ANY MEDICATION FOR THIS.    Review of Systems   Constitutional: Negative for chills and fever.   HENT: Negative for sore throat.    Eyes: Negative for visual disturbance.   Respiratory: Negative for shortness of breath.    Cardiovascular: Negative for chest pain.   Gastrointestinal: Positive for diarrhea. Negative for abdominal pain.   Endocrine: Negative for polyuria.   Genitourinary: Negative for dysuria.   Musculoskeletal: Negative for back pain.   Skin: Negative for color change.   Neurological: Negative for headaches.   Psychiatric/Behavioral: Negative for agitation and confusion.     Past Medical History:   Diagnosis Date    Asthma     Back pain     Blood transfusion     Chronic kidney disease     kidney stones    Diabetes mellitus     Diabetes mellitus, type 2     Esotropia     GERD (gastroesophageal reflux disease)     Hydrocephalus     Hypertension     Non-healing ulcer of buttock     Nystagmus, congenital     Paralysis     Spinal bifida, closed     Wheezing      Objective:     Vitals:    10/29/19 1036   BP: 132/80   Pulse: 95   Temp: 98.1 °F (36.7 °C)     Body mass index is 57.95 kg/m².  Physical Exam   Constitutional: No distress.   HENT:   Head: Normocephalic and atraumatic.   Eyes: EOM are normal.   Cardiovascular: Normal rate and normal heart sounds.   Pulmonary/Chest: Effort normal.   Abdominal: Bowel sounds are normal.   Musculoskeletal: Normal range of motion.   Neurological: She is alert.   Psychiatric: She has a normal mood and affect.     Assessment:     1. Immunization  due    2. Continuous opioid dependence- contract 3/21/19      Plan:   Continuous opioid dependence- contract 3/21/19  -     Discontinue: HYDROcodone-acetaminophen (NORCO) 7.5-325 mg per tablet; Take 1 tablet by mouth every 6 (six) hours as needed for Pain. Do not fill until 10/5/19  Dispense: 120 tablet; Refill: 0  -     Discontinue: HYDROcodone-acetaminophen (NORCO) 7.5-325 mg per tablet; Take 1 tablet by mouth every 6 (six) hours as needed for Pain. Do not fill until 11/29/19  Dispense: 120 tablet; Refill: 0  -     HYDROcodone-acetaminophen (NORCO) 7.5-325 mg per tablet; Take 1 tablet by mouth every 6 (six) hours as needed for Pain. Do not fill until 12/29/19  Dispense: 120 tablet; Refill: 0  Counseled the patient in length the risks of taking this medication.  These risks include respiratory depression and death.  Patient understands these risks are increased when mixing the medication with alcohol or other medications.  Patient advised to only take the medication as prescribed and understands must follow up in my clinic for refills of this medication.  Patient also counseled on the importance of weaning this medication to its lowest tolerable dose.  Patient will follow up with my clinic for any further questions.  Immunization due  -     Influenza - Quadrivalent (6 months+) (PF)    Diarrhea, unspecified type  -     diphenoxylate-atropine 2.5-0.025 mg (LOMOTIL) 2.5-0.025 mg per tablet; Take 1 tablet by mouth 4 (four) times daily. for 10 days  Dispense: 40 tablet; Refill: 0  Counseled patient family length other assist with medication.  Notify them to follow-up with my office with any issues or if patient is having fevers in to discontinue medication      Time spent with patient: 30 minutes and over half of that time was spent on counseling an coordination of care.    Jose De Jesus Curran MD  10/31/2019    Portions of this note have been dictated with DILLON Spear     

## 2024-06-03 ENCOUNTER — TELEPHONE (OUTPATIENT)
Dept: HEMATOLOGY/ONCOLOGY | Facility: CLINIC | Age: 49
End: 2024-06-03
Payer: MEDICARE

## 2024-06-07 ENCOUNTER — OFFICE VISIT (OUTPATIENT)
Dept: FAMILY MEDICINE | Facility: CLINIC | Age: 49
End: 2024-06-07
Payer: MEDICARE

## 2024-06-07 ENCOUNTER — PATIENT MESSAGE (OUTPATIENT)
Dept: FAMILY MEDICINE | Facility: CLINIC | Age: 49
End: 2024-06-07

## 2024-06-07 DIAGNOSIS — J01.90 ACUTE SINUSITIS, RECURRENCE NOT SPECIFIED, UNSPECIFIED LOCATION: Primary | ICD-10-CM

## 2024-06-07 DIAGNOSIS — G91.1 OBSTRUCTIVE HYDROCEPHALUS: ICD-10-CM

## 2024-06-07 DIAGNOSIS — I87.2 PERIPHERAL VENOUS INSUFFICIENCY: ICD-10-CM

## 2024-06-07 DIAGNOSIS — R79.9 ABNORMAL FINDING OF BLOOD CHEMISTRY, UNSPECIFIED: ICD-10-CM

## 2024-06-07 DIAGNOSIS — G82.20 PARAPLEGIA: ICD-10-CM

## 2024-06-07 DIAGNOSIS — F11.20 CONTINUOUS OPIOID DEPENDENCE: Chronic | ICD-10-CM

## 2024-06-07 PROBLEM — L89.44: Status: RESOLVED | Noted: 2019-04-17 | Resolved: 2024-06-07

## 2024-06-07 PROBLEM — L89.314 PRESSURE ULCER OF RIGHT BUTTOCK, STAGE 4: Status: RESOLVED | Noted: 2022-04-07 | Resolved: 2024-06-07

## 2024-06-07 PROCEDURE — G2211 COMPLEX E/M VISIT ADD ON: HCPCS | Mod: 95,,, | Performed by: FAMILY MEDICINE

## 2024-06-07 PROCEDURE — 99214 OFFICE O/P EST MOD 30 MIN: CPT | Mod: 95,,, | Performed by: FAMILY MEDICINE

## 2024-06-07 RX ORDER — HYDROCODONE BITARTRATE AND ACETAMINOPHEN 5; 325 MG/1; MG/1
1 TABLET ORAL EVERY 8 HOURS PRN
Qty: 90 TABLET | Refills: 0 | Status: SHIPPED | OUTPATIENT
Start: 2024-06-07 | End: 2024-09-05

## 2024-06-07 RX ORDER — FUROSEMIDE 20 MG/1
20 TABLET ORAL DAILY PRN
Qty: 135 TABLET | Refills: 1 | Status: SHIPPED | OUTPATIENT
Start: 2024-06-07

## 2024-06-07 RX ORDER — HYDROCODONE BITARTRATE AND ACETAMINOPHEN 5; 325 MG/1; MG/1
1 TABLET ORAL EVERY 8 HOURS PRN
Qty: 90 TABLET | Refills: 0 | Status: SHIPPED | OUTPATIENT
Start: 2024-06-07

## 2024-06-07 RX ORDER — AZITHROMYCIN 250 MG/1
TABLET, FILM COATED ORAL
Qty: 6 TABLET | Refills: 0 | Status: SHIPPED | OUTPATIENT
Start: 2024-06-07 | End: 2024-06-12

## 2024-06-07 NOTE — PATIENT INSTRUCTIONS
Jasen Warren,     If you are due for any health screening(s) below please notify me so we can arrange them to be ordered and scheduled to maintain your health. Most healthy patients complete it. Don't lose out on improving your health.     Tests to Keep You Healthy    Last Blood Pressure <= 139/89 (12/6/2023): NO

## 2024-06-07 NOTE — PROGRESS NOTES
The patient location is:  [place]  The chief complaint leading to consultation is: Checkup  Visit type: Virtual visit with synchronous audio and video  Total time spent with patient:  Greater than 30 minutes  Each patient to whom he or she provides medical services by telemedicine is:  (1) informed of the relationship between the physician and patient and the respective role of any other health care provider with respect to management of the patient; and (2) notified that he or she may decline to receive medical services by telemedicine and may withdraw from such care at any time. Vital signs recorded were provided by the patient.    Notes:  See below    Subjective:   Patient ID: Briana Uriarte is a 48 y.o. female     Chief Complaint: Checkup    HPI  Review of Systems   Respiratory:  Negative for shortness of breath.    Cardiovascular:  Negative for chest pain.   Gastrointestinal:  Negative for abdominal pain.   Genitourinary:  Negative for dysuria.     Past Medical History:   Diagnosis Date    Chronic hypercapnic respiratory failure     Chronic kidney disease     kidney stones    Diabetes mellitus, type 2     Encounter for blood transfusion     Esotropia     GERD (gastroesophageal reflux disease)     Hydrocephalus     Hypertension     Morbid obesity     Obesity hypoventilation syndrome     On home oxygen therapy     Paralysis     Psoriasis     Scoliosis     Spinal bifida, closed      Past Surgical History:   Procedure Laterality Date    BRAIN SURGERY       shunt revision    CHOLECYSTECTOMY      CYSTOSCOPY W/ LASER LITHOTRIPSY      CYSTOURETEROSCOPY, WITH HOLMIUM LASER LITHOTRIPSY OF URETERAL CALCULUS AND STENT INSERTION Right 9/15/2023    Procedure: CYSTOURETEROSCOPY, WITH HOLMIUM LASER LITHOTRIPSY OF URETERAL CALCULUS AND STENT INSERTION;  Surgeon: Siobhan Phillips MD;  Location: [facility];  Service: Urology;  Laterality: Right;    REPAIR OF INCARCERATED UMBILICAL HERNIA N/A 3/8/2019    Procedure:  REPAIR, HERNIA, UMBILICAL, INCARCERATED, AGE 5 YEARS OR OLDER;  Surgeon: Ziyad Hancock MD;  Location: [facility] OR;  Service: General;  Laterality: N/A;    SPINE SURGERY       Objective:   There were no vitals filed for this visit.  There is no height or weight on file to calculate BMI.  Physical Exam  Vitals and nursing note reviewed.   Constitutional:       Appearance: She is well-developed.   HENT:      Head: Normocephalic and atraumatic.   Eyes:      General: No scleral icterus.     Conjunctiva/sclera: Conjunctivae normal.   Pulmonary:      Effort: Pulmonary effort is normal. No respiratory distress.   Musculoskeletal:         General: No deformity. Normal range of motion.      Cervical back: Normal range of motion and neck supple.   Skin:     Coloration: Skin is not pale.      Findings: No rash.   Neurological:      Mental Status: She is alert and oriented to person, place, and time.   Psychiatric:         Behavior: Behavior normal.         Thought Content: Thought content normal.         Judgment: Judgment normal.       Assessment:     1. Acute sinusitis, recurrence not specified, unspecified location    2. Peripheral venous insufficiency    3. Abnormal finding of blood chemistry, unspecified    4. Continuous opioid dependence- contract 3/21/19    5. Obstructive hydrocephalus    6. Paraplegia      Plan:   Acute sinusitis, recurrence not specified, unspecified location  -     azithromycin (Z-CASSIDY) 250 MG tablet; Take 2 tablets by mouth on day 1; Take 1 tablet by mouth on days 2-5  Dispense: 6 tablet; Refill: 0    Peripheral venous insufficiency  -     furosemide (LASIX) 20 MG tablet; Take 1 tablet (20 mg total) by mouth daily as needed.  Dispense: 135 tablet; Refill: 1  -     Comprehensive Metabolic Panel; Future; Expected date: 06/07/2024  -     Hemoglobin A1C; Future; Expected date: 06/07/2024  -     Lipid Panel; Future; Expected date: 06/07/2024  -     CBC Auto Differential; Future; Expected date:  06/07/2024    Abnormal finding of blood chemistry, unspecified  -     Hemoglobin A1C; Future; Expected date: 06/07/2024  -     Lipid Panel; Future; Expected date: 06/07/2024    Continuous opioid dependence- contract 3/21/19  -     HYDROcodone-acetaminophen (NORCO) 5-325 mg per tablet; Take 1 tablet by mouth every 8 (eight) hours as needed for Pain. Do not fill until 9/7/24  Dispense: 90 tablet; Refill: 0  -     HYDROcodone-acetaminophen (NORCO) 5-325 mg per tablet; Take 1 tablet by mouth every 8 (eight) hours as needed for Pain. Do not fill unti 7/7/24  Dispense: 90 tablet; Refill: 0  -     HYDROcodone-acetaminophen (NORCO) 5-325 mg per tablet; Take 1 tablet by mouth every 8 (eight) hours as needed for Pain.  Dispense: 90 tablet; Refill: 0    Obstructive hydrocephalus  -     Comprehensive Metabolic Panel; Future; Expected date: 06/07/2024    Paraplegia  -     Comprehensive Metabolic Panel; Future; Expected date: 06/07/2024            Total time spent of Greater than 30 minutes minutes on the day of the visit.This includes face to face time and preparing to see the patient, obtaining and reviewing separately obtained history, documenting clinical information in the electronic or other health record, independently interpreting results and communicating results to the patient/family/caregiver, or care coordinator.    Established patient with me has been instructed that must see me at least 1 time yearly (every 365 days) for refills of medications. Seeing other providers in this clinic is fine but expectation is to see me yearly.    Jose De Jesus Curarn MD  06/07/2024    Portions of this note have been dictated with DILLON Spear

## 2024-06-24 ENCOUNTER — HOSPITAL ENCOUNTER (OUTPATIENT)
Dept: RADIOLOGY | Facility: HOSPITAL | Age: 49
Discharge: HOME | End: 2024-06-24
Attending: STUDENT IN AN ORGANIZED HEALTH CARE EDUCATION/TRAINING PROGRAM
Payer: MEDICARE

## 2024-06-24 DIAGNOSIS — N22 CALCULUS OF URINARY TRACT IN DISEASES CLASSIFIED ELSEWHERE: ICD-10-CM

## 2024-06-24 PROCEDURE — 74176 CT ABD & PELVIS W/O CONTRAST: CPT | Mod: TC

## 2024-07-19 NOTE — TELEPHONE ENCOUNTER
"    Reason for Disposition   Chest pain  (Exception: MILD central chest pain, present only when coughing)   Difficulty breathing    Protocols used: COUGH - ACUTE NON-PRODUCTIVE-A-AH    Briana's mom, Manuela, called to say she has been running low-grade fever since the day of her discharge following exploratory lap for SBO 03/10/19.  Manuela states she has been coughing so much that she did not sleep last night at all. Cough is non-productive, and mom states "it sounds like a bad bronchitis or pneumonia cough, and it has her completely tired out." Briana is short of breath, and states her chest hurts, per Manuela.  Her temp this morning is 99.9.  It ranges to 100.2 since home.  Briana is 43, has spina bifida, is confined to wheelchair with paralysis, history of asthma and "breathing problems" per mom, and has suprapubic catheter. [facility], recommended ED now for evaluation.  Mom will bring her.  Message to Anaid Osei MD, pcp, Ziyad Hancock MD, surgeon, and Ramirez Corrigan MD, pulmonology. Please contact caller directly with any additional care advice.    "
Seen in ED yesterday and treated for influenza  
No, psychiatric follow up needed - call with questions...

## 2024-08-20 ENCOUNTER — PATIENT MESSAGE (OUTPATIENT)
Dept: FAMILY MEDICINE | Facility: CLINIC | Age: 49
End: 2024-08-20
Payer: MEDICARE

## 2024-08-20 DIAGNOSIS — F11.20 CONTINUOUS OPIOID DEPENDENCE: Chronic | ICD-10-CM

## 2024-08-20 RX ORDER — HYDROCODONE BITARTRATE AND ACETAMINOPHEN 5; 325 MG/1; MG/1
1 TABLET ORAL EVERY 8 HOURS PRN
Qty: 90 TABLET | Refills: 0 | Status: SHIPPED | OUTPATIENT
Start: 2024-08-20

## 2024-08-20 NOTE — TELEPHONE ENCOUNTER
No care due was identified.  [facility] Embedded Care Due Messages. Reference number: [id number].   8/20/2024 10:00:20 AM CDT

## 2024-09-09 ENCOUNTER — LAB VISIT (OUTPATIENT)
Dept: LAB | Facility: HOSPITAL | Age: 49
End: 2024-09-09
Attending: FAMILY MEDICINE
Payer: MEDICARE

## 2024-09-09 DIAGNOSIS — R79.9 ABNORMAL FINDING OF BLOOD CHEMISTRY, UNSPECIFIED: ICD-10-CM

## 2024-09-09 DIAGNOSIS — G82.20 PARAPLEGIA: ICD-10-CM

## 2024-09-09 DIAGNOSIS — G91.1 OBSTRUCTIVE HYDROCEPHALUS: ICD-10-CM

## 2024-09-09 DIAGNOSIS — I87.2 PERIPHERAL VENOUS INSUFFICIENCY: ICD-10-CM

## 2024-09-09 LAB
ALBUMIN SERPL BCP-MCNC: 2.8 G/DL (ref 3.5–5.2)
ALP SERPL-CCNC: 71 U/L (ref 55–135)
ALT SERPL W/O P-5'-P-CCNC: 8 U/L (ref 10–44)
ANION GAP SERPL CALC-SCNC: 10 MMOL/L (ref 8–16)
AST SERPL-CCNC: 14 U/L (ref 10–40)
BASOPHILS # BLD AUTO: 0.06 K/UL (ref 0–0.2)
BASOPHILS NFR BLD: 0.8 % (ref 0–1.9)
BILIRUB SERPL-MCNC: 0.3 MG/DL (ref 0.1–1)
BUN SERPL-MCNC: 14 MG/DL (ref 6–20)
CALCIUM SERPL-MCNC: 9.4 MG/DL (ref 8.7–10.5)
CHLORIDE SERPL-SCNC: 104 MMOL/L (ref 95–110)
CHOLEST SERPL-MCNC: 184 MG/DL (ref 120–199)
CHOLEST/HDLC SERPL: 3.2 {RATIO} (ref 2–5)
CO2 SERPL-SCNC: 21 MMOL/L (ref 23–29)
CREAT SERPL-MCNC: 0.7 MG/DL (ref 0.5–1.4)
DIFFERENTIAL METHOD BLD: ABNORMAL
EOSINOPHIL # BLD AUTO: 0.3 K/UL (ref 0–0.5)
EOSINOPHIL NFR BLD: 4.3 % (ref 0–8)
ERYTHROCYTE [DISTWIDTH] IN BLOOD BY AUTOMATED COUNT: 17.7 % (ref 11.5–14.5)
EST. GFR  (NO RACE VARIABLE): >60 ML/MIN/1.73 M^2
ESTIMATED AVG GLUCOSE: 108 MG/DL (ref 68–131)
GLUCOSE SERPL-MCNC: 94 MG/DL (ref 70–110)
HBA1C MFR BLD: 5.4 % (ref 4–5.6)
HCT VFR BLD AUTO: 34.3 % (ref 37–48.5)
HDLC SERPL-MCNC: 58 MG/DL (ref 40–75)
HDLC SERPL: 31.5 % (ref 20–50)
HGB BLD-MCNC: 10 G/DL (ref 12–16)
IMM GRANULOCYTES # BLD AUTO: 0.03 K/UL (ref 0–0.04)
IMM GRANULOCYTES NFR BLD AUTO: 0.4 % (ref 0–0.5)
LDLC SERPL CALC-MCNC: 109.8 MG/DL (ref 63–159)
LYMPHOCYTES # BLD AUTO: 1.6 K/UL (ref 1–4.8)
LYMPHOCYTES NFR BLD: 20.6 % (ref 18–48)
MCH RBC QN AUTO: 22.5 PG (ref 27–31)
MCHC RBC AUTO-ENTMCNC: 29.2 G/DL (ref 32–36)
MCV RBC AUTO: 77 FL (ref 82–98)
MONOCYTES # BLD AUTO: 0.5 K/UL (ref 0.3–1)
MONOCYTES NFR BLD: 6.1 % (ref 4–15)
NEUTROPHILS # BLD AUTO: 5.3 K/UL (ref 1.8–7.7)
NEUTROPHILS NFR BLD: 67.8 % (ref 38–73)
NONHDLC SERPL-MCNC: 126 MG/DL
NRBC BLD-RTO: 0 /100 WBC
PLATELET # BLD AUTO: 384 K/UL (ref 150–450)
PMV BLD AUTO: 9.9 FL (ref 9.2–12.9)
POTASSIUM SERPL-SCNC: 3.7 MMOL/L (ref 3.5–5.1)
PROT SERPL-MCNC: 8.2 G/DL (ref 6–8.4)
RBC # BLD AUTO: 4.44 M/UL (ref 4–5.4)
SODIUM SERPL-SCNC: 135 MMOL/L (ref 136–145)
TRIGL SERPL-MCNC: 81 MG/DL (ref 30–150)
WBC # BLD AUTO: 7.75 K/UL (ref 3.9–12.7)

## 2024-09-09 PROCEDURE — 85025 COMPLETE CBC W/AUTO DIFF WBC: CPT | Performed by: FAMILY MEDICINE

## 2024-09-09 PROCEDURE — 80053 COMPREHEN METABOLIC PANEL: CPT | Performed by: FAMILY MEDICINE

## 2024-09-09 PROCEDURE — 36415 COLL VENOUS BLD VENIPUNCTURE: CPT | Mod: PO | Performed by: FAMILY MEDICINE

## 2024-09-09 PROCEDURE — 83036 HEMOGLOBIN GLYCOSYLATED A1C: CPT | Performed by: FAMILY MEDICINE

## 2024-09-09 PROCEDURE — 80061 LIPID PANEL: CPT | Performed by: FAMILY MEDICINE

## 2024-09-13 ENCOUNTER — OFFICE VISIT (OUTPATIENT)
Dept: FAMILY MEDICINE | Facility: CLINIC | Age: 49
End: 2024-09-13
Payer: MEDICARE

## 2024-09-13 DIAGNOSIS — Z93.59 SUPRAPUBIC CATHETER: Primary | ICD-10-CM

## 2024-09-13 DIAGNOSIS — F11.20 CONTINUOUS OPIOID DEPENDENCE: Chronic | ICD-10-CM

## 2024-09-13 DIAGNOSIS — L89.90 PRESSURE INJURY OF SKIN, UNSPECIFIED INJURY STAGE, UNSPECIFIED LOCATION: ICD-10-CM

## 2024-09-13 DIAGNOSIS — I10 ESSENTIAL HYPERTENSION: ICD-10-CM

## 2024-09-13 PROCEDURE — 99214 OFFICE O/P EST MOD 30 MIN: CPT | Mod: 95,,, | Performed by: FAMILY MEDICINE

## 2024-09-13 PROCEDURE — G2211 COMPLEX E/M VISIT ADD ON: HCPCS | Mod: 95,,, | Performed by: FAMILY MEDICINE

## 2024-09-13 RX ORDER — HYDROCODONE BITARTRATE AND ACETAMINOPHEN 5; 325 MG/1; MG/1
1 TABLET ORAL EVERY 8 HOURS PRN
Qty: 90 TABLET | Refills: 0 | Status: SHIPPED | OUTPATIENT
Start: 2024-09-13 | End: 2024-09-13 | Stop reason: SDUPTHER

## 2024-09-13 RX ORDER — METOPROLOL SUCCINATE 50 MG/1
50 TABLET, EXTENDED RELEASE ORAL DAILY
Qty: 90 TABLET | Refills: 3 | Status: SHIPPED | OUTPATIENT
Start: 2024-09-13 | End: 2025-09-13

## 2024-09-13 RX ORDER — SILVER SULFADIAZINE 10 G/1000G
CREAM TOPICAL 2 TIMES DAILY
Qty: 50 G | Refills: 11 | Status: SHIPPED | OUTPATIENT
Start: 2024-09-13

## 2024-09-13 NOTE — TELEPHONE ENCOUNTER
No care due was identified.  [facility] Embedded Care Due Messages. Reference number: [id number].   9/13/2024 5:18:49 PM CDT

## 2024-09-13 NOTE — TELEPHONE ENCOUNTER
Transmission to pharmacy failed for Hydrocodone prescription.  This particular medication can not be phoned in; please resend.  Thank you.       HYDROcodone-acetaminophen (NORCO) 5-325 mg per tablet 90 tablet 0 9/13/2024 12/12/2024    Sig - Route: Take 1 tablet by mouth every 8 (eight) hours as needed for Pain. Do not fill unti 9/20/24 - Oral    Sent to pharmacy as: HYDROcodone-acetaminophen (NORCO) 5-325 mg per tablet    Earliest Fill Date: 9/13/2024    Notes to Pharmacy: Quantity prescribed more than 7 day supply? Yes, quantity medically necessary    E-Prescribing Status: Transmission to pharmacy failed (9/13/2024 12:08 PM CDT)

## 2024-09-16 PROBLEM — L89.153 STAGE III PRESSURE ULCER OF SACRAL REGION: Status: RESOLVED | Noted: 2021-08-12 | Resolved: 2024-09-16

## 2024-09-16 PROBLEM — L89.323 STAGE III PRESSURE ULCER OF LEFT BUTTOCK: Status: RESOLVED | Noted: 2021-08-23 | Resolved: 2024-09-16

## 2024-09-16 PROBLEM — L89.133: Status: RESOLVED | Noted: 2021-08-12 | Resolved: 2024-09-16

## 2024-09-16 RX ORDER — HYDROCODONE BITARTRATE AND ACETAMINOPHEN 5; 325 MG/1; MG/1
1 TABLET ORAL EVERY 8 HOURS PRN
Qty: 90 TABLET | Refills: 0 | Status: SHIPPED | OUTPATIENT
Start: 2024-09-16 | End: 2024-12-15

## 2024-09-16 RX ORDER — HYDROCODONE BITARTRATE AND ACETAMINOPHEN 5; 325 MG/1; MG/1
1 TABLET ORAL EVERY 8 HOURS PRN
Qty: 90 TABLET | Refills: 0 | Status: SHIPPED | OUTPATIENT
Start: 2024-09-16

## 2024-09-16 NOTE — PROGRESS NOTES
The patient location is:  [place]  The chief complaint leading to consultation is:  Checkup  Visit type: Virtual visit with synchronous audio and video  Total time spent with patient:  Less than 30 minutes  Each patient to whom he or she provides medical services by telemedicine is:  (1) informed of the relationship between the physician and patient and the respective role of any other health care provider with respect to management of the patient; and (2) notified that he or she may decline to receive medical services by telemedicine and may withdraw from such care at any time. Vital signs recorded were provided by the patient.    Notes:  See below    Subjective:   Patient ID: Briana Uriarte is a 48 y.o. female     Chief Complaint: Checkup    Here for checkup.      Review of Systems   Unable to perform ROS: Other     Past Medical History:   Diagnosis Date    Chronic hypercapnic respiratory failure     Chronic kidney disease     kidney stones    Diabetes mellitus, type 2     Encounter for blood transfusion     Esotropia     GERD (gastroesophageal reflux disease)     Hydrocephalus     Hypertension     Morbid obesity     Obesity hypoventilation syndrome     On home oxygen therapy     Paralysis     Psoriasis     Scoliosis     Spinal bifida, closed      Past Surgical History:   Procedure Laterality Date    BRAIN SURGERY       shunt revision    CHOLECYSTECTOMY      CYSTOSCOPY W/ LASER LITHOTRIPSY      CYSTOURETEROSCOPY, WITH HOLMIUM LASER LITHOTRIPSY OF URETERAL CALCULUS AND STENT INSERTION Right 9/15/2023    Procedure: CYSTOURETEROSCOPY, WITH HOLMIUM LASER LITHOTRIPSY OF URETERAL CALCULUS AND STENT INSERTION;  Surgeon: Siobhan Phillips MD;  Location: [facility];  Service: Urology;  Laterality: Right;    REPAIR OF INCARCERATED UMBILICAL HERNIA N/A 3/8/2019    Procedure: REPAIR, HERNIA, UMBILICAL, INCARCERATED, AGE 5 YEARS OR OLDER;  Surgeon: Ziyad Hancock MD;  [facility];  Service: General;   Laterality: N/A;    SPINE SURGERY       Objective:   There were no vitals filed for this visit.  There is no height or weight on file to calculate BMI.  Physical Exam  Vitals and nursing note reviewed.   Constitutional:       Appearance: She is well-developed.   HENT:      Head: Normocephalic and atraumatic.   Eyes:      General: No scleral icterus.     Conjunctiva/sclera: Conjunctivae normal.   Pulmonary:      Effort: Pulmonary effort is normal. No respiratory distress.   Musculoskeletal:         General: No deformity. Normal range of motion.      Cervical back: Normal range of motion and neck supple.   Skin:     Coloration: Skin is not pale.      Findings: No rash.   Neurological:      Mental Status: She is alert and oriented to person, place, and time.   Psychiatric:         Behavior: Behavior normal.         Thought Content: Thought content normal.         Judgment: Judgment normal.       Assessment:     1. Suprapubic catheter    2. Continuous opioid dependence- contract 3/21/19    3. Essential hypertension    4. Pressure injury of skin, unspecified injury stage, unspecified location      Plan:   Suprapubic catheter  -     catheter 20 Fr Misc; 1 Application by Misc.(Non-Drug; Combo Route) route once daily.  Dispense: 10 each; Refill: 11    Continuous opioid dependence- contract 3/21/19  -     HYDROcodone-acetaminophen (NORCO) 5-325 mg per tablet; Take 1 tablet by mouth every 8 (eight) hours as needed for Pain. Do not fill until 11/20/24  Dispense: 90 tablet; Refill: 0  -     HYDROcodone-acetaminophen (NORCO) 5-325 mg per tablet; Take 1 tablet by mouth every 8 (eight) hours as needed for Pain. Do not fill until 10/20/24  Dispense: 90 tablet; Refill: 0  -     HYDROcodone-acetaminophen (NORCO) 5-325 mg per tablet; Take 1 tablet by mouth every 8 (eight) hours as needed for Pain. Do not fill unti 9/20/24  Dispense: 90 tablet; Refill: 0    Essential hypertension  -     metoprolol succinate (TOPROL-XL) 50 MG 24 hr  tablet; Take 1 tablet (50 mg total) by mouth once daily.  Dispense: 90 tablet; Refill: 3    Pressure injury of skin, unspecified injury stage, unspecified location  -     silver sulfADIAZINE 1% (SILVADENE) 1 % cream; Apply topically 2 (two) times daily.  Dispense: 50 g; Refill: 11            Total time spent of Less than 30 minutes minutes on the day of the visit.This includes face to face time and preparing to see the patient, obtaining and reviewing separately obtained history, documenting clinical information in the electronic or other health record, independently interpreting results and communicating results to the patient/family/caregiver, or care coordinator.    Established patient with me has been instructed that must see me at least 1 time yearly (every 365 days) for refills of medications. Seeing other providers in this clinic is fine but expectation is to see me yearly.    Jose De Jesus Curran MD  09/16/2024    Portions of this note have been dictated with DILLON Spear

## 2024-09-19 ENCOUNTER — TELEPHONE (OUTPATIENT)
Dept: UROLOGY | Facility: CLINIC | Age: 49
End: 2024-09-19
Payer: MEDICARE

## 2024-09-19 ENCOUNTER — PATIENT MESSAGE (OUTPATIENT)
Dept: UROLOGY | Facility: CLINIC | Age: 49
End: 2024-09-19
Payer: MEDICARE

## 2024-09-19 NOTE — TELEPHONE ENCOUNTER
Call placed to patient's mother to inform we need to reschedule VV appt on 9/26, we can reschedule to 9/30 at 3:30, no answer, message left with call back number.

## 2024-09-27 NOTE — PROGRESS NOTES
[facility] Note    PCP: Jose De Jesus Curran MD    Chief Complaint: neurogenic bladder (VV)    SUBJECTIVE:       History of Present Illness:  Briana Uriarte is a 49 y.o. female who presents to clinic for neurogenic bladder. She is Established  to our clinic.     CT 6/24/24:   Bilateral renal stones with stone burden not appearing significantly changed. Mild right hydronephrosis and severe left hydronephrosis with virtually hydronephrotic sac on the left also not appearing significantly changed.     GFR 9/9/24: >60    Over the last month has had issues with SPT getting clogged with thick mucus. Urine appears brown.   Her mother flushes it or changes it early.     No symptomatic UTIs.   No fevers.       12/27/23  She underwent right ureteroscopy on 9/15/23.   - no tumors or concerning lesions within the bladder  - right ureter widely patent and dilated   - no tumors within the right collecting system, at the area filling defect in the right lower pole there was a large amount of stone debris, no significant stone fragments  - a few passes of the basket were used to grab as many stone fragments as possible however there is still significant amount of very small stone debris left in the kidney    Stone analysis: 90% Calcium phosphate (apatite). 10% Magnesium ammonium   phosphate (struvite).     Renal US 12/19/23:   Right kidney: It is poorly seen due to overlying bowel gas. There are mildly dilated calices seen.  No beto hydronephrosis. No renal stone.  Left kidney: No solid renal parenchyma is identified with severe hydronephrosis noted.    Occasionally has mild hematuria in the am.  No pain.   No fevers.     8/30/23  Her CT Urogram showed a concerning filling defect in the lower pole of the right kidney, 2.3 cm. No signs of extravasation on this or cystogram.     Cytology is negative.     She is doing well today.     5/10/23  No recent imaging  GFR 1/27/23: >60  No documented  UTIs    She has continued issues with sacral decubitus ulcer with open wound. She has a second open wound near the perineum. Mom states that this drains clear fluid that she is concerned may be urine. Cultures from this have grown Staph, proteus, E coli.   She was following with wound care, has not seen ID.   No issues with SPT. Drains clear, but darker/orange in the AM. Has some sediment.     6/28/22  Doing well from a urologic standpoint.   Mcdonald being changed every 3 weeks. No issues with exchanges.  Has been battling a sacral decubitus ulcer for which she is following with wound care.     Had a CT in september which showed a blown out non-function left kidney. No hydro in the right however small volume of renal stones.     6/9/21  Previous patient of Dr. Smith, last seen March 2020.  Patient has a hx of spina bifida, hydrocephalus, paraplegia and neurogenic bladder. She is currently managed with an SPT. Changed by her mother every 3 weeks.   She has a multi-cystic dystrophic nonfunctional left kidney. She does have some large right sided renal stones that appear to be intraparenchymal as of CT from July 2020.    Not having issues with frequent symptomatic UTIs.   Has had SPT in place for 12 years.   Had a PCNL on the left side >10 years ago  No gross hematuria.     Recently admitted to hospital with respiratory issues.     Last urine culture: multiple documented UTIs, however unclear if these were symptomatic     Lab Results   Component Value Date    CREATININE 0.7 09/09/2024     Family  hx: no bladder or kidney cancer   Hx of spina bifida, paraplegia, HTN, DM, obesity, Pickwickian syndrome    Past medical, family, and social history reviewed as documented in chart with pertinent positive medical, family, and social history detailed in HPI.    Review of patient's allergies indicates:   Allergen Reactions    Latex Swelling    Adhesive Blisters     tegaderm    Cefepime      HIVES       Past Medical History:  "  Diagnosis Date    Chronic hypercapnic respiratory failure     Chronic kidney disease     kidney stones    Diabetes mellitus, type 2     Encounter for blood transfusion     Esotropia     GERD (gastroesophageal reflux disease)     Hydrocephalus     Hypertension     Morbid obesity     Obesity hypoventilation syndrome     On home oxygen therapy     Paralysis     Psoriasis     Scoliosis     Spinal bifida, closed      Past Surgical History:   Procedure Laterality Date    BRAIN SURGERY       shunt revision    CHOLECYSTECTOMY      CYSTOSCOPY W/ LASER LITHOTRIPSY      CYSTOURETEROSCOPY, WITH HOLMIUM LASER LITHOTRIPSY OF URETERAL CALCULUS AND STENT INSERTION Right 9/15/2023    Procedure: CYSTOURETEROSCOPY, WITH HOLMIUM LASER LITHOTRIPSY OF URETERAL CALCULUS AND STENT INSERTION;  Surgeon: Siobhan Phillips MD;  Location: [facility] OR;  Service: Urology;  Laterality: Right;    REPAIR OF INCARCERATED UMBILICAL HERNIA N/A 3/8/2019    Procedure: REPAIR, HERNIA, UMBILICAL, INCARCERATED, AGE 5 YEARS OR OLDER;  Surgeon: Ziyad Hancock MD;  Location: [facility] OR;  Service: General;  Laterality: N/A;    SPINE SURGERY       Family History   Problem Relation Name Age of Onset    Cancer Mother      Glaucoma Father      Psoriasis Maternal Uncle      Macular degeneration Maternal Grandmother      Heart disease Maternal Grandmother      Cancer Maternal Grandmother      Glaucoma Paternal Grandfather      Melanoma Neg Hx      Lupus Neg Hx      Eczema Neg Hx       Social History     Tobacco Use    Smoking status: Never    Smokeless tobacco: Never   Substance Use Topics    Alcohol use: No    Drug use: Yes     Types: Hydrocodone        Review of Systems   Unable to perform ROS    OBJECTIVE:     Anticoagulation:  2.5 mg Eliquis     Estimated body mass index is 55.39 kg/m² as calculated from the following:    Height as of 12/6/23: 4' 10" (1.473 m).    Weight as of 4/16/24: 120.2 kg (265 lb).    Vital Signs (Most Recent)       Physical " Exam  Constitutional:       General: She is not in acute distress.     Appearance: She is obese. She is not ill-appearing, toxic-appearing or diaphoretic.      Comments: In wheelchair   HENT:      Head: Normocephalic and atraumatic.   Pulmonary:      Effort: Pulmonary effort is normal. No respiratory distress.   Skin:     Coloration: Skin is not jaundiced.   Neurological:      Mental Status: She is alert. Mental status is at baseline.         BMP  Lab Results   Component Value Date     (L) 09/09/2024    K 3.7 09/09/2024     09/09/2024    CO2 21 (L) 09/09/2024    BUN 14 09/09/2024    CREATININE 0.7 09/09/2024    CALCIUM 9.4 09/09/2024    ANIONGAP 10 09/09/2024    ESTGFRAFRICA >60.0 07/18/2022    EGFRNONAA >60.0 07/18/2022       Lab Results   Component Value Date    WBC 7.75 09/09/2024    HGB 10.0 (L) 09/09/2024    HCT 34.3 (L) 09/09/2024    MCV 77 (L) 09/09/2024     09/09/2024       Imaging:  Per HPI    ASSESSMENT     1. Calculus of urinary tract in diseases classified elsewhere    2. Spina bifida of lumbar region, unspecified hydrocephalus presence    3. Suprapubic catheter    4. Neurogenic bladder      PLAN:     - Doing well with no complaints   - Mild calyceal dilation on right, but no obvious hydro  - Plan to repeat CT in June 2025   - Renal function is stable   - Will schedule VV to discuss results   - Encouraged fluid intake of at least 70 oz daily   - Discussed flushing of catheter daily to ensure it is draining. Can come in to  irrigation supplies if needed.   - Follow up PRN if needed     Siobhan Phillips MD       Visit type: Virtual visit with synchronous audio and video   The patient location is: home with chief complaint as listed above HPI  Each patient to whom he or she provides medical services by telemedicine is:  (1) informed of the relationship between the physician and patient and the respective role of any other health care provider with respect to management of the  patient; and (2) notified that he or she may decline to receive medical services by telemedicine and may withdraw from such care at any time    Total time spent in/on encounter today, including face to face time with patient, counseling, medical record review, interpretation of tests/results, , and treatment plan coordination: 30 minutes      Visit today included increased complexity associated with the care of the episodic problem neurogenic bladder/kidney stones addressed and managing the longitudinal care of the patient due to the serious and/or complex managed problem(s).

## 2024-09-30 ENCOUNTER — OFFICE VISIT (OUTPATIENT)
Dept: UROLOGY | Facility: CLINIC | Age: 49
End: 2024-09-30
Payer: MEDICARE

## 2024-09-30 DIAGNOSIS — N31.9 NEUROGENIC BLADDER: ICD-10-CM

## 2024-09-30 DIAGNOSIS — Q05.7 SPINA BIFIDA OF LUMBAR REGION, UNSPECIFIED HYDROCEPHALUS PRESENCE: ICD-10-CM

## 2024-09-30 DIAGNOSIS — Z93.59 SUPRAPUBIC CATHETER: ICD-10-CM

## 2024-09-30 DIAGNOSIS — N22 CALCULUS OF URINARY TRACT IN DISEASES CLASSIFIED ELSEWHERE: Primary | ICD-10-CM

## 2024-09-30 PROCEDURE — G2211 COMPLEX E/M VISIT ADD ON: HCPCS | Mod: 95,,, | Performed by: STUDENT IN AN ORGANIZED HEALTH CARE EDUCATION/TRAINING PROGRAM

## 2024-09-30 PROCEDURE — 99214 OFFICE O/P EST MOD 30 MIN: CPT | Mod: 95,,, | Performed by: STUDENT IN AN ORGANIZED HEALTH CARE EDUCATION/TRAINING PROGRAM

## 2024-10-28 ENCOUNTER — PATIENT MESSAGE (OUTPATIENT)
Dept: FAMILY MEDICINE | Facility: CLINIC | Age: 49
End: 2024-10-28
Payer: MEDICARE

## 2024-10-28 DIAGNOSIS — G82.20 PARAPLEGIA: Primary | ICD-10-CM

## 2024-10-28 DIAGNOSIS — Q05.9 SPINA BIFIDA MANIFESTA: ICD-10-CM

## 2024-11-21 ENCOUNTER — TELEPHONE (OUTPATIENT)
Dept: FAMILY MEDICINE | Facility: CLINIC | Age: 49
End: 2024-11-21
Payer: MEDICARE

## 2024-11-22 ENCOUNTER — TELEPHONE (OUTPATIENT)
Dept: FAMILY MEDICINE | Facility: CLINIC | Age: 49
End: 2024-11-22
Payer: MEDICARE

## 2024-12-02 ENCOUNTER — LAB VISIT (OUTPATIENT)
Dept: LAB | Facility: HOSPITAL | Age: 49
End: 2024-12-02
Attending: INTERNAL MEDICINE
Payer: MEDICARE

## 2024-12-02 DIAGNOSIS — D50.0 IRON DEFICIENCY ANEMIA DUE TO CHRONIC BLOOD LOSS: ICD-10-CM

## 2024-12-02 LAB
BASOPHILS # BLD AUTO: 0.07 K/UL (ref 0–0.2)
BASOPHILS NFR BLD: 1 % (ref 0–1.9)
DIFFERENTIAL METHOD BLD: ABNORMAL
EOSINOPHIL # BLD AUTO: 0.3 K/UL (ref 0–0.5)
EOSINOPHIL NFR BLD: 4.7 % (ref 0–8)
ERYTHROCYTE [DISTWIDTH] IN BLOOD BY AUTOMATED COUNT: 19.9 % (ref 11.5–14.5)
FERRITIN SERPL-MCNC: 32 NG/ML (ref 20–300)
HCT VFR BLD AUTO: 36.8 % (ref 37–48.5)
HGB BLD-MCNC: 10.8 G/DL (ref 12–16)
IMM GRANULOCYTES # BLD AUTO: 0.03 K/UL (ref 0–0.04)
IMM GRANULOCYTES NFR BLD AUTO: 0.4 % (ref 0–0.5)
LYMPHOCYTES # BLD AUTO: 1.6 K/UL (ref 1–4.8)
LYMPHOCYTES NFR BLD: 24.3 % (ref 18–48)
MCH RBC QN AUTO: 22.5 PG (ref 27–31)
MCHC RBC AUTO-ENTMCNC: 29.3 G/DL (ref 32–36)
MCV RBC AUTO: 77 FL (ref 82–98)
MONOCYTES # BLD AUTO: 0.4 K/UL (ref 0.3–1)
MONOCYTES NFR BLD: 5.9 % (ref 4–15)
NEUTROPHILS # BLD AUTO: 4.3 K/UL (ref 1.8–7.7)
NEUTROPHILS NFR BLD: 63.7 % (ref 38–73)
NRBC BLD-RTO: 0 /100 WBC
PLATELET # BLD AUTO: 335 K/UL (ref 150–450)
PMV BLD AUTO: 10.7 FL (ref 9.2–12.9)
RBC # BLD AUTO: 4.79 M/UL (ref 4–5.4)
WBC # BLD AUTO: 6.76 K/UL (ref 3.9–12.7)

## 2024-12-02 PROCEDURE — 85025 COMPLETE CBC W/AUTO DIFF WBC: CPT | Performed by: INTERNAL MEDICINE

## 2024-12-02 PROCEDURE — 36415 COLL VENOUS BLD VENIPUNCTURE: CPT | Mod: PO | Performed by: INTERNAL MEDICINE

## 2024-12-02 PROCEDURE — 82728 ASSAY OF FERRITIN: CPT | Performed by: INTERNAL MEDICINE

## 2024-12-04 DIAGNOSIS — I87.2 PERIPHERAL VENOUS INSUFFICIENCY: ICD-10-CM

## 2024-12-04 RX ORDER — FUROSEMIDE 20 MG/1
20 TABLET ORAL DAILY PRN
Qty: 135 TABLET | Refills: 1 | Status: SHIPPED | OUTPATIENT
Start: 2024-12-04

## 2024-12-04 NOTE — TELEPHONE ENCOUNTER
No care due was identified.  [facility] Embedded Care Due Messages. Reference number: [id number].   12/04/2024 11:22:11 AM CST

## 2024-12-05 ENCOUNTER — TELEPHONE (OUTPATIENT)
Dept: HEMATOLOGY/ONCOLOGY | Facility: CLINIC | Age: 49
End: 2024-12-05
Payer: MEDICARE

## 2024-12-05 ENCOUNTER — PATIENT MESSAGE (OUTPATIENT)
Dept: HEMATOLOGY/ONCOLOGY | Facility: CLINIC | Age: 49
End: 2024-12-05

## 2024-12-10 ENCOUNTER — PATIENT OUTREACH (OUTPATIENT)
Dept: ADMINISTRATIVE | Facility: OTHER | Age: 49
End: 2024-12-10
Payer: MEDICARE

## 2024-12-10 ENCOUNTER — OFFICE VISIT (OUTPATIENT)
Dept: FAMILY MEDICINE | Facility: CLINIC | Age: 49
End: 2024-12-10
Payer: MEDICARE

## 2024-12-10 VITALS
HEART RATE: 81 BPM | OXYGEN SATURATION: 98 % | HEIGHT: 58 IN | RESPIRATION RATE: 18 BRPM | BODY MASS INDEX: 55.62 KG/M2 | WEIGHT: 265 LBS | TEMPERATURE: 98 F | SYSTOLIC BLOOD PRESSURE: 134 MMHG | DIASTOLIC BLOOD PRESSURE: 70 MMHG

## 2024-12-10 DIAGNOSIS — L89.90 PRESSURE INJURY OF SKIN, UNSPECIFIED INJURY STAGE, UNSPECIFIED LOCATION: ICD-10-CM

## 2024-12-10 DIAGNOSIS — I10 ESSENTIAL HYPERTENSION: Primary | ICD-10-CM

## 2024-12-10 DIAGNOSIS — Q05.7 SPINA BIFIDA OF LUMBAR REGION, UNSPECIFIED HYDROCEPHALUS PRESENCE: ICD-10-CM

## 2024-12-10 DIAGNOSIS — F11.20 CONTINUOUS OPIOID DEPENDENCE: ICD-10-CM

## 2024-12-10 DIAGNOSIS — F11.20 CONTINUOUS OPIOID DEPENDENCE: Chronic | ICD-10-CM

## 2024-12-10 DIAGNOSIS — L89.152 PRESSURE INJURY OF SACRAL REGION, STAGE 2: ICD-10-CM

## 2024-12-10 PROCEDURE — 99214 OFFICE O/P EST MOD 30 MIN: CPT | Mod: S$PBB,,,

## 2024-12-10 PROCEDURE — 99999 PR PBB SHADOW E&M-EST. PATIENT-LVL IV: CPT | Mod: PBBFAC,,,

## 2024-12-10 PROCEDURE — 99214 OFFICE O/P EST MOD 30 MIN: CPT | Mod: PBBFAC,PO

## 2024-12-10 RX ORDER — HYDROCODONE BITARTRATE AND ACETAMINOPHEN 5; 325 MG/1; MG/1
1 TABLET ORAL EVERY 8 HOURS PRN
Qty: 90 TABLET | Refills: 0 | Status: SHIPPED | OUTPATIENT
Start: 2024-12-10 | End: 2025-03-10

## 2024-12-10 RX ORDER — HYDROCODONE BITARTRATE AND ACETAMINOPHEN 5; 325 MG/1; MG/1
1 TABLET ORAL EVERY 8 HOURS PRN
Qty: 90 TABLET | Refills: 0 | Status: SHIPPED | OUTPATIENT
Start: 2024-12-10

## 2024-12-10 RX ORDER — SILVER SULFADIAZINE 10 G/1000G
CREAM TOPICAL 2 TIMES DAILY
Qty: 400 G | Refills: 11 | Status: SHIPPED | OUTPATIENT
Start: 2024-12-10

## 2024-12-10 NOTE — PROGRESS NOTES
"CHW - Initial Contact    This Community Health Worker completed OR updated the Social Determinant of Health questionnaire with caregiver via telephone today.    Pt identified barriers of most importance are: Spoke to pt's mom and she notified me her daughter is in need of a physician to come in the home. Message was sent to "[facility]" and when I get info I will update the pt this week.    Referrals were put through [facility] - no: none  Support and Services:   Other information discussed the patient needs / wants help with: Spoke to pt's mom and she notified me her daughter is in need of a physician to come in the home. Message was sent to "[facility]" and when I get info I will update the pt this week.    Follow up required:   Initial Outreach - Due: 12/13/2024    "

## 2024-12-10 NOTE — TELEPHONE ENCOUNTER
I saw Ms. Warren today for follow up. Requests refill of her [place].  reviewed. Pended to you for refill. Please sign if appropriate.

## 2024-12-10 NOTE — PROGRESS NOTES
Subjective:       Patient ID: Briana Uriarte is a 49 y.o. female.    Chief Complaint: follow up       Briana Uriarte is a 49 y.o. female with paraplegia, HTN, opioid dependence who presents to clinic with caretaker for follow up. Requests medication refills for Norco and Silvadene cream. Caretaker inquires about case management/  consult, as it is difficult for them to get out of the house to make it to in-person appointments. Pt's labs updated in September and recently in December for her hematologist. No other concerns or complaints today. ROS completed by caretaker reviewed.        Review of Systems   Constitutional:  Negative for activity change and unexpected weight change.   HENT:  Negative for hearing loss, rhinorrhea and trouble swallowing.    Eyes:  Negative for discharge and visual disturbance.   Respiratory:  Negative for chest tightness and wheezing.    Cardiovascular:  Negative for chest pain and palpitations.   Gastrointestinal:  Negative for blood in stool, constipation, diarrhea and vomiting.   Endocrine: Negative for polydipsia and polyuria.   Genitourinary:  Negative for difficulty urinating, dysuria, hematuria and menstrual problem.   Musculoskeletal:  Negative for arthralgias, joint swelling and neck pain.   Neurological:  Negative for weakness and headaches.   Psychiatric/Behavioral:  Negative for confusion and dysphoric mood.          Past Medical History:   Diagnosis Date    Chronic hypercapnic respiratory failure     Chronic kidney disease     kidney stones    Diabetes mellitus, type 2     Encounter for blood transfusion     Esotropia     GERD (gastroesophageal reflux disease)     Hydrocephalus     Hypertension     Morbid obesity     Obesity hypoventilation syndrome     On home oxygen therapy     Paralysis     Psoriasis     Scoliosis     Spinal bifida, closed        Review of patient's allergies indicates:   Allergen Reactions    Latex Swelling    Adhesive  Blisters     tegaderm    Cefepime      HIVES         Current Outpatient Medications:     catheter 20 Fr Misc, 1 Application by Misc.(Non-Drug; Combo Route) route once daily., Disp: 10 each, Rfl: 11    furosemide (LASIX) 20 MG tablet, Take 1 tablet (20 mg total) by mouth daily as needed., Disp: 135 tablet, Rfl: 1    HYDROcodone-acetaminophen (NORCO) 5-325 mg per tablet, Take 1 tablet by mouth every 8 (eight) hours as needed for Pain. Do not fill until 10/20/24, Disp: 90 tablet, Rfl: 0    HYDROcodone-acetaminophen (NORCO) 5-325 mg per tablet, Take 1 tablet by mouth every 8 (eight) hours as needed for Pain. Do not fill unti 9/20/24, Disp: 90 tablet, Rfl: 0    HYDROcodone-acetaminophen (NORCO) 5-325 mg per tablet, Take 1 tablet by mouth every 8 (eight) hours as needed for Pain. Do not fill until 11/20/24, Disp: 90 tablet, Rfl: 0    metoprolol succinate (TOPROL-XL) 50 MG 24 hr tablet, Take 1 tablet (50 mg total) by mouth once daily., Disp: 90 tablet, Rfl: 3    multivitamin with minerals tablet, Take 1 tablet by mouth once daily., Disp: , Rfl:     silver sulfADIAZINE 1% (SILVADENE) 1 % cream, Apply topically 2 (two) times daily., Disp: 400 g, Rfl: 11  No current facility-administered medications for this visit.    Facility-Administered Medications Ordered in Other Visits:     sodium chloride 0.9% flush 10 mL, 10 mL, Intravenous, PRN, Bozena Acosta MD    sodium chloride 0.9% flush 10 mL, 10 mL, Intravenous, PRN, Bozena Acosta MD    sodium chloride 0.9% flush 10 mL, 10 mL, Intravenous, PRNKarla Camille, MD    sodium chloride 0.9% flush 10 mL, 10 mL, Intravenous, PRNKarla Camille, MD    Objective:        Physical Exam  Vitals reviewed.   Constitutional:       Comments: In wheelchair   HENT:      Head: Normocephalic and atraumatic.   Eyes:      Conjunctiva/sclera: Conjunctivae normal.   Cardiovascular:      Rate and Rhythm: Normal rate and regular rhythm.      Heart sounds: Normal heart sounds.   Pulmonary:       "Effort: Pulmonary effort is normal.      Breath sounds: Normal breath sounds.   Neurological:      Mental Status: She is alert. Mental status is at baseline.           Visit Vitals  /70 (BP Location: Right arm, Patient Position: Sitting)   Pulse 81   Temp 98.3 °F (36.8 °C) (Oral)   Resp 18   Ht 4' 10" (1.473 m)   Wt 120.2 kg (265 lb)   SpO2 98%   BMI 55.39 kg/m²      Assessment:         1. Essential hypertension    2. Pressure injury of sacral region, stage 2    3. Pressure injury of skin, unspecified injury stage, unspecified location    4. Continuous opioid dependence- contract 3/21/19    5. Spina bifida of lumbar region, unspecified hydrocephalus presence        Plan:         Briana was seen today for follow-up.    Diagnoses and all orders for this visit:    Essential hypertension        -    Well controlled on current medications. Continue to monitor.     Pressure injury of sacral region, stage 2  -     Ambulatory referral/consult to Outpatient Case Management    Pressure injury of skin, unspecified injury stage, unspecified location  -     silver sulfADIAZINE 1% (SILVADENE) 1 % cream; Apply topically 2 (two) times daily.  -     Ambulatory referral/consult to Outpatient Case Management    Continuous opioid dependence- contract 3/21/19        -   Norco refills pended to Dr. Curran for signature.  reviewed.    Spina bifida of lumbar region, unspecified hydrocephalus presence  -     Ambulatory referral/consult to Outpatient Case Management     Follow up in 3 months with PCP or sooner if needed.        Family Medicine Physician Assistant     Future Appointments       Date Provider Specialty Appt Notes    3/14/2025 Jose De Jesus Curran MD Family Medicine  Arrive at: Telehealth 3 month f/u    4/16/2025 Cris Culver MD Pulmonology 1 yr f/u             Tests to Keep You Healthy    Last Blood Pressure <= 139/89 (12/10/2024): NO       I spent a total of 30 minutes on the day of the visit.This includes face " to face time and non-face to face time preparing to see the patient (eg, review of tests), obtaining and/or reviewing separately obtained history, documenting clinical information in the electronic or other health record, independently interpreting results and communicating results to the patient/family/caregiver, or care coordinator.

## 2025-02-11 ENCOUNTER — OUTPATIENT CASE MANAGEMENT (OUTPATIENT)
Dept: ADMINISTRATIVE | Facility: OTHER | Age: 50
End: 2025-02-11
Payer: MEDICARE

## 2025-02-11 NOTE — PROGRESS NOTES
Outpatient Care Management   - Patient Assessment    Patient: Briana Uriarte  MRN:  [id number]  Date of Service:  2/11/2025  Completed by:  Herman Murillo LCSW  Referral Date: 12/10/2024    No chief complaint on file.      Brief Summary:  received a referral from other for the following Low/Mod SW psychosocial needs unable to get out of the house to get to the dr appts.  The patient also requests assistance with the assistance available to the pt. such as home health, NP [facility] home, caregiver assistance. Care plan was created in collaboration with patient/caregiver input.

## 2025-02-11 NOTE — LETTER
February 11, 2025   Dear Manuela Uriarte:      It was a pleasure talking to you the today. Per our conversation, I have attached a list of resources for to look over.     Local home Health Agencies attached    Please feel free to contact me with any questions or concerns.    If you have any questions, don't hesitate to call me at [phone], Monday through Friday 8:00am-4:30pm. [facility] also has a program with a nurse available 24/7 to answer questions or provide medical advice. [facility] on call can be reached at [phone].

## 2025-02-12 ENCOUNTER — TELEPHONE (OUTPATIENT)
Dept: FAMILY MEDICINE | Facility: CLINIC | Age: 50
End: 2025-02-12
Payer: MEDICARE

## 2025-02-12 ENCOUNTER — PATIENT OUTREACH (OUTPATIENT)
Dept: ADMINISTRATIVE | Facility: OTHER | Age: 50
End: 2025-02-12
Payer: MEDICARE

## 2025-02-12 ENCOUNTER — TELEPHONE (OUTPATIENT)
Dept: HOME HEALTH SERVICES | Facility: CLINIC | Age: 50
End: 2025-02-12
Payer: MEDICARE

## 2025-02-12 DIAGNOSIS — I50.9 CONGESTIVE HEART FAILURE, UNSPECIFIED HF CHRONICITY, UNSPECIFIED HEART FAILURE TYPE: Primary | ICD-10-CM

## 2025-02-12 DIAGNOSIS — L89.152 PRESSURE INJURY OF SACRAL REGION, STAGE 2: ICD-10-CM

## 2025-02-12 DIAGNOSIS — L89.90 PRESSURE INJURY OF SKIN, UNSPECIFIED INJURY STAGE, UNSPECIFIED LOCATION: ICD-10-CM

## 2025-02-12 DIAGNOSIS — Q05.7 SPINA BIFIDA OF LUMBAR REGION, UNSPECIFIED HYDROCEPHALUS PRESENCE: Primary | ICD-10-CM

## 2025-02-12 DIAGNOSIS — I10 HYPERTENSION, UNSPECIFIED TYPE: ICD-10-CM

## 2025-02-12 NOTE — PROGRESS NOTES
CHW - Case Closure    This Community Health Worker spoke to caregiver via telephone today.   Pt/Caregiver reported: Pt's mom notified me a  contacted the pt. Followed by Another Department.   Pt/Caregiver denied any additional needs at this time and agrees with episode closure at this time.  Provided caregiver with Community Health Worker's contact information and encouraged him/her to contact this Community Health Worker if additional needs arise.

## 2025-02-12 NOTE — TELEPHONE ENCOUNTER
Call placed to patient's mother (Manuela) for notification.  Mrs. Roy verbalized understanding.

## 2025-02-12 NOTE — TELEPHONE ENCOUNTER
[facility]  Referral placed and patient will be scheduled.

## 2025-02-12 NOTE — TELEPHONE ENCOUNTER
----- Message from Nurse Davey sent at 2/11/2025  4:20 PM CST -----  Regarding: FW: [facility] Referral  LOV:12/10/24  NOV:03/14/25-Dr. Curran  ----- Message -----  From: Herman Murillo LCSW  Sent: 2/11/2025   4:07 PM CST  To: [facility] Staff  Subject: [facility] Referral                                    Good afternoon,    I am a  and have contacted the pt/caregiver. For some reason her information just showed up on my dashboard. I have no clue why it did not before.     I talked to the pts mom and she has a lot going on.    If you agree will you please write and order for home health and NP at home? I want to give them all the help I can find.    Thank you for the referral and I will be the  if I can assist in any way.    Thank you,   Herman Murillo LMSW

## 2025-02-14 ENCOUNTER — TELEPHONE (OUTPATIENT)
Dept: HOME HEALTH SERVICES | Facility: CLINIC | Age: 50
End: 2025-02-14
Payer: MEDICARE

## 2025-02-16 ENCOUNTER — TELEPHONE (OUTPATIENT)
Dept: HOME HEALTH SERVICES | Facility: CLINIC | Age: 50
End: 2025-02-16
Payer: MEDICARE

## 2025-02-16 NOTE — TELEPHONE ENCOUNTER
"Spoke with patient's mother, Manuela, via telephone to confirm [facility] NP visit for 2/17/25. She wishes to cancel appointment:    "I think she is fine now" referring to getting out of the home every 3 months to get her pain medication refilled which does prove to be difficult due to her multiple chronic issues.    I educated her on Nps not being allowed to manage chronic pain in the [facility] but offered to come out and see her on an as needed basis and/or every 3 months for follow. She stated "I have found out a lot over the last 2 weeks" regarding chronic opioid prescription management. Discussed collaborating with Dr. Curran to see if I were seeing her in person at home every 3 months if that would satisfy the pain contract. Once again Manuela said "no" to appointment tomorrow.    Asked if patient has Home Health services after Manuela stated patient has some skin breakdown and indicated "I can take care of that" and does not want Home Health services for wound care.      "

## 2025-02-21 ENCOUNTER — OUTPATIENT CASE MANAGEMENT (OUTPATIENT)
Dept: ADMINISTRATIVE | Facility: OTHER | Age: 50
End: 2025-02-21
Payer: MEDICARE

## 2025-02-21 DIAGNOSIS — Z00.00 ENCOUNTER FOR MEDICARE ANNUAL WELLNESS EXAM: ICD-10-CM

## 2025-02-25 ENCOUNTER — PATIENT MESSAGE (OUTPATIENT)
Dept: FAMILY MEDICINE | Facility: CLINIC | Age: 50
End: 2025-02-25
Payer: MEDICARE

## 2025-03-14 ENCOUNTER — OFFICE VISIT (OUTPATIENT)
Dept: FAMILY MEDICINE | Facility: CLINIC | Age: 50
End: 2025-03-14
Payer: MEDICARE

## 2025-03-14 DIAGNOSIS — Z93.59 SUPRAPUBIC CATHETER: ICD-10-CM

## 2025-03-14 DIAGNOSIS — G91.1 OBSTRUCTIVE HYDROCEPHALUS: ICD-10-CM

## 2025-03-14 DIAGNOSIS — J96.11 CHRONIC RESPIRATORY FAILURE WITH HYPOXIA AND HYPERCAPNIA: ICD-10-CM

## 2025-03-14 DIAGNOSIS — L08.9 WOUND INFECTION: ICD-10-CM

## 2025-03-14 DIAGNOSIS — L89.152 PRESSURE ULCER OF SACRAL REGION, STAGE 2: ICD-10-CM

## 2025-03-14 DIAGNOSIS — F11.20 CONTINUOUS OPIOID DEPENDENCE: Primary | Chronic | ICD-10-CM

## 2025-03-14 DIAGNOSIS — G82.20 PARAPLEGIA: ICD-10-CM

## 2025-03-14 DIAGNOSIS — T14.8XXA WOUND INFECTION: ICD-10-CM

## 2025-03-14 DIAGNOSIS — L89.90 PRESSURE INJURY OF SKIN, UNSPECIFIED INJURY STAGE, UNSPECIFIED LOCATION: ICD-10-CM

## 2025-03-14 DIAGNOSIS — J96.12 CHRONIC RESPIRATORY FAILURE WITH HYPOXIA AND HYPERCAPNIA: ICD-10-CM

## 2025-03-14 DIAGNOSIS — I87.2 PERIPHERAL VENOUS INSUFFICIENCY: ICD-10-CM

## 2025-03-14 DIAGNOSIS — E66.2 OBESITY HYPOVENTILATION SYNDROME: ICD-10-CM

## 2025-03-14 RX ORDER — HYDROCODONE BITARTRATE AND ACETAMINOPHEN 5; 325 MG/1; MG/1
1 TABLET ORAL EVERY 8 HOURS PRN
Qty: 90 TABLET | Refills: 0 | Status: SHIPPED | OUTPATIENT
Start: 2025-03-14

## 2025-03-14 RX ORDER — FUROSEMIDE 20 MG/1
20 TABLET ORAL DAILY PRN
Qty: 90 TABLET | Refills: 3 | Status: SHIPPED | OUTPATIENT
Start: 2025-03-14 | End: 2026-03-14

## 2025-03-14 RX ORDER — HYDROCODONE BITARTRATE AND ACETAMINOPHEN 5; 325 MG/1; MG/1
1 TABLET ORAL EVERY 8 HOURS PRN
Qty: 90 TABLET | Refills: 0 | Status: SHIPPED | OUTPATIENT
Start: 2025-03-14 | End: 2025-06-12

## 2025-03-14 RX ORDER — SILVER SULFADIAZINE 10 G/1000G
CREAM TOPICAL 2 TIMES DAILY
Qty: 400 G | Refills: 11 | Status: SHIPPED | OUTPATIENT
Start: 2025-03-14

## 2025-03-14 NOTE — PROGRESS NOTES
Subjective:   Patient ID: Briana Uriarte is a 49 y.o. female     Chief Complaint: Checkup    Here for checkup      Review of Systems   Respiratory:  Negative for shortness of breath.    Cardiovascular:  Negative for chest pain.   Gastrointestinal:  Negative for abdominal pain.   Genitourinary:  Negative for dysuria.     Past Medical History:   Diagnosis Date    Chronic hypercapnic respiratory failure     Chronic kidney disease     kidney stones    Diabetes mellitus, type 2     Encounter for blood transfusion     Esotropia     GERD (gastroesophageal reflux disease)     Hydrocephalus     Hypertension     Morbid obesity     Obesity hypoventilation syndrome     On home oxygen therapy     Paralysis     Psoriasis     Scoliosis     Spinal bifida, closed      Past Surgical History:   Procedure Laterality Date    BRAIN SURGERY       shunt revision    CHOLECYSTECTOMY      CYSTOSCOPY W/ LASER LITHOTRIPSY      CYSTOURETEROSCOPY, WITH HOLMIUM LASER LITHOTRIPSY OF URETERAL CALCULUS AND STENT INSERTION Right 9/15/2023    Procedure: CYSTOURETEROSCOPY, WITH HOLMIUM LASER LITHOTRIPSY OF URETERAL CALCULUS AND STENT INSERTION;  Surgeon: Siobhan Phillips MD;  Location: [facility];  Service: Urology;  Laterality: Right;    REPAIR OF INCARCERATED UMBILICAL HERNIA N/A 3/8/2019    Procedure: REPAIR, HERNIA, UMBILICAL, INCARCERATED, AGE 5 YEARS OR OLDER;  Surgeon: Ziyad Hancock MD;  Location: [facility];  Service: General;  Laterality: N/A;    SPINE SURGERY       Objective:   There were no vitals filed for this visit.  There is no height or weight on file to calculate BMI.  Physical Exam  Vitals and nursing note reviewed.   Constitutional:       Appearance: She is well-developed.   HENT:      Head: Normocephalic and atraumatic.   Eyes:      General: No scleral icterus.     Conjunctiva/sclera: Conjunctivae normal.   Cardiovascular:      Heart sounds: No murmur heard.  Pulmonary:      Effort: Pulmonary effort is normal. No  respiratory distress.   Musculoskeletal:         General: No deformity. Normal range of motion.      Cervical back: Normal range of motion and neck supple.   Skin:     Coloration: Skin is not pale.      Findings: No rash.   Neurological:      Mental Status: She is alert and oriented to person, place, and time.   Psychiatric:         Behavior: Behavior normal.         Thought Content: Thought content normal.         Judgment: Judgment normal.       Assessment:     1. Continuous opioid dependence- contract 3/21/19    2. Peripheral venous insufficiency    3. Wound infection    4. Pressure ulcer of sacral region, stage 2    5. Pressure injury of skin, unspecified injury stage, unspecified location    6. Obesity hypoventilation syndrome    7. Obstructive hydrocephalus    8. Paraplegia    9. Chronic respiratory failure with hypoxia and hypercapnia    10. Suprapubic catheter      Plan:   Continuous opioid dependence- contract 3/21/19  -     HYDROcodone-acetaminophen (NORCO) 5-325 mg per tablet; Take 1 tablet by mouth every 8 (eight) hours as needed for Pain. Do not fill until 5/20/25  Dispense: 90 tablet; Refill: 0  -     HYDROcodone-acetaminophen (NORCO) 5-325 mg per tablet; Take 1 tablet by mouth every 8 (eight) hours as needed for Pain. Do not fill until 4/20/25  Dispense: 90 tablet; Refill: 0  -     HYDROcodone-acetaminophen (NORCO) 5-325 mg per tablet; Take 1 tablet by mouth every 8 (eight) hours as needed for Pain. Do not fill unti 3/20/25  Dispense: 90 tablet; Refill: 0    Peripheral venous insufficiency  -     furosemide (LASIX) 20 MG tablet; Take 1 tablet (20 mg total) by mouth daily as needed.  Dispense: 90 tablet; Refill: 3    Wound infection  -     silver sulfADIAZINE 1% (SILVADENE) 1 % cream; Apply topically 2 (two) times daily.  Dispense: 400 g; Refill: 11    Pressure ulcer of sacral region, stage 2  -     silver sulfADIAZINE 1% (SILVADENE) 1 % cream; Apply topically 2 (two) times daily.  Dispense: 400 g;  Refill: 11    Pressure injury of skin, unspecified injury stage, unspecified location  -     silver sulfADIAZINE 1% (SILVADENE) 1 % cream; Apply topically 2 (two) times daily.  Dispense: 400 g; Refill: 11    Obesity hypoventilation syndrome  Stable with pulm  Obstructive hydrocephalus  stable  Paraplegia  stable  Chronic respiratory failure with hypoxia and hypercapnia  Stable with pulm  Suprapubic catheter  Stable with urology    Chronic condition not otherwise mentioned is stable      Total time spent of Greater than 30 minutes minutes on the day of the visit.This includes face to face time and preparing to see the patient, obtaining and reviewing separately obtained history, documenting clinical information in the electronic or other health record, independently interpreting results and communicating results to the patient/family/caregiver, or care coordinator.    Established patient with me has been instructed that must see me at least 1 time yearly (every 365 days) for refills of medications. Seeing other providers in this clinic is fine but expectation is to see me yearly.    Jose De Jesus Curran MD  03/14/2025    Portions of this note have been dictated with DILLON Spear

## 2025-03-18 ENCOUNTER — TELEPHONE (OUTPATIENT)
Dept: FAMILY MEDICINE | Facility: CLINIC | Age: 50
End: 2025-03-18
Payer: MEDICARE

## 2025-03-25 ENCOUNTER — TELEPHONE (OUTPATIENT)
Dept: FAMILY MEDICINE | Facility: CLINIC | Age: 50
End: 2025-03-25
Payer: MEDICARE

## 2025-03-26 ENCOUNTER — TELEPHONE (OUTPATIENT)
Dept: FAMILY MEDICINE | Facility: CLINIC | Age: 50
End: 2025-03-26
Payer: MEDICARE

## 2025-03-31 ENCOUNTER — TELEPHONE (OUTPATIENT)
Dept: PULMONOLOGY | Facility: HOSPITAL | Age: 50
End: 2025-03-31

## 2025-03-31 NOTE — TELEPHONE ENCOUNTER
Thepatient's 90 day download shows excellent compliance with her home ventilator.  She has a Trilogy Erwin.   Average peak pressure 14, average end expiratory pressure is 7.

## 2025-04-29 ENCOUNTER — TELEPHONE (OUTPATIENT)
Dept: PULMONOLOGY | Facility: CLINIC | Age: 50
End: 2025-04-29
Payer: MEDICARE

## 2025-04-29 NOTE — TELEPHONE ENCOUNTER
Spoke with the mother, pt needed to reschedule due to her lift breaking and they not being able to transfer pt. Mom says it isn't urgent that pt sees dr dee, but pt is r/s to 06/05 10:20am

## 2025-04-29 NOTE — TELEPHONE ENCOUNTER
----- Message from Clay sent at 4/29/2025  1:55 PM CDT -----  Consult/AdvisoryName Of Caller:Briana Contact Preference:[phone] of call: Pt was getting ready to come to her appt and the lift broke they are asking if the appt can be virtual please call to assist

## 2025-06-11 ENCOUNTER — OFFICE VISIT (OUTPATIENT)
Dept: FAMILY MEDICINE | Facility: CLINIC | Age: 50
End: 2025-06-11
Payer: MEDICARE

## 2025-06-11 DIAGNOSIS — E66.2 OBESITY HYPOVENTILATION SYNDROME: Primary | ICD-10-CM

## 2025-06-11 DIAGNOSIS — J96.12 CHRONIC RESPIRATORY FAILURE WITH HYPOXIA AND HYPERCAPNIA: ICD-10-CM

## 2025-06-11 DIAGNOSIS — F11.20 CONTINUOUS OPIOID DEPENDENCE: ICD-10-CM

## 2025-06-11 DIAGNOSIS — J96.11 CHRONIC RESPIRATORY FAILURE WITH HYPOXIA AND HYPERCAPNIA: ICD-10-CM

## 2025-06-11 RX ORDER — HYDROCODONE BITARTRATE AND ACETAMINOPHEN 5; 325 MG/1; MG/1
1 TABLET ORAL EVERY 8 HOURS PRN
Qty: 90 TABLET | Refills: 0 | Status: SHIPPED | OUTPATIENT
Start: 2025-06-11 | End: 2025-09-09

## 2025-06-11 RX ORDER — HYDROCODONE BITARTRATE AND ACETAMINOPHEN 5; 325 MG/1; MG/1
1 TABLET ORAL EVERY 8 HOURS PRN
Qty: 90 TABLET | Refills: 0 | Status: SHIPPED | OUTPATIENT
Start: 2025-06-11

## 2025-06-11 NOTE — PROGRESS NOTES
The patient location is:  [place]  The chief complaint leading to consultation is: Checkup  Visit type: Virtual visit with synchronous audio and video  Total time spent with patient:  Less than 30 minutes  Each patient to whom he or she provides medical services by telemedicine is:  (1) informed of the relationship between the physician and patient and the respective role of any other health care provider with respect to management of the patient; and (2) notified that he or she may decline to receive medical services by telemedicine and may withdraw from such care at any time. Vital signs recorded were provided by the patient.    Notes:  See below    Subjective:   Patient ID: Briana Uriarte is a 49 y.o. female     Chief Complaint: Checkup    Here for checkup      Review of Systems   Respiratory:  Negative for shortness of breath.    Cardiovascular:  Negative for chest pain.   Gastrointestinal:  Negative for abdominal pain.   Genitourinary:  Negative for dysuria.     Past Medical History:   Diagnosis Date    Chronic hypercapnic respiratory failure     Chronic kidney disease     kidney stones    Diabetes mellitus, type 2     Encounter for blood transfusion     Esotropia     GERD (gastroesophageal reflux disease)     Hydrocephalus     Hypertension     Morbid obesity     Obesity hypoventilation syndrome     On home oxygen therapy     Paralysis     Psoriasis     Scoliosis     Spinal bifida, closed      Past Surgical History:   Procedure Laterality Date    BRAIN SURGERY       shunt revision    CHOLECYSTECTOMY      CYSTOSCOPY W/ LASER LITHOTRIPSY      CYSTOURETEROSCOPY, WITH HOLMIUM LASER LITHOTRIPSY OF URETERAL CALCULUS AND STENT INSERTION Right 9/15/2023    Procedure: CYSTOURETEROSCOPY, WITH HOLMIUM LASER LITHOTRIPSY OF URETERAL CALCULUS AND STENT INSERTION;  Surgeon: Siobhan Phillips MD;  Location: [facility];  Service: Urology;  Laterality: Right;    REPAIR OF INCARCERATED UMBILICAL HERNIA N/A 3/8/2019     Procedure: REPAIR, HERNIA, UMBILICAL, INCARCERATED, AGE 5 YEARS OR OLDER;  Surgeon: Ziyad Hancock MD;  Location: [facility] OR;  Service: General;  Laterality: N/A;    SPINE SURGERY       Objective:   There were no vitals filed for this visit.  There is no height or weight on file to calculate BMI.  Physical Exam  Vitals and nursing note reviewed.   Constitutional:       Appearance: She is well-developed.   HENT:      Head: Normocephalic and atraumatic.   Eyes:      General: No scleral icterus.     Conjunctiva/sclera: Conjunctivae normal.   Pulmonary:      Effort: Pulmonary effort is normal. No respiratory distress.   Musculoskeletal:         General: No deformity. Normal range of motion.      Cervical back: Normal range of motion and neck supple.   Skin:     Coloration: Skin is not pale.      Findings: No rash.   Neurological:      Mental Status: She is alert and oriented to person, place, and time.   Psychiatric:         Behavior: Behavior normal.         Thought Content: Thought content normal.         Judgment: Judgment normal.       Assessment:     1. Obesity hypoventilation syndrome    2. Chronic respiratory failure with hypoxia and hypercapnia    3. Continuous opioid dependence      Plan:   Obesity hypoventilation syndrome  -     Ambulatory referral/consult to Pulmonology; Future; Expected date: 06/18/2025    Chronic respiratory failure with hypoxia and hypercapnia  -     Ambulatory referral/consult to Pulmonology; Future; Expected date: 06/18/2025    Continuous opioid dependence  -     HYDROcodone-acetaminophen (NORCO) 5-325 mg per tablet; Take 1 tablet by mouth every 8 (eight) hours as needed for Pain.  Dispense: 90 tablet; Refill: 0  -     HYDROcodone-acetaminophen (NORCO) 5-325 mg per tablet; Take 1 tablet by mouth every 8 (eight) hours as needed for Pain. Do not fill until 8/11/25  Dispense: 90 tablet; Refill: 0  -     HYDROcodone-acetaminophen (NORCO) 5-325 mg per tablet; Take 1 tablet by mouth every  8 (eight) hours as needed for Pain. Do not fill unti 7/11/25  Dispense: 90 tablet; Refill: 0      Chronic condition not otherwise mentioned is stable      Total time spent of Less than 30 minutes minutes on the day of the visit.This includes face to face time and preparing to see the patient, obtaining and reviewing separately obtained history, documenting clinical information in the electronic or other health record, independently interpreting results and communicating results to the patient/family/caregiver, or care coordinator.    Established patient with me has been instructed that must see me at least 1 time yearly (every 365 days) for refills of medications. Seeing other providers in this clinic is fine but expectation is to see me yearly.    Jose De Jesus Curran MD  06/11/2025    Portions of this note have been dictated with DILLON Spear

## 2025-08-05 ENCOUNTER — OFFICE VISIT (OUTPATIENT)
Dept: PULMONOLOGY | Facility: CLINIC | Age: 50
End: 2025-08-05
Payer: MEDICARE

## 2025-08-05 VITALS
WEIGHT: 265 LBS | SYSTOLIC BLOOD PRESSURE: 146 MMHG | HEIGHT: 58 IN | DIASTOLIC BLOOD PRESSURE: 79 MMHG | HEART RATE: 98 BPM | OXYGEN SATURATION: 96 % | BODY MASS INDEX: 55.62 KG/M2

## 2025-08-05 DIAGNOSIS — J96.12 CHRONIC RESPIRATORY FAILURE WITH HYPOXIA AND HYPERCAPNIA: ICD-10-CM

## 2025-08-05 DIAGNOSIS — E66.2 OBESITY HYPOVENTILATION SYNDROME: ICD-10-CM

## 2025-08-05 DIAGNOSIS — I87.2 PERIPHERAL VENOUS INSUFFICIENCY: ICD-10-CM

## 2025-08-05 DIAGNOSIS — G47.33 OBSTRUCTIVE SLEEP APNEA: ICD-10-CM

## 2025-08-05 DIAGNOSIS — R60.9 EDEMA, UNSPECIFIED TYPE: Primary | ICD-10-CM

## 2025-08-05 DIAGNOSIS — J96.11 CHRONIC RESPIRATORY FAILURE WITH HYPOXIA AND HYPERCAPNIA: ICD-10-CM

## 2025-08-05 PROCEDURE — 99999 PR PBB SHADOW E&M-EST. PATIENT-LVL IV: CPT | Mod: PBBFAC,,, | Performed by: INTERNAL MEDICINE

## 2025-08-05 PROCEDURE — 99214 OFFICE O/P EST MOD 30 MIN: CPT | Mod: PBBFAC,PO | Performed by: INTERNAL MEDICINE

## 2025-08-05 PROCEDURE — 99203 OFFICE O/P NEW LOW 30 MIN: CPT | Mod: S$PBB,,, | Performed by: INTERNAL MEDICINE

## 2025-08-05 RX ORDER — TORSEMIDE 20 MG/1
40 TABLET ORAL DAILY
Qty: 180 TABLET | Refills: 3 | Status: SHIPPED | OUTPATIENT
Start: 2025-08-05 | End: 2026-08-05

## 2025-08-05 NOTE — PATIENT INSTRUCTIONS
Will ask staff to check on compliance report on bipap.  [employer] was supplier.  Could try turning down bipap and eventually do sleep study to get to bipap.    Could arrange in lab sleep study -- to get to bipap.  Narotics likey played role with respiratory failure in 2021.  Edema still excessive and ehco was not done and chest xray in 4/2021 suggested excess fluid    Will order in lab sleep study.  Cpap may be needed.    Use demadex in place of lasix/furosemide-- use 2 daily -- decrease to one if fluid minimal    Check screen for heart and blood clot , along with potassium/kidney function in 1 week.    With legs swelling -- if ddimer elevated would check legs again for clot..    Cpap may be covered better than bipap..

## 2025-08-05 NOTE — PROGRESS NOTES
8/5/2025    Briana Uriarte       Chief Complaint   Patient presents with    chronic respiratory failure    obesity hypoventilation syndrome       HPI:     8/5/2025 pt had had excess sleepiness around 4/2021 ppt er eval.   Latest Reference Range & Units 04/07/21 08:16   POC PH 7.35 - 7.45  7.258 (LL)   (LL): Data is critically low    Pt had diffuse haze seen on admit cxr than subsequently cleared.  Pt was placed on bpap and has continued bipap since-- no sleep study and cost bipap excessive...  pt has been compliant with bpap -- parents, caregivers, feel bipap may not be needed.  Pt's sister , a patient here, uses apaps.  Father uses cpap.   Pt did become much more bright alert on bipap -- also noted not to sleep much at all.   Pt was on norco 7.5  120/m around time of 4/2021 admit, now down to 90 of 5 norco.  Pt talks min.                      May  9 2019- had  Hernia corrected.  No major problems.  Lungs doing well  Patient Instructions   Lungs have been stable- would continue trelegy/singulair,  Use as needed albuterol.    If  Right leg develops pain or worse swelling - would recommend another leg test.    Could have Dr Osei write for  Needed  Breathing medications, call as needed  July 16, , 2018- got distended with hosp eval for 1 day then returned for 3 days.  Uses trelegy with occ albuterol use.  No major wheezes - no snoring now.  Patient Instructions   Will need to verify good 02 levels for sleep.     Lung capacity is about 50% - part from body size.    Asthma - copd concerns should do better with regular Trelegy.     May 2, 2018no  asthma and wheelchair bound, no h/o pe.  Has swollen legs - no dvt.  No renal dz, chr suprapubic catheter, h/o loss  renal infection and cyst evolved and stones and only right works now.      Pt has occ wheezes.      Snores somewhat- more puffs.  Use nebulizer helps a little. No pft.    No pft nor sleep study.                  The chief compliant  problem is new to  "me",   PFSH:  Past Medical History:   Diagnosis Date    Chronic hypercapnic respiratory failure     Chronic kidney disease     kidney stones    Diabetes mellitus, type 2     Encounter for blood transfusion     Esotropia     GERD (gastroesophageal reflux disease)     Hydrocephalus     Hypertension     Morbid obesity     Obesity hypoventilation syndrome     On home oxygen therapy     Paralysis     Psoriasis     Scoliosis     Spinal bifida, closed          Past Surgical History:   Procedure Laterality Date    BRAIN SURGERY       shunt revision    CHOLECYSTECTOMY      CYSTOSCOPY W/ LASER LITHOTRIPSY      CYSTOURETEROSCOPY, WITH HOLMIUM LASER LITHOTRIPSY OF URETERAL CALCULUS AND STENT INSERTION Right 9/15/2023    Procedure: CYSTOURETEROSCOPY, WITH HOLMIUM LASER LITHOTRIPSY OF URETERAL CALCULUS AND STENT INSERTION;  Surgeon: Siobhan Phillips MD;  Location: [facility] OR;  Service: Urology;  Laterality: Right;    REPAIR OF INCARCERATED UMBILICAL HERNIA N/A 3/8/2019    Procedure: REPAIR, HERNIA, UMBILICAL, INCARCERATED, AGE 5 YEARS OR OLDER;  Surgeon: Ziyad Hancock MD;  Location: [facility] OR;  Service: General;  Laterality: N/A;    SPINE SURGERY       Social History     Tobacco Use    Smoking status: Never    Smokeless tobacco: Never   Substance Use Topics    Alcohol use: No    Drug use: Yes     Types: Hydrocodone     Family History   Problem Relation Name Age of Onset    Cancer Mother      Glaucoma Father      Psoriasis Maternal Uncle      Macular degeneration Maternal Grandmother      Heart disease Maternal Grandmother      Cancer Maternal Grandmother      Glaucoma Paternal Grandfather      Melanoma Neg Hx      Lupus Neg Hx      Eczema Neg Hx       Review of patient's allergies indicates:   Allergen Reactions    Cefepime      HIVES    Latex Swelling       Performance Status:The patient's activity level is bedbound.      Review of Systems:  a review of eleven systems covering constitutional, Eye, HEENT, Psych, " "Respiratory, Cardiac, GI, , Musculoskeletal, Endocrine, Dermatologic was negative except for pertinent findings as listed ABOVE and below:   pertinent positive as above, rest is good       Exam:Comprehensive exam done. BP (!) 146/79 (BP Location: Right forearm, Patient Position: Sitting)   Pulse 98   Ht 4' 10" (1.473 m)   Wt 120.2 kg (264 lb 15.9 oz)   SpO2 96% Comment: on room air at rest  BMI 55.38 kg/m²   Exam included Vitals as listed, and patient's appearance and affect and alertness and mood, oral exam for yeast and hygiene and pharynx lesions and Mallapatti (M) score, neck with inspection for jvd and masses and thyroid abnormalities and lymph nodes (supraclavicular and infraclavicular nodes and axillary also examined and noted if abn), chest exam included symmetry and effort and fremitus and percussion and auscultation, cardiac exam included rhythm and gallops and murmur and rubs and jvd and edema, abdominal exam for mass and hepatosplenomegaly and tenderness and hernias and bowel sounds, Musculoskeletal exam with muscle tone and posture and mobility/gait and  strength, and skin for rashes and cyanosis and pallor and turgor, extremity for clubbing.  Findings were normal except for pertinent findings listed below:  MOrbid obese, M1, right >> left leg edema,  shunt apparent in neck, chest is symmetric, no distress, normal percussion, normal fremitus and good normal breath sounds  RRR, no hs megaly nor mass nor clubbing, right leg >> left witht edema- 2018 u/s neg for  clott    Radiographs (ct chest and cxr) reviewed: view by direct vision  2015 ct chest tb [place] and retained secretions ll    Ct abd  3/12/19 good lower lungs    Labs reviewed       PFT   Pulmonary Functions, including spirometry and bronchodilator response and lung volumes  , study was done May  8, 2018.  Spirometry shows loss of vital capacity and fev1 with no obstruction and no bronchodilator response.   FEV1 is 45% or 1.24 " liters.  Lung volumes show  loss of TLC with restriction 51%.     Pulmonary functions show restriction. Clinical correlation recommended.     Ramirez Corrigan M.D.     Plan:  Clinical impression is apparently straight forward and impression with management as below.    Briana was seen today for chronic respiratory failure and obesity hypoventilation syndrome.    Diagnoses and all orders for this visit:    Edema, unspecified type  -     NT-Pro Natriuretic Peptide; Future  -     Basic Metabolic Panel; Future  -     D-Dimer, Quantitative; Future  -     torsemide (DEMADEX) 20 MG Tab; Take 2 tablets (40 mg total) by mouth once daily.    Obesity hypoventilation syndrome  -     Ambulatory referral/consult to Pulmonology  -     Polysomnogram (CPAP will be added if patient meets diagnostic criteria.); Future    Chronic respiratory failure with hypoxia and hypercapnia  -     Ambulatory referral/consult to Pulmonology    Peripheral venous insufficiency    Obstructive sleep apnea  -     Polysomnogram (CPAP will be added if patient meets diagnostic criteria.); Future        Follow up in about 6 weeks (around 9/16/2025), or if symptoms worsen or fail to improve.    Discussed with patient above for education the following:      Patient Instructions   Will ask staff to check on compliance report on bipap.  [employer] was supplier.  Could try turning down bipap and eventually do sleep study to get to bipap.    Could arrange in lab sleep study -- to get to bipap.  Narotics likey played role with respiratory failure in 2021.  Edema still excessive and ehco was not done and chest xray in 4/2021 suggested excess fluid    Will order in lab sleep study.  Cpap may be needed.    Use demadex in place of lasix/furosemide-- use 2 daily -- decrease to one if fluid minimal    Check screen for heart and blood clot , along with potassium/kidney function in 1 week.    With legs swelling -- if ddimer elevated would check legs  again for clot..    Cpap may be covered better than bipap..          Eval took 43 min

## 2025-08-13 ENCOUNTER — LAB VISIT (OUTPATIENT)
Dept: LAB | Facility: HOSPITAL | Age: 50
End: 2025-08-13
Attending: INTERNAL MEDICINE
Payer: MEDICARE

## 2025-08-13 DIAGNOSIS — R60.9 EDEMA, UNSPECIFIED TYPE: ICD-10-CM

## 2025-08-13 LAB
ANION GAP (OHS): 17 MMOL/L (ref 8–16)
BUN SERPL-MCNC: 17 MG/DL (ref 6–20)
CALCIUM SERPL-MCNC: 9 MG/DL (ref 8.7–10.5)
CHLORIDE SERPL-SCNC: 98 MMOL/L (ref 95–110)
CO2 SERPL-SCNC: 24 MMOL/L (ref 23–29)
CREAT SERPL-MCNC: 0.8 MG/DL (ref 0.5–1.4)
GFR SERPLBLD CREATININE-BSD FMLA CKD-EPI: >60 ML/MIN/1.73/M2
GLUCOSE SERPL-MCNC: 93 MG/DL (ref 70–110)
NT-PROBNP SERPL-MCNC: 94 PG/ML
POTASSIUM SERPL-SCNC: 3.2 MMOL/L (ref 3.5–5.1)
SODIUM SERPL-SCNC: 139 MMOL/L (ref 136–145)

## 2025-08-13 PROCEDURE — 80048 BASIC METABOLIC PNL TOTAL CA: CPT

## 2025-08-13 PROCEDURE — 36415 COLL VENOUS BLD VENIPUNCTURE: CPT | Mod: PO

## 2025-08-13 PROCEDURE — 83880 ASSAY OF NATRIURETIC PEPTIDE: CPT

## 2025-08-13 PROCEDURE — 85379 FIBRIN DEGRADATION QUANT: CPT

## 2025-08-14 LAB — D DIMER PPP IA.FEU-MCNC: 1.41 MG/L FEU

## 2025-08-19 ENCOUNTER — HOSPITAL ENCOUNTER (OUTPATIENT)
Dept: RADIOLOGY | Facility: HOSPITAL | Age: 50
Discharge: HOME | End: 2025-08-19
Attending: INTERNAL MEDICINE
Payer: MEDICARE

## 2025-08-19 DIAGNOSIS — R79.89 ELEVATED D-DIMER: ICD-10-CM

## 2025-08-19 DIAGNOSIS — R60.0 BILATERAL LEG EDEMA: ICD-10-CM

## 2025-08-19 PROCEDURE — 93970 EXTREMITY STUDY: CPT | Mod: TC

## 2025-08-19 PROCEDURE — 93970 EXTREMITY STUDY: CPT | Mod: 26,,, | Performed by: RADIOLOGY

## 2025-08-23 ENCOUNTER — PATIENT MESSAGE (OUTPATIENT)
Dept: PULMONOLOGY | Facility: CLINIC | Age: 50
End: 2025-08-23
Payer: MEDICARE

## 2025-09-03 ENCOUNTER — PROCEDURE VISIT (OUTPATIENT)
Dept: SLEEP MEDICINE | Facility: HOSPITAL | Age: 50
End: 2025-09-03
Attending: INTERNAL MEDICINE
Payer: MEDICARE

## 2025-09-03 DIAGNOSIS — G47.33 OBSTRUCTIVE SLEEP APNEA: ICD-10-CM

## 2025-09-03 DIAGNOSIS — E66.2 OBESITY HYPOVENTILATION SYNDROME: ICD-10-CM

## 2025-09-03 PROCEDURE — 95810 POLYSOM 6/> YRS 4/> PARAM: CPT

## (undated) DEVICE — GOWN POLY REINF BRTH SLV XL

## (undated) DEVICE — DRAPE ABDOMINAL TIBURON 14X11

## (undated) DEVICE — GLOVE PROTEXIS PI CLASSIC 7.5

## (undated) DEVICE — SCRUB DYNA-HEX LIQ 4% CHG 4OZ

## (undated) DEVICE — SLEEVE SCD EXPRESS CALF MEDIUM

## (undated) DEVICE — BAG LINGEMAN DRAIN UROLOGY

## (undated) DEVICE — COVER TABLE 44X90 STERILE

## (undated) DEVICE — SEE MEDLINE ITEM 152622

## (undated) DEVICE — FIBER QUANTA OPT SDT 272UM

## (undated) DEVICE — SPONGE BULKEE II ABSRB 6X6.75

## (undated) DEVICE — GOWN POLY REINF BRTH SLV LG

## (undated) DEVICE — DRAPE FLUID WARMER ORS 44X44IN

## (undated) DEVICE — GUIDE WIRE MOTION .035 X 150CM

## (undated) DEVICE — SOL NACL IRR 1000ML BTL

## (undated) DEVICE — SLEEVE SCD EXPRESS KNEE MEDIUM

## (undated) DEVICE — BOWL STERILE LARGE 32OZ

## (undated) DEVICE — BLADE ELECTRO EXTENDED.

## (undated) DEVICE — JELLY SURGILUBE LUBE TUBE 2OZ

## (undated) DEVICE — SEE MEDLINE ITEM 157116

## (undated) DEVICE — SOL 9P NACL IRR PIC IL

## (undated) DEVICE — SET IRR URLGY 2LINE UNIV SPIKE

## (undated) DEVICE — PAD K-THERMIA 24IN X 60IN

## (undated) DEVICE — GLOVE SURGEONS ULTRA TOUCH 6.5

## (undated) DEVICE — SUT 1 48IN PDS II VIO MONO

## (undated) DEVICE — ELECTRODE REM PLYHSV RETURN 9

## (undated) DEVICE — URETERO-RENOSCOPE FLEX-X POS

## (undated) DEVICE — PACK CUSTOM UNIV BASIN SLI

## (undated) DEVICE — PACK BASIC

## (undated) DEVICE — STAPLER SKIN ROTATING HEAD

## (undated) DEVICE — SEE MEDLINE ITEM 157117

## (undated) DEVICE — LINER SUCTION 3000CC

## (undated) DEVICE — SUT SA85H SILK 2-0

## (undated) DEVICE — LEGGING CLEAR POLY 2/PACK

## (undated) DEVICE — DRAPE CYSTOSCOPY LARGE

## (undated) DEVICE — GUIDEWIRE AMPLATZ .035X145CM

## (undated) DEVICE — SUT SILK 2-0 SH 18IN BLACK

## (undated) DEVICE — SPONGE LAP 18X18 PREWASHED

## (undated) DEVICE — STONE EXTRAC N-COMPASS NITINOL

## (undated) DEVICE — CONTAINER SPECIMEN OR STER 4OZ

## (undated) DEVICE — DRESSING ABSRBNT ISLAND 3.6X8

## (undated) DEVICE — SOL NACL IRR 3000ML

## (undated) DEVICE — SYR ONLY LUER LOCK 20CC

## (undated) DEVICE — STRAP OR TABLE 5IN X 72IN

## (undated) DEVICE — SHEATH FLEXOR URET 10.7FRX35CM

## (undated) DEVICE — APPLICATOR CHLORAPREP ORN 26ML